# Patient Record
Sex: MALE | Race: WHITE | NOT HISPANIC OR LATINO | Employment: OTHER | URBAN - METROPOLITAN AREA
[De-identification: names, ages, dates, MRNs, and addresses within clinical notes are randomized per-mention and may not be internally consistent; named-entity substitution may affect disease eponyms.]

---

## 2017-01-04 ENCOUNTER — AMBULATORY - HEALTHEAST (OUTPATIENT)
Dept: LAB | Facility: CLINIC | Age: 52
End: 2017-01-04

## 2017-01-04 DIAGNOSIS — E11.9 TYPE 2 DIABETES MELLITUS WITHOUT COMPLICATION, WITHOUT LONG-TERM CURRENT USE OF INSULIN (H): ICD-10-CM

## 2017-01-04 LAB
CHOLEST SERPL-MCNC: 111 MG/DL
FASTING STATUS PATIENT QL REPORTED: YES
HDLC SERPL-MCNC: 32 MG/DL
LDLC SERPL CALC-MCNC: 61 MG/DL
TRIGL SERPL-MCNC: 92 MG/DL

## 2017-01-05 ENCOUNTER — COMMUNICATION - HEALTHEAST (OUTPATIENT)
Dept: FAMILY MEDICINE | Facility: CLINIC | Age: 52
End: 2017-01-05

## 2017-01-11 ENCOUNTER — OFFICE VISIT - HEALTHEAST (OUTPATIENT)
Dept: NURSING | Facility: CLINIC | Age: 52
End: 2017-01-11

## 2017-01-11 ENCOUNTER — OFFICE VISIT - HEALTHEAST (OUTPATIENT)
Dept: FAMILY MEDICINE | Facility: CLINIC | Age: 52
End: 2017-01-11

## 2017-01-11 DIAGNOSIS — R80.9 PROTEINURIA: ICD-10-CM

## 2017-01-11 DIAGNOSIS — R79.89 ELEVATED LFTS: ICD-10-CM

## 2017-01-11 DIAGNOSIS — E66.3 OVERWEIGHT: ICD-10-CM

## 2017-01-11 DIAGNOSIS — E11.9 TYPE 2 DIABETES MELLITUS WITHOUT COMPLICATION, WITHOUT LONG-TERM CURRENT USE OF INSULIN (H): ICD-10-CM

## 2017-01-11 DIAGNOSIS — E78.00 PURE HYPERCHOLESTEROLEMIA: ICD-10-CM

## 2017-01-25 ENCOUNTER — OFFICE VISIT - HEALTHEAST (OUTPATIENT)
Dept: SLEEP MEDICINE | Facility: CLINIC | Age: 52
End: 2017-01-25

## 2017-01-25 DIAGNOSIS — G47.33 OSA ON CPAP: ICD-10-CM

## 2017-01-25 ASSESSMENT — MIFFLIN-ST. JEOR: SCORE: 1992.46

## 2017-01-27 ENCOUNTER — AMBULATORY - HEALTHEAST (OUTPATIENT)
Dept: SLEEP MEDICINE | Facility: CLINIC | Age: 52
End: 2017-01-27

## 2017-02-01 ENCOUNTER — AMBULATORY - HEALTHEAST (OUTPATIENT)
Dept: FAMILY MEDICINE | Facility: CLINIC | Age: 52
End: 2017-02-01

## 2017-02-01 DIAGNOSIS — Z71.84 TRAVEL ADVICE ENCOUNTER: ICD-10-CM

## 2017-02-04 ENCOUNTER — COMMUNICATION - HEALTHEAST (OUTPATIENT)
Dept: FAMILY MEDICINE | Facility: CLINIC | Age: 52
End: 2017-02-04

## 2017-02-04 DIAGNOSIS — I10 HTN (HYPERTENSION): ICD-10-CM

## 2017-02-09 ENCOUNTER — RECORDS - HEALTHEAST (OUTPATIENT)
Dept: ADMINISTRATIVE | Facility: OTHER | Age: 52
End: 2017-02-09

## 2017-02-15 ENCOUNTER — AMBULATORY - HEALTHEAST (OUTPATIENT)
Dept: LAB | Facility: CLINIC | Age: 52
End: 2017-02-15

## 2017-02-15 DIAGNOSIS — E11.9 TYPE 2 DIABETES MELLITUS WITHOUT COMPLICATION, WITHOUT LONG-TERM CURRENT USE OF INSULIN (H): ICD-10-CM

## 2017-02-15 DIAGNOSIS — R80.9 PROTEINURIA: ICD-10-CM

## 2017-02-15 LAB — HBA1C MFR BLD: 4.9 % (ref 3.5–6)

## 2017-03-06 ENCOUNTER — COMMUNICATION - HEALTHEAST (OUTPATIENT)
Dept: SLEEP MEDICINE | Facility: CLINIC | Age: 52
End: 2017-03-06

## 2017-04-26 ENCOUNTER — OFFICE VISIT - HEALTHEAST (OUTPATIENT)
Dept: FAMILY MEDICINE | Facility: CLINIC | Age: 52
End: 2017-04-26

## 2017-04-26 DIAGNOSIS — E11.9 TYPE 2 DIABETES MELLITUS WITHOUT COMPLICATION, WITHOUT LONG-TERM CURRENT USE OF INSULIN (H): ICD-10-CM

## 2017-04-26 DIAGNOSIS — Z71.84 TRAVEL ADVICE ENCOUNTER: ICD-10-CM

## 2017-05-03 ENCOUNTER — AMBULATORY - HEALTHEAST (OUTPATIENT)
Dept: FAMILY MEDICINE | Facility: CLINIC | Age: 52
End: 2017-05-03

## 2017-05-03 ENCOUNTER — COMMUNICATION - HEALTHEAST (OUTPATIENT)
Dept: FAMILY MEDICINE | Facility: CLINIC | Age: 52
End: 2017-05-03

## 2017-05-15 ENCOUNTER — COMMUNICATION - HEALTHEAST (OUTPATIENT)
Dept: FAMILY MEDICINE | Facility: CLINIC | Age: 52
End: 2017-05-15

## 2017-05-15 DIAGNOSIS — J30.9 ALLERGIC RHINITIS: ICD-10-CM

## 2017-06-29 ENCOUNTER — AMBULATORY - HEALTHEAST (OUTPATIENT)
Dept: NURSING | Facility: CLINIC | Age: 52
End: 2017-06-29

## 2017-08-22 ENCOUNTER — AMBULATORY - HEALTHEAST (OUTPATIENT)
Dept: LAB | Facility: CLINIC | Age: 52
End: 2017-08-22

## 2017-08-22 DIAGNOSIS — Z71.84 TRAVEL ADVICE ENCOUNTER: ICD-10-CM

## 2017-08-22 DIAGNOSIS — E11.9 TYPE 2 DIABETES MELLITUS WITHOUT COMPLICATION, WITHOUT LONG-TERM CURRENT USE OF INSULIN (H): ICD-10-CM

## 2017-08-22 LAB
CHOLEST SERPL-MCNC: 114 MG/DL
FASTING STATUS PATIENT QL REPORTED: YES
HBA1C MFR BLD: 4.7 % (ref 3.5–6)
HDLC SERPL-MCNC: 35 MG/DL
LDLC SERPL CALC-MCNC: 63 MG/DL
TRIGL SERPL-MCNC: 78 MG/DL

## 2017-08-23 ENCOUNTER — COMMUNICATION - HEALTHEAST (OUTPATIENT)
Dept: FAMILY MEDICINE | Facility: CLINIC | Age: 52
End: 2017-08-23

## 2017-08-25 ENCOUNTER — OFFICE VISIT - HEALTHEAST (OUTPATIENT)
Dept: FAMILY MEDICINE | Facility: CLINIC | Age: 52
End: 2017-08-25

## 2017-08-25 DIAGNOSIS — F32.A DEPRESSION: ICD-10-CM

## 2017-08-25 DIAGNOSIS — E11.9 TYPE 2 DIABETES MELLITUS WITHOUT COMPLICATION, WITHOUT LONG-TERM CURRENT USE OF INSULIN (H): ICD-10-CM

## 2017-08-25 DIAGNOSIS — E78.00 PURE HYPERCHOLESTEROLEMIA: ICD-10-CM

## 2017-08-25 DIAGNOSIS — G47.30 SLEEP APNEA, UNSPECIFIED TYPE: ICD-10-CM

## 2017-08-25 DIAGNOSIS — M25.522 LEFT ELBOW PAIN: ICD-10-CM

## 2017-09-01 ENCOUNTER — RECORDS - HEALTHEAST (OUTPATIENT)
Dept: ADMINISTRATIVE | Facility: OTHER | Age: 52
End: 2017-09-01

## 2017-10-24 ENCOUNTER — COMMUNICATION - HEALTHEAST (OUTPATIENT)
Dept: NURSING | Facility: CLINIC | Age: 52
End: 2017-10-24

## 2017-10-24 DIAGNOSIS — E11.9 TYPE 2 DIABETES MELLITUS WITHOUT COMPLICATION, WITHOUT LONG-TERM CURRENT USE OF INSULIN (H): ICD-10-CM

## 2017-10-31 ENCOUNTER — AMBULATORY - HEALTHEAST (OUTPATIENT)
Dept: NURSING | Facility: CLINIC | Age: 52
End: 2017-10-31

## 2017-11-08 ENCOUNTER — COMMUNICATION - HEALTHEAST (OUTPATIENT)
Dept: NURSING | Facility: CLINIC | Age: 52
End: 2017-11-08

## 2017-11-08 DIAGNOSIS — E78.00 PURE HYPERCHOLESTEROLEMIA: ICD-10-CM

## 2017-12-14 ENCOUNTER — COMMUNICATION - HEALTHEAST (OUTPATIENT)
Dept: NURSING | Facility: CLINIC | Age: 52
End: 2017-12-14

## 2017-12-14 ENCOUNTER — COMMUNICATION - HEALTHEAST (OUTPATIENT)
Dept: FAMILY MEDICINE | Facility: CLINIC | Age: 52
End: 2017-12-14

## 2017-12-14 DIAGNOSIS — I10 HTN (HYPERTENSION): ICD-10-CM

## 2017-12-14 DIAGNOSIS — E11.9 TYPE 2 DIABETES MELLITUS WITHOUT COMPLICATION, WITHOUT LONG-TERM CURRENT USE OF INSULIN (H): ICD-10-CM

## 2018-01-28 ENCOUNTER — COMMUNICATION - HEALTHEAST (OUTPATIENT)
Dept: NURSING | Facility: CLINIC | Age: 53
End: 2018-01-28

## 2018-01-28 DIAGNOSIS — I10 HTN (HYPERTENSION): ICD-10-CM

## 2018-01-28 DIAGNOSIS — E11.9 TYPE 2 DIABETES MELLITUS WITHOUT COMPLICATION, WITHOUT LONG-TERM CURRENT USE OF INSULIN (H): ICD-10-CM

## 2018-02-11 ENCOUNTER — COMMUNICATION - HEALTHEAST (OUTPATIENT)
Dept: FAMILY MEDICINE | Facility: CLINIC | Age: 53
End: 2018-02-11

## 2018-02-11 DIAGNOSIS — E11.9 TYPE 2 DIABETES MELLITUS WITHOUT COMPLICATION, WITHOUT LONG-TERM CURRENT USE OF INSULIN (H): ICD-10-CM

## 2018-02-11 DIAGNOSIS — E78.00 PURE HYPERCHOLESTEROLEMIA: ICD-10-CM

## 2018-04-30 ENCOUNTER — COMMUNICATION - HEALTHEAST (OUTPATIENT)
Dept: NURSING | Facility: CLINIC | Age: 53
End: 2018-04-30

## 2018-04-30 DIAGNOSIS — E11.9 TYPE 2 DIABETES MELLITUS WITHOUT COMPLICATION, WITHOUT LONG-TERM CURRENT USE OF INSULIN (H): ICD-10-CM

## 2018-05-08 ENCOUNTER — COMMUNICATION - HEALTHEAST (OUTPATIENT)
Dept: FAMILY MEDICINE | Facility: CLINIC | Age: 53
End: 2018-05-08

## 2018-05-08 DIAGNOSIS — J30.9 ALLERGIC RHINITIS: ICD-10-CM

## 2018-05-24 ENCOUNTER — COMMUNICATION - HEALTHEAST (OUTPATIENT)
Dept: NURSING | Facility: CLINIC | Age: 53
End: 2018-05-24

## 2018-05-24 DIAGNOSIS — E11.9 TYPE 2 DIABETES MELLITUS WITHOUT COMPLICATION, WITHOUT LONG-TERM CURRENT USE OF INSULIN (H): ICD-10-CM

## 2018-05-25 ENCOUNTER — COMMUNICATION - HEALTHEAST (OUTPATIENT)
Dept: FAMILY MEDICINE | Facility: CLINIC | Age: 53
End: 2018-05-25

## 2018-05-25 DIAGNOSIS — E11.9 TYPE 2 DIABETES MELLITUS WITHOUT COMPLICATION, WITHOUT LONG-TERM CURRENT USE OF INSULIN (H): ICD-10-CM

## 2018-05-31 ENCOUNTER — OFFICE VISIT - HEALTHEAST (OUTPATIENT)
Dept: FAMILY MEDICINE | Facility: CLINIC | Age: 53
End: 2018-05-31

## 2018-05-31 DIAGNOSIS — G47.30 SLEEP APNEA, UNSPECIFIED TYPE: ICD-10-CM

## 2018-05-31 DIAGNOSIS — I10 ESSENTIAL HYPERTENSION: ICD-10-CM

## 2018-05-31 DIAGNOSIS — E78.00 PURE HYPERCHOLESTEROLEMIA: ICD-10-CM

## 2018-05-31 DIAGNOSIS — E11.9 TYPE 2 DIABETES MELLITUS WITHOUT COMPLICATION, WITHOUT LONG-TERM CURRENT USE OF INSULIN (H): ICD-10-CM

## 2018-05-31 DIAGNOSIS — E66.9 OBESITY: ICD-10-CM

## 2018-05-31 DIAGNOSIS — Z00.00 HEALTH CARE MAINTENANCE: ICD-10-CM

## 2018-05-31 RX ORDER — 1.1% SODIUM FLUORIDE 11 MG/G
GEL DENTAL
Status: SHIPPED | COMMUNITY
Start: 2018-05-29 | End: 2022-12-18

## 2018-06-01 ENCOUNTER — AMBULATORY - HEALTHEAST (OUTPATIENT)
Dept: LAB | Facility: CLINIC | Age: 53
End: 2018-06-01

## 2018-06-01 DIAGNOSIS — I10 ESSENTIAL HYPERTENSION: ICD-10-CM

## 2018-06-01 DIAGNOSIS — E11.9 TYPE 2 DIABETES MELLITUS WITHOUT COMPLICATION, WITHOUT LONG-TERM CURRENT USE OF INSULIN (H): ICD-10-CM

## 2018-06-01 DIAGNOSIS — Z00.00 HEALTH CARE MAINTENANCE: ICD-10-CM

## 2018-06-01 DIAGNOSIS — E78.00 PURE HYPERCHOLESTEROLEMIA: ICD-10-CM

## 2018-06-01 LAB
ALBUMIN SERPL-MCNC: 4.2 G/DL (ref 3.5–5)
ALP SERPL-CCNC: 90 U/L (ref 45–120)
ALT SERPL W P-5'-P-CCNC: 16 U/L (ref 0–45)
ANION GAP SERPL CALCULATED.3IONS-SCNC: 9 MMOL/L (ref 5–18)
AST SERPL W P-5'-P-CCNC: 26 U/L (ref 0–40)
BILIRUB SERPL-MCNC: 1.3 MG/DL (ref 0–1)
BUN SERPL-MCNC: 13 MG/DL (ref 8–22)
CALCIUM SERPL-MCNC: 9.3 MG/DL (ref 8.5–10.5)
CHLORIDE BLD-SCNC: 111 MMOL/L (ref 98–107)
CHOLEST SERPL-MCNC: 146 MG/DL
CO2 SERPL-SCNC: 25 MMOL/L (ref 22–31)
CREAT SERPL-MCNC: 0.87 MG/DL (ref 0.7–1.3)
CREAT UR-MCNC: 110.3 MG/DL
FASTING STATUS PATIENT QL REPORTED: YES
GFR SERPL CREATININE-BSD FRML MDRD: >60 ML/MIN/1.73M2
GLUCOSE BLD-MCNC: 115 MG/DL (ref 70–125)
HBA1C MFR BLD: 5.2 % (ref 3.5–6)
HDLC SERPL-MCNC: 36 MG/DL
LDLC SERPL CALC-MCNC: 91 MG/DL
MICROALBUMIN UR-MCNC: 11.58 MG/DL (ref 0–1.99)
MICROALBUMIN/CREAT UR: 105 MG/G
POTASSIUM BLD-SCNC: 4 MMOL/L (ref 3.5–5)
PROT SERPL-MCNC: 7.1 G/DL (ref 6–8)
PSA SERPL-MCNC: 2.3 NG/ML (ref 0–3.5)
SODIUM SERPL-SCNC: 145 MMOL/L (ref 136–145)
TRIGL SERPL-MCNC: 97 MG/DL
TSH SERPL DL<=0.005 MIU/L-ACNC: 0.63 UIU/ML (ref 0.3–5)

## 2018-06-04 ENCOUNTER — COMMUNICATION - HEALTHEAST (OUTPATIENT)
Dept: FAMILY MEDICINE | Facility: CLINIC | Age: 53
End: 2018-06-04

## 2018-06-04 LAB — 25(OH)D3 SERPL-MCNC: 29.6 NG/ML (ref 30–80)

## 2018-07-23 ENCOUNTER — AMBULATORY - HEALTHEAST (OUTPATIENT)
Dept: FAMILY MEDICINE | Facility: CLINIC | Age: 53
End: 2018-07-23

## 2018-07-23 ENCOUNTER — COMMUNICATION - HEALTHEAST (OUTPATIENT)
Dept: FAMILY MEDICINE | Facility: CLINIC | Age: 53
End: 2018-07-23

## 2018-07-23 DIAGNOSIS — I10 ESSENTIAL HYPERTENSION: ICD-10-CM

## 2018-07-27 ENCOUNTER — COMMUNICATION - HEALTHEAST (OUTPATIENT)
Dept: NURSING | Facility: CLINIC | Age: 53
End: 2018-07-27

## 2018-07-27 DIAGNOSIS — I10 HTN (HYPERTENSION): ICD-10-CM

## 2018-07-30 ENCOUNTER — COMMUNICATION - HEALTHEAST (OUTPATIENT)
Dept: FAMILY MEDICINE | Facility: CLINIC | Age: 53
End: 2018-07-30

## 2018-07-30 DIAGNOSIS — E78.00 PURE HYPERCHOLESTEROLEMIA: ICD-10-CM

## 2018-09-05 ENCOUNTER — OFFICE VISIT - HEALTHEAST (OUTPATIENT)
Dept: FAMILY MEDICINE | Facility: CLINIC | Age: 53
End: 2018-09-05

## 2018-09-05 ENCOUNTER — COMMUNICATION - HEALTHEAST (OUTPATIENT)
Dept: FAMILY MEDICINE | Facility: CLINIC | Age: 53
End: 2018-09-05

## 2018-09-05 DIAGNOSIS — E78.00 PURE HYPERCHOLESTEROLEMIA: ICD-10-CM

## 2018-09-05 DIAGNOSIS — E11.9 TYPE 2 DIABETES MELLITUS WITHOUT COMPLICATION, WITHOUT LONG-TERM CURRENT USE OF INSULIN (H): ICD-10-CM

## 2018-09-05 DIAGNOSIS — S06.9XAA TBI (TRAUMATIC BRAIN INJURY) (H): ICD-10-CM

## 2018-09-05 DIAGNOSIS — G47.30 SLEEP APNEA, UNSPECIFIED TYPE: ICD-10-CM

## 2018-09-05 DIAGNOSIS — E66.9 OBESITY: ICD-10-CM

## 2018-09-05 DIAGNOSIS — I10 ESSENTIAL HYPERTENSION: ICD-10-CM

## 2018-09-05 LAB
ALBUMIN SERPL-MCNC: 4.2 G/DL (ref 3.5–5)
ALP SERPL-CCNC: 83 U/L (ref 45–120)
ALT SERPL W P-5'-P-CCNC: 19 U/L (ref 0–45)
ANION GAP SERPL CALCULATED.3IONS-SCNC: 10 MMOL/L (ref 5–18)
AST SERPL W P-5'-P-CCNC: 22 U/L (ref 0–40)
BILIRUB SERPL-MCNC: 1.5 MG/DL (ref 0–1)
BUN SERPL-MCNC: 15 MG/DL (ref 8–22)
CALCIUM SERPL-MCNC: 9.6 MG/DL (ref 8.5–10.5)
CHLORIDE BLD-SCNC: 108 MMOL/L (ref 98–107)
CHOLEST SERPL-MCNC: 133 MG/DL
CO2 SERPL-SCNC: 28 MMOL/L (ref 22–31)
CREAT SERPL-MCNC: 0.9 MG/DL (ref 0.7–1.3)
CREAT UR-MCNC: 118.9 MG/DL
FASTING STATUS PATIENT QL REPORTED: YES
GFR SERPL CREATININE-BSD FRML MDRD: >60 ML/MIN/1.73M2
GLUCOSE BLD-MCNC: 99 MG/DL (ref 70–125)
HBA1C MFR BLD: 4.9 % (ref 3.5–6)
HDLC SERPL-MCNC: 34 MG/DL
LDLC SERPL CALC-MCNC: 75 MG/DL
MICROALBUMIN UR-MCNC: 11.41 MG/DL (ref 0–1.99)
MICROALBUMIN/CREAT UR: 96 MG/G
POTASSIUM BLD-SCNC: 4.3 MMOL/L (ref 3.5–5)
PROT SERPL-MCNC: 7 G/DL (ref 6–8)
SODIUM SERPL-SCNC: 146 MMOL/L (ref 136–145)
TRIGL SERPL-MCNC: 121 MG/DL

## 2018-09-09 ENCOUNTER — COMMUNICATION - HEALTHEAST (OUTPATIENT)
Dept: FAMILY MEDICINE | Facility: CLINIC | Age: 53
End: 2018-09-09

## 2018-09-09 DIAGNOSIS — I10 HTN (HYPERTENSION): ICD-10-CM

## 2018-10-13 ENCOUNTER — COMMUNICATION - HEALTHEAST (OUTPATIENT)
Dept: FAMILY MEDICINE | Facility: CLINIC | Age: 53
End: 2018-10-13

## 2018-12-05 ENCOUNTER — AMBULATORY - HEALTHEAST (OUTPATIENT)
Dept: FAMILY MEDICINE | Facility: CLINIC | Age: 53
End: 2018-12-05

## 2018-12-05 ENCOUNTER — COMMUNICATION - HEALTHEAST (OUTPATIENT)
Dept: LAB | Facility: CLINIC | Age: 53
End: 2018-12-05

## 2018-12-05 DIAGNOSIS — I10 ESSENTIAL HYPERTENSION: ICD-10-CM

## 2018-12-05 DIAGNOSIS — E11.9 TYPE 2 DIABETES MELLITUS WITHOUT COMPLICATION, WITHOUT LONG-TERM CURRENT USE OF INSULIN (H): ICD-10-CM

## 2018-12-05 DIAGNOSIS — E78.00 PURE HYPERCHOLESTEROLEMIA: ICD-10-CM

## 2018-12-06 ENCOUNTER — AMBULATORY - HEALTHEAST (OUTPATIENT)
Dept: LAB | Facility: CLINIC | Age: 53
End: 2018-12-06

## 2018-12-06 DIAGNOSIS — E78.00 PURE HYPERCHOLESTEROLEMIA: ICD-10-CM

## 2018-12-06 DIAGNOSIS — E11.9 TYPE 2 DIABETES MELLITUS WITHOUT COMPLICATION, WITHOUT LONG-TERM CURRENT USE OF INSULIN (H): ICD-10-CM

## 2018-12-06 DIAGNOSIS — I10 ESSENTIAL HYPERTENSION: ICD-10-CM

## 2018-12-06 LAB
ALBUMIN SERPL-MCNC: 4 G/DL (ref 3.5–5)
ALP SERPL-CCNC: 87 U/L (ref 45–120)
ALT SERPL W P-5'-P-CCNC: 16 U/L (ref 0–45)
ANION GAP SERPL CALCULATED.3IONS-SCNC: 10 MMOL/L (ref 5–18)
AST SERPL W P-5'-P-CCNC: 19 U/L (ref 0–40)
BILIRUB SERPL-MCNC: 1.3 MG/DL (ref 0–1)
BUN SERPL-MCNC: 15 MG/DL (ref 8–22)
CALCIUM SERPL-MCNC: 9.5 MG/DL (ref 8.5–10.5)
CHLORIDE BLD-SCNC: 108 MMOL/L (ref 98–107)
CHOLEST SERPL-MCNC: 133 MG/DL
CO2 SERPL-SCNC: 28 MMOL/L (ref 22–31)
CREAT SERPL-MCNC: 0.98 MG/DL (ref 0.7–1.3)
FASTING STATUS PATIENT QL REPORTED: YES
GFR SERPL CREATININE-BSD FRML MDRD: >60 ML/MIN/1.73M2
GLUCOSE BLD-MCNC: 125 MG/DL (ref 70–125)
HBA1C MFR BLD: 5 % (ref 3.5–6)
HDLC SERPL-MCNC: 37 MG/DL
LDLC SERPL CALC-MCNC: 78 MG/DL
POTASSIUM BLD-SCNC: 3.5 MMOL/L (ref 3.5–5)
PROT SERPL-MCNC: 6.6 G/DL (ref 6–8)
SODIUM SERPL-SCNC: 146 MMOL/L (ref 136–145)
TRIGL SERPL-MCNC: 92 MG/DL

## 2018-12-10 ENCOUNTER — OFFICE VISIT - HEALTHEAST (OUTPATIENT)
Dept: FAMILY MEDICINE | Facility: CLINIC | Age: 53
End: 2018-12-10

## 2018-12-10 DIAGNOSIS — E11.9 TYPE 2 DIABETES MELLITUS WITHOUT COMPLICATION, WITHOUT LONG-TERM CURRENT USE OF INSULIN (H): ICD-10-CM

## 2018-12-10 DIAGNOSIS — E66.01 MORBID OBESITY (H): ICD-10-CM

## 2018-12-10 DIAGNOSIS — I10 ESSENTIAL HYPERTENSION: ICD-10-CM

## 2018-12-10 DIAGNOSIS — E78.00 PURE HYPERCHOLESTEROLEMIA: ICD-10-CM

## 2018-12-18 ENCOUNTER — COMMUNICATION - HEALTHEAST (OUTPATIENT)
Dept: FAMILY MEDICINE | Facility: CLINIC | Age: 53
End: 2018-12-18

## 2018-12-18 DIAGNOSIS — E11.9 TYPE 2 DIABETES MELLITUS WITHOUT COMPLICATION, WITHOUT LONG-TERM CURRENT USE OF INSULIN (H): ICD-10-CM

## 2018-12-31 ENCOUNTER — AMBULATORY - HEALTHEAST (OUTPATIENT)
Dept: NURSING | Facility: CLINIC | Age: 53
End: 2018-12-31

## 2018-12-31 ENCOUNTER — AMBULATORY - HEALTHEAST (OUTPATIENT)
Dept: FAMILY MEDICINE | Facility: CLINIC | Age: 53
End: 2018-12-31

## 2018-12-31 DIAGNOSIS — I10 ESSENTIAL HYPERTENSION: ICD-10-CM

## 2019-01-25 ENCOUNTER — COMMUNICATION - HEALTHEAST (OUTPATIENT)
Dept: FAMILY MEDICINE | Facility: CLINIC | Age: 54
End: 2019-01-25

## 2019-01-25 ENCOUNTER — AMBULATORY - HEALTHEAST (OUTPATIENT)
Dept: FAMILY MEDICINE | Facility: CLINIC | Age: 54
End: 2019-01-25

## 2019-01-25 DIAGNOSIS — I10 ESSENTIAL HYPERTENSION: ICD-10-CM

## 2019-04-23 ENCOUNTER — COMMUNICATION - HEALTHEAST (OUTPATIENT)
Dept: FAMILY MEDICINE | Facility: CLINIC | Age: 54
End: 2019-04-23

## 2019-04-23 DIAGNOSIS — E78.00 PURE HYPERCHOLESTEROLEMIA: ICD-10-CM

## 2019-05-10 ENCOUNTER — AMBULATORY - HEALTHEAST (OUTPATIENT)
Dept: LAB | Facility: CLINIC | Age: 54
End: 2019-05-10

## 2019-05-10 DIAGNOSIS — E78.00 PURE HYPERCHOLESTEROLEMIA: ICD-10-CM

## 2019-05-10 DIAGNOSIS — I10 ESSENTIAL HYPERTENSION: ICD-10-CM

## 2019-05-10 DIAGNOSIS — E11.9 TYPE 2 DIABETES MELLITUS WITHOUT COMPLICATION, WITHOUT LONG-TERM CURRENT USE OF INSULIN (H): ICD-10-CM

## 2019-05-10 LAB
ALBUMIN SERPL-MCNC: 4.2 G/DL (ref 3.5–5)
ALP SERPL-CCNC: 89 U/L (ref 45–120)
ALT SERPL W P-5'-P-CCNC: 18 U/L (ref 0–45)
ANION GAP SERPL CALCULATED.3IONS-SCNC: 10 MMOL/L (ref 5–18)
AST SERPL W P-5'-P-CCNC: 20 U/L (ref 0–40)
BILIRUB SERPL-MCNC: 1.3 MG/DL (ref 0–1)
BUN SERPL-MCNC: 14 MG/DL (ref 8–22)
CALCIUM SERPL-MCNC: 9.4 MG/DL (ref 8.5–10.5)
CHLORIDE BLD-SCNC: 109 MMOL/L (ref 98–107)
CHOLEST SERPL-MCNC: 123 MG/DL
CO2 SERPL-SCNC: 25 MMOL/L (ref 22–31)
CREAT SERPL-MCNC: 0.93 MG/DL (ref 0.7–1.3)
CREAT UR-MCNC: 152.5 MG/DL
FASTING STATUS PATIENT QL REPORTED: YES
GFR SERPL CREATININE-BSD FRML MDRD: >60 ML/MIN/1.73M2
GLUCOSE BLD-MCNC: 141 MG/DL (ref 70–125)
HBA1C MFR BLD: 5.8 % (ref 3.5–6)
HDLC SERPL-MCNC: 34 MG/DL
LDLC SERPL CALC-MCNC: 68 MG/DL
MICROALBUMIN UR-MCNC: 34.32 MG/DL (ref 0–1.99)
MICROALBUMIN/CREAT UR: 225 MG/G
POTASSIUM BLD-SCNC: 4 MMOL/L (ref 3.5–5)
PROT SERPL-MCNC: 7 G/DL (ref 6–8)
SODIUM SERPL-SCNC: 144 MMOL/L (ref 136–145)
TRIGL SERPL-MCNC: 103 MG/DL
TSH SERPL DL<=0.005 MIU/L-ACNC: 0.41 UIU/ML (ref 0.3–5)

## 2019-05-13 LAB — 25(OH)D3 SERPL-MCNC: 41.7 NG/ML (ref 30–80)

## 2019-05-20 ENCOUNTER — COMMUNICATION - HEALTHEAST (OUTPATIENT)
Dept: SCHEDULING | Facility: CLINIC | Age: 54
End: 2019-05-20

## 2019-05-20 ENCOUNTER — OFFICE VISIT - HEALTHEAST (OUTPATIENT)
Dept: FAMILY MEDICINE | Facility: CLINIC | Age: 54
End: 2019-05-20

## 2019-05-20 DIAGNOSIS — R32 URINARY INCONTINENCE, UNSPECIFIED TYPE: ICD-10-CM

## 2019-05-20 DIAGNOSIS — R31.29 MICROSCOPIC HEMATURIA: ICD-10-CM

## 2019-05-20 DIAGNOSIS — E66.01 MORBID OBESITY (H): ICD-10-CM

## 2019-05-20 DIAGNOSIS — E11.9 TYPE 2 DIABETES MELLITUS WITHOUT COMPLICATION, WITHOUT LONG-TERM CURRENT USE OF INSULIN (H): ICD-10-CM

## 2019-05-20 DIAGNOSIS — E78.00 PURE HYPERCHOLESTEROLEMIA: ICD-10-CM

## 2019-05-20 DIAGNOSIS — I10 ESSENTIAL HYPERTENSION: ICD-10-CM

## 2019-05-20 LAB
ALBUMIN UR-MCNC: ABNORMAL MG/DL
APPEARANCE UR: CLEAR
BACTERIA #/AREA URNS HPF: ABNORMAL HPF
BILIRUB UR QL STRIP: NEGATIVE
COLOR UR AUTO: YELLOW
GLUCOSE UR STRIP-MCNC: ABNORMAL MG/DL
HGB UR QL STRIP: ABNORMAL
KETONES UR STRIP-MCNC: ABNORMAL MG/DL
LEUKOCYTE ESTERASE UR QL STRIP: NEGATIVE
MUCOUS THREADS #/AREA URNS LPF: ABNORMAL LPF
NITRATE UR QL: NEGATIVE
PH UR STRIP: 6 [PH] (ref 5–8)
RBC #/AREA URNS AUTO: ABNORMAL HPF
SP GR UR STRIP: 1.02 (ref 1–1.03)
SQUAMOUS #/AREA URNS AUTO: ABNORMAL LPF
UROBILINOGEN UR STRIP-ACNC: ABNORMAL
WBC #/AREA URNS AUTO: ABNORMAL HPF

## 2019-05-21 LAB — BACTERIA SPEC CULT: NO GROWTH

## 2019-05-22 ENCOUNTER — COMMUNICATION - HEALTHEAST (OUTPATIENT)
Dept: FAMILY MEDICINE | Facility: CLINIC | Age: 54
End: 2019-05-22

## 2019-05-22 ENCOUNTER — AMBULATORY - HEALTHEAST (OUTPATIENT)
Dept: FAMILY MEDICINE | Facility: CLINIC | Age: 54
End: 2019-05-22

## 2019-05-22 DIAGNOSIS — R31.29 MICROSCOPIC HEMATURIA: ICD-10-CM

## 2019-05-24 ENCOUNTER — COMMUNICATION - HEALTHEAST (OUTPATIENT)
Dept: FAMILY MEDICINE | Facility: CLINIC | Age: 54
End: 2019-05-24

## 2019-05-24 ENCOUNTER — HOSPITAL ENCOUNTER (OUTPATIENT)
Dept: CARDIOLOGY | Facility: CLINIC | Age: 54
Discharge: HOME | End: 2019-05-24
Attending: FAMILY MEDICINE

## 2019-05-24 DIAGNOSIS — I10 ESSENTIAL HYPERTENSION: ICD-10-CM

## 2019-05-24 DIAGNOSIS — E11.9 TYPE 2 DIABETES MELLITUS WITHOUT COMPLICATION, WITHOUT LONG-TERM CURRENT USE OF INSULIN (H): ICD-10-CM

## 2019-05-24 DIAGNOSIS — E66.01 MORBID OBESITY (H): ICD-10-CM

## 2019-05-24 LAB
AORTIC ROOT: 3.7 CM
AORTIC VALVE MEAN VELOCITY: 106 CM/S
ASCENDING AORTA: 4.2 CM
AV DIMENSIONLESS INDEX VTI: 0.6
AV MEAN GRADIENT: 5 MMHG
AV PEAK GRADIENT: 10.5 MMHG
AV VALVE AREA: 3.2 CM2
AV VELOCITY RATIO: 0.6
BSA FOR ECHO PROCEDURE: 2.52 M2
CV BLOOD PRESSURE: ABNORMAL MMHG
CV ECHO HEIGHT: 70 IN
CV ECHO WEIGHT: 284 LBS
DOP CALC AO PEAK VEL: 162 CM/S
DOP CALC AO VTI: 33.7 CM
DOP CALC LVOT AREA: 5.72 CM2
DOP CALC LVOT DIAMETER: 2.7 CM
DOP CALC LVOT PEAK VEL: 90.4 CM/S
DOP CALC LVOT STROKE VOLUME: 106.4 CM3
DOP CALCLVOT PEAK VEL VTI: 18.6 CM
EJECTION FRACTION: 65 % (ref 55–75)
FRACTIONAL SHORTENING: 30 % (ref 28–44)
INTERVENTRICULAR SEPTUM IN END DIASTOLE: 2 CM (ref 0.6–1)
IVS/PW RATIO: 1
LA AREA 1: 25.5 CM2
LA AREA 2: 21.9 CM2
LEFT ATRIUM LENGTH: 5.82 CM
LEFT ATRIUM SIZE: 5.1 CM
LEFT ATRIUM VOLUME INDEX: 32.4 ML/M2
LEFT ATRIUM VOLUME: 81.6 ML
LEFT VENTRICLE CARDIAC INDEX: 2.6 L/MIN/M2
LEFT VENTRICLE CARDIAC OUTPUT: 6.5 L/MIN
LEFT VENTRICLE DIASTOLIC VOLUME INDEX: 57.5 CM3/M2 (ref 34–74)
LEFT VENTRICLE DIASTOLIC VOLUME: 145 CM3 (ref 62–150)
LEFT VENTRICLE HEART RATE: 61 BPM
LEFT VENTRICLE MASS INDEX: 269.1 G/M2
LEFT VENTRICLE SYSTOLIC VOLUME INDEX: 20.2 CM3/M2 (ref 11–31)
LEFT VENTRICLE SYSTOLIC VOLUME: 51 CM3 (ref 21–61)
LEFT VENTRICULAR INTERNAL DIMENSION IN DIASTOLE: 6 CM (ref 4.2–5.8)
LEFT VENTRICULAR INTERNAL DIMENSION IN SYSTOLE: 4.2 CM (ref 2.5–4)
LEFT VENTRICULAR MASS: 678.1 G
LEFT VENTRICULAR OUTFLOW TRACT MEAN GRADIENT: 2 MMHG
LEFT VENTRICULAR OUTFLOW TRACT MEAN VELOCITY: 57.6 CM/S
LEFT VENTRICULAR OUTFLOW TRACT PEAK GRADIENT: 3 MMHG
LEFT VENTRICULAR POSTERIOR WALL IN END DIASTOLE: 2.1 CM (ref 0.6–1)
LV STROKE VOLUME INDEX: 42.2 ML/M2
MITRAL VALVE E/A RATIO: 1.6
MV AVERAGE E/E' RATIO: 10.9 CM/S
MV DECELERATION TIME: 211 MS
MV E'TISSUE VEL-LAT: 11.1 CM/S
MV E'TISSUE VEL-MED: 5.07 CM/S
MV LATERAL E/E' RATIO: 7.9
MV MEDIAL E/E' RATIO: 17.3
MV PEAK A VELOCITY: 54.1 CM/S
MV PEAK E VELOCITY: 87.9 CM/S
NUC REST DIASTOLIC VOLUME INDEX: 4544 LBS
NUC REST SYSTOLIC VOLUME INDEX: 70 IN
TRICUSPID VALVE ANULAR PLANE SYSTOLIC EXCURSION: 2.3 CM

## 2019-05-24 ASSESSMENT — MIFFLIN-ST. JEOR: SCORE: 2119.47

## 2019-05-28 ENCOUNTER — OFFICE VISIT - HEALTHEAST (OUTPATIENT)
Dept: CARDIOLOGY | Facility: CLINIC | Age: 54
End: 2019-05-28

## 2019-05-28 DIAGNOSIS — I10 UNCONTROLLED HYPERTENSION: ICD-10-CM

## 2019-05-28 ASSESSMENT — MIFFLIN-ST. JEOR: SCORE: 2155.75

## 2019-06-13 ENCOUNTER — COMMUNICATION - HEALTHEAST (OUTPATIENT)
Dept: FAMILY MEDICINE | Facility: CLINIC | Age: 54
End: 2019-06-13

## 2019-06-13 DIAGNOSIS — I10 ESSENTIAL HYPERTENSION: ICD-10-CM

## 2019-06-16 ENCOUNTER — COMMUNICATION - HEALTHEAST (OUTPATIENT)
Dept: FAMILY MEDICINE | Facility: CLINIC | Age: 54
End: 2019-06-16

## 2019-06-16 DIAGNOSIS — I10 ESSENTIAL HYPERTENSION: ICD-10-CM

## 2019-06-25 ENCOUNTER — OFFICE VISIT - HEALTHEAST (OUTPATIENT)
Dept: CARDIOLOGY | Facility: CLINIC | Age: 54
End: 2019-06-25

## 2019-06-25 DIAGNOSIS — G47.30 SLEEP APNEA, UNSPECIFIED TYPE: ICD-10-CM

## 2019-06-25 DIAGNOSIS — E66.01 MORBID OBESITY (H): ICD-10-CM

## 2019-06-25 DIAGNOSIS — I10 ESSENTIAL HYPERTENSION: ICD-10-CM

## 2019-06-25 LAB
ANION GAP SERPL CALCULATED.3IONS-SCNC: 7 MMOL/L (ref 5–18)
ATRIAL RATE - MUSE: 67 BPM
BUN SERPL-MCNC: 16 MG/DL (ref 8–22)
CALCIUM SERPL-MCNC: 9.8 MG/DL (ref 8.5–10.5)
CHLORIDE BLD-SCNC: 109 MMOL/L (ref 98–107)
CO2 SERPL-SCNC: 27 MMOL/L (ref 22–31)
CREAT SERPL-MCNC: 0.98 MG/DL (ref 0.7–1.3)
DIASTOLIC BLOOD PRESSURE - MUSE: NORMAL MMHG
GFR SERPL CREATININE-BSD FRML MDRD: >60 ML/MIN/1.73M2
GLUCOSE BLD-MCNC: 87 MG/DL (ref 70–125)
INTERPRETATION ECG - MUSE: NORMAL
P AXIS - MUSE: 115 DEGREES
POTASSIUM BLD-SCNC: 4.5 MMOL/L (ref 3.5–5)
PR INTERVAL - MUSE: 164 MS
QRS DURATION - MUSE: 104 MS
QT - MUSE: 416 MS
QTC - MUSE: 439 MS
R AXIS - MUSE: 22 DEGREES
SODIUM SERPL-SCNC: 143 MMOL/L (ref 136–145)
SYSTOLIC BLOOD PRESSURE - MUSE: NORMAL MMHG
T AXIS - MUSE: 14 DEGREES
VENTRICULAR RATE- MUSE: 67 BPM

## 2019-06-25 ASSESSMENT — MIFFLIN-ST. JEOR: SCORE: 2132.29

## 2019-06-26 ENCOUNTER — COMMUNICATION - HEALTHEAST (OUTPATIENT)
Dept: CARDIOLOGY | Facility: CLINIC | Age: 54
End: 2019-06-26

## 2019-06-26 DIAGNOSIS — I10 UNCONTROLLED HYPERTENSION: ICD-10-CM

## 2019-06-27 ENCOUNTER — AMBULATORY - HEALTHEAST (OUTPATIENT)
Dept: CARDIOLOGY | Facility: CLINIC | Age: 54
End: 2019-06-27

## 2019-06-27 DIAGNOSIS — I10 ESSENTIAL HYPERTENSION: ICD-10-CM

## 2019-07-08 ENCOUNTER — COMMUNICATION - HEALTHEAST (OUTPATIENT)
Dept: FAMILY MEDICINE | Facility: CLINIC | Age: 54
End: 2019-07-08

## 2019-07-08 ENCOUNTER — COMMUNICATION - HEALTHEAST (OUTPATIENT)
Dept: CARDIOLOGY | Facility: CLINIC | Age: 54
End: 2019-07-08

## 2019-07-17 ENCOUNTER — COMMUNICATION - HEALTHEAST (OUTPATIENT)
Dept: CARDIOLOGY | Facility: CLINIC | Age: 54
End: 2019-07-17

## 2019-07-17 DIAGNOSIS — I10 ESSENTIAL HYPERTENSION: ICD-10-CM

## 2019-07-25 ENCOUNTER — COMMUNICATION - HEALTHEAST (OUTPATIENT)
Dept: FAMILY MEDICINE | Facility: CLINIC | Age: 54
End: 2019-07-25

## 2019-07-30 ENCOUNTER — COMMUNICATION - HEALTHEAST (OUTPATIENT)
Dept: CARDIOLOGY | Facility: CLINIC | Age: 54
End: 2019-07-30

## 2019-08-07 ENCOUNTER — COMMUNICATION - HEALTHEAST (OUTPATIENT)
Dept: FAMILY MEDICINE | Facility: CLINIC | Age: 54
End: 2019-08-07

## 2019-09-11 ENCOUNTER — COMMUNICATION - HEALTHEAST (OUTPATIENT)
Dept: FAMILY MEDICINE | Facility: CLINIC | Age: 54
End: 2019-09-11

## 2019-09-11 DIAGNOSIS — I10 HTN (HYPERTENSION): ICD-10-CM

## 2019-09-15 ENCOUNTER — COMMUNICATION - HEALTHEAST (OUTPATIENT)
Dept: FAMILY MEDICINE | Facility: CLINIC | Age: 54
End: 2019-09-15

## 2019-09-15 DIAGNOSIS — E11.9 TYPE 2 DIABETES MELLITUS WITHOUT COMPLICATION, WITHOUT LONG-TERM CURRENT USE OF INSULIN (H): ICD-10-CM

## 2019-09-29 ENCOUNTER — COMMUNICATION - HEALTHEAST (OUTPATIENT)
Dept: NURSING | Facility: CLINIC | Age: 54
End: 2019-09-29

## 2019-09-29 DIAGNOSIS — E11.9 TYPE 2 DIABETES MELLITUS WITHOUT COMPLICATION, WITHOUT LONG-TERM CURRENT USE OF INSULIN (H): ICD-10-CM

## 2019-09-30 RX ORDER — LANCETS
EACH MISCELLANEOUS
Qty: 300 EACH | Refills: 4 | Status: SHIPPED | OUTPATIENT
Start: 2019-09-30

## 2019-10-07 ENCOUNTER — COMMUNICATION - HEALTHEAST (OUTPATIENT)
Dept: FAMILY MEDICINE | Facility: CLINIC | Age: 54
End: 2019-10-07

## 2019-10-07 DIAGNOSIS — E78.00 PURE HYPERCHOLESTEROLEMIA: ICD-10-CM

## 2019-11-04 ENCOUNTER — COMMUNICATION - HEALTHEAST (OUTPATIENT)
Dept: CARDIOLOGY | Facility: CLINIC | Age: 54
End: 2019-11-04

## 2019-11-04 DIAGNOSIS — I10 ESSENTIAL HYPERTENSION: ICD-10-CM

## 2019-11-21 ENCOUNTER — AMBULATORY - HEALTHEAST (OUTPATIENT)
Dept: LAB | Facility: CLINIC | Age: 54
End: 2019-11-21

## 2019-11-21 ENCOUNTER — AMBULATORY - HEALTHEAST (OUTPATIENT)
Dept: NURSING | Facility: CLINIC | Age: 54
End: 2019-11-21

## 2019-11-21 DIAGNOSIS — I10 ESSENTIAL HYPERTENSION: ICD-10-CM

## 2019-11-21 DIAGNOSIS — E78.00 PURE HYPERCHOLESTEROLEMIA: ICD-10-CM

## 2019-11-21 DIAGNOSIS — E11.9 TYPE 2 DIABETES MELLITUS WITHOUT COMPLICATION, WITHOUT LONG-TERM CURRENT USE OF INSULIN (H): ICD-10-CM

## 2019-11-21 DIAGNOSIS — Z23 NEED FOR INFLUENZA VACCINATION: ICD-10-CM

## 2019-11-21 LAB
ALBUMIN SERPL-MCNC: 4.3 G/DL (ref 3.5–5)
ALP SERPL-CCNC: 98 U/L (ref 45–120)
ALT SERPL W P-5'-P-CCNC: 19 U/L (ref 0–45)
ANION GAP SERPL CALCULATED.3IONS-SCNC: 11 MMOL/L (ref 5–18)
AST SERPL W P-5'-P-CCNC: 19 U/L (ref 0–40)
BILIRUB SERPL-MCNC: 1 MG/DL (ref 0–1)
BUN SERPL-MCNC: 17 MG/DL (ref 8–22)
CALCIUM SERPL-MCNC: 9.5 MG/DL (ref 8.5–10.5)
CHLORIDE BLD-SCNC: 109 MMOL/L (ref 98–107)
CO2 SERPL-SCNC: 24 MMOL/L (ref 22–31)
CREAT SERPL-MCNC: 1.4 MG/DL (ref 0.7–1.3)
GFR SERPL CREATININE-BSD FRML MDRD: 52 ML/MIN/1.73M2
GLUCOSE BLD-MCNC: 128 MG/DL (ref 70–125)
HBA1C MFR BLD: 5.5 % (ref 3.5–6)
POTASSIUM BLD-SCNC: 3.8 MMOL/L (ref 3.5–5)
PROT SERPL-MCNC: 7.2 G/DL (ref 6–8)
SODIUM SERPL-SCNC: 144 MMOL/L (ref 136–145)

## 2019-11-22 ENCOUNTER — COMMUNICATION - HEALTHEAST (OUTPATIENT)
Dept: FAMILY MEDICINE | Facility: CLINIC | Age: 54
End: 2019-11-22

## 2019-12-02 ENCOUNTER — OFFICE VISIT - HEALTHEAST (OUTPATIENT)
Dept: FAMILY MEDICINE | Facility: CLINIC | Age: 54
End: 2019-12-02

## 2019-12-02 DIAGNOSIS — E11.9 TYPE 2 DIABETES MELLITUS WITHOUT COMPLICATION, WITHOUT LONG-TERM CURRENT USE OF INSULIN (H): ICD-10-CM

## 2019-12-02 DIAGNOSIS — R79.89 ELEVATED SERUM CREATININE: ICD-10-CM

## 2019-12-02 DIAGNOSIS — E66.01 MORBID OBESITY (H): ICD-10-CM

## 2019-12-02 DIAGNOSIS — I10 ESSENTIAL HYPERTENSION: ICD-10-CM

## 2019-12-02 DIAGNOSIS — E78.00 PURE HYPERCHOLESTEROLEMIA: ICD-10-CM

## 2019-12-02 ASSESSMENT — PATIENT HEALTH QUESTIONNAIRE - PHQ9: SUM OF ALL RESPONSES TO PHQ QUESTIONS 1-9: 5

## 2020-01-02 ENCOUNTER — AMBULATORY - HEALTHEAST (OUTPATIENT)
Dept: LAB | Facility: CLINIC | Age: 55
End: 2020-01-02

## 2020-01-02 DIAGNOSIS — R31.29 MICROSCOPIC HEMATURIA: ICD-10-CM

## 2020-01-02 DIAGNOSIS — E78.00 PURE HYPERCHOLESTEROLEMIA: ICD-10-CM

## 2020-01-02 DIAGNOSIS — I10 ESSENTIAL HYPERTENSION: ICD-10-CM

## 2020-01-02 DIAGNOSIS — R79.89 ELEVATED SERUM CREATININE: ICD-10-CM

## 2020-01-02 LAB
ALBUMIN SERPL-MCNC: 4.3 G/DL (ref 3.5–5)
ALP SERPL-CCNC: 98 U/L (ref 45–120)
ALT SERPL W P-5'-P-CCNC: 24 U/L (ref 0–45)
ANION GAP SERPL CALCULATED.3IONS-SCNC: 8 MMOL/L (ref 5–18)
AST SERPL W P-5'-P-CCNC: 22 U/L (ref 0–40)
BILIRUB SERPL-MCNC: 1.2 MG/DL (ref 0–1)
BUN SERPL-MCNC: 18 MG/DL (ref 8–22)
CALCIUM SERPL-MCNC: 9.3 MG/DL (ref 8.5–10.5)
CHLORIDE BLD-SCNC: 108 MMOL/L (ref 98–107)
CHOLEST SERPL-MCNC: 140 MG/DL
CO2 SERPL-SCNC: 26 MMOL/L (ref 22–31)
CREAT SERPL-MCNC: 1.02 MG/DL (ref 0.7–1.3)
CREAT UR-MCNC: 133.5 MG/DL
FASTING STATUS PATIENT QL REPORTED: YES
GFR SERPL CREATININE-BSD FRML MDRD: >60 ML/MIN/1.73M2
GLUCOSE BLD-MCNC: 163 MG/DL (ref 70–125)
HDLC SERPL-MCNC: 36 MG/DL
LDLC SERPL CALC-MCNC: 78 MG/DL
MICROALBUMIN UR-MCNC: 6.73 MG/DL (ref 0–1.99)
MICROALBUMIN/CREAT UR: 50.4 MG/G
POTASSIUM BLD-SCNC: 4 MMOL/L (ref 3.5–5)
PROT SERPL-MCNC: 6.9 G/DL (ref 6–8)
SODIUM SERPL-SCNC: 142 MMOL/L (ref 136–145)
TRIGL SERPL-MCNC: 132 MG/DL

## 2020-01-03 ENCOUNTER — COMMUNICATION - HEALTHEAST (OUTPATIENT)
Dept: FAMILY MEDICINE | Facility: CLINIC | Age: 55
End: 2020-01-03

## 2020-04-27 ENCOUNTER — COMMUNICATION - HEALTHEAST (OUTPATIENT)
Dept: CARDIOLOGY | Facility: CLINIC | Age: 55
End: 2020-04-27

## 2020-04-27 DIAGNOSIS — I10 ESSENTIAL HYPERTENSION: ICD-10-CM

## 2020-05-07 ENCOUNTER — OFFICE VISIT - HEALTHEAST (OUTPATIENT)
Dept: CARDIOLOGY | Facility: CLINIC | Age: 55
End: 2020-05-07

## 2020-05-07 DIAGNOSIS — I10 ESSENTIAL HYPERTENSION: ICD-10-CM

## 2020-05-07 DIAGNOSIS — Z86.79 HISTORY OF ATRIAL FIBRILLATION: ICD-10-CM

## 2020-05-07 DIAGNOSIS — E66.01 MORBID OBESITY (H): ICD-10-CM

## 2020-05-07 DIAGNOSIS — G47.30 SLEEP APNEA, UNSPECIFIED TYPE: ICD-10-CM

## 2020-06-03 ENCOUNTER — COMMUNICATION - HEALTHEAST (OUTPATIENT)
Dept: CARDIOLOGY | Facility: CLINIC | Age: 55
End: 2020-06-03

## 2020-06-03 DIAGNOSIS — I10 UNCONTROLLED HYPERTENSION: ICD-10-CM

## 2020-07-03 ENCOUNTER — COMMUNICATION - HEALTHEAST (OUTPATIENT)
Dept: FAMILY MEDICINE | Facility: CLINIC | Age: 55
End: 2020-07-03

## 2020-07-03 DIAGNOSIS — E78.00 PURE HYPERCHOLESTEROLEMIA: ICD-10-CM

## 2020-07-14 ENCOUNTER — COMMUNICATION - HEALTHEAST (OUTPATIENT)
Dept: CARDIOLOGY | Facility: CLINIC | Age: 55
End: 2020-07-14

## 2020-07-14 DIAGNOSIS — I10 ESSENTIAL HYPERTENSION: ICD-10-CM

## 2020-07-14 RX ORDER — LISINOPRIL 40 MG/1
TABLET ORAL
Qty: 180 TABLET | Refills: 2 | Status: SHIPPED | OUTPATIENT
Start: 2020-07-14 | End: 2021-12-20 | Stop reason: DRUGHIGH

## 2020-07-18 ENCOUNTER — COMMUNICATION - HEALTHEAST (OUTPATIENT)
Dept: FAMILY MEDICINE | Facility: CLINIC | Age: 55
End: 2020-07-18

## 2020-07-18 DIAGNOSIS — E11.9 TYPE 2 DIABETES MELLITUS WITHOUT COMPLICATION, WITHOUT LONG-TERM CURRENT USE OF INSULIN (H): ICD-10-CM

## 2020-07-20 ENCOUNTER — COMMUNICATION - HEALTHEAST (OUTPATIENT)
Dept: CARDIOLOGY | Facility: CLINIC | Age: 55
End: 2020-07-20

## 2020-07-20 ENCOUNTER — AMBULATORY - HEALTHEAST (OUTPATIENT)
Dept: FAMILY MEDICINE | Facility: CLINIC | Age: 55
End: 2020-07-20

## 2020-07-20 DIAGNOSIS — I10 ESSENTIAL HYPERTENSION: ICD-10-CM

## 2020-07-31 ENCOUNTER — COMMUNICATION - HEALTHEAST (OUTPATIENT)
Dept: FAMILY MEDICINE | Facility: CLINIC | Age: 55
End: 2020-07-31

## 2020-07-31 ENCOUNTER — VIRTUAL VISIT (OUTPATIENT)
Dept: FAMILY MEDICINE | Facility: OTHER | Age: 55
End: 2020-07-31
Payer: COMMERCIAL

## 2020-07-31 PROCEDURE — 99421 OL DIG E/M SVC 5-10 MIN: CPT | Performed by: PHYSICIAN ASSISTANT

## 2020-08-01 ENCOUNTER — AMBULATORY - HEALTHEAST (OUTPATIENT)
Dept: FAMILY MEDICINE | Facility: CLINIC | Age: 55
End: 2020-08-01

## 2020-08-01 DIAGNOSIS — Z20.822 SUSPECTED COVID-19 VIRUS INFECTION: ICD-10-CM

## 2020-08-01 NOTE — PROGRESS NOTES
"Date: 2020 10:43:44  Clinician: Marilee Alston  Clinician NPI: [id number]  Patient: Govind Alexandre  Patient : 1965  Patient Address: [facility] [address] [place] [id number]  Patient Phone: [phone]  Visit Protocol: URI  Patient Summary:  Govind is a 54 year old ( : 1965 ) male who initiated a Visit for COVID-19 (Coronavirus) evaluation and screening. When asked the question \"Please sign me up to receive news, health information and promotions from [facility].\", Govind responded \"No\".    When asked when his symptoms started, Govind reported that he does not have any symptoms.   He denies having recent facial or sinus surgery in the past 60 days and taking antibiotic medication in the past month.    Pertinent COVID-19 (Coronavirus) information  In the past 14 days, Govind has not worked in a congregate living setting.   He does not work or volunteer as healthcare worker or a  and does not work or volunteer in a healthcare facility.   Govind also has not lived in a congregate living setting in the past 14 days. He does not live with a healthcare worker.   Govind has had a close contact with a laboratory-confirmed COVID-19 patient in the last 14 days. Additional information about contact with COVID-19 (Coronavirus) patient as reported by the patient (free text): My daughter who lives with us tested positive She was tested for strep negative then for covid on monday and was positive   Pertinent medical history  Govind needs a return to work/school note.   Weight: 270 lbs   Govind does not smoke or use smokeless tobacco.   Weight: 270 lbs    MEDICATIONS: spironolactone oral, atorvastatin oral, lisinopril oral, nifedipine oral, metformin oral, ALLERGIES: NKDA  Clinician Response:  Dear Govind,   Based on your exposure to COVID-19 (coronavirus), we would like to test you for this virus.  1. Please call [phone] to schedule your visit. Explain that you were referred by [facility] to have " a COVID-19 test. Be ready to share your [facility] visit ID number.  The following will serve as your written order for this COVID Test, ordered by me, for the indication of suspected COVID [Z20.828]: The test will be ordered in [facility], our electronic health record, after you are scheduled. It will show as ordered and authorized by José Magallon MD.  Order: COVID-19 (coronavirus) PCR [facility]AT[facility]  If you know you have had close contact with someone who tested positive, you should be quarantined for 14 days after this exposure. You should stay in quarantine for the14 days even if the covid test is negative, the optimal time to test after exposure is 5-7 days from the exposure  Quarantine means   What should I do?  For safety, it's very important to follow these rules. Do this for 14 days after the date you were last exposed to the virus..  Stay home and away from others. Don't go to school or anywhere else. Generally quarantine means staying home for work but there are some exceptions to this. Please contact your workplace.   No hugging, kissing or shaking hands.  Don't let anyone visit.  Cover your mouth and nose with a mask, tissue or washcloth to avoid spreading germs.  Wash your hands and face often. Use soap and water.  What are the symptoms of COVID-19?  The most common symptoms are cough, fever and trouble breathing. Less common symptoms include headache, body aches, fatigue (feeling very tired), chills, sore throat, stuffy or runny nose, diarrhea (loose poop), loss of taste or smell, belly pain, and nausea or vomiting (feeling sick to your stomach or throwing up).  After 14 days, if you have still don't have symptoms, you likely don't have this virus.  If you develop symptoms, follow these guidelines.  If you're normally healthy: Please start another [facility] visit to report your symptoms. Go to [email or web address].  If you have a serious health problem (like cancer, heart failure, an organ  transplant or kidney disease): Call your specialty clinic. Let them know that you might have COVID-19.  2. When it's time for your COVID test:  Stay at least 6 feet away from others. (If someone will drive you to your test, stay in the backseat, as far away from the  as you can.)  Cover your mouth and nose with a mask, tissue or washcloth.  Go straight to the testing site. Don't make any stops on the way there or back.  Please note  Caregivers in these groups are at risk for severe illness due to COVID-19:  o People 65 years and older  o People who live in a nursing home or long-term care facility  o People with chronic disease (lung, heart, cancer, diabetes, kidney, liver, immunologic)  o People who have a weakened immune system, including those who:  Are in cancer treatment  Take medicine that weakens the immune system, such as corticosteroids  Had a bone marrow or organ transplant  Have an immune deficiency  Have poorly controlled HIV or AIDS  Are obese (body mass index of 40 or higher)  Smoke regularly  Where can I get more information?  [facility] -- About COVID-19: [email or web address]  CDC -- What to Do If You're Sick: www.cdc.gov/coronavirus/2019-ncov/about/steps-when-sick.html  CDC -- Ending Home Isolation: www.cdc.gov/coronavirus/2019-ncov/hcp/disposition-in-home-patients.html  [facility] -- Caring for Someone: www.cdc.gov/coronavirus/2019-ncov/if-you-are-sick/care-for-someone.html  [facility] -- Interim Guidance for Hospital Discharge to Home: www.health.[facility].[place]/diseases/coronavirus/hcp/hospdischarge.pdf  [facility] clinical trials (COVID-19 research studies): clinicalaffairs.[facility]-clinical-trials  Below are the COVID-19 hotlines at the [place] Department of Health ([facility]). Interpreters are available.  For health questions: Call 346-558-0632 or 1-911.438.2492 (7 a.m. to 7 p.m.)  For questions about schools and childcare: Call 516-741-6132 or 1-711.532.2815 (7 a.m. to 7  p.m.)    Diagnosis: Contact with and (suspected) exposure to other viral communicable diseases  Diagnosis ICD: Z20.828

## 2020-08-03 ENCOUNTER — COMMUNICATION - HEALTHEAST (OUTPATIENT)
Dept: SCHEDULING | Facility: CLINIC | Age: 55
End: 2020-08-03

## 2020-08-17 ENCOUNTER — AMBULATORY - HEALTHEAST (OUTPATIENT)
Dept: LAB | Facility: CLINIC | Age: 55
End: 2020-08-17

## 2020-08-17 DIAGNOSIS — E11.9 TYPE 2 DIABETES MELLITUS WITHOUT COMPLICATION, WITHOUT LONG-TERM CURRENT USE OF INSULIN (H): ICD-10-CM

## 2020-08-17 DIAGNOSIS — E78.00 PURE HYPERCHOLESTEROLEMIA: ICD-10-CM

## 2020-08-17 DIAGNOSIS — R79.89 ELEVATED SERUM CREATININE: ICD-10-CM

## 2020-08-17 DIAGNOSIS — E66.01 MORBID OBESITY (H): ICD-10-CM

## 2020-08-17 DIAGNOSIS — I10 ESSENTIAL HYPERTENSION: ICD-10-CM

## 2020-08-17 LAB
ALBUMIN SERPL-MCNC: 4.3 G/DL (ref 3.5–5)
ALP SERPL-CCNC: 90 U/L (ref 45–120)
ALT SERPL W P-5'-P-CCNC: 21 U/L (ref 0–45)
ANION GAP SERPL CALCULATED.3IONS-SCNC: 8 MMOL/L (ref 5–18)
AST SERPL W P-5'-P-CCNC: 19 U/L (ref 0–40)
BILIRUB SERPL-MCNC: 1 MG/DL (ref 0–1)
BUN SERPL-MCNC: 14 MG/DL (ref 8–22)
CALCIUM SERPL-MCNC: 9.3 MG/DL (ref 8.5–10.5)
CHLORIDE BLD-SCNC: 107 MMOL/L (ref 98–107)
CHOLEST SERPL-MCNC: 140 MG/DL
CO2 SERPL-SCNC: 27 MMOL/L (ref 22–31)
CREAT SERPL-MCNC: 0.94 MG/DL (ref 0.7–1.3)
CREAT UR-MCNC: 190.5 MG/DL
FASTING STATUS PATIENT QL REPORTED: YES
GFR SERPL CREATININE-BSD FRML MDRD: >60 ML/MIN/1.73M2
GLUCOSE BLD-MCNC: 162 MG/DL (ref 70–125)
HBA1C MFR BLD: 5.6 %
HDLC SERPL-MCNC: 33 MG/DL
LDLC SERPL CALC-MCNC: 80 MG/DL
MICROALBUMIN UR-MCNC: 9.97 MG/DL (ref 0–1.99)
MICROALBUMIN/CREAT UR: 52.3 MG/G
POTASSIUM BLD-SCNC: 3.8 MMOL/L (ref 3.5–5)
PROT SERPL-MCNC: 7.1 G/DL (ref 6–8)
SODIUM SERPL-SCNC: 142 MMOL/L (ref 136–145)
TRIGL SERPL-MCNC: 135 MG/DL
TSH SERPL DL<=0.005 MIU/L-ACNC: 0.34 UIU/ML (ref 0.3–5)

## 2020-08-19 ENCOUNTER — OFFICE VISIT - HEALTHEAST (OUTPATIENT)
Dept: FAMILY MEDICINE | Facility: CLINIC | Age: 55
End: 2020-08-19

## 2020-08-19 DIAGNOSIS — E11.9 TYPE 2 DIABETES MELLITUS WITHOUT COMPLICATION, WITHOUT LONG-TERM CURRENT USE OF INSULIN (H): ICD-10-CM

## 2020-08-19 DIAGNOSIS — S06.9X0D TRAUMATIC BRAIN INJURY, WITHOUT LOSS OF CONSCIOUSNESS, SUBSEQUENT ENCOUNTER: ICD-10-CM

## 2020-08-19 DIAGNOSIS — E66.01 MORBID OBESITY (H): ICD-10-CM

## 2020-08-19 DIAGNOSIS — I10 ESSENTIAL HYPERTENSION: ICD-10-CM

## 2020-08-19 DIAGNOSIS — Z86.79 HISTORY OF ATRIAL FIBRILLATION: ICD-10-CM

## 2020-08-19 DIAGNOSIS — Z13.9 SCREENING FOR CONDITION: ICD-10-CM

## 2020-08-19 DIAGNOSIS — E78.00 PURE HYPERCHOLESTEROLEMIA: ICD-10-CM

## 2020-08-19 RX ORDER — ROSUVASTATIN CALCIUM 10 MG/1
10 TABLET, COATED ORAL AT BEDTIME
Qty: 30 TABLET | Refills: 11 | Status: SHIPPED | OUTPATIENT
Start: 2020-08-19 | End: 2022-07-08

## 2020-08-19 ASSESSMENT — PATIENT HEALTH QUESTIONNAIRE - PHQ9: SUM OF ALL RESPONSES TO PHQ QUESTIONS 1-9: 0

## 2020-08-19 ASSESSMENT — MIFFLIN-ST. JEOR: SCORE: 2153.94

## 2020-09-01 ENCOUNTER — COMMUNICATION - HEALTHEAST (OUTPATIENT)
Dept: FAMILY MEDICINE | Facility: CLINIC | Age: 55
End: 2020-09-01

## 2020-09-01 DIAGNOSIS — E11.9 TYPE 2 DIABETES MELLITUS WITHOUT COMPLICATION, WITHOUT LONG-TERM CURRENT USE OF INSULIN (H): ICD-10-CM

## 2020-09-03 RX ORDER — METFORMIN HCL 500 MG
TABLET, EXTENDED RELEASE 24 HR ORAL
Qty: 180 TABLET | Refills: 3 | Status: SHIPPED | OUTPATIENT
Start: 2020-09-03 | End: 2021-08-05 | Stop reason: ALTCHOICE

## 2020-11-30 ENCOUNTER — COMMUNICATION - HEALTHEAST (OUTPATIENT)
Dept: CARDIOLOGY | Facility: CLINIC | Age: 55
End: 2020-11-30

## 2020-11-30 DIAGNOSIS — I10 UNCONTROLLED HYPERTENSION: ICD-10-CM

## 2020-12-10 ENCOUNTER — OFFICE VISIT - HEALTHEAST (OUTPATIENT)
Dept: CARDIOLOGY | Facility: CLINIC | Age: 55
End: 2020-12-10

## 2020-12-10 DIAGNOSIS — E78.2 MIXED HYPERLIPIDEMIA: ICD-10-CM

## 2020-12-10 DIAGNOSIS — Z86.79 HISTORY OF ATRIAL FIBRILLATION: ICD-10-CM

## 2020-12-10 DIAGNOSIS — I10 ESSENTIAL HYPERTENSION: ICD-10-CM

## 2020-12-10 DIAGNOSIS — G47.30 SLEEP APNEA, UNSPECIFIED TYPE: ICD-10-CM

## 2020-12-10 DIAGNOSIS — E66.01 MORBID OBESITY (H): ICD-10-CM

## 2021-01-18 ENCOUNTER — AMBULATORY - HEALTHEAST (OUTPATIENT)
Dept: LAB | Facility: CLINIC | Age: 56
End: 2021-01-18

## 2021-01-18 DIAGNOSIS — E11.9 TYPE 2 DIABETES MELLITUS WITHOUT COMPLICATION, WITHOUT LONG-TERM CURRENT USE OF INSULIN (H): ICD-10-CM

## 2021-01-18 DIAGNOSIS — E78.00 PURE HYPERCHOLESTEROLEMIA: ICD-10-CM

## 2021-01-18 DIAGNOSIS — Z13.9 SCREENING FOR CONDITION: ICD-10-CM

## 2021-01-18 LAB
ALBUMIN SERPL-MCNC: 4.4 G/DL (ref 3.5–5)
ALP SERPL-CCNC: 90 U/L (ref 45–120)
ALT SERPL W P-5'-P-CCNC: 27 U/L (ref 0–45)
ANION GAP SERPL CALCULATED.3IONS-SCNC: 10 MMOL/L (ref 5–18)
AST SERPL W P-5'-P-CCNC: 25 U/L (ref 0–40)
BILIRUB SERPL-MCNC: 1.2 MG/DL (ref 0–1)
BUN SERPL-MCNC: 14 MG/DL (ref 8–22)
CALCIUM SERPL-MCNC: 9.1 MG/DL (ref 8.5–10.5)
CHLORIDE BLD-SCNC: 106 MMOL/L (ref 98–107)
CHOLEST SERPL-MCNC: 122 MG/DL
CO2 SERPL-SCNC: 25 MMOL/L (ref 22–31)
CREAT SERPL-MCNC: 0.93 MG/DL (ref 0.7–1.3)
FASTING STATUS PATIENT QL REPORTED: YES
GFR SERPL CREATININE-BSD FRML MDRD: >60 ML/MIN/1.73M2
GLUCOSE BLD-MCNC: 187 MG/DL (ref 70–125)
HBA1C MFR BLD: 6.2 %
HDLC SERPL-MCNC: 35 MG/DL
LDLC SERPL CALC-MCNC: 55 MG/DL
POTASSIUM BLD-SCNC: 4.5 MMOL/L (ref 3.5–5)
PROT SERPL-MCNC: 7 G/DL (ref 6–8)
PSA SERPL-MCNC: 2.4 NG/ML (ref 0–3.5)
SODIUM SERPL-SCNC: 141 MMOL/L (ref 136–145)
TRIGL SERPL-MCNC: 159 MG/DL

## 2021-01-19 ENCOUNTER — COMMUNICATION - HEALTHEAST (OUTPATIENT)
Dept: FAMILY MEDICINE | Facility: CLINIC | Age: 56
End: 2021-01-19

## 2021-01-22 ENCOUNTER — COMMUNICATION - HEALTHEAST (OUTPATIENT)
Dept: CARDIOLOGY | Facility: CLINIC | Age: 56
End: 2021-01-22

## 2021-01-22 DIAGNOSIS — I10 ESSENTIAL HYPERTENSION: ICD-10-CM

## 2021-01-25 ENCOUNTER — OFFICE VISIT - HEALTHEAST (OUTPATIENT)
Dept: FAMILY MEDICINE | Facility: CLINIC | Age: 56
End: 2021-01-25

## 2021-01-25 DIAGNOSIS — Z86.79 HISTORY OF ATRIAL FIBRILLATION: ICD-10-CM

## 2021-01-25 DIAGNOSIS — E78.2 MIXED HYPERLIPIDEMIA: ICD-10-CM

## 2021-01-25 DIAGNOSIS — Z79.899 MEDICATION MANAGEMENT: ICD-10-CM

## 2021-01-25 DIAGNOSIS — E11.9 TYPE 2 DIABETES MELLITUS WITHOUT COMPLICATION, WITHOUT LONG-TERM CURRENT USE OF INSULIN (H): ICD-10-CM

## 2021-01-25 DIAGNOSIS — S06.9X0D TRAUMATIC BRAIN INJURY, WITHOUT LOSS OF CONSCIOUSNESS, SUBSEQUENT ENCOUNTER: ICD-10-CM

## 2021-01-25 DIAGNOSIS — I10 ESSENTIAL HYPERTENSION: ICD-10-CM

## 2021-01-25 DIAGNOSIS — E66.01 MORBID OBESITY (H): ICD-10-CM

## 2021-01-25 DIAGNOSIS — G47.30 SLEEP APNEA, UNSPECIFIED TYPE: ICD-10-CM

## 2021-01-25 ASSESSMENT — MIFFLIN-ST. JEOR: SCORE: 2149.85

## 2021-02-12 ENCOUNTER — COMMUNICATION - HEALTHEAST (OUTPATIENT)
Dept: CARDIOLOGY | Facility: CLINIC | Age: 56
End: 2021-02-12

## 2021-02-12 DIAGNOSIS — I10 UNCONTROLLED HYPERTENSION: ICD-10-CM

## 2021-02-12 RX ORDER — SPIRONOLACTONE 25 MG/1
TABLET ORAL
Qty: 90 TABLET | Refills: 2 | Status: SHIPPED | OUTPATIENT
Start: 2021-02-12 | End: 2021-11-09

## 2021-04-22 ENCOUNTER — AMBULATORY - HEALTHEAST (OUTPATIENT)
Dept: NURSING | Facility: CLINIC | Age: 56
End: 2021-04-22

## 2021-05-13 ENCOUNTER — AMBULATORY - HEALTHEAST (OUTPATIENT)
Dept: NURSING | Facility: CLINIC | Age: 56
End: 2021-05-13

## 2021-05-22 ENCOUNTER — HEALTH MAINTENANCE LETTER (OUTPATIENT)
Age: 56
End: 2021-05-22

## 2021-05-26 ASSESSMENT — PATIENT HEALTH QUESTIONNAIRE - PHQ9: SUM OF ALL RESPONSES TO PHQ QUESTIONS 1-9: 5

## 2021-05-27 ASSESSMENT — PATIENT HEALTH QUESTIONNAIRE - PHQ9: SUM OF ALL RESPONSES TO PHQ QUESTIONS 1-9: 0

## 2021-05-28 NOTE — TELEPHONE ENCOUNTER
Refill Approved    Rx renewed per Medication Renewal Policy. Medication was last renewed on 7/30/18.    Kourtney Escobedo, [facility] Triage/Med Refill 4/24/2019     Requested Prescriptions   Pending Prescriptions Disp Refills     atorvastatin (LIPITOR) 10 MG tablet [Pharmacy Med Name: ATORVASTATIN 10MG TABLETS] 90 tablet 0     Sig: TAKE 1 TABLET BY MOUTH EVERY DAY       Statins Refill Protocol (Hmg CoA Reductase Inhibitors) Passed - 4/23/2019  3:27 AM        Passed - PCP or prescribing provider visit in past 12 months      Last office visit with prescriber/PCP: 12/10/2018 Deepa Short MD OR same dept: 12/10/2018 Deepa Short MD OR same specialty: 12/10/2018 Deepa Short MD  Last physical: Visit date not found Last MTM visit: Visit date not found   Next visit within 3 mo: Visit date not found  Next physical within 3 mo: Visit date not found  Prescriber OR PCP: Deepa Short MD  Last diagnosis associated with med order: 1. Serum Total Cholesterol Was Elevated  - atorvastatin (LIPITOR) 10 MG tablet [Pharmacy Med Name: ATORVASTATIN 10MG TABLETS]; TAKE 1 TABLET BY MOUTH EVERY DAY  Dispense: 90 tablet; Refill: 0    If protocol passes may refill for 12 months if within 3 months of last provider visit (or a total of 15 months).

## 2021-05-28 NOTE — TELEPHONE ENCOUNTER
Blood in urine., pain with urinating, lack of bladder control.  X 2 days.looks like clots as well x 2 different times.  Chills, unsure of fever. Lower back pain.  Has a history of kidney stones.    Has appointment on Wed.    Patient urged to be seen today, and an appointment was found at 10:50 this am with Dr. Short.    Cris Hernandez RN  [facility] Triage/refill nurse        Reason for Disposition    Diabetes mellitus or weak immune system (e.g., HIV positive, cancer chemotherapy, transplant patient)    [1] SEVERE pain with urination  (e.g., excruciating) AND [2] not improved after 2 hours of pain medicine (e.g., acetaminophen or ibuprofen)    Protocols used: URINATION PAIN - MALE-A-

## 2021-05-28 NOTE — PROGRESS NOTES
Chief complaint: Hematuria    HPI: The patient reports that this weekend he started having some candida blood in his urine and a couple times he actually saw clots in his urine.  He has had some urgency and frequency and a couple times he has had some incontinence.  He describes an low back ache without sharp shooting stabbing pains yesterday he had some pain with urination and that was not as bad last night or this morning but he was not having as much blood in his urine today either.  He has a remote history of kidney stones and does not really remember what that felt like.  He denies autonomic nervous system effects and denies pain of the level that is typically reported for kidney stone pain.    He was scheduled to have a follow-up appointment in 2 days for his diabetes and we ended up reviewing that today as well the thing that is concerning for me is that he is having increasing microalbuminuria.  I have not been able to get his blood pressure under control since December and we have changed, increased, medication twice in that interval.  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    In December 2015 he had a head injury and I believe at that time they discovered that his blood pressure was more out of control that he suspected an echocardiogram was completed.  In light of the fact that I am now having increasing difficulty managing his blood pressure, I want to repeat the echocardiogram and get a cardiology consult.  I do not think he is in enough pain today to warrant the elevated blood pressure.  He is significantly overweight and has gained 14 pounds since he had well-controlled blood pressure, but I do not think that that is the root cause for that.  I would be concerned about some other cause for sudden difficulty in managing his blood pressure.  I am also concerned that the microalbuminuria is because of his blood  pressure and no longer because of his diabetes because his A1c is good as is the LDL cholesterol level.    Objective:BP (!) 150/96 (Patient Site: Right Arm, Patient Position: Sitting, Cuff Size: Adult Large)   Pulse 68   Wt (!) 284 lb 14.4 oz (129.2 kg)   SpO2 96%   BMI 40.88 kg/m    Recent Results (from the past 24 hour(s))   Urinalysis Macro & Micro   Result Value Ref Range    Color, UA Yellow Colorless, Yellow, Straw, Light Yellow    Clarity, UA Clear Clear    Glucose,  mg/dL (!) Negative    Bilirubin, UA Negative Negative    Ketones, UA Trace (!) Negative    Specific Gravity, UA 1.025 1.005 - 1.030    Blood, UA Moderate (!) Negative    pH, UA 6.0 5.0 - 8.0    Protein,  mg/dL (!) Negative mg/dL    Urobilinogen, UA 2.0 E.U./dL (!) 0.2 E.U./dL, 1.0 E.U./dL    Nitrite, UA Negative Negative    Leukocytes, UA Negative Negative    Bacteria, UA None Seen None Seen hpf    RBC, UA  (!) None Seen, 0-2 hpf    WBC, UA None Seen None Seen, 0-5 hpf    Squam Epithel, UA 0-5 None Seen, 0-5 lpf    Mucus, UA Few (!) None Seen lpf     I did do a prostate exam and it does not feel to be particularly enlarged, boggy or particularly painful.    Assessment: Hematuria with urgency frequency and incontinence  Type 2 diabetes mellitus  Hypertension resistant to medication changes recently and increasing microalbuminuria  Obesity    Plan: I do not think this represents a prostatitis.  I will treat him empirically with Septra DS 1 p.o. twice daily x10 days pending urine culture  I will order an echocardiogram and asked that he be seen by a cardiologist.  I think this is unlikely to be something atypical like a pheochromocytoma but I just want to make sure I am not missing anything because now it seems to becoming more difficult to manage the blood pressure.  I also want him to come back in about 10 days to 2 weeks and do a lab visit for urinalysis to make sure that the urine clears from his urine because if it does  not, we will then pursue consult with a urologist.    He will see me again in about 4 months to follow-up on his diabetes.

## 2021-05-28 NOTE — PATIENT INSTRUCTIONS - HE
Urine culture; will do sulfa antibiotic until culture back    Push fluids in case kidney stone passed    Echocardiogram for resistant hypertension    Cardiologist consult due to hypertension and microalbuminuria

## 2021-05-29 NOTE — PROGRESS NOTES
Thank you for asking the [facility] team to see Govind Alexandre in consultation  to evaluate hypertension.      Assessment/Plan:   Hypertension - Obese with recent weight gain. No abdominal bruit on exam but his abdomen is large and dense. No symptoms that are concerning for a pheochromocytoma. His potassium in the past has been on the lower side so we can consider a hypoaldosterone state. Will start aldactone as he is not on a diuretic yet and plan to titrate that as needed/tolerated. Consider switching metoprolol to a calcium channel blocker. If unable to control blood pressure with 4 agents can consider an evaluation for a renal artery stenosis. Diet and exercise reviewed. He should avoid restaurant and processed foods given their sodium content.     Coronary risks - diabetic, hypertension, hyperlipidemia, male 52 y/o, family history. He gives no symptoms of ischemia. Diet and exercise recommended.     F/U 1 month     Current History:   Govind Alexandre is a 53 y.o. obese diabetic with uncontrolled hypertension. He has KORINA and is compliant with his CPAP, he rarely drinks alcohol and does not smoke. He is not using any stimulant medications. Govind has been on metoprolol and lisinopril for some time for hypertension. Over the past 6 months his BP has been difficult to control and his lisinopril was titrated to 40mg daily without much effect. Govind is taking both medications at night. He notes no chest pain, dyspnea, syncope or lightheadedness. He occasionally feels some brief fast heart beats in bed at night. Govind has lost and then gained back 30 lbs over the past year. His diet is poor. An echocardiogram recently showed an EF of 55% with moderate-severe concentric LVH.     Govind has a family history of both parents needing CABG in their 70s.     Past Medical History:     Past Medical History:   Diagnosis Date     Depression 12/24/2014     Essential hypertension      Multiple thyroid nodules 9/8/2016      Type 2 diabetes mellitus without complication (H) 11/7/2016       Past Surgical History:     Past Surgical History:   Procedure Laterality Date     [facility]  11/30/2015          NM REMOVAL OF SPERM DUCT(S)      Description: Surgery Of Male Genitalia Vasectomy;  Recorded: 01/13/2010;     NM REPAIR ROTATOR CUFF,ACUTE      Description: Rotator Cuff Repair Acute;  Recorded: 01/13/2010;  Comments: cortisone     WISDOM TOOTH EXTRACTION         Family History:     Family History   Problem Relation Age of Onset     Heart disease Mother      Kidney disease Mother      Thyroid cancer Father      Kidney disease Father      Heart disease Father      Hypertension Father      No Medical Problems Sister      Hypertension Brother      Depression Daughter      Autism Son      Heart disease Paternal Grandmother      Hypertension Sister        Social History:    reports that he quit smoking about 14 years ago. He has never used smokeless tobacco. He reports that he drinks alcohol. He reports that he does not use drugs.    Meds:     Current Outpatient Medications   Medication Sig     ACCU-CHEK FASTCLIX USE TO TEST BLOOD SUGARS 2 TO 3 TIMES A DAY     aspirin 81 mg chewable tablet Chew 81 mg daily.     atorvastatin (LIPITOR) 10 MG tablet TAKE 1 TABLET BY MOUTH EVERY DAY     blood glucose test (ACCU-CHEK NATHANIEL PLUS TEST STRP) strips Use 1 each As Directed 3 (three) times a day.     fexofenadine (ALLEGRA) 180 MG tablet Take 180 mg by mouth daily.     lisinopril (PRINIVIL,ZESTRIL) 40 MG tablet Take 2 tablets daily as directed     metFORMIN (GLUCOPHAGE-XR) 500 MG 24 hr tablet TAKE 2 TABLETS(1000 MG) BY MOUTH DAILY. START WITH 1 TABLET 500 MG DAILY FOR 1 WEEK, THEN. INCREASE TO 2 TABLETS DAILY     metoprolol succinate (TOPROL-XL) 50 MG 24 hr tablet Take 1 tablet (50 mg total) by mouth daily.     SF 1.1 % Gel dental gel      fluticasone (FLONASE) 50 mcg/actuation nasal spray SHAKE LIQUID AND USE 2 SPRAYS IN EACH NOSTRIL DAILY      sulfamethoxazole-trimethoprim (BACTRIM DS) 800-160 mg per tablet Take 1 tablet by mouth 2 (two) times a day for 10 days.       Allergies:   Patient has no known allergies.    Review of Systems:   Review of Systems:   General: WNL  Eyes: WNL  Ears/Nose/Throat: WNL  Lungs: WNL  Heart: WNL  Stomach: WNL  Bladder: WNL  Muscle/Joints: WNL  Skin: WNL  Nervous System: WNL  Mental Health: WNL     Blood: WNL       Objective:      Physical Exam  @LASTENCWT:3@  6' (1.829 m)  @BMI:3@  BP (!) 138/100 (Patient Site: Left Arm, Patient Position: Sitting, Cuff Size: Adult Large)   Pulse 60   Resp 16   Ht 6' (1.829 m)   Wt (!) 285 lb (129.3 kg)   BMI 38.65 kg/m      General Appearance:   Alert, cooperative and in no acute distress.   HEENT:  No scleral icterus; the mucous membranes were pink and moist.   Neck: JVP flat. No thyromegaly. No HJR   Chest: The spine was straight. The chest was symmetric.   Lungs:   Respirations unlabored; the lungs are clear to auscultation.   Cardiovascular:   S1 and S2 normal and without murmur. No clicks or rubs. No carotid bruits noted. Right DP, PT, and radial pulses 2+. Left DP, PT, and radial pulses 2+.   Abdomen:  No organomegaly, masses, bruits, or tenderness. Bowels sounds are present   Extremities: No cyanosis, clubbing, or edema.   Skin: No xanthelasma.   Neurologic: Mood and affect are appropriate.         Lab Review   Lab Results   Component Value Date     05/10/2019     (H) 12/06/2018     (H) 09/05/2018    K 4.0 05/10/2019    K 3.5 12/06/2018    K 4.3 09/05/2018     (H) 05/10/2019     (H) 12/06/2018     (H) 09/05/2018    CO2 25 05/10/2019    CO2 28 12/06/2018    CO2 28 09/05/2018    BUN 14 05/10/2019    BUN 15 12/06/2018    BUN 15 09/05/2018    CREATININE 0.93 05/10/2019    CREATININE 0.98 12/06/2018    CREATININE 0.90 09/05/2018    CALCIUM 9.4 05/10/2019    CALCIUM 9.5 12/06/2018    CALCIUM 9.6 09/05/2018     Lab Results   Component Value Date     WBC 9.8 12/10/2015    WBC 12.1 (H) 12/03/2015    WBC 14.8 (H) 12/02/2015    HGB 15.1 12/10/2015    HGB 12.7 (L) 12/04/2015    HGB 12.0 (L) 12/03/2015    HCT 43.4 12/10/2015    HCT 34.7 (L) 12/03/2015    HCT 36.0 (L) 12/02/2015    MCV 88 12/10/2015    MCV 91 12/03/2015    MCV 90 12/02/2015     12/10/2015     (L) 12/03/2015     12/02/2015     Lab Results   Component Value Date    CHOL 123 05/10/2019    CHOL 133 12/06/2018    CHOL 133 09/05/2018    TRIG 103 05/10/2019    TRIG 92 12/06/2018    TRIG 121 09/05/2018    HDL 34 (L) 05/10/2019    HDL 37 (L) 12/06/2018    HDL 34 (L) 09/05/2018     No results found for: BNP      Deborah Wang M.D.

## 2021-05-29 NOTE — TELEPHONE ENCOUNTER
Refill Approved    Rx renewed per Medication Renewal Policy. Medication was last renewed on 1/25/19.    Kourtney Escobedo, [facility] Triage/Med Refill 6/17/2019     Requested Prescriptions   Pending Prescriptions Disp Refills     lisinopril (PRINIVIL,ZESTRIL) 40 MG tablet [Pharmacy Med Name: LISINOPRIL 40MG TABLETS] 60 tablet 0     Sig: TAKE 2 TABLETS BY MOUTH DAILY AS DIRECTED       Ace Inhibitors Refill Protocol Passed - 6/16/2019 10:04 AM        Passed - PCP or prescribing provider visit in past 12 months       Last office visit with prescriber/PCP: 5/20/2019 Deepa Short MD OR same dept: 5/20/2019 Deepa Short MD OR same specialty: 5/20/2019 Deepa Short MD  Last physical: Visit date not found Last MTM visit: Visit date not found   Next visit within 3 mo: Visit date not found  Next physical within 3 mo: Visit date not found  Prescriber OR PCP: Deepa Short MD  Last diagnosis associated with med order: 1. Essential hypertension  - lisinopril (PRINIVIL,ZESTRIL) 40 MG tablet [Pharmacy Med Name: LISINOPRIL 40MG TABLETS]; TAKE 2 TABLETS BY MOUTH DAILY AS DIRECTED  Dispense: 60 tablet; Refill: 0    If protocol passes may refill for 12 months if within 3 months of last provider visit (or a total of 15 months).             Passed - Serum Potassium in past 12 months     Lab Results   Component Value Date    Potassium 4.0 05/10/2019             Passed - Blood pressure filed in past 12 months     BP Readings from Last 1 Encounters:   05/28/19 (!) 138/100             Passed - Serum Creatinine in past 12 months     Creatinine   Date Value Ref Range Status   05/10/2019 0.93 0.70 - 1.30 mg/dL Final

## 2021-05-29 NOTE — PATIENT INSTRUCTIONS - HE
- Mediterranean diet - no processed foods, avoid restaurants.     - Exercise 30 continuous minutes per day    - Start aldactone 25 mg in the morning. Keep taking metoprolol and lisinopril at night.     - Call us in 2 weeks if your BP is still above 140/100mmHg    - See me in 1 month

## 2021-05-30 VITALS — WEIGHT: 256 LBS | BODY MASS INDEX: 36.65 KG/M2 | HEIGHT: 70 IN

## 2021-05-30 VITALS — WEIGHT: 241.4 LBS | BODY MASS INDEX: 34.64 KG/M2

## 2021-05-30 VITALS — WEIGHT: 261.9 LBS | BODY MASS INDEX: 37.58 KG/M2

## 2021-05-30 NOTE — PROGRESS NOTES
"Copied for OV and AVS with EMG 6/25/19:  \"Patient Instructions     - Replace metoprolol with amlodipine 10 mg daily     - Call us in 3-4 weeks with what your blood pressures are doing     - Decrease lisinopril to 40 mg daily     - Continue to work on diet, exercise and weight loss     - F/U 2-3     Medications adjusted on AVS for proper medication list. VICKIE,RN  "

## 2021-05-30 NOTE — TELEPHONE ENCOUNTER
Spoke with patient regarding medication recommendations per Dr. Wang. Patient verbalized understanding and agreement with plan. Rx sent to patient pharmacy. No further questions or concerns at this time. -ejb    ----- Message -----  From: Deborah Wang MD  Sent: 7/16/2019   2:13 PM  To: Kehinde Guzman RN  Subject: FW: Updates about my health                      He improved with amlodipine but is not at goal yet. Since we are at max dose on amlodipine lets try nifedipine XL (procardia) 60mg daily instead of the amlodipine and see if that does get him to goal.

## 2021-05-30 NOTE — PROGRESS NOTES
Thank you for asking the [facility] team to see Govind Alexandre.      Assessment/Plan:   Severe hypertension - switch metoprolol to amlodipine. Consider switching amlodipine to nifedipine in a few weeks if there is not a sufficient BP lowering with amlodipine. He is cpap compliant. Consider w/u for CATRACHITO.    History of atrial fibrillation - unsure if this was known as it was not commented on in the hospital notes from that time (his [facility] admission). Anticoagulation would have been contraindicated at that time. Consider HUBER down the road. EKG today shows NSR with an inferior Q wave which is stable for him. I have advised him to monitor his heart rhythm daily for one minute to look for irregularility.     Risk factors - DM, HTN, obesity, KORINA. LDL 68 on low dose atorvastatin and no symptoms concerning for ischemia currently.     F/U 2-3 months     Current History:   Govind Alexandre is a 53 y.o. obese diabetic with uncontrolled hypertension. He has KORINA and is compliant with his CPAP, he rarely drinks alcohol and does not smoke. An echocardiogram recently showed an EF of 55% with moderate-severe concentric LVH. He does have a history of atrial fibrillation while hospitalized for a subarachnoid hemorrhage presumed related to a traumatic brain injury in December 2015.      Govind returns for 1 month f/u today. He has started the spironolactone but notes no improvement in his blood pressure. He continues on lisinopril 40mg two times a day and toprol XL 50mg daily. There is no chest pain, dyspnea, dizziness or syncope.     Past Medical History:     Past Medical History:   Diagnosis Date     Depression 12/24/2014     Essential hypertension      Multiple thyroid nodules 9/8/2016     Type 2 diabetes mellitus without complication (H) 11/7/2016       Past Surgical History:     Past Surgical History:   Procedure Laterality Date     PICC  11/30/2015          [place] REMOVAL OF SPERM DUCT(S)      Description: Surgery Of Male  Genitalia Vasectomy;  Recorded: 01/13/2010;     [place] REPAIR ROTATOR CUFF,ACUTE      Description: Rotator Cuff Repair Acute;  Recorded: 01/13/2010;  Comments: cortisone     WISDOM TOOTH EXTRACTION         Family History:     Family History   Problem Relation Age of Onset     Heart disease Mother      Kidney disease Mother      Thyroid cancer Father      Kidney disease Father      Heart disease Father      Hypertension Father      No Medical Problems Sister      Hypertension Brother      Depression Daughter      Autism Son      Heart disease Paternal Grandmother      Hypertension Sister        Social History:    reports that he quit smoking about 14 years ago. He has never used smokeless tobacco. He reports that he drinks alcohol. He reports that he does not use drugs.    Meds:     Current Outpatient Medications   Medication Sig     ACCU-CHEK FASTCLIX USE TO TEST BLOOD SUGARS 2 TO 3 TIMES A DAY     aspirin 81 mg chewable tablet Chew 81 mg daily.     atorvastatin (LIPITOR) 10 MG tablet TAKE 1 TABLET BY MOUTH EVERY DAY     blood glucose test (ACCU-CHEK NATHANIEL PLUS TEST STRP) strips Use 1 each As Directed 3 (three) times a day.     fexofenadine (ALLEGRA) 180 MG tablet Take 180 mg by mouth daily.     lisinopril (PRINIVIL,ZESTRIL) 40 MG tablet Take two tablets (80 mg) by mouth daily.     metFORMIN (GLUCOPHAGE-XR) 500 MG 24 hr tablet TAKE 2 TABLETS(1000 MG) BY MOUTH DAILY. START WITH 1 TABLET 500 MG DAILY FOR 1 WEEK, THEN. INCREASE TO 2 TABLETS DAILY     metoprolol succinate (TOPROL-XL) 50 MG 24 hr tablet Take 1 tablet (50 mg total) by mouth daily.     SF 1.1 % Gel dental gel      spironolactone (ALDACTONE) 25 MG tablet Take 1 tablet (25 mg total) by mouth daily.     amLODIPine (NORVASC) 10 MG tablet Take 1 tablet (10 mg total) by mouth daily.     fluticasone (FLONASE) 50 mcg/actuation nasal spray SHAKE LIQUID AND USE 2 SPRAYS IN EACH NOSTRIL DAILY     lisinopril (PRINIVIL,ZESTRIL) 40 MG tablet TAKE 2 TABLETS BY MOUTH DAILY AS  "DIRECTED       Allergies:   Patient has no known allergies.    Review of Systems:   Review of Systems:   General: WNL  Eyes: WNL  Ears/Nose/Throat: WNL  Lungs: WNL  Heart: WNL  Stomach: WNL  Bladder: WNL  Muscle/Joints: WNL  Skin: WNL  Nervous System: WNL  Mental Health: WNL     Blood: WNL       Objective:      Physical Exam  @LASTENCWT:3@  6' 0.01\" (1.829 m)  @BMI:3@  /90 (Patient Site: Left Arm, Patient Position: Sitting, Cuff Size: Adult Large)   Pulse 68   Resp 16   Ht 6' 0.01\" (1.829 m)   Wt (!) 279 lb 12.8 oz (126.9 kg)   BMI 37.94 kg/m      General Appearance:   Alert, cooperative and in no acute distress.   HEENT:  No scleral icterus; the mucous membranes were pink and moist.   Neck: JVP flat. No thyromegaly. No HJR   Chest: The spine was straight. The chest was symmetric.   Lungs:   Respirations unlabored; the lungs are clear to auscultation.   Cardiovascular:   S1 and S2 normal and without murmur. No clicks or rubs. No carotid bruits noted. Right DP, PT, and radial pulses 2+. Left DP, PT, and radial pulses 2+.   Abdomen:  No organomegaly, masses, bruits, or tenderness. Bowels sounds are present   Extremities: No cyanosis, clubbing, or edema.   Skin: No xanthelasma.   Neurologic: Mood and affect are appropriate.         Lab Review   Lab Results   Component Value Date     05/10/2019     (H) 12/06/2018     (H) 09/05/2018    K 4.0 05/10/2019    K 3.5 12/06/2018    K 4.3 09/05/2018     (H) 05/10/2019     (H) 12/06/2018     (H) 09/05/2018    CO2 25 05/10/2019    CO2 28 12/06/2018    CO2 28 09/05/2018    BUN 14 05/10/2019    BUN 15 12/06/2018    BUN 15 09/05/2018    CREATININE 0.93 05/10/2019    CREATININE 0.98 12/06/2018    CREATININE 0.90 09/05/2018    CALCIUM 9.4 05/10/2019    CALCIUM 9.5 12/06/2018    CALCIUM 9.6 09/05/2018     Lab Results   Component Value Date    WBC 9.8 12/10/2015    WBC 12.1 (H) 12/03/2015    WBC 14.8 (H) 12/02/2015    HGB 15.1 12/10/2015    " HGB 12.7 (L) 12/04/2015    HGB 12.0 (L) 12/03/2015    HCT 43.4 12/10/2015    HCT 34.7 (L) 12/03/2015    HCT 36.0 (L) 12/02/2015    MCV 88 12/10/2015    MCV 91 12/03/2015    MCV 90 12/02/2015     12/10/2015     (L) 12/03/2015     12/02/2015     Lab Results   Component Value Date    CHOL 123 05/10/2019    CHOL 133 12/06/2018    CHOL 133 09/05/2018    TRIG 103 05/10/2019    TRIG 92 12/06/2018    TRIG 121 09/05/2018    HDL 34 (L) 05/10/2019    HDL 37 (L) 12/06/2018    HDL 34 (L) 09/05/2018     No results found for: LIBIA Wang M.D.

## 2021-05-30 NOTE — PATIENT INSTRUCTIONS - HE
- Replace metoprolol with amlodipine 10 mg daily    - Call us in 3-4 weeks with what your blood pressures are doing    - Decrease lisinopril to 40 mg daily    - Continue to work on diet, exercise and weight loss    - F/U 2-3

## 2021-05-31 VITALS — BODY MASS INDEX: 35.02 KG/M2 | WEIGHT: 244.1 LBS

## 2021-06-01 VITALS — WEIGHT: 277.5 LBS | BODY MASS INDEX: 39.82 KG/M2

## 2021-06-01 VITALS — WEIGHT: 273.3 LBS | BODY MASS INDEX: 39.21 KG/M2

## 2021-06-01 NOTE — TELEPHONE ENCOUNTER
RN cannot approve Refill Request    RN can NOT refill this medication medication not on med list. Last office visit: 5/20/2019 Deepa Short MD Last Physical: Visit date not found Last MTM visit: Visit date not found Last visit same specialty: 5/20/2019 Deepa Short MD.  Next visit within 3 mo: Visit date not found  Next physical within 3 mo: Visit date not found      Mercy Marquez, [facility] Triage/Med Refill 9/12/2019    Requested Prescriptions   Pending Prescriptions Disp Refills     metoprolol succinate (TOPROL-XL) 50 MG 24 hr tablet [Pharmacy Med Name: METOPROLOL ER SUCCINATE 50MG TABS] 90 tablet 0     Sig: TAKE 1 TABLET(50 MG) BY MOUTH DAILY       Beta-Blockers Refill Protocol Passed - 9/11/2019 10:10 AM        Passed - PCP or prescribing provider visit in past 12 months or next 3 months     Last office visit with prescriber/PCP: 5/20/2019 Deepa Short MD OR same dept: 5/20/2019 Deepa Short MD OR same specialty: 5/20/2019 Deepa Short MD  Last physical: Visit date not found Last MTM visit: Visit date not found   Next visit within 3 mo: Visit date not found  Next physical within 3 mo: Visit date not found  Prescriber OR PCP: Deepa Short MD  Last diagnosis associated with med order: 1. HTN (hypertension)  - metoprolol succinate (TOPROL-XL) 50 MG 24 hr tablet [Pharmacy Med Name: METOPROLOL ER SUCCINATE 50MG TABS]; TAKE 1 TABLET(50 MG) BY MOUTH DAILY  Dispense: 90 tablet; Refill: 0    If protocol passes may refill for 12 months if within 3 months of last provider visit (or a total of 15 months).             Passed - Blood pressure filed in past 12 months     BP Readings from Last 1 Encounters:   06/25/19 148/90

## 2021-06-01 NOTE — TELEPHONE ENCOUNTER
Refill Approved    Rx renewed per Medication Renewal Policy. Medication was last renewed on 12/16/17.    Kourtney Escobedo, [facility] Triage/Med Refill 9/30/2019     Requested Prescriptions   Pending Prescriptions Disp Refills     ACCU-CHEK FASTCLIX LANCET DRUM [Pharmacy Med Name: ACCU-CHEK FASTCLIX LANCETS 102'S] 306 each 0     Sig: USE TO TEST BLOOD SUGARS 2 TO 3 TIMES A DAY       Diabetic Supplies Refill Protocol Passed - 9/30/2019 10:42 AM        Passed - Visit with PCP or prescribing provider visit in last 6 months     Last office visit with prescriber/PCP: 5/20/2019 Deepa Short MD OR same dept: Visit date not found OR same specialty: Visit date not found  Last physical: Visit date not found Last MTM visit: 1/11/2017 Flora Chavis, PharmD   Next visit within 3 mo: Visit date not found  Next physical within 3 mo: Visit date not found  Prescriber OR PCP: Deepa Short MD  Last diagnosis associated with med order: 1. Type 2 diabetes mellitus without complication, without long-term current use of insulin (H)  - ACCU-CHEK FASTCLIX LANCET DRUM [Pharmacy Med Name: ACCU-CHEK FASTCLIX LANCETS 102'S]; USE TO TEST BLOOD SUGARS 2 TO 3 TIMES A DAY  Dispense: 306 each; Refill: 0    If protocol passes may refill for 12 months if within 3 months of last provider visit (or a total of 15 months).             Passed - A1C in last 6 months     Hemoglobin A1c   Date Value Ref Range Status   05/10/2019 5.8 3.5 - 6.0 % Final

## 2021-06-01 NOTE — TELEPHONE ENCOUNTER
Refill Approved    Rx renewed per Medication Renewal Policy. Medication was last renewed on 12/19/18, last OV 5/20/19.    Lisa Esparza, [facility] Triage/Med Refill 9/15/2019     Requested Prescriptions   Pending Prescriptions Disp Refills     metFORMIN (GLUCOPHAGE-XR) 500 MG 24 hr tablet [Pharmacy Med Name: METFORMIN ER 500MG 24HR TABS] 180 tablet 0     Sig: TAKE 2 TABLETS(1000 MG) BY MOUTH DAILY. START WITH 1 TABLET 500 MG DAILY FOR 1 WEEK, THEN. INCREASE TO 2 TABLETS DAILY       Metformin Refill Protocol Passed - 9/15/2019  1:45 PM        Passed - Blood pressure in last 12 months     BP Readings from Last 1 Encounters:   06/25/19 148/90             Passed - LFT or AST or ALT in last 12 months     Albumin   Date Value Ref Range Status   05/10/2019 4.2 3.5 - 5.0 g/dL Final     Bilirubin, Total   Date Value Ref Range Status   05/10/2019 1.3 (H) 0.0 - 1.0 mg/dL Final     Bilirubin, Direct   Date Value Ref Range Status   12/02/2015 0.5 <=0.5 mg/dL Final     Alkaline Phosphatase   Date Value Ref Range Status   05/10/2019 89 45 - 120 U/L Final     AST   Date Value Ref Range Status   05/10/2019 20 0 - 40 U/L Final     ALT   Date Value Ref Range Status   05/10/2019 18 0 - 45 U/L Final     Protein, Total   Date Value Ref Range Status   05/10/2019 7.0 6.0 - 8.0 g/dL Final                Passed - GFR or Serum Creatinine in last 6 months     GFR MDRD Non Af Amer   Date Value Ref Range Status   06/25/2019 >60 >60 mL/min/1.73m2 Final     GFR MDRD Af Amer   Date Value Ref Range Status   06/25/2019 >60 >60 mL/min/1.73m2 Final             Passed - Visit with PCP or prescribing provider visit in last 6 months or next 3 months     Last office visit with prescriber/PCP: 5/20/2019 OR same dept: 5/20/2019 Deepa Short MD OR same specialty: 5/20/2019 Deepa Short MD Last physical: Visit date not found Last MTM visit: Visit date not found         Next appt within 3 mo: Visit date not found  Next physical within 3 mo:  Visit date not found  Prescriber OR PCP: Deepa Short MD  Last diagnosis associated with med order: 1. Type 2 diabetes mellitus without complication, without long-term current use of insulin (H)  - metFORMIN (GLUCOPHAGE-XR) 500 MG 24 hr tablet [Pharmacy Med Name: METFORMIN ER 500MG 24HR TABS]; TAKE 2 TABLETS(1000 MG) BY MOUTH DAILY. START WITH 1 TABLET 500 MG DAILY FOR 1 WEEK, THEN. INCREASE TO 2 TABLETS DAILY  Dispense: 180 tablet; Refill: 0     If protocol passes may refill for 12 months if within 3 months of last provider visit (or a total of 15 months).           Passed - A1C in last 6 months     Hemoglobin A1c   Date Value Ref Range Status   05/10/2019 5.8 3.5 - 6.0 % Final               Passed - Microalbumin in last year      Microalbumin, Random Urine   Date Value Ref Range Status   05/10/2019 34.32 (H) 0.00 - 1.99 mg/dL Final

## 2021-06-02 VITALS — WEIGHT: 278.8 LBS | BODY MASS INDEX: 40 KG/M2

## 2021-06-02 NOTE — TELEPHONE ENCOUNTER
Refill Approved    Rx renewed per Medication Renewal Policy. Medication was last renewed on 4/24/19.    Kourtney Escobedo, [facility] Triage/Med Refill 10/8/2019     Requested Prescriptions   Pending Prescriptions Disp Refills     atorvastatin (LIPITOR) 10 MG tablet [Pharmacy Med Name: ATORVASTATIN 10MG TABLETS] 90 tablet 0     Sig: TAKE 1 TABLET BY MOUTH EVERY DAY       Statins Refill Protocol (Hmg CoA Reductase Inhibitors) Passed - 10/7/2019  5:08 AM        Passed - PCP or prescribing provider visit in past 12 months      Last office visit with prescriber/PCP: 5/20/2019 Deepa Short MD OR same dept: 5/20/2019 Deepa Short MD OR same specialty: 5/20/2019 Deepa Short MD  Last physical: Visit date not found Last MTM visit: Visit date not found   Next visit within 3 mo: Visit date not found  Next physical within 3 mo: Visit date not found  Prescriber OR PCP: Deepa Short MD  Last diagnosis associated with med order: 1. Serum Total Cholesterol Was Elevated  - atorvastatin (LIPITOR) 10 MG tablet [Pharmacy Med Name: ATORVASTATIN 10MG TABLETS]; TAKE 1 TABLET BY MOUTH EVERY DAY  Dispense: 90 tablet; Refill: 0    If protocol passes may refill for 12 months if within 3 months of last provider visit (or a total of 15 months).

## 2021-06-03 VITALS — BODY MASS INDEX: 40.66 KG/M2 | HEIGHT: 70 IN | WEIGHT: 284 LBS

## 2021-06-03 VITALS — BODY MASS INDEX: 38.6 KG/M2 | WEIGHT: 285 LBS | HEIGHT: 72 IN

## 2021-06-03 VITALS — BODY MASS INDEX: 37.9 KG/M2 | WEIGHT: 279.8 LBS | HEIGHT: 72 IN

## 2021-06-03 VITALS — WEIGHT: 284.9 LBS | BODY MASS INDEX: 40.88 KG/M2

## 2021-06-03 NOTE — PROGRESS NOTES
Chief complaint: Check diabetes    HPI: Patient is here after having had some labs completed at the end of November.  His A1c was great at a 5.5 level and he is only taking the metformin once a day.  His blood pressure has been hovering in the 130s to 140s over 70s to 80s.  He been seen by cardiologist who told him they were to have to change medication if he were consistently greater than 140 systolic and he has not hit that point.  They stop the amlodipine and start a different calcium channel blocker that seems to be working a little bit better for him.    Objective:/80   Pulse 73   Wt (!) 284 lb 6.4 oz (129 kg)   SpO2 98%   BMI 38.56 kg/m    He brought in his list of blood pressure readings for the last 4 months and he is mostly at goal when he takes his medication.    He is significantly overweight for height and has not lost weight as he knows he needs to do  His A1c is normal at 5.5 and that was from the end of November.  His company has a metabolic panel was reviewed and is glucose is 128 but it was not a fasting level so I am okay with that.  The curious finding was a creatinine that was elevated at 1.42 so want to check that again in about a month to make sure that we are not having some problems with impaired renal function but some new.    Assessment: Type 2 diabetes mellitus A1c doing well  Elevated serum creatinine level-new finding we will follow-up  Hypertension reasonably well controlled  Obesity  Hyperlipidemia    Plan: He will come back in about a month just for a lab visit to check the creatinine.  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

## 2021-06-04 VITALS
OXYGEN SATURATION: 95 % | WEIGHT: 284.6 LBS | BODY MASS INDEX: 38.55 KG/M2 | HEIGHT: 72 IN | HEART RATE: 72 BPM | SYSTOLIC BLOOD PRESSURE: 139 MMHG | DIASTOLIC BLOOD PRESSURE: 84 MMHG

## 2021-06-04 VITALS
WEIGHT: 284.4 LBS | DIASTOLIC BLOOD PRESSURE: 80 MMHG | HEART RATE: 73 BPM | SYSTOLIC BLOOD PRESSURE: 144 MMHG | OXYGEN SATURATION: 98 % | BODY MASS INDEX: 38.56 KG/M2

## 2021-06-04 VITALS
HEART RATE: 78 BPM | WEIGHT: 275 LBS | DIASTOLIC BLOOD PRESSURE: 86 MMHG | SYSTOLIC BLOOD PRESSURE: 142 MMHG | BODY MASS INDEX: 37.29 KG/M2

## 2021-06-05 VITALS
HEART RATE: 62 BPM | BODY MASS INDEX: 38.42 KG/M2 | DIASTOLIC BLOOD PRESSURE: 66 MMHG | WEIGHT: 283.7 LBS | SYSTOLIC BLOOD PRESSURE: 132 MMHG | HEIGHT: 72 IN

## 2021-06-08 NOTE — PROGRESS NOTES
Dear Dr. Deepa Short MD  [address] [place] [address],    Thank you for the opportunity to participate in the care of Govind Alexandre.     He is a 51 y.o. y/o male patient who comes to the sleep medicine clinic for follow up.    He was diagnosed with severe KORINA (JJJ=562.1)  and has been using CPAP of 10 cwp.  He had frequent central events during the titration portion of the test but we started with CPAP because ASV did not provide complete resolution of events either.  His symptoms are improved since he started using CPAP. He is not waking up in the middle of the night. He denies any PAP intolerance. He is using the device every night and tolerates the pressure well.  He is no longer snoring with the device.     Residual AHI: 11.6  Leak: 0  Compliance: 100%    Mask Tolerance: excellent  Skin irritation: no      Past Medical History   Diagnosis Date     Depression 12/24/2014     Multiple thyroid nodules 9/8/2016     Type 2 diabetes mellitus without complication 11/7/2016       Past Surgical History   Procedure Laterality Date     Pr repair rotator cuff,acute       Description: Rotator Cuff Repair Acute;  Recorded: 01/13/2010;  Comments: cortisone     Pr removal of sperm duct(s)       Description: Surgery Of Male Genitalia Vasectomy;  Recorded: 01/13/2010;     [place] tooth extraction       Pic  11/30/2015             Social History     Social History     Marital status:      Spouse name: N/A     Number of children: N/A     Years of education: N/A     Occupational History     Not on file.     Social History Main Topics     Smoking status: Former Smoker     Quit date: 12/24/2004     Smokeless tobacco: Never Used     Alcohol use Yes      Comment: 1 every 2 weeks     Drug use: No     Sexual activity: Yes     Partners: Female     Birth control/ protection: None     Other Topics Concern     Not on file     Social History Narrative       Review of Systems:  General: No weight gain, no weight  "loss  Eyes: No vision changes  ENT: No hearing changes  Cardio: No chest pain, no nocturnal dyspnea  Respiratory: No shortness of breath, no cough  Gastrointestinal: No diarrhea, no constipation  Genitourinary: No excessive urination  Tegumentary: No rashes  Neurological: No seizures, no loss of consciousness  Endo: No heat or cold intolerance.    Current Outpatient Prescriptions   Medication Sig Dispense Refill     aspirin 81 mg chewable tablet Chew 81 mg daily.       atorvastatin (LIPITOR) 10 MG tablet Take 1 tablet (10 mg total) by mouth daily. 90 tablet 3     blood glucose test (ACCU-CHEK NATHANIEL PLUS TEST STRP) strips Use 1 each As Directed 3 (three) times a day. 300 each 3     fexofenadine (ALLEGRA) 180 MG tablet Take 180 mg by mouth daily.       fluticasone (FLONASE) 50 mcg/actuation nasal spray 2 sprays into each nostril daily. 16 g 3     generic lancets (ACCU-CHEK FASTCLIX) Use to test blood sugars 2-3 times daily. 306 each 3     lisinopril (PRINIVIL,ZESTRIL) 10 MG tablet Take 1 tablet (10 mg total) by mouth daily. 30 tablet 11     metFORMIN (GLUCOPHAGE XR) 500 MG 24 hr tablet Take 2 tablets (1,000 mg total) by mouth daily. Start with 1 tablet (500 mg) daily for 1 week, then increase to 2 tablets daily. 180 tablet 3     metoprolol succinate (TOPROL XL) 50 MG 24 hr tablet Take 1 tablet (50 mg total) by mouth daily. 90 tablet 3     No current facility-administered medications for this visit.        No Known Allergies    Physical Exam:  Visit Vitals     /86     Ht 5' 10\" (1.778 m)     Wt (!) 256 lb (116.1 kg)     BMI 36.73 kg/m2     BMI:Body mass index is 36.73 kg/(m^2).   GEN: NAD, obese  Neurological: Alert, oriented to time, place, and person.  Psych: normal mood, normal affect     Labs/Studies:    I reviewed the efficacy and compliance report from his device. Data summarized on the HPI and the CPAP compliance flow sheet.     Lab Results   Component Value Date    WBC 9.8 12/10/2015    HGB 15.1 " 12/10/2015    HCT 43.4 12/10/2015    MCV 88 12/10/2015     12/10/2015         Chemistry        Component Value Date/Time     01/04/2017 0740    K 3.5 01/04/2017 0740     (H) 01/04/2017 0740    CO2 28 01/04/2017 0740    BUN 16 01/04/2017 0740    CREATININE 0.80 01/04/2017 0740    GLU 96 01/04/2017 0740        Component Value Date/Time    CALCIUM 9.2 01/04/2017 0740    ALKPHOS 96 01/04/2017 0740    AST 33 01/04/2017 0740    ALT 27 01/04/2017 0740    BILITOT 1.3 (H) 01/04/2017 0740              Assessment and Plan:  In summary Govind Alexandre is a 51 y.o. year old male who is here for follow up.    1. Obstructive Sleep Apnea  The AHI is improved compared to his baseline study but he continues to have residual events. Residual AHI with CPAP of 10 was 23 and this is improved today. Here is evidence that suggests that complex sleep apnea may resolve over time.  One of the concerns right now is that he may have significant hypoxia from these residual events.  We will order an overnight oximetry an decide if ASV is required or not.      Patient verbalized understanding of these issues, agrees with the plan and all questions were answered today. Patient was given an opportuntity to voice any other symptoms or concerns not listed above. Patient did not have any other symptoms or concerns.      Patient told to return in 6 months. Patient instructed to stop at  to schedule appointment before leaving today.    MD MARIYA Lowe Board Certified in Internal Medicine and Sleep Medicine  [facility].

## 2021-06-08 NOTE — PROGRESS NOTES
Order for Durable Medical Equipment was processed and equipment ordered.   DME provider: [facility]  Date Faxed: 1/27/2017  Ordering Provider: DR. DEXTER  Equipment ordered: OVERNIGHT OXIMETRY

## 2021-06-08 NOTE — PATIENT INSTRUCTIONS - HE
- walk at least 30 minutes a day, shoot for 60    - Low sodium, mediterranean diet    - See me in 6 months

## 2021-06-08 NOTE — PROGRESS NOTES
Chief complaint: Follow-up diabetes    HPI: The patient was seen with his wife and with our pharmacist today.  He had had some labs completed within the last week or so and was found to have some notable proteinuria.  Plan he's been taking his blood sugars a couple times a day and he is getting to goal.  He is lost 3 pounds.  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  He knows that he will be able to do as much exercise as he should but they are planning ahead to make sure that the snacks that he had in the meals that he has are diet and diabetes friendly.    He is to change the type of meter he uses to one does less expensive.    His blood pressure isn't quite as well-controlled as we would like just on the metoprolol 50 mg a day, and with his pulse rate in the 60s I don't want to lower his heart rate any further so will add lisinopril to lower the blood pressure and to protect the kidneys with a microalbuminuria.    We discussed whether or not he would need multivitamins and likely only during the tax season when he is not eating adequately.    He mentioned as an aside that they're going to [place] in May and wanted to know if he needed any shots.  I will print out the [employer] website for traveling to [place] and told him if there is something on there that he needs to have he will need to discuss that at another visit.  I do want to see him back after April 17 20 taxes are due before he goes to [place] switch him to a diabetes check.  I do want to do some labs in about 4-6 weeks because her putting him on lisinopril and wanted check renal function and by then we can also check an A1c to see how much better he is doing with his blood sugars.    Objective:  Visit Vitals     /86 (Patient Site: Right Arm, Patient Position: Sitting, Cuff Size: Adult Large)     Pulse 64     Wt  (!) 261 lb 14.4 oz (118.8 kg)     BMI 37.58 kg/m2     We reviewed his labs in detail.  His liver function is stable on 10 mg of atorvastatin and his lipids have come down dramatically    No further physical exam was completed.    Assessment: Type 2 diabetes mellitus  Microalbuminuria  Hypertension not adequately controlled  Overweight  Hyperlipidemia    Plan: Most of his numbers are improving, but with a microalbuminuria we will add lisinopril.  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  He will come back and see me the end of April before he goes to [place].    I gave him information on traveling to [place] from the [employer] website softer something else he needs before the trip, we can address that the next time we see him.    Total time spent was 20-25 minutes and all that time was counseling and care coordination and discussion of trying to come up with the care plan and follow-up it is appropriate for his schedule.

## 2021-06-08 NOTE — PROGRESS NOTES
MTM Encounter    ASSESSMENT AND PLAN    1. Type 2 diabetes mellitus   Blood sugars are well controlled and meeting fasting and post-prandial goals. Congratulated him on the positive dietary changes he is making and encouraged to continue, even through his busy work season. Will plan on checking A1c in about 6 weeks, along with BMP since starting lisinopril.   Plan   1. Check A1c in about 6 weeks.     2. Elevated blood pressure  BP is above goal of <140/90 mm Hg. Because of this and microalbuminuria, recommended starting an ACE inhibitor. We will start lisinopril 10 mg daily. Medication education and counseling was provided, including indication, expected effect, dosing instructions, and possible side effects. The patient's questions were answered.   Plan   1. Start lisinopril 10 mg daily.         Return in about 3 months (around 4/11/2017).      SUBJECTIVE AND OBJECTIVE  Govind Alexanrde is a 51 y.o. male here for a medication therapy management (MTM) appointment.     1. Type 2 diabetes mellitus   Govind was recently diagnosed with type 2 diabetes. He is currently taking metformin extended release 1000 milligrams daily. He and his wife have been working on making positive diet changes. He still isn't really exercising though. He is also meeting with diabetes education. He is checking his blood sugars twice daily as recommended. Blood sugars in the morning and evening are in the  range.   Lab Results   Component Value Date    HGBA1C 7.4 (H) 11/04/2016       2. Elevated blood pressure  Govind is currently taking metoprolol extended release 50 mg daily. His blood pressure recently has been elevated. Today it is 142/86.     BP Readings from Last 3 Encounters:   01/11/17 142/86   12/27/16 (!) 183/91   12/21/16 118/80                 Govind's medication list was reviewed with them, discussing reason for use, directions for use, and potential side effects of each medication as needed. Indication, safety, efficacy, and  convenience was assessed for all medications addressed above.  No environmental factors were noted currently affecting patient.  This care plan was communicated via EMR with his primary care provider, Deepa Short MD, who is the authorizing prescriber for this visit.  Direct supervision was available by either the patient's PCP or other available provider.    Time Spent: 30 minutes  Time and complexity billing metrics are included in the doc-flowsheet linked to this visit.       Flora Chavis, PharmD, BCACP    Current Outpatient Prescriptions   Medication Sig Dispense Refill     aspirin 81 mg chewable tablet Chew 81 mg daily.       atorvastatin (LIPITOR) 10 MG tablet Take 1 tablet (10 mg total) by mouth daily. 90 tablet 3     blood glucose test (ACCU-CHEK NATHANIEL PLUS TEST STRP) strips Use 1 each As Directed 3 (three) times a day. 300 each 3     fexofenadine (ALLEGRA) 180 MG tablet Take 180 mg by mouth daily.       fluticasone (FLONASE) 50 mcg/actuation nasal spray 2 sprays into each nostril daily. 16 g 3     generic lancets (ACCU-CHEK FASTCLIX) Use to test blood sugars 2-3 times daily. 306 each 3     lisinopril (PRINIVIL,ZESTRIL) 10 MG tablet Take 1 tablet (10 mg total) by mouth daily. 30 tablet 11     metFORMIN (GLUCOPHAGE XR) 500 MG 24 hr tablet Take 2 tablets (1,000 mg total) by mouth daily. Start with 1 tablet (500 mg) daily for 1 week, then increase to 2 tablets daily. 180 tablet 3     metoprolol succinate (TOPROL XL) 50 MG 24 hr tablet Take 1 tablet (50 mg total) by mouth daily. 90 tablet 3     No current facility-administered medications for this visit.

## 2021-06-09 NOTE — TELEPHONE ENCOUNTER
Appointment scheduled for 8/7/2020 at 3:30pm. Patient would like labs done before appointment, lab only appointment scheduled for 7/30/2020 at 8:15am. Are you okay placing future lab orders?

## 2021-06-09 NOTE — TELEPHONE ENCOUNTER
RN cannot approve Refill Request    RN can NOT refill this medication PCP messaged that patient is overdue for Labs and Office Visit and Protocol failed and NO refill given. Last office visit: 12/2/2019 Deepa Short MD Last Physical: Visit date not found Last MTM visit: Visit date not found Last visit same specialty: 12/2/2019 Deepa Short MD.  Next visit within 3 mo: Visit date not found  Next physical within 3 mo: Visit date not found      Kathleen Mota, [facility] Triage/Med Refill 7/18/2020    Requested Prescriptions   Pending Prescriptions Disp Refills     metFORMIN (GLUCOPHAGE-XR) 500 MG 24 hr tablet [Pharmacy Med Name: METFORMIN ER 500MG 24HR TABS] 180 tablet 2     Sig: TAKE 2 TABLETS(1000 MG) BY MOUTH DAILY. START WITH 1 TABLET 500 MG DAILY FOR 1 WEEK, THEN. INCREASE TO 2 TABLETS DAILY       Metformin Refill Protocol Failed - 7/18/2020  8:33 AM        Failed - Visit with PCP or prescribing provider visit in last 6 months or next 3 months     Last office visit with prescriber/PCP: Visit date not found OR same dept: 12/2/2019 Deepa Short MD OR same specialty: 12/2/2019 Deepa Short MD Last physical: Visit date not found Last MTM visit: Visit date not found         Next appt within 3 mo: Visit date not found  Next physical within 3 mo: Visit date not found  Prescriber OR PCP: Deepa Short MD  Last diagnosis associated with med order: 1. Type 2 diabetes mellitus without complication, without long-term current use of insulin (H)  - metFORMIN (GLUCOPHAGE-XR) 500 MG 24 hr tablet [Pharmacy Med Name: METFORMIN ER 500MG 24HR TABS]; TAKE 2 TABLETS(1000 MG) BY MOUTH DAILY. START WITH 1 TABLET 500 MG DAILY FOR 1 WEEK, THEN. INCREASE TO 2 TABLETS DAILY  Dispense: 180 tablet; Refill: 2     If protocol passes may refill for 12 months if within 3 months of last provider visit (or a total of 15 months).           Failed - A1C in last 6 months     Hemoglobin A1c   Date Value Ref Range Status    11/21/2019 5.5 3.5 - 6.0 % Final               Passed - Blood pressure in last 12 months     BP Readings from Last 1 Encounters:   05/07/20 142/86             Passed - LFT or AST or ALT in last 12 months     Albumin   Date Value Ref Range Status   01/02/2020 4.3 3.5 - 5.0 g/dL Final     Bilirubin, Total   Date Value Ref Range Status   01/02/2020 1.2 (H) 0.0 - 1.0 mg/dL Final     Bilirubin, Direct   Date Value Ref Range Status   12/02/2015 0.5 <=0.5 mg/dL Final     Alkaline Phosphatase   Date Value Ref Range Status   01/02/2020 98 45 - 120 U/L Final     AST   Date Value Ref Range Status   01/02/2020 22 0 - 40 U/L Final     ALT   Date Value Ref Range Status   01/02/2020 24 0 - 45 U/L Final     Protein, Total   Date Value Ref Range Status   01/02/2020 6.9 6.0 - 8.0 g/dL Final                Passed - GFR or Serum Creatinine in last 6 months     GFR MDRD Non Af Amer   Date Value Ref Range Status   01/02/2020 >60 >60 mL/min/1.73m2 Final     GFR MDRD Af Amer   Date Value Ref Range Status   01/02/2020 >60 >60 mL/min/1.73m2 Final             Passed - Microalbumin in last year      Microalbumin, Random Urine   Date Value Ref Range Status   01/02/2020 6.73 (H) 0.00 - 1.99 mg/dL Final

## 2021-06-10 NOTE — PROGRESS NOTES
Complaint: Travel consult to [facility]  Diabetes recheck    HPI: Patient was recently diagnosed with diabetes so he has been taking his Glucophage and checking his blood sugars.  We also put him on Lipitor for the cholesterol and metoprolol and lisinopril for his blood pressure.  He has lost a significant amount of weight and now he is already less than the weight goal he had set for himself.  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    We reviewed his last labs and he would be due for any blood work today he will come back in about 3 or 4 months to recheck fasting labs.  He will start an exercise program and hopefully if he continues to lose weight we can decrease some of his other medication.    We reviewed the CDC guidelines in detail and he already has a prescription for typhoid vaccine at home and will get him started on his hepatitis B vaccine series which he needs for his diabetes anyway.    He was seen by an ophthalmologist in October 2016 shortly after we diagnosed him with diabetes and has had his prescription change.    Objective:/84 (Patient Site: Right Arm, Patient Position: Sitting, Cuff Size: Adult Large)  Pulse 76  Wt (!) 241 lb 6.4 oz (109.5 kg)  BMI 34.64 kg/m2  He is in no acute distress and he does look to have lost some weight he is wearing glasses.    We reviewed the CDC website and his labs    Assessment: Type 2 diabetes mellitus doing pretty well he will check his machines control solution to make sure that is giving him accurate readings.  I will start him on his hepatitis B vaccine series today no come back in 2 months and in 6 months and have him follow-up with his diabetes check within that 4-6 month timeframe

## 2021-06-10 NOTE — PROGRESS NOTES
Chief complaint: Diabetes follow-up    HPI: The patient had his labs completed a couple of days ago so we could review them today.    He has a daughter who is living at home with him who tested positive for COVID even though the only symptom she had was a sore throat and she had initially come in to be tested for strep.  No one else in the home tested positive.    Objective:/84 (Patient Site: Right Arm, Patient Position: Sitting, Cuff Size: Adult Regular)   Pulse 72   Ht 6' (1.829 m)   Wt (!) 284 lb 9.6 oz (129.1 kg)   SpO2 95%   BMI 38.60 kg/m    He brought in a list of all of his blood pressures that he has had over the last several months and his average is running in the 130s systolic over 70s to 80s diastolic.  He just had a telephone visit with a cardiologist last month and no changes were made in his regimen.  He is not currently having atrial fibrillation    His cardiac exam is unremarkable his lungs are clear    His foot exam is entirely normal    His A1c is less than 6 and the rest of his labs were remarkable for persistently elevated microalbumin in the urine and his fasting blood sugar was 160s.  His A1c however was at an appropriate level but he is also being medicated.    His weight has been stable but he certainly needs to lose a significant amount of weight and he might be able to get off some of his medication    Assessment: Type 2 diabetes mellitus; A1c at goal  Hyperlipidemia; at goal but with low HDL, we decided to change from atorvastatin to rosuvastatin because that has less of an effect on the HDL.  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

## 2021-06-11 NOTE — TELEPHONE ENCOUNTER
Refill Approved    Rx renewed per Medication Renewal Policy. Medication was last renewed on 7/20/20.    Kourtney Escobedo, [facility] Triage/Med Refill 9/3/2020     Requested Prescriptions   Pending Prescriptions Disp Refills     metFORMIN (GLUCOPHAGE-XR) 500 MG 24 hr tablet [Pharmacy Med Name: METFORMIN ER 500MG 24HR TABS] 60 tablet 0     Sig: TAKE 2 TABLETS(1000 MG) BY MOUTH DAILY. START WITH 1 TABLET 500 MG DAILY FOR 1 WEEK, THEN. INCREASE TO 2 TABLETS DAILY       Metformin Refill Protocol Passed - 9/1/2020  6:01 AM        Passed - Blood pressure in last 12 months     BP Readings from Last 1 Encounters:   08/19/20 139/84             Passed - LFT or AST or ALT in last 12 months     Albumin   Date Value Ref Range Status   08/17/2020 4.3 3.5 - 5.0 g/dL Final     Bilirubin, Total   Date Value Ref Range Status   08/17/2020 1.0 0.0 - 1.0 mg/dL Final     Bilirubin, Direct   Date Value Ref Range Status   12/02/2015 0.5 <=0.5 mg/dL Final     Alkaline Phosphatase   Date Value Ref Range Status   08/17/2020 90 45 - 120 U/L Final     AST   Date Value Ref Range Status   08/17/2020 19 0 - 40 U/L Final     ALT   Date Value Ref Range Status   08/17/2020 21 0 - 45 U/L Final     Protein, Total   Date Value Ref Range Status   08/17/2020 7.1 6.0 - 8.0 g/dL Final                Passed - GFR or Serum Creatinine in last 6 months     GFR MDRD Non Af Amer   Date Value Ref Range Status   08/17/2020 >60 >60 mL/min/1.73m2 Final     GFR MDRD Af Amer   Date Value Ref Range Status   08/17/2020 >60 >60 mL/min/1.73m2 Final             Passed - Visit with PCP or prescribing provider visit in last 6 months or next 3 months     Last office visit with prescriber/PCP: 8/19/2020 OR same dept: 8/19/2020 Deepa Short MD OR same specialty: 8/19/2020 Deepa Short MD Last physical: Visit date not found Last MTM visit: Visit date not found         Next appt within 3 mo: Visit date not found  Next physical within 3 mo: Visit date not  found  Prescriber OR PCP: Deepa Short MD  Last diagnosis associated with med order: 1. Type 2 diabetes mellitus without complication, without long-term current use of insulin (H)  - metFORMIN (GLUCOPHAGE-XR) 500 MG 24 hr tablet [Pharmacy Med Name: METFORMIN ER 500MG 24HR TABS]; TAKE 2 TABLETS(1000 MG) BY MOUTH DAILY. START WITH 1 TABLET 500 MG DAILY FOR 1 WEEK, THEN. INCREASE TO 2 TABLETS DAILY  Dispense: 60 tablet; Refill: 0     If protocol passes may refill for 12 months if within 3 months of last provider visit (or a total of 15 months).           Passed - A1C in last 6 months     Hemoglobin A1c   Date Value Ref Range Status   08/17/2020 5.6 <=5.6 % Final     Comment:     Normal <5.7% Prediabete 5.7-6.4% Diabletes 6.5% or higher - adopted from ADA consensus guidelines               Passed - Microalbumin in last year      Microalbumin, Random Urine   Date Value Ref Range Status   08/17/2020 9.97 (H) 0.00 - 1.99 mg/dL Final

## 2021-06-12 NOTE — PROGRESS NOTES
Chief complaint: Follow-up diabetes testing  Left elbow pain for over 8 months  Sleep disturbance with mood disturbance    HPI: Patient is here with his wife to review his diabetes labs.  He has been doing well but with a new meter, he thinks his blood sugars have been running a little bit higher.  I have asked him if he used the control solution to calibrate his machine and he did not really know what I was talking about so he will ask his pharmacist.    He has lost 20 pounds since last December according to our records and would love to get off some of his medication but I am not sure that there is anything I am to be able to decrease yet.  He needs to continue work on weight loss and exercise because his blood pressure is still elevated despite being on multiple medications.    He is complaining of some left elbow pain that certainly been going on for over 6 months and he does not know if he injured it when he fell on the ice in his driveway a year and a half ago.  He feels a bony prominence that is very tender on the lateral aspect of his elbow and feels when he is flexing and extending it that something is clunking in his joint and it might be a tendon.  He has not done anything with this besides rest it but he just came back from a trip to [place] so he has been doing a lot of lifting of luggage etc.    Objective:/88 (Patient Site: Right Arm, Patient Position: Sitting, Cuff Size: Adult Large)  Pulse 68  Wt (!) 244 lb 1.6 oz (110.7 kg)  BMI 35.02 kg/m2  He is in no distress and he is wearing glasses.  We reviewed his labs in detail with the trend of his results.  His A1c is down to 4.7.  His LDL is at goal.  He is taking an aspirin he is not smoking but his blood pressure is not quite where we want it to be yet.  We had intended to check his blood pressure before he left and did not get a chance to do that.    His left elbow has a bony protuberance and it feels like an osteophyte over the lateral aspect  of the olecranon.  When he flexes and extends it feels as though a tendon is sliding over this area.  Is not warm red or indurated.  I opted not to do an x-ray because I am going to send him to the orthopedic surgeon and they will x-ray it.    Assessment: Type 2 diabetes mellitus  Hypertension not adequately controlled  Overweight  Hyperlipidemia doing well  Left elbow pain    Plan: We will continue his diabetes medications for now and see him back in about 5 months before [facility] season starts [facility].  I am not going change medications right now.  I have encouraged him to continue decreasing portion sizes and to increase his exercise for continued weight loss.  I will send him to orthopedic surgery for the imaging and management of his elbow pain.    Addendum he mentions as an aside after we completed our visit that he is having some sleep disturbance and is kind of anxious.  His PHQ 9 score is 8 and his JORGE 7 score is 6.  There are a lot of things going on the family and with a business but they are having a big meeting with the lower in about 3 weeks and think things will get better at that point.  We really did not have time to address this today so he will monitor how that is going and come back if he wants to be seen by a therapist because right now I would not start medications and we talked about sleep hygiene and some other things that he could do.    Over 25 minutes was spent today and over half of that was counseling care coordination and discussion

## 2021-06-13 NOTE — PATIENT INSTRUCTIONS - HE
- Pick one diet change (big glass of water before meals, cut out baked goods, cut out soda, cut portion size in half, etc) and stick to it    - Walk every day    - Call if you have any trouble or questions    - See me in 1 year

## 2021-06-14 ENCOUNTER — AMBULATORY - HEALTHEAST (OUTPATIENT)
Dept: LAB | Facility: CLINIC | Age: 56
End: 2021-06-14

## 2021-06-14 DIAGNOSIS — I10 ESSENTIAL HYPERTENSION: ICD-10-CM

## 2021-06-14 DIAGNOSIS — E11.9 TYPE 2 DIABETES MELLITUS WITHOUT COMPLICATION, WITHOUT LONG-TERM CURRENT USE OF INSULIN (H): ICD-10-CM

## 2021-06-14 DIAGNOSIS — E78.2 MIXED HYPERLIPIDEMIA: ICD-10-CM

## 2021-06-14 LAB
ALBUMIN SERPL-MCNC: 4.4 G/DL (ref 3.5–5)
ALP SERPL-CCNC: 86 U/L (ref 45–120)
ALT SERPL W P-5'-P-CCNC: 20 U/L (ref 0–45)
ANION GAP SERPL CALCULATED.3IONS-SCNC: 12 MMOL/L (ref 5–18)
AST SERPL W P-5'-P-CCNC: 19 U/L (ref 0–40)
BILIRUB SERPL-MCNC: 1.1 MG/DL (ref 0–1)
BUN SERPL-MCNC: 18 MG/DL (ref 8–22)
CALCIUM SERPL-MCNC: 8.7 MG/DL (ref 8.5–10.5)
CHLORIDE BLD-SCNC: 110 MMOL/L (ref 98–107)
CHOLEST SERPL-MCNC: 108 MG/DL
CO2 SERPL-SCNC: 22 MMOL/L (ref 22–31)
CREAT SERPL-MCNC: 0.97 MG/DL (ref 0.7–1.3)
CREAT UR-MCNC: 181.7 MG/DL
FASTING STATUS PATIENT QL REPORTED: YES
GFR SERPL CREATININE-BSD FRML MDRD: >60 ML/MIN/1.73M2
GLUCOSE BLD-MCNC: 148 MG/DL (ref 70–125)
HBA1C MFR BLD: 5.8 %
HDLC SERPL-MCNC: 31 MG/DL
LDLC SERPL CALC-MCNC: 57 MG/DL
MICROALBUMIN UR-MCNC: 3.36 MG/DL (ref 0–1.99)
MICROALBUMIN/CREAT UR: 18.5 MG/G
POTASSIUM BLD-SCNC: 4.7 MMOL/L (ref 3.5–5)
PROT SERPL-MCNC: 7 G/DL (ref 6–8)
SODIUM SERPL-SCNC: 144 MMOL/L (ref 136–145)
TRIGL SERPL-MCNC: 98 MG/DL
TSH SERPL DL<=0.005 MIU/L-ACNC: 0.38 UIU/ML (ref 0.3–5)
VIT B12 SERPL-MCNC: 839 PG/ML (ref 213–816)

## 2021-06-14 NOTE — PROGRESS NOTES
ASSESSMENT:  1. Essential hypertension     - Comprehensive Metabolic Panel; Future    2. Type 2 diabetes mellitus without complication, without long-term current use of insulin (H)     - Glycosylated Hemoglobin A1c; Future  - Comprehensive Metabolic Panel; Future  - Vitamin B12; Future  - Thyroid Stimulating Hormone (TSH); Future  - Vitamin D, Total (25-Hydroxy); Future  - Microalbumin, Random Urine; Future    3. Sleep apnea, unspecified type  Wears CPAP    4. Mixed hyperlipidemia     - Lipid Profile; Future  - Comprehensive Metabolic Panel; Future    5. Traumatic brain injury, without loss of consciousness, subsequent encounter  Just emotional lability as a residual    6. Obesity (BMI 35.0-39.9) with comorbidity (H)       7. History of atrial fibrillation  With TBI    8. Medication management              PLAN:   follow up PE in 6 months    There are no Patient Instructions on file for this visit.    Orders Placed This Encounter   Procedures     Glycosylated Hemoglobin A1c     Standing Status:   Future     Standing Expiration Date:   1/25/2022     Lipid Profile     Standing Status:   Future     Standing Expiration Date:   1/25/2022     Order Specific Question:   Fasting is required?     Answer:   Yes     Comprehensive Metabolic Panel     Standing Status:   Future     Standing Expiration Date:   1/25/2022     Vitamin B12     Standing Status:   Future     Standing Expiration Date:   1/25/2022     Thyroid Stimulating Hormone (TSH)     Standing Status:   Future     Standing Expiration Date:   1/25/2022     Vitamin D, Total (25-Hydroxy)     Standing Status:   Future     Standing Expiration Date:   1/25/2022     Microalbumin, Random Urine     Standing Status:   Future     Standing Expiration Date:   1/25/2022     Medications Discontinued During This Encounter   Medication Reason     fluticasone (FLONASE) 50 mcg/actuation nasal spray Therapy completed          CHIEF COMPLAINT:  Chief Complaint   Patient presents with      Diabetes     follow up       HISTORY OF PRESENT ILLNESS:  Govind is a 55 y.o. male who is here for diabetes follow-up.  He had his labs completed 1 week ago and we reviewed those together today.    He has not been managing his diabetes very well and took some liberties with his diet and exercise over the last several weeks.  His weight is stable but his A1c jumped from 5.6 in August to 6.2 .  His triglycerides increased from 135 in August to 159 in January.  His total cholesterol LDL and HDL are otherwise pretty stable.  His fasting blood sugar on  was 187 and his bilirubin was slightly elevated 1.2 but that is an old finding.  The rest of his comprehensive metabolic panel is unremarkable.  I had also done a PSA that was normal and stable at 2.4.    He missed his eye exam in November so he will get it at the completion of tax season.  His foot exam today is due.    He had lots of questions about when he will be able to get the Covid vaccine.  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    REVIEW OF SYSTEMS:   He tells me that maybe 2 nights he woke up from a dream with his heart beating faster than is typical.  He took a deep breath and then held his breath and what ever that rhythm was completely stopped.  He had no chest pain or shortness of breath with this.  Was not particularly stressed so unclear what the etiology of that might be.  If it starts to happen more regularly, we could always do a Holter monitor.  All other systems are negative.      TOBACCO USE:  Social History     Tobacco Use   Smoking Status Former Smoker     Quit date: 2004     Years since quittin.0   Smokeless Tobacco Never Used        VITALS:  Vitals:    01/25/21 0759   BP: 132/66   Patient Site: Right Arm   Patient Position: Sitting   Cuff Size: Adult Large   Pulse: 62   Weight: (!) 283 lb 11.2 oz (128.7 kg)   Height: 6' (1.829 m)     Wt Readings from Last 3 Encounters:   01/25/21 (!) 283 lb 11.2 oz (128.7 kg)   08/19/20 (!) 284 lb 9.6 oz (129.1 kg)   05/07/20 (!) 275 lb (124.7 kg)       PHYSICAL EXAM:  He is significantly overweight for height.  He is wearing glasses and he has a double facemask.  His foot exam was normal.  Reviewed his vitals that have been fairly stable although blood pressure is slightly higher than we would like it to be.    DATA REVIEWED:  Additional History from Old Records Summarized (2):    Decision to Obtain Records (1):    Radiology Tests Summarized or Ordered (1):    Labs Reviewed or Ordered (1): 1  Medicine Test Summarized or Ordered (1):    Independent Review of EKG or X-RAY(2 each):      The visit lasted a total of 25 minutes face to face with the patient. Over 50% of the time was spent counseling and educating the patient about managing his diabetes and cholesterol in the Covid pandemic while trying to do his job as a ..    MEDICATIONS:  Current Outpatient Medications   Medication Sig Dispense Refill     ACCU-CHEK FASTCLIX LANCET DRUM USE TO TEST BLOOD SUGARS 2 TO 3 TIMES A  each 4     aspirin 81 mg chewable tablet Chew 81 mg daily.       blood glucose test (ACCU-CHEK NATHANIEL PLUS TEST STRP) strips Use 1 each As Directed 3 (three) times a day. 300 each 3     fexofenadine (ALLEGRA) 180 MG tablet Take 180 mg by mouth daily.       lisinopriL (PRINIVIL,ZESTRIL) 40 MG tablet TAKE 1 TABLET BY MOUTH DAILY 180 tablet 2     metFORMIN (GLUCOPHAGE-XR) 500 MG 24 hr tablet TAKE 2 TABLETS(1000 MG) BY MOUTH DAILY. START WITH 1 TABLET 500 MG DAILY FOR 1 WEEK, THEN. INCREASE TO 2 TABLETS DAILY 180 tablet 3     rosuvastatin (CRESTOR) 10 MG tablet Take 1 tablet (10 mg total) by mouth at bedtime. 30 tablet  11     SF 1.1 % Gel dental gel        spironolactone (ALDACTONE) 25 MG tablet TAKE 1 TABLET(25 MG) BY MOUTH DAILY 90 tablet 0     NIFEdipine (PROCARDIA XL) 60 MG 24 hr tablet TAKE 1 TABLET(60 MG) BY MOUTH DAILY 90 tablet 2     No current facility-administered medications for this visit.

## 2021-06-15 LAB — 25(OH)D3 SERPL-MCNC: 51.9 NG/ML (ref 30–80)

## 2021-06-16 PROBLEM — E78.2 MIXED HYPERLIPIDEMIA: Status: ACTIVE | Noted: 2020-12-10

## 2021-06-16 PROBLEM — Z86.79 HISTORY OF ATRIAL FIBRILLATION: Status: ACTIVE | Noted: 2020-05-07

## 2021-06-16 PROBLEM — E66.01 MORBID OBESITY (H): Status: ACTIVE | Noted: 2018-12-10

## 2021-06-18 NOTE — PROGRESS NOTES
Chief complaint: Follow-up diabetes, hypertension, hyperlipidemia and obesity    HPI: The patient has not been seen since August.  He is a  and his season is from January to April and he works very long days 6 or 7 days a week.  He had been doing okay with his blood sugar control during that time and then has been not watching his diet and gaining weight.  There are some psychosocial stressors in that his brother-in-law has been living with them for quite a while while he is transitioning out of her marriage and trying to go through some recovery from alcoholism.  He has been through treatment.  He will be moving out of the house in a week because he bought his own house.    His daughter is moving to [place] to live with her boyfriend is being transferred from the  to the [place] to work for an adhesive company.    He knows that he has not been managing his blood sugars as he should and wants to get back on track.    Objective:BP (!) 142/98 (Patient Site: Right Arm, Patient Position: Sitting, Cuff Size: Adult Large)  Pulse 72  Wt (!) 277 lb 8 oz (125.9 kg)  BMI 39.82 kg/m2  He is significantly overweight and wearing glasses.  His heart and lung exam is unremarkable.  When I reviewed his labs from August, he was doing quite well and his A1c was less than 5.  Since he has fallen so far from ideal    Assessment: Hypertension not adequately managed  Hyperlipidemia  Type 2 diabetes mellitus without complication yet  Obesity    Plan: We will have him increase his lisinopril to 2 tablets a day.  He will come back tomorrow morning and have his fasting blood work completed.  We can make adjustments from there but looking to see him in about 3 months to hold him accountable for the lifestyle changes that he knows he is capable of making.  He did a beautiful job when he was first diagnosed bringing his A1c down into normal range quite quickly.  He is motivated to try to get back on track with that.   We will try to reset his every six-month visit to be in the summer and in the winter before tax season starts.  Right now we will see him every 3 months with a goal to get him back on an every six-month schedule he is agreeable to that.    More than 25 minutes was spent today reviewing his history, his medications, his labs and coming up with a plan for what we need to do next and how we need to follow his progress.  Over 50% of that time was in counseling care coordination and discussion

## 2021-06-19 NOTE — LETTER
Letter by Deepa Short MD at      Author: Deepa Short MD Service: -- Author Type: --    Filed:  Encounter Date: 7/25/2019 Status: (Other)         Govind Alexandre  [address]  [facility] [address]             July 25, 2019         Dear Mr. Alexandre,    Our records indicate that you are due for your annual diabetic eye exam.  If you have already completed this exam within this year please help us get a copy of the exam so we can update your records.  If you have not completed this yet this year, please call your eye clinic and schedule an appointment.    Please call with questions or contact us using [employer].    Sincerely,        Electronically signed by Deepa Short MD

## 2021-06-19 NOTE — LETTER
Letter by Deepa Short MD at      Author: Deepa Short MD Service: -- Author Type: --    Filed:  Encounter Date: 8/7/2019 Status: (Other)         Govind Alexandre  [address]  [facility] [address]             August 7, 2019         Dear Mr. Alexandre,    Our records indicate that you are due for your annual diabetic eye exam.  If you have already completed this exam within this year please help us get a copy of the exam so we can update your records. They can be faxed to [phone]. If you have not completed this yet this year, please call your eye clinic and schedule an appointment.    Please call with questions or contact us using [employer].    Sincerely,        Electronically signed by Deepa Short MD

## 2021-06-20 NOTE — PROGRESS NOTES
Chief complaint: Diabetes check    HPI: Patient is here for his diabetes check.  He has not lost the weight that he had gained over this year.  They have had some dietary indiscretions in the last week because his daughter became engaged and they were entertaining his future in-laws.  He does not need any refills on medication and he knows he needs to make an appointment to see the ophthalmologist.  No other particular concerns today.  When I asked him about any sequelae from his traumatic head injury, he reported that sometimes he will have a little bit of memory difficulty but not enough that he is suffering from that.    Objective:/82 (Patient Site: Right Arm, Patient Position: Sitting, Cuff Size: Adult Large)  Pulse 72  Wt (!) 273 lb 4.8 oz (124 kg)  BMI 39.21 kg/m2  He is wearing glasses he still significantly overweight.    He is fasting today so we will order the blood work and he will be notified of the results.  We will see him back in about 4 months or so.  We will do his tetanus update today and his flu shot today    Assessment: Diabetes follow-up  Hypertension doing well  Obesity  Hyperlipidemia  Seasonal allergies    Plan: We will do his diabetes labs and he will be notified of the results.  We likely are not can make any changes to medication but I have encouraged him to get his eyes evaluated by an ophthalmologist.

## 2021-06-21 ENCOUNTER — OFFICE VISIT - HEALTHEAST (OUTPATIENT)
Dept: FAMILY MEDICINE | Facility: CLINIC | Age: 56
End: 2021-06-21

## 2021-06-21 DIAGNOSIS — I10 ESSENTIAL HYPERTENSION: ICD-10-CM

## 2021-06-21 DIAGNOSIS — E78.2 MIXED HYPERLIPIDEMIA: ICD-10-CM

## 2021-06-21 DIAGNOSIS — E78.00 PURE HYPERCHOLESTEROLEMIA: ICD-10-CM

## 2021-06-21 DIAGNOSIS — S06.9X9S TRAUMATIC BRAIN INJURY WITH LOSS OF CONSCIOUSNESS, SEQUELA (H): ICD-10-CM

## 2021-06-21 DIAGNOSIS — E66.01 MORBID OBESITY (H): ICD-10-CM

## 2021-06-21 DIAGNOSIS — G56.02 CARPAL TUNNEL SYNDROME OF LEFT WRIST: ICD-10-CM

## 2021-06-21 DIAGNOSIS — E04.2 MULTIPLE THYROID NODULES: ICD-10-CM

## 2021-06-21 DIAGNOSIS — E11.9 TYPE 2 DIABETES MELLITUS WITHOUT COMPLICATION, WITHOUT LONG-TERM CURRENT USE OF INSULIN (H): ICD-10-CM

## 2021-06-21 DIAGNOSIS — I10 UNCONTROLLED HYPERTENSION: ICD-10-CM

## 2021-06-21 DIAGNOSIS — Z00.00 ROUTINE GENERAL MEDICAL EXAMINATION AT A HEALTH CARE FACILITY: ICD-10-CM

## 2021-06-21 DIAGNOSIS — G47.30 SLEEP APNEA, UNSPECIFIED TYPE: ICD-10-CM

## 2021-06-21 RX ORDER — METFORMIN HCL 500 MG
1000 TABLET, EXTENDED RELEASE 24 HR ORAL
Qty: 180 TABLET | Refills: 3 | Status: SHIPPED | OUTPATIENT
Start: 2021-06-21 | End: 2021-12-20

## 2021-06-21 RX ORDER — LISINOPRIL 40 MG/1
40 TABLET ORAL DAILY
Qty: 180 TABLET | Refills: 3 | Status: SHIPPED | OUTPATIENT
Start: 2021-06-21 | End: 2022-07-21

## 2021-06-21 RX ORDER — ROSUVASTATIN CALCIUM 10 MG/1
10 TABLET, COATED ORAL AT BEDTIME
Qty: 90 TABLET | Refills: 3 | Status: SHIPPED | OUTPATIENT
Start: 2021-06-21 | End: 2021-12-20

## 2021-06-21 ASSESSMENT — MIFFLIN-ST. JEOR: SCORE: 2093.16

## 2021-06-22 NOTE — PROGRESS NOTES
He was told to increase the blood pressure medication to 40 mg daily and recheck blood pressure in one month

## 2021-06-22 NOTE — PROGRESS NOTES
CC: Diabetes check    HPI: The patient is here for his diabetes check.  He had labs completed prior to the visit so we could discuss them today.  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  His daughter is also getting  in June which is somewhat stressful.    He has not been able to exercise very well so he is gained back 37 pounds in April 2017 so well his A1c is good and his labs are pretty acceptable, his blood pressure has increased again    Objective:/90 (Patient Site: Right Arm, Patient Position: Sitting, Cuff Size: Adult Large)   Pulse 64   Wt (!) 278 lb 12.8 oz (126.5 kg)   BMI 40.00 kg/m    We discussed his weight, we discussed his labs in detail.  We rechecked his blood pressure and it was still elevated so we decided to increase his Prinivil and have him come back for a nurse visit at the end of the month to make sure that it is controlling his blood pressure better.  He wants to come back in May to do his next set of labs and diabetes check so that it is after tax season and before his daughter's wedding.    Assessment: Type 2 diabetes mellitus well controlled  Overweight discussed weight loss  Hypertension-not adequately controlled will increase the lisinopril  Hyperlipidemia  Early glaucoma    Plan: Double the Prinivil and come back at the end of the month to have a nurse visit to recheck blood pressure.  We will see him again in May to discuss his diabetes and blood pressure and sooner if there are questions problems or concerns.    Addendum: He is having trouble sleeping so we talked about doing some journaling and using over-the-counter medication like Unisom  instead of generic Sominex.  Tylenol PM is also an option.  I did not really want to do a prescription sleep aid because it might lead to weight gain or constipation.    He also wanted refill on Cialis and was told that he can't get refill while his blood pressure is elevated. If, after increasing the lisinopril, his blood pressure is better, I would consider sending the refill

## 2021-06-22 NOTE — PROGRESS NOTES
I met with Govind Alexandre at the request of Dr. Short to recheck his blood pressure.  Blood pressure medications on the MAR were reviewed with patient.    Patient has taken all medications as per usual regimen: Yes  Patient reports tolerating them without any issues or concerns: Yes    Vitals:    12/31/18 1144 12/31/18 1148   BP: 162/86 150/88   Patient Site: Right Arm Right Arm   Patient Position: Sitting Sitting   Cuff Size: Adult Large Adult Large   Pulse: 60        After 5 minutes, the patient's blood pressure remained greater than or equal to 140/90.    Is the patient currently having any chest pain?  No  Does the patient currently have a headache?   No  Does the patient currently have any vision changes? No  Does the patient currently have any nausea? No  Does the patient currently have any abdominal pain? No    After 5 minutes BP was 150/88.  Pt had a recent med change of Lisinopril 10mg to Lisinopril 20mg.

## 2021-06-23 NOTE — TELEPHONE ENCOUNTER
Medication Question or Clarification  Who is calling: Pharmacy: [facility] [place]  What medication are you calling about?: Lisinopril 40mg tablets  What dose do you take?: see below  How often are you taking the medication?: see below  Who prescribed the medication?: Deepa Short MD  What is your question/concern?: Fax received from pharmacy requesting clarification on directions for use. Current RX has 2 different sets of instructions  Pharmacy: [facility]  Okay to leave a detailed message?: No-speak to pharmacy staff  Site CMT - Please call the pharmacy to obtain any additional needed information.    Order Providers     Prescribing Provider Encounter Provider   Deepa Short MD Sheehy, Kerry L., MD   Medication Detail      Disp Refills Start End    lisinopril (PRINIVIL,ZESTRIL) 40 MG tablet 30 tablet 4 12/31/2018     Sig - Route: Take 1 tablet (40 mg total) by mouth daily. Take 2 tablets daily as directed - Oral    Sent to pharmacy as: lisinopril (PRINIVIL,ZESTRIL) 40 MG tablet

## 2021-06-26 NOTE — PROGRESS NOTES
"ASSESSMENT:  1. Routine general medical examination at a health care facility       2. Essential hypertension  Well controlled  - lisinopriL (PRINIVIL,ZESTRIL) 40 MG tablet; Take 1 tablet (40 mg total) by mouth daily.  Dispense: 180 tablet; Refill: 3  - NIFEdipine (PROCARDIA XL) 60 MG 24 hr tablet; TAKE 1 TABLET(60 MG) BY MOUTH DAILY  Dispense: 90 tablet; Refill: 3    3. Multiple thyroid nodules  Thyroid has been normal    4. Type 2 diabetes mellitus without complication, without long-term current use of insulin (H)  Doing quite well  - metFORMIN (GLUCOPHAGE-XR) 500 MG 24 hr tablet; Take 2 tablets (1,000 mg total) by mouth daily with breakfast.  Dispense: 180 tablet; Refill: 3    5. Sleep apnea, unspecified type  Uses CPAP regularly    6. Mixed hyperlipidemia  He is at goal for his lipids on medication    7. Obesity (BMI 35.0-39.9) with comorbidity (H)  He will be trying to lose weight with increased exercise    8. Serum Total Cholesterol Was Elevated     - rosuvastatin (CRESTOR) 10 MG tablet; Take 1 tablet (10 mg total) by mouth at bedtime.  Dispense: 90 tablet; Refill: 3      9. Traumatic brain injury with loss of consciousness, sequela (H)  Still with some smell and taste distortion    11. Carpal tunnel syndrome of left wrist  Has had EMG in the past and not considered \"bad enough\", so he wears splints at night. Not getting worse by might do something if EMG looks worse  - EMG- Left Arm; Future           PLAN:  There are no Patient Instructions on file for this visit.   Follow up 6 months    Orders Placed This Encounter   Procedures     EMG- Left Arm     Left Arm     Standing Status:   Future     Standing Expiration Date:   6/21/2022     Scheduling Instructions:      With Dr. Worthington     Medications Discontinued During This Encounter   Medication Reason     lisinopriL (PRINIVIL,ZESTRIL) 40 MG tablet Reorder     rosuvastatin (CRESTOR) 10 MG tablet Reorder     metFORMIN (GLUCOPHAGE-XR) 500 MG 24 hr tablet Reorder "     NIFEdipine (PROCARDIA XL) 60 MG 24 hr tablet Reorder       Return in about 6 months (around 12/21/2021) for diabetes.    Health Maintenance Due   Topic Date Due     DEPRESSION ACTION PLAN  Never done     PREVENTIVE CARE VISIT  Never done     ZOSTER VACCINES (2 of 3) 01/16/2017     DIABETIC EYE EXAM  11/10/2019     HYPERTENSION FOLLOW-UP  06/02/2020         CHIEF COMPLAINT:  Chief Complaint   Patient presents with     Annual Exam         HISTORY OF PRESENT ILLNESS:  Govind Alexandre is a 55 y.o. male presenting to the clinic today for a physical exam.     He had his fasting labs completed a week ago.  We went through the tests from last week.  He was congratulated that his microalbumin is less than it was last year.  His A1c is down to 5.8 from 6.2.  His lipids from 614 showed sugar triglycerides of 98, cholesterol 108, LDL 57 and HDL 31.  His comprehensive metabolic panel shows a slightly elevated chloride, fasting blood sugar of 148, total bilirubin of 1.1 and that has been elevated in the past with normal renal function and liver function.  His B12 is only slightly elevated at 839 and the only be supplement he takes is in a multivitamin.  His TSH is normal and his vitamin D is 51.9 so that is also normal.    He and his family are going to [place] to have belated party for his daughters wedding reception for the family of her  who live in [place].  He will be tested before he goes and still have to quarantine for 2 weeks once he gets there so they are staying approximately 6 weeks and then returning to [place].  He and his whole household have all been vaccinated against COVID-19.    He is an  and we talked a little bit about some safety measures that he will continue to use in the office and going forward and for next flu season.    He has bilateral carpal tunnel symptoms and wears night splints and it helps, but he still has some dysesthesias more in his left hand than his right hand.  He  initially was resistant to doing anything about that at this time, but he agreed that doing an EMG would make sense to make sure that the nerve compression is not getting worse which might precipitate a visit with the surgeon to at least consider a cortisone injection.    We talked again about the need for him to lose some weight and that perhaps he could get off some of his medications and he would be interested in that.    Since his labs are so good, we will just plan to see him again in 6 months unless something happens sooner    Healthy Habits  Are you taking a daily aspirin? Yes  Do you typically exercising at least 40 min, 3-4 times per week?  Yes  Do you usually eat at least 4 servings of fruit and vegetables a day, include whole grains and fiber and avoid regularly eating high fat foods? Yes  Have you had an eye exam in the past two years? Yes  Do you see a dentist twice per year? Yes  Do you have any concerns regarding sleep? No  Do you drink caffeine? YES    Safety Screen  If you own firearms, are they secured in a locked gun cabinet or with trigger locks? NO    REVIEW OF SYSTEMS:   All other systems are negative.    PFSH:     Social History     Tobacco Use   Smoking Status Former Smoker     Quit date: 2004     Years since quittin.5   Smokeless Tobacco Never Used     Family History   Problem Relation Age of Onset     Heart disease Mother      Kidney disease Mother      Thyroid cancer Father      Kidney disease Father      Heart disease Father      Hypertension Father      No Medical Problems Sister      Hypertension Brother      Depression Daughter      Autism Son      Heart disease Paternal Grandmother      Hypertension Sister      Social History     Socioeconomic History     Marital status:      Spouse name: Not on file     Number of children: Not on file     Years of education: Not on file     Highest education level: Not on file   Occupational History     Not on file   Social Needs      Financial resource strain: Not on file     Food insecurity     Worry: Not on file     Inability: Not on file     Transportation needs     Medical: Not on file     Non-medical: Not on file   Tobacco Use     Smoking status: Former Smoker     Quit date: 2004     Years since quittin.5     Smokeless tobacco: Never Used   Substance and Sexual Activity     Alcohol use: Yes     Comment: 1 every 2 weeks     Drug use: No     Sexual activity: Yes     Partners: Female     Birth control/protection: None   Lifestyle     Physical activity     Days per week: Not on file     Minutes per session: Not on file     Stress: Not on file   Relationships     Social connections     Talks on phone: Not on file     Gets together: Not on file     Attends Voodoo service: Not on file     Active member of club or organization: Not on file     Attends meetings of clubs or organizations: Not on file     Relationship status: Not on file     Intimate partner violence     Fear of current or ex partner: Not on file     Emotionally abused: Not on file     Physically abused: Not on file     Forced sexual activity: Not on file   Other Topics Concern     Not on file   Social History Narrative     Not on file     Past Surgical History:   Procedure Laterality Date     PICC  2015          [place] REMOVAL OF SPERM DUCT(S)      Description: Surgery Of Male Genitalia Vasectomy;  Recorded: 2010;     [place] REPAIR ROTATOR CUFF,ACUTE      Description: Rotator Cuff Repair Acute;  Recorded: 2010;  Comments: cortisone     WISDOM TOOTH EXTRACTION       No Known Allergies  Active Ambulatory Problems     Diagnosis Date Noted     Male Erectile Disorder      Chronic fatigue      Depression 2014     Essential hypertension 2014     TBI (traumatic brain injury) (H) 2015     Multiple thyroid nodules 2016     Type 2 diabetes mellitus without complication (H) 2016     Sleep apnea 2016     Obesity (BMI 35.0-39.9) with  "comorbidity (H) 12/10/2018     History of atrial fibrillation 05/07/2020     Mixed hyperlipidemia 12/10/2020     Resolved Ambulatory Problems     Diagnosis Date Noted     Serum Total Cholesterol Was Elevated      Blood Pressure Isolated Elevated      Serum Total Cholesterol Was Elevated 12/24/2014     SAH (subarachnoid hemorrhage) (H) 11/30/2015     Elevated LFTs 11/30/2015     Obesity 12/02/2016     No Additional Past Medical History       VITALS:  Vitals:    06/21/21 0758   BP: 130/68   Patient Site: Right Arm   Patient Position: Sitting   Cuff Size: Adult Large   Pulse: 74   Weight: (!) 278 lb 3.2 oz (126.2 kg)   Height: 5' 10\" (1.778 m)     BP Readings from Last 3 Encounters:   06/21/21 130/68   01/25/21 132/66   08/19/20 139/84     Wt Readings from Last 3 Encounters:   06/21/21 (!) 278 lb 3.2 oz (126.2 kg)   01/25/21 (!) 283 lb 11.2 oz (128.7 kg)   08/19/20 (!) 284 lb 9.6 oz (129.1 kg)     Body mass index is 39.92 kg/m .    PHYSICAL EXAM:  General Appearance: Alert, cooperative, no distress, appears stated age. Overweight for height  Head: Normocephalic, without obvious abnormality, atraumatic  Eyes: PERRL, conjunctiva/corneas clear, EOM's intact, glasses  Ears: Normal TM's and external ear canals, both ears  Nose: Nares normal, septum midline,mucosa normal, no drainage  Throat: masked, has seen Dentist  Neck: Supple, symmetrical, trachea midline, no adenopathy;  thyroid: not enlarged, symmetric, no tenderness/mass/nodules;    Back: Symmetric, no curvature, ROM normal, no CVA tenderness  Lungs: Clear to auscultation bilaterally, respirations unlabored  Heart: Regular rate and rhythm, S1 and S2 normal, no murmur, rub, or gallop, Abdomen: Soft, non-tender, bowel sounds active all four quadrants,  no masses, no organomegaly  Extremities: Extremities normal, atraumatic, no cyanosis or edema  Skin: Skin color, texture, turgor normal, no rashes or lesions  Lymph nodes: Cervical, supraclavicular, and axillary nodes " normal  Neurologic: Normal     QUALITY MEASURES:  The following high BMI interventions were performed this visit: encouragement to exercise, weight monitoring and prescribed dietary intake         MEDICATIONS:  Current Outpatient Medications   Medication Sig Dispense Refill     ACCU-CHEK FASTCLIX LANCET DRUM USE TO TEST BLOOD SUGARS 2 TO 3 TIMES A  each 4     aspirin 81 mg chewable tablet Chew 81 mg daily.       blood glucose test (ACCU-CHEK NATHANIEL PLUS TEST STRP) strips Use 1 each As Directed 3 (three) times a day. 300 each 3     fexofenadine (ALLEGRA) 180 MG tablet Take 180 mg by mouth daily.       lisinopriL (PRINIVIL,ZESTRIL) 40 MG tablet Take 1 tablet (40 mg total) by mouth daily. 180 tablet 3     metFORMIN (GLUCOPHAGE-XR) 500 MG 24 hr tablet Take 2 tablets (1,000 mg total) by mouth daily with breakfast. 180 tablet 3     NIFEdipine (PROCARDIA XL) 60 MG 24 hr tablet TAKE 1 TABLET(60 MG) BY MOUTH DAILY 90 tablet 3     rosuvastatin (CRESTOR) 10 MG tablet Take 1 tablet (10 mg total) by mouth at bedtime. 90 tablet 3     SF 1.1 % Gel dental gel        spironolactone (ALDACTONE) 25 MG tablet TAKE 1 TABLET(25 MG) BY MOUTH DAILY 90 tablet 2     No current facility-administered medications for this visit.

## 2021-06-29 NOTE — PROGRESS NOTES
"Progress Notes by Deborah Wang MD at 5/7/2020  2:10 PM     Author: Deborah Wang MD Service: -- Author Type: Physician    Filed: 5/7/2020  2:25 PM Encounter Date: 5/7/2020 Status: Signed    : Deborah Wang MD (Physician)           The patient has been notified of following:     \"This video visit will be conducted via a call between you and your physician/provider. We have found that certain health care needs can be provided without the need for an in-person physical exam.  This service lets us provide the care you need with a video conversation.  If a prescription is necessary we can send it directly to your pharmacy.  If lab work is needed we can place an order for that and you can then stop by our lab to have the test done at a later time.      Patient has given verbal consent to a Video visit? Yes    HEART CARE VIDEO ENCOUNTER        The patient has chosen to have the visit conducted as a video visit, to reduce risk of exposure given the current status of Coronavirus in our community. This video visit is being conducted via a call between the patient and physician/provider. Health care needs are being provided without a physical exam.     Assessment/Recommendations   Assessment/Plan:  Hypertension with LVH - blood pressure mostly controlled with aldactone, lisinopril and nifedipine. We discussed daily exercise and weight loss vs titrating the nifedipine, and he favors the diet and exercise route.     Hyperlipidemia - LDL 78 in January on atorvastatin 10mg    Lone afib - consider HUBER down the road    Follow Up Plan:  6 months  I have reviewed the note as documented.  This accurately captures the substance of my conversation with the patient.    Total time of video between patient and provider was 5 minutes   Start Time: 14:`4   Stop Time:14:19    Originating Location (pt. Location): Home    Distant Location (provider location):  [facility]      Mode of Communication:  Video Conference " via [email or web address]       History of Present Illness/Subjective    Govind Alexandre is a 54 y.o. male who is being evaluated via a billable video visit and has consented to a video visit. Govind Alexandre is an obese diabetic with uncontrolled hypertension. He has KORINA and is compliant with his CPAP, he rarely drinks alcohol and does not smoke. An echocardiogram recently showed an EF of 55% with moderate-severe concentric LVH. He does have a history of atrial fibrillation while hospitalized for a subarachnoid hemorrhage presumed related to a traumatic brain injury in December 2015. Govind has been on aldactone 25, lisinopril 40 and nifedipine 60mg for the past several months.      Govind notes his bps are typically 120-130s/80s. He feels well and walks regularly with his family. There is no chest pain, dyspnea, dizziness, palpitations or edema. He is trying to avoid salt and eat right but has not been able to lose weight as of yet.    I have reviewed and updated the patient's Past Medical History, Social History, Family History and Medication List.     Physical Examination performed via live video encounter Review of Systems   General Appearance:   no distress, obese body habitus, upright.   ENT/Mouth: membranes moist, no nasal discharge or bleeding gums.  Normal head shape, no evidence of injury or laceration.     EYES:  no scleral icterus, normal conjunctivae   Neck: no evidence of thyromegaly.  Supple   Chest/Lungs:   No audible wheezing equal chest wall expansion. Non labored breathing.  No cough.   Cardiovascular:   No evidence of elevated jugular venous pressure.  No evidence of pitting edema bilaterally    Abdomen:  no evidence of abdominal distention. No observe juandice.     Extremities: no cyanosis or clubbing noted.    Skin: no xanthelasma, normal skin color. No evidence of facial lacerations.      Neurologic: Normal arm motion bilateral, no tremors.  No evidence of focal defect.       Psychiatric: alert and oriented  x3, calm                                               Medical History  Surgical History Family History Social History   Past Medical History:   Diagnosis Date   ? Depression 12/24/2014   ? Essential hypertension    ? Multiple thyroid nodules 9/8/2016   ? Type 2 diabetes mellitus without complication (H) 11/7/2016    Past Surgical History:   Procedure Laterality Date   ? PICC  11/30/2015        ? [place] REMOVAL OF SPERM DUCT(S)      Description: Surgery Of Male Genitalia Vasectomy;  Recorded: 01/13/2010;   ? [place] REPAIR ROTATOR CUFF,ACUTE      Description: Rotator Cuff Repair Acute;  Recorded: 01/13/2010;  Comments: cortisone   ? WISDOM TOOTH EXTRACTION      Family History   Problem Relation Age of Onset   ? Heart disease Mother    ? Kidney disease Mother    ? Thyroid cancer Father    ? Kidney disease Father    ? Heart disease Father    ? Hypertension Father    ? No Medical Problems Sister    ? Hypertension Brother    ? Depression Daughter    ? Autism Son    ? Heart disease Paternal Grandmother    ? Hypertension Sister       Social History     Socioeconomic History   ? Marital status:      Spouse name: Not on file   ? Number of children: Not on file   ? Years of education: Not on file   ? Highest education level: Not on file   Occupational History   ? Not on file   Social Needs   ? Financial resource strain: Not on file   ? Food insecurity     Worry: Not on file     Inability: Not on file   ? Transportation needs     Medical: Not on file     Non-medical: Not on file   Tobacco Use   ? Smoking status: Former Smoker     Last attempt to quit: 12/24/2004     Years since quitting: 15.3   ? Smokeless tobacco: Never Used   Substance and Sexual Activity   ? Alcohol use: Yes     Comment: 1 every 2 weeks   ? Drug use: No   ? Sexual activity: Yes     Partners: Female     Birth control/protection: None   Lifestyle   ? Physical activity     Days per week: Not on file     Minutes per session: Not on file   ? Stress: Not on file    Relationships   ? Social connections     Talks on phone: Not on file     Gets together: Not on file     Attends Hindu service: Not on file     Active member of club or organization: Not on file     Attends meetings of clubs or organizations: Not on file     Relationship status: Not on file   ? Intimate partner violence     Fear of current or ex partner: Not on file     Emotionally abused: Not on file     Physically abused: Not on file     Forced sexual activity: Not on file   Other Topics Concern   ? Not on file   Social History Narrative   ? Not on file          Medications  Allergies   Current Outpatient Medications   Medication Sig Dispense Refill   ? ACCU-CHEK FASTCLIX LANCET DRUM USE TO TEST BLOOD SUGARS 2 TO 3 TIMES A  each 4   ? aspirin 81 mg chewable tablet Chew 81 mg daily.     ? atorvastatin (LIPITOR) 10 MG tablet TAKE 1 TABLET BY MOUTH EVERY DAY 90 tablet 2   ? blood glucose test (ACCU-CHEK NATHANIEL PLUS TEST STRP) strips Use 1 each As Directed 3 (three) times a day. 300 each 3   ? fexofenadine (ALLEGRA) 180 MG tablet Take 180 mg by mouth daily.     ? lisinopril (PRINIVIL,ZESTRIL) 40 MG tablet Take 1 tablet (40 mg total) by mouth daily. 180 tablet 3   ? metFORMIN (GLUCOPHAGE-XR) 500 MG 24 hr tablet TAKE 2 TABLETS(1000 MG) BY MOUTH DAILY. START WITH 1 TABLET 500 MG DAILY FOR 1 WEEK, THEN. INCREASE TO 2 TABLETS DAILY 180 tablet 2   ? NIFEdipine (PROCARDIA XL) 60 MG 24 hr tablet TAKE 1 TABLET(60 MG) BY MOUTH DAILY 90 tablet 0   ? SF 1.1 % Gel dental gel      ? spironolactone (ALDACTONE) 25 MG tablet TAKE 1 TABLET(25 MG) BY MOUTH DAILY 90 tablet 3   ? fluticasone (FLONASE) 50 mcg/actuation nasal spray SHAKE LIQUID AND USE 2 SPRAYS IN EACH NOSTRIL DAILY 16 g 6     No current facility-administered medications for this visit.     No Known Allergies      Lab Results    Chemistry/lipid CBC Cardiac Enzymes/BNP/TSH/INR   Lab Results   Component Value Date    CHOL 140 01/02/2020    HDL 36 (L) 01/02/2020     LDLCALC 78 01/02/2020    TRIG 132 01/02/2020    CREATININE 1.02 01/02/2020    BUN 18 01/02/2020    K 4.0 01/02/2020     01/02/2020     (H) 01/02/2020    CO2 26 01/02/2020    Lab Results   Component Value Date    WBC 9.8 12/10/2015    HGB 15.1 12/10/2015    HCT 43.4 12/10/2015    MCV 88 12/10/2015     12/10/2015    Lab Results   Component Value Date    TSH 0.41 05/10/2019    INR 1.04 11/30/2015        Deborah Wang

## 2021-06-30 NOTE — PROGRESS NOTES
"Progress Notes by Deborah Wang MD at 12/10/2020  7:50 AM     Author: Deborah Wang MD Service: -- Author Type: Physician    Filed: 12/10/2020  8:15 AM Encounter Date: 12/10/2020 Status: Signed    : Deborah Wang MD (Physician)           The patient has been notified of following:     \"This video visit will be conducted via a call between you and your physician/provider. We have found that certain health care needs can be provided without the need for an in-person physical exam.  This service lets us provide the care you need with a video conversation.  If a prescription is necessary we can send it directly to your pharmacy.  If lab work is needed we can place an order for that and you can then stop by our lab to have the test done at a later time.      Patient has given verbal consent to a Video visit? Yes    HEART CARE VIDEO ENCOUNTER        The patient has chosen to have the visit conducted as a video visit, to reduce risk of exposure given the current status of Coronavirus in our community. This video visit is being conducted via a call between the patient and physician/provider. Health care needs are being provided without a physical exam.     Assessment/Recommendations   Assessment/Plan:  Hypertension with LVH - asymptomatic and BP controlled on multiple agents. Diet and exercise reviewed and recommendations given.    Hyperlipidemia - getting checked next month with PCP. On statin.     Lone afib - occasional rare palpitations, he is going to get an apple watch    KORINA - compliant with CPAP    Obesity - diet and exercise reviewed    Follow Up Plan:  12 months  I have reviewed the note as documented.  This accurately captures the substance of my conversation with the patient.    Total time of video between patient and provider was 9 minutes   Start Time: 8:01   Stop Time: 8:10    Originating Location (pt. Location): Home    Distant Location (provider location):  [facility] " [place]     Mode of Communication:  Video Conference via [email or web address]       History of Present Illness/Subjective    Govind Alexandre is a 55 y.o. male who is being evaluated via a billable video visit and has consented to a video visit. Govind Alexandre is an obese diabetic with uncontrolled hypertension. He has KORINA and is compliant with his CPAP, he rarely drinks alcohol and does not smoke. An echocardiogram showed an EF of 55% with moderate-severe concentric LVH. He does have a history of atrial fibrillation while hospitalized for a subarachnoid hemorrhage presumed related to a traumatic brain injury in December 2015. Govind has been on aldactone 25, lisinopril 40 and nifedipine 60mg for the past several months.      Govind has been feeling well. He walks three times per week without dyspnea, dizziness or chest pain. He has rare brief palpitations, primarily at night, that are otherwise asymptomatic. He feels like he is doing well and his BP is always below 140/90 at home.    I have reviewed and updated the patient's Past Medical History, Social History, Family History and Medication List.     Physical Examination performed via live video encounter Review of Systems     Vitals - Patient Reported  Systolic (Patient Reported): 124  Diastolic (Patient Reported): 75  Weight (Patient Reported): 277 lb (125.6 kg)  Pulse (Patient Reported): 77     Vitals - Patient Reported 12/10/2020   Weight (Patient Reported) 277 lb   Systolic (Patient Reported) 124   Diastolic (Patient Reported) 75   Pulse (Patient Reported) 77          General Appearance:   no distress, normal body habitus, upright.   ENT/Mouth: membranes moist, no nasal discharge or bleeding gums.  Normal head shape, no evidence of injury or laceration.     EYES:  no scleral icterus, normal conjunctivae   Neck: no evidence of thyromegaly.  Supple   Chest/Lungs:   No audible wheezing equal chest wall expansion. Non labored breathing.  No cough.   Cardiovascular:   No  evidence of elevated jugular venous pressure.  No evidence of pitting edema bilaterally    Abdomen:  no evidence of abdominal distention. No observe juandice.     Extremities: no cyanosis or clubbing noted.    Skin: no xanthelasma, normal skin color. No evidence of facial lacerations.      Neurologic: Normal arm motion bilateral, no tremors.  No evidence of focal defect.       Psychiatric: alert and oriented x3, calm                                               Medical History  Surgical History Family History Social History   Past Medical History:   Diagnosis Date   ? Depression 12/24/2014   ? Essential hypertension    ? Multiple thyroid nodules 9/8/2016   ? Type 2 diabetes mellitus without complication (H) 11/7/2016    Past Surgical History:   Procedure Laterality Date   ? PICC  11/30/2015        ? NC REMOVAL OF SPERM DUCT(S)      Description: Surgery Of Male Genitalia Vasectomy;  Recorded: 01/13/2010;   ? NC REPAIR ROTATOR CUFF,ACUTE      Description: Rotator Cuff Repair Acute;  Recorded: 01/13/2010;  Comments: cortisone   ? WISDOM TOOTH EXTRACTION      Family History   Problem Relation Age of Onset   ? Heart disease Mother    ? Kidney disease Mother    ? Thyroid cancer Father    ? Kidney disease Father    ? Heart disease Father    ? Hypertension Father    ? No Medical Problems Sister    ? Hypertension Brother    ? Depression Daughter    ? Autism Son    ? Heart disease Paternal Grandmother    ? Hypertension Sister       Social History     Socioeconomic History   ? Marital status:      Spouse name: Not on file   ? Number of children: Not on file   ? Years of education: Not on file   ? Highest education level: Not on file   Occupational History   ? Not on file   Social Needs   ? Financial resource strain: Not on file   ? Food insecurity     Worry: Not on file     Inability: Not on file   ? Transportation needs     Medical: Not on file     Non-medical: Not on file   Tobacco Use   ? Smoking status: Former  Smoker     Quit date: 12/24/2004     Years since quitting: 15.9   ? Smokeless tobacco: Never Used   Substance and Sexual Activity   ? Alcohol use: Yes     Comment: 1 every 2 weeks   ? Drug use: No   ? Sexual activity: Yes     Partners: Female     Birth control/protection: None   Lifestyle   ? Physical activity     Days per week: Not on file     Minutes per session: Not on file   ? Stress: Not on file   Relationships   ? Social connections     Talks on phone: Not on file     Gets together: Not on file     Attends Muslim service: Not on file     Active member of club or organization: Not on file     Attends meetings of clubs or organizations: Not on file     Relationship status: Not on file   ? Intimate partner violence     Fear of current or ex partner: Not on file     Emotionally abused: Not on file     Physically abused: Not on file     Forced sexual activity: Not on file   Other Topics Concern   ? Not on file   Social History Narrative   ? Not on file          Medications  Allergies   Current Outpatient Medications   Medication Sig Dispense Refill   ? ACCU-CHEK FASTCLIX LANCET DRUM USE TO TEST BLOOD SUGARS 2 TO 3 TIMES A  each 4   ? aspirin 81 mg chewable tablet Chew 81 mg daily.     ? blood glucose test (ACCU-CHEK NATHANIEL PLUS TEST STRP) strips Use 1 each As Directed 3 (three) times a day. 300 each 3   ? fexofenadine (ALLEGRA) 180 MG tablet Take 180 mg by mouth daily.     ? lisinopriL (PRINIVIL,ZESTRIL) 40 MG tablet TAKE 1 TABLET BY MOUTH DAILY 180 tablet 2   ? metFORMIN (GLUCOPHAGE-XR) 500 MG 24 hr tablet TAKE 2 TABLETS(1000 MG) BY MOUTH DAILY. START WITH 1 TABLET 500 MG DAILY FOR 1 WEEK, THEN. INCREASE TO 2 TABLETS DAILY 180 tablet 3   ? NIFEdipine (PROCARDIA XL) 60 MG 24 hr tablet TAKE 1 TABLET(60 MG) BY MOUTH DAILY 90 tablet 1   ? rosuvastatin (CRESTOR) 10 MG tablet Take 1 tablet (10 mg total) by mouth at bedtime. 30 tablet 11   ? spironolactone (ALDACTONE) 25 MG tablet TAKE 1 TABLET(25 MG) BY MOUTH  DAILY 90 tablet 0   ? fluticasone (FLONASE) 50 mcg/actuation nasal spray SHAKE LIQUID AND USE 2 SPRAYS IN EACH NOSTRIL DAILY 16 g 6   ? SF 1.1 % Gel dental gel        No current facility-administered medications for this visit.     No Known Allergies      Lab Results    Chemistry/lipid CBC Cardiac Enzymes/BNP/TSH/INR   Lab Results   Component Value Date    CHOL 140 08/17/2020    HDL 33 (L) 08/17/2020    LDLCALC 80 08/17/2020    TRIG 135 08/17/2020    CREATININE 0.94 08/17/2020    BUN 14 08/17/2020    K 3.8 08/17/2020     08/17/2020     08/17/2020    CO2 27 08/17/2020    Lab Results   Component Value Date    WBC 9.8 12/10/2015    HGB 15.1 12/10/2015    HCT 43.4 12/10/2015    MCV 88 12/10/2015     12/10/2015    Lab Results   Component Value Date    TSH 0.34 08/17/2020    INR 1.04 11/30/2015        Deborah Wang

## 2021-07-06 VITALS
HEIGHT: 70 IN | HEART RATE: 74 BPM | SYSTOLIC BLOOD PRESSURE: 130 MMHG | DIASTOLIC BLOOD PRESSURE: 68 MMHG | BODY MASS INDEX: 39.83 KG/M2 | WEIGHT: 278.2 LBS

## 2021-08-03 ENCOUNTER — OFFICE VISIT (OUTPATIENT)
Dept: PHYSICAL MEDICINE AND REHAB | Facility: CLINIC | Age: 56
End: 2021-08-03
Payer: COMMERCIAL

## 2021-08-03 DIAGNOSIS — R20.2 PARESTHESIA OF HAND, BILATERAL: Primary | ICD-10-CM

## 2021-08-03 PROCEDURE — 95911 NRV CNDJ TEST 9-10 STUDIES: CPT | Performed by: PHYSICAL MEDICINE & REHABILITATION

## 2021-08-03 PROCEDURE — 95886 MUSC TEST DONE W/N TEST COMP: CPT | Mod: RT | Performed by: PHYSICAL MEDICINE & REHABILITATION

## 2021-08-03 NOTE — PROGRESS NOTES
Patient presents at the request of Dr. Deepa Short for an upper extremity EMG.  He has left greater than right hand numbness and tingling throughout digits 1-3 of both hands.  Present for some time worsening for the past 6 months.  He is right-handed.  On exam, he has normal sensation, strength and reflexes in the upper extremities.  Bilateral upper extremity testing was done today given the severity of symptoms in both hands.    EMG/NCS  results: Please see scanned full report.    Comment NCS: Abnormal study:    1.  Absent left median SNAP and bilateral median transcarpal studies.  2.  Prolonged bilateral median CMAP latency, decreased amplitudes and conduction velocities are slowed.  3.  Normal bilateral ulnar studies.    Comment EMG: Abnormal study.  1.  Increased insertional activity with reduced recruitment left APB muscle.  Remainder right upper extremity needle EMG normal.  2.  Equivocal increased insertional activity right APB muscle.  Remainder right upper extremity needle EMG normal    Interpretation: Abnormal study: There is electrodiagnostic evidence of:    1.  Left median neuropathy at the wrist consistent with entrapment in the carpal tunnel, severe in severity.    2.  Right median neuropathy at the wrist consistent with entrapment in the carpal tunnel, moderate-severe in severity.      3. There is no electrodiagnostic evidence of cervical radiculopathy, brachial plexopathy, or ulnar neuropathy in the bilateral upper extremities.    The testing was completed in its entirety by the physician.      It was our pleasure caring for your patient today, if there any questions or concerns please do not hesitate to contact us.

## 2021-08-03 NOTE — LETTER
8/3/2021         RE: Govind Alexandre  [id number] [facility] [id number]        Dear Colleague,    Thank you for referring your patient, Govind Alexandre, to the [facility]. Please see a copy of my visit note below.    Patient presents at the request of Dr. Deepa Short for an upper extremity EMG.  He has left greater than right hand numbness and tingling throughout digits 1-3 of both hands.  Present for some time worsening for the past 6 months.  He is right-handed.  On exam, he has normal sensation, strength and reflexes in the upper extremities.  Bilateral upper extremity testing was done today given the severity of symptoms in both hands.    EMG/NCS  results: Please see scanned full report.    Comment NCS: Abnormal study:    1.  Absent left median SNAP and bilateral median transcarpal studies.  2.  Prolonged bilateral median CMAP latency, decreased amplitudes and conduction velocities are slowed.  3.  Normal bilateral ulnar studies.    Comment EMG: Abnormal study.  1.  Increased insertional activity with reduced recruitment left APB muscle.  Remainder right upper extremity needle EMG normal.  2.  Equivocal increased insertional activity right APB muscle.  Remainder right upper extremity needle EMG normal    Interpretation: Abnormal study: There is electrodiagnostic evidence of:    1.  Left median neuropathy at the wrist consistent with entrapment in the carpal tunnel, severe in severity.    2.  Right median neuropathy at the wrist consistent with entrapment in the carpal tunnel, moderate-severe in severity.      3. There is no electrodiagnostic evidence of cervical radiculopathy, brachial plexopathy, or ulnar neuropathy in the bilateral upper extremities.    The testing was completed in its entirety by the physician.      It was our pleasure caring for your patient today, if there any questions or concerns please do not hesitate to contact us.        Again, thank you for  allowing me to participate in the care of your patient.        Sincerely,        Leon Worthington, DO

## 2021-08-04 NOTE — PROGRESS NOTES
Hello, could you call and tell him our testing confirms significant carpal tunnel nerve impingement. Does he need a referral to the hand surgeon?

## 2021-08-05 DIAGNOSIS — G56.03 BILATERAL CARPAL TUNNEL SYNDROME: Primary | ICD-10-CM

## 2021-08-07 ENCOUNTER — TRANSFERRED RECORDS (OUTPATIENT)
Dept: HEALTH INFORMATION MANAGEMENT | Facility: CLINIC | Age: 56
End: 2021-08-07

## 2021-08-07 LAB — RETINOPATHY: NEGATIVE

## 2021-08-11 ENCOUNTER — TRANSFERRED RECORDS (OUTPATIENT)
Dept: HEALTH INFORMATION MANAGEMENT | Facility: CLINIC | Age: 56
End: 2021-08-11

## 2021-08-17 ENCOUNTER — TRANSFERRED RECORDS (OUTPATIENT)
Dept: HEALTH INFORMATION MANAGEMENT | Facility: CLINIC | Age: 56
End: 2021-08-17

## 2021-08-25 ENCOUNTER — TRANSFERRED RECORDS (OUTPATIENT)
Dept: HEALTH INFORMATION MANAGEMENT | Facility: CLINIC | Age: 56
End: 2021-08-25

## 2021-08-31 ENCOUNTER — TRANSFERRED RECORDS (OUTPATIENT)
Dept: HEALTH INFORMATION MANAGEMENT | Facility: CLINIC | Age: 56
End: 2021-08-31

## 2021-09-08 ENCOUNTER — TRANSFERRED RECORDS (OUTPATIENT)
Dept: HEALTH INFORMATION MANAGEMENT | Facility: CLINIC | Age: 56
End: 2021-09-08

## 2021-09-11 ENCOUNTER — HEALTH MAINTENANCE LETTER (OUTPATIENT)
Age: 56
End: 2021-09-11

## 2021-11-06 ENCOUNTER — HEALTH MAINTENANCE LETTER (OUTPATIENT)
Age: 56
End: 2021-11-06

## 2021-11-09 DIAGNOSIS — I10 UNCONTROLLED HYPERTENSION: ICD-10-CM

## 2021-11-09 RX ORDER — SPIRONOLACTONE 25 MG/1
TABLET ORAL
Qty: 90 TABLET | Refills: 0 | Status: SHIPPED | OUTPATIENT
Start: 2021-11-09 | End: 2022-02-07

## 2021-12-01 DIAGNOSIS — E78.2 MIXED HYPERLIPIDEMIA: ICD-10-CM

## 2021-12-01 DIAGNOSIS — E11.9 TYPE 2 DIABETES MELLITUS WITHOUT COMPLICATION, WITHOUT LONG-TERM CURRENT USE OF INSULIN (H): Primary | ICD-10-CM

## 2021-12-01 DIAGNOSIS — I10 ESSENTIAL HYPERTENSION: ICD-10-CM

## 2021-12-03 ENCOUNTER — LAB (OUTPATIENT)
Dept: LAB | Facility: CLINIC | Age: 56
End: 2021-12-03
Payer: COMMERCIAL

## 2021-12-03 DIAGNOSIS — E78.2 MIXED HYPERLIPIDEMIA: ICD-10-CM

## 2021-12-03 DIAGNOSIS — I10 ESSENTIAL HYPERTENSION: ICD-10-CM

## 2021-12-03 DIAGNOSIS — E11.9 TYPE 2 DIABETES MELLITUS WITHOUT COMPLICATION, WITHOUT LONG-TERM CURRENT USE OF INSULIN (H): ICD-10-CM

## 2021-12-03 LAB
ALBUMIN SERPL-MCNC: 4 G/DL (ref 3.5–5)
ALP SERPL-CCNC: 85 U/L (ref 45–120)
ALT SERPL W P-5'-P-CCNC: 22 U/L (ref 0–45)
ANION GAP SERPL CALCULATED.3IONS-SCNC: 12 MMOL/L (ref 5–18)
AST SERPL W P-5'-P-CCNC: 19 U/L (ref 0–40)
BILIRUB SERPL-MCNC: 1.3 MG/DL (ref 0–1)
BUN SERPL-MCNC: 15 MG/DL (ref 8–22)
CALCIUM SERPL-MCNC: 9.4 MG/DL (ref 8.5–10.5)
CHLORIDE BLD-SCNC: 108 MMOL/L (ref 98–107)
CHOLEST SERPL-MCNC: 120 MG/DL
CO2 SERPL-SCNC: 23 MMOL/L (ref 22–31)
CREAT SERPL-MCNC: 1.03 MG/DL (ref 0.7–1.3)
FASTING STATUS PATIENT QL REPORTED: YES
GFR SERPL CREATININE-BSD FRML MDRD: 80 ML/MIN/1.73M2
GLUCOSE BLD-MCNC: 215 MG/DL (ref 70–125)
HBA1C MFR BLD: 6.9 % (ref 0–5.6)
HDLC SERPL-MCNC: 35 MG/DL
LDLC SERPL CALC-MCNC: 54 MG/DL
POTASSIUM BLD-SCNC: 4.4 MMOL/L (ref 3.5–5)
PROT SERPL-MCNC: 6.7 G/DL (ref 6–8)
SODIUM SERPL-SCNC: 143 MMOL/L (ref 136–145)
TRIGL SERPL-MCNC: 153 MG/DL

## 2021-12-03 PROCEDURE — 36415 COLL VENOUS BLD VENIPUNCTURE: CPT

## 2021-12-03 PROCEDURE — 80053 COMPREHEN METABOLIC PANEL: CPT

## 2021-12-03 PROCEDURE — 80061 LIPID PANEL: CPT

## 2021-12-03 PROCEDURE — 83036 HEMOGLOBIN GLYCOSYLATED A1C: CPT

## 2021-12-20 ENCOUNTER — OFFICE VISIT (OUTPATIENT)
Dept: FAMILY MEDICINE | Facility: CLINIC | Age: 56
End: 2021-12-20
Payer: COMMERCIAL

## 2021-12-20 VITALS
WEIGHT: 289.7 LBS | DIASTOLIC BLOOD PRESSURE: 62 MMHG | SYSTOLIC BLOOD PRESSURE: 124 MMHG | HEART RATE: 72 BPM | BODY MASS INDEX: 41.57 KG/M2

## 2021-12-20 DIAGNOSIS — E11.9 TYPE 2 DIABETES MELLITUS WITHOUT COMPLICATION, WITHOUT LONG-TERM CURRENT USE OF INSULIN (H): ICD-10-CM

## 2021-12-20 DIAGNOSIS — Z23 HIGH PRIORITY FOR 2019-NCOV VACCINE: Primary | ICD-10-CM

## 2021-12-20 PROCEDURE — 99214 OFFICE O/P EST MOD 30 MIN: CPT | Performed by: FAMILY MEDICINE

## 2021-12-20 PROCEDURE — 91300 COVID-19,PF,PFIZER (12+ YRS): CPT | Performed by: FAMILY MEDICINE

## 2021-12-20 PROCEDURE — 0004A COVID-19,PF,PFIZER (12+ YRS): CPT | Performed by: FAMILY MEDICINE

## 2021-12-20 RX ORDER — METFORMIN HCL 500 MG
1000 TABLET, EXTENDED RELEASE 24 HR ORAL 2 TIMES DAILY WITH MEALS
Qty: 360 TABLET | Refills: 3 | Status: SHIPPED | OUTPATIENT
Start: 2021-12-20 | End: 2022-11-29

## 2021-12-20 NOTE — PROGRESS NOTES
ASSESSMENT:  1. Type 2 diabetes mellitus without complication, without long-term current use of insulin (H)  Not well controlled  - metFORMIN (GLUCOPHAGE-XR) 500 MG 24 hr tablet; Take 2 tablets (1,000 mg) by mouth 2 times daily (with meals)  Dispense: 360 tablet; Refill: 3  - Adult Endocrinology Referral; Future    2. High priority for 2019-nCoV vaccine     - COVID-19PF,PFIZER (12+ Yrs PURPLE LABEL)           PLAN:  We will have him increase his Metformin to 2 tablets twice a day and try to find a way to get an exercise.  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    Orders Placed This Encounter   Procedures     COVID-19,PF,PFIZER (12+ Yrs PURPLE LABEL)     Adult Endocrinology Referral     Medications Discontinued During This Encounter   Medication Reason     lisinopriL (PRINIVIL,ZESTRIL) 40 MG tablet Dose adjustment     lisinopril (ZESTRIL) 40 MG tablet Dose adjustment     metFORMIN (GLUCOPHAGE-XR) 500 MG 24 hr tablet Reorder     NIFEdipine (PROCARDIA XL) 60 MG 24 hr tablet Therapy completed     NIFEdipine (PROCARDIA XL) 60 MG 24 hr tablet Therapy completed     rosuvastatin (CRESTOR) 10 MG tablet Therapy completed       No follow-ups on file. 4 months  CHIEF COMPLAINT:  chief complaint    HISTORY OF PRESENT ILLNESS:  Govind is a 56 year old male who messaged me with increasing difficulties with frequency of urination and some blurred vision and all signs that his blood sugars probably were not adequately controlled so we ordered his diabetes labs and as we expected he is not as  well-controlled as he should be so we will adjust his medications accordingly.    REVIEW OF SYSTEMS:       All other systems are negative.      TOBACCO USE:  History   Smoking Status     Former Smoker     Quit date: 12/24/2004   Smokeless Tobacco     Never Used       VITALS:  Vitals:    12/20/21 0824   BP: 124/62   BP Location: Right arm   Patient Position: Sitting   Cuff Size: Adult Large   Pulse: 72   Weight: 131.4 kg (289 lb 11.2 oz)     Wt Readings from Last 3 Encounters:   12/20/21 131.4 kg (289 lb 11.2 oz)   06/21/21 126.2 kg (278 lb 3.2 oz)   01/25/21 128.7 kg (283 lb 11.2 oz)       PHYSICAL EXAM:  This was limited to reviewing all of his labs in detail that had been drawn December 3.  Weight is also up almost 10 pounds from the last time we checked it so he knows he needs some lifestyle management     MEDICATIONS:  Current Outpatient Medications   Medication Sig Dispense Refill     ACCU-CHEK FASTCLIX LANCET DRUM [ACCU-CHEK FASTCLIX LANCET DRUM] USE TO TEST BLOOD SUGARS 2 TO 3 TIMES A  each 4     aspirin 81 mg chewable tablet [ASPIRIN 81 MG CHEWABLE TABLET] Chew 81 mg daily.       blood glucose test (ACCU-CHEK NATHANIEL PLUS TEST STRP) strips [BLOOD GLUCOSE TEST (ACCU-CHEK NATHANIEL PLUS TEST STRP) STRIPS] Use 1 each As Directed 3 (three) times a day. 300 each 3     fexofenadine (ALLEGRA) 180 MG tablet [FEXOFENADINE (ALLEGRA) 180 MG TABLET] Take 180 mg by mouth daily.       lisinopriL (PRINIVIL,ZESTRIL) 40 MG tablet [LISINOPRIL (PRINIVIL,ZESTRIL) 40 MG TABLET] Take 1 tablet (40 mg total) by mouth daily. 180 tablet 3     metFORMIN (GLUCOPHAGE-XR) 500 MG 24 hr tablet Take 2 tablets (1,000 mg) by mouth 2 times daily (with meals) 360 tablet 3     NIFEdipine ER OSMOTIC (PROCARDIA XL) 60 MG 24 hr tablet TAKE 1 TABLET(60 MG) BY MOUTH DAILY 90 tablet 1     rosuvastatin (CRESTOR) 10 MG tablet [ROSUVASTATIN (CRESTOR) 10 MG TABLET] Take 1 tablet (10 mg total) by mouth at bedtime. 30 tablet 11     SF 1.1 % Gel dental gel  [SF 1.1 % GEL DENTAL GEL]        spironolactone (ALDACTONE) 25 MG tablet TAKE 1 TABLET(25 MG) BY MOUTH DAILY 90 tablet 0

## 2021-12-20 NOTE — TELEPHONE ENCOUNTER
RECORDS RECEIVED FROM: internal    DATE RECEIVED: 12.22.21    NOTES (FOR ALL VISITS) STATUS DETAILS   OFFICE NOTES from referring provider internal    Dr Short   OFFICE NOTES from other specialist na    ED NOTES na    OPERATIVE REPORT  (thyroid, pituitary, adrenal, parathyroid) na    MEDICATION LIST internal     IMAGING      DEXASCAN na    MRI (BRAIN) na    XR (Chest) na    CT (HEAD/NECK/CHEST/ABDOMEN) na    NUCLEAR  na    ULTRASOUND (HEAD/NECK) na    LABS     DIABETES: HBGA1C, CREATININE, FASTING LIPIDS, MICROALBUMIN URINE, POTASSIUM, TSH, T4    THYROID: TSH, T4, CBC, THYRODLONULIN, TOTAL T3, FREE T4, CALCITONIN, CEA internal  Lipid= 12.3.21  HBGA1C- 12.3.21   TSH/T4- 6.14.21  Vitamin D- 6.14.21

## 2021-12-21 ASSESSMENT — ENCOUNTER SYMPTOMS
DEPRESSION: 1
TROUBLE SWALLOWING: 0
DYSURIA: 0
INCREASED ENERGY: 0
DECREASED CONCENTRATION: 0
FATIGUE: 1
SINUS PAIN: 0
HOARSE VOICE: 0
SINUS CONGESTION: 0
CHILLS: 0
ALTERED TEMPERATURE REGULATION: 0
POLYPHAGIA: 0
SORE THROAT: 0
WEIGHT LOSS: 0
SMELL DISTURBANCE: 0
DIFFICULTY URINATING: 1
FLANK PAIN: 0
WEIGHT GAIN: 1
FEVER: 0
PANIC: 0
HALLUCINATIONS: 0
INSOMNIA: 0
NERVOUS/ANXIOUS: 1
POLYDIPSIA: 1
TASTE DISTURBANCE: 0
NECK MASS: 0
DECREASED APPETITE: 0
NIGHT SWEATS: 0
HEMATURIA: 0

## 2021-12-22 ENCOUNTER — PRE VISIT (OUTPATIENT)
Dept: ENDOCRINOLOGY | Facility: CLINIC | Age: 56
End: 2021-12-22

## 2021-12-22 ENCOUNTER — VIRTUAL VISIT (OUTPATIENT)
Dept: ENDOCRINOLOGY | Facility: CLINIC | Age: 56
End: 2021-12-22
Payer: COMMERCIAL

## 2021-12-22 DIAGNOSIS — E66.813 CLASS 3 SEVERE OBESITY DUE TO EXCESS CALORIES WITH SERIOUS COMORBIDITY AND BODY MASS INDEX (BMI) OF 40.0 TO 44.9 IN ADULT (H): ICD-10-CM

## 2021-12-22 DIAGNOSIS — E04.1 THYROID NODULE: ICD-10-CM

## 2021-12-22 DIAGNOSIS — E11.9 TYPE 2 DIABETES MELLITUS WITHOUT COMPLICATION, WITHOUT LONG-TERM CURRENT USE OF INSULIN (H): Primary | ICD-10-CM

## 2021-12-22 DIAGNOSIS — E66.01 CLASS 3 SEVERE OBESITY DUE TO EXCESS CALORIES WITH SERIOUS COMORBIDITY AND BODY MASS INDEX (BMI) OF 40.0 TO 44.9 IN ADULT (H): ICD-10-CM

## 2021-12-22 DIAGNOSIS — G47.33 OSA (OBSTRUCTIVE SLEEP APNEA): ICD-10-CM

## 2021-12-22 PROCEDURE — 99204 OFFICE O/P NEW MOD 45 MIN: CPT | Mod: 95 | Performed by: INTERNAL MEDICINE

## 2021-12-22 NOTE — PROGRESS NOTES
Endocrinology Clinic New Consult        Govind Alexandre MRN:[id number] YOB: 1965  Primary care provider: Deepa Short     Reason for Endocrine consult: T2 DM without long term insulin use    HPI:  Govind Alexandre is a 56 year old male with PMHx of HTN, T2DM, HLD,KORINA on CPAP, Obesity, thyroid nodules is referred to our clinic for management of his T2DM    He was diagnosed with DM 2 in 2016 and has only been on metformin, currently 1000 mg XR BID, he went up on the dose 2-3 days ago, before that he was on metformin 500 mg XR BID    He was recently complaining of excessive urination, blurring of vision and his labs were rechecked and he had HbA1c: 6.9. Weight relatively stable in last 2 years 10 lb weight gain, current weight: 289 lbs.     He is interested in weight loss management    Diet:  Eats 3 meals a day, trying not to snack, cut out soda, doing water and unsweetened ice tea, used to drinks 4-5 sodas a day, now does 1 can  a week. Has pasta and pizza often    Exercise:He is trying to exercise more in the last 6 months, but he hasn't seen much improvement in diet.  Was swimming in the pool, uses treadmill on and off and walking.     Hypoglycemia: none    Checks BG using glucometer, twice a day after getting up and bedtime    BG: In Nov BG : 225,207,392,304, 317, 325, 279  He mentions he always had BG in 100-150 range in the last 6 years but now he is seeing higher number and he is concerned about it and wants to know medication options    Diabetes complications include:  Retinopathy: last eye exam 3-4 months ago, neg for PDR  Nephropathy: none   Neuropathy: has mild tingling in middle toes, no numbness, hand he had tingling and numbness but that was from carpal tunnel that resolved 95% after surgery  LDL: At goal on statin crestor 10 mg QD  ASA: yes  Smoking: None  BP: 124/62 mm Hg , well controlled on lisinopril 40 mg QD    He mentions he doesn't remember how this came about, whether on  physical exam or if he had noticed some swelling, he said that they got thyroid US as his father had thyroid cancer . He also mentions he underwent biopsy of the thyroid at [facility] which was benign    Denies any difficulty swallowing, neck swelling,change in voice, weight changes, sleep disturbances, fatigue, sleepiness, palpitations,. Diarrhea, constipation, tremors. 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 mother, father and brother : CAD    SH: Non smoker, alcohol consumption - social 1-2 drinks a month , illicit drug use.  He is an  , runs a tax company.  Lives with wife and son who is autistic. Has a daughter       ROS:  All 12 systems were reviewed and negative except as mentioned in HPI    Past Medical/Surgical History:  Past Medical History:   Diagnosis Date     Depression 12/24/2014     Essential hypertension      Multiple thyroid nodules 9/8/2016     Type 2 diabetes mellitus without complication (H) 11/7/2016     Past Surgical History:   Procedure Laterality Date     CARPAL TUNNEL RELEASE RT/LT Right      HC REPAIR ROTATOR CUFF,ACUTE      Description: Rotator Cuff Repair Acute;  Recorded: 01/13/2010;  Comments: cortisone     [facility]  11/30/2015          REMOVAL OF SPERM DUCT(S)      Description: Surgery Of Male Genitalia Vasectomy;  Recorded: 01/13/2010;     WISDOM TOOTH EXTRACTION         Allergies:  No Known Allergies    PTA Meds:  Prior to Admission medications    Medication Sig Last Dose Taking? Auth Provider   ACCU-CHEK FASTCLIX LANCET DRUM [ACCU-CHEK FASTCLIX LANCET DRUM] USE TO TEST BLOOD SUGARS 2 TO 3 TIMES A DAY   Deepa Short MD   aspirin 81 mg chewable tablet [ASPIRIN 81 MG CHEWABLE TABLET] Chew 81 mg daily.   Provider, Historical   blood glucose test (ACCU-CHEK NATHANIEL PLUS TEST STRP) strips [BLOOD GLUCOSE TEST (ACCU-CHEK NATHANIEL PLUS TEST STRP) STRIPS] Use 1 each As Directed 3 (three) times  a day.   Deepa Short MD   fexofenadine (ALLEGRA) 180 MG tablet [FEXOFENADINE (ALLEGRA) 180 MG TABLET] Take 180 mg by mouth daily.   Provider, Historical   lisinopriL (PRINIVIL,ZESTRIL) 40 MG tablet [LISINOPRIL (PRINIVIL,ZESTRIL) 40 MG TABLET] Take 1 tablet (40 mg total) by mouth daily.   Deepa Short MD   metFORMIN (GLUCOPHAGE-XR) 500 MG 24 hr tablet Take 2 tablets (1,000 mg) by mouth 2 times daily (with meals)   Deepa Short MD   NIFEdipine ER OSMOTIC (PROCARDIA XL) 60 MG 24 hr tablet TAKE 1 TABLET(60 MG) BY MOUTH DAILY   Deborah Wang MD   rosuvastatin (CRESTOR) 10 MG tablet [ROSUVASTATIN (CRESTOR) 10 MG TABLET] Take 1 tablet (10 mg total) by mouth at bedtime.   Deepa Short MD   SF 1.1 % Gel dental gel [SF 1.1 % GEL DENTAL GEL]    Provider, Historical   spironolactone (ALDACTONE) 25 MG tablet TAKE 1 TABLET(25 MG) BY MOUTH DAILY   Deborah Wang MD        Current heard:   Current Outpatient Medications   Medication     ACCU-CHEK FASTCLIX LANCET DRUM     aspirin 81 mg chewable tablet     blood glucose test (ACCU-CHEK NATHANIEL PLUS TEST STRP) strips     fexofenadine (ALLEGRA) 180 MG tablet     lisinopriL (PRINIVIL,ZESTRIL) 40 MG tablet     metFORMIN (GLUCOPHAGE-XR) 500 MG 24 hr tablet     NIFEdipine ER OSMOTIC (PROCARDIA XL) 60 MG 24 hr tablet     rosuvastatin (CRESTOR) 10 MG tablet     SF 1.1 % Gel dental gel     spironolactone (ALDACTONE) 25 MG tablet     No current facility-administered medications for this visit.         Social History:  Social History     Tobacco Use     Smoking status: Former Smoker     Quit date: 2004     Years since quittin.0     Smokeless tobacco: Never Used   Substance Use Topics     Alcohol use: Yes     Comment: Alcoholic Drinks/day: 1 every 2 weeks         Physical examination:  General appearance: seated comfortably in the chair during assessment. Not in any acute distress  HEENT: EOMI, anicteric, thick neck  Lungs: Unlabored respiration, speaking full  sentences  Abdomen: soobese  Neurological: conscious and oriented. Speech: normal. Moving all four extremities equally  Psychiatric: normal mood and affect. Normal judgment    Endocrine Labs:        Narrative & Impression   US THYROID  9/9/2016 7:05 AM     INDICATION: Nodules noted on recent neck CT  TECHNIQUE: Routine.  COMPARISON: Neck CT 09/07/2016.     FINDINGS:  Both the linear and curved transducers were used to evaluate the thyroid as the left lobe is low lying.     Right lobe measures 4.7 x 2.7 x 3 cm and the left 4.8 x 2.7 x 2.1 cm. Both lobes are quite heterogeneous with multiple nodules. There are at least 2 that are measured on the right, largest measuring approximately 2 x 2.7 cm which is predominantly   hyperechoic but heterogeneous. Coarse calcifications noted within it. In the thickened isthmus, there is an ovoid 3.3 x 3 x 2 cm hypoechoic nodule. The left lobe is quite heterogeneous and areas measured seem arbitrary.     IMPRESSION:   CONCLUSION:  1.  Multinodular goiter with an isoechoic nodule along the left-sided isthmus as noted above. This as well as the larger heterogeneous nodule in the right lobe should undergo FNA if not already performed.     Case Report   Medical Cytology                                  Case: [id number]                                    Authorizing Provider:  Deepa Short MD        Collected:           09/20/2016 1002               Ordering Location:     [facility]      Received:            09/20/2016 1016                                      Ultrasound                                                                    Pathologist:           Brendan Mejia MD                                                        Specimens:   A) - Thyroid, Right                                                                                  B) - Isthmus                                                                               General Path Interpretation   Negative  for malignant cells   Electronically signed by Brendan Mejia MD on 9/22/2016 at 12:01 PM       Lab AP Micro/Diagnosis     A) ULTRASOUND-GUIDED FINE NEEDLE ASPIRATION OF RIGHT THYROID GLAND NODULE #1:          -  CYTOLOGIC FINDINGS MOST CONSISTENT WITH COLLOID NODULE          -  NUMEROUS HEMOSIDERIN MACROPHAGES, CONSISTENT WITH ONE OR MORE PRIOR                EPISODES OF BLEEDING          -  NEGATIVE FOR ATYPICAL OR MALIGNANT CELLS          -  NO EVIDENCE OF PAPILLARY EPITHELIAL GROWTH     B) ULTRASOUND-GUIDED FINE NEEDLE ASPIRATION OF RIGHT THYROID GLAND NODULE #2:          -  CYTOLOGIC FINDINGS MOST CONSISTENT WITH COLLOID NODULE          -  NEGATIVE FOR ATYPICAL OR MALIGNANT CELLS          -  NO EVIDENCE OF PAPILLARY EPITHELIAL GROWTH   Electronically signed by Brendan Mejia MD on 9/22/2016 at 12:01 PM    Comment     MICROSCOPIC:   A) Material examined consists of cell block sections, one monolayer preparation, and six smears preparations. Cell block sections contains scattered epithelial cell, scattered lymphocytes, and occasional macrophages within a background of degenerated blood. The monolayer and smear preparations demonstrate moderate numbers of hemosiderin-filled macrophages, with scattered clusters of epithelial cells. The epithelial cells are admixed with stringy colloid, and there are additional fragments of firm colloid in most of the smear preparations. In the background are moderate numbers of lymphocytes, and rare neutrophils. The epithelial groups demonstrate no atypical nuclear features, and there are no microfollicular or papillary architectural patterns.     B) Material examined consists of one monolayer preparation, and six smears preparations. These demonstrate loose or cohesive groups of epithelial cells essentially identical in appearance to those described in specimen A above. As above, there are no atypical nuclear features, and no microfollicular or papillary architectural  features. In the case of this nodule, the smear preparations demonstrate no hemosiderin macrophages, and only small numbers of lymphocytes.    Clinical Information  multiple thyroid nodules    Specimen Description     Ultrasound guided FNA performed by Dr. Nickolas Negron.     A)   3 Air dried slides   3 Fixed slides   1 Sure[employer] slide   1 Cell block   are prepared.     B)   3 Air dried slides   3 Fixed slides   1 SurePath slide   are prepared.     INITIAL CYTOLOGIC EVALUATION :   A)  Site #1, Episode #1, 3 Passes :  See below     B)  Site #2, Episode #2, 3 Passes :  A&B - [place] epithelium, colloid in A, hemosiderin, macrophages in A. - JPL     CPT:   66215 ×2, 02000 ×2, 36201   ICD-10:   E04.2     CC:   Nickolas Negron MD    Comment:    All histology slide preparation, stains and image analysis done               at [facility] are performed at [facility], [address] [place] [address], [facility], with final interpretation and               frozen section analysis at the indicated laboratory.      Resulting [facility] LABORATORY              Specimen Collected: 09/20/16 10:02 AM Last Resulted: 09/22/16 12:01 PM        Order Details      View Encounter      Lab and Collection Details      Routing      Result History             Result Care Coordination        Patient Communication     Add Comments   Add Notifications  Back to Top                Assessment and Plan:   Govind Alexandre is a 56 year old male with PMHx of HTN, T2DM, HLD,KORINA on CPAP, Obesity, thyroid nodules is referred to our clinic for management of his T2DM    # T2DM , HbA1c:6.9, moderately controlled, C/b obesity, HTN, HLD  - Given sudden worsening of BG raises suspicion for ROBBY  - Continue metformin 1000 mg XR BID  - Will check c- peptide and BG to check for endogenous insulin production  - We discussed GLP-1 as an option given his DM and obesity but given his father history of thyroid cancer  with hx of radiation,further clarification is required which type of cancer , would refrain from using that  - We discussed SGLT-2 as other option, jardiance 10 mg daily, side effects of dehydration, UTI.   - We asked him to send BG logs in 2 weeks  - Patient to check with his family and see if he can get more information regarding his father cancer history  - Referral to weight management made  - We discussed lifestyle changes and encouraged him to walk atleast 30 mins a day and also encouraged him to cut down carbohydrate from his meals and substitute with vegetables and protein      # Multinodular Goiter, FH of thyroid cancer  - TSH on 6/2021: 0.38  - Will repeat thyroid US and TFT  - patient to try to obtain records on his fathers cancer history    The patient was seen, examined and discussed with MD Lilian Johnson MD.  Endocrinology fellow  Answers for HPI/ROS submitted by the patient on 12/21/2021  General Symptoms: Yes  Skin Symptoms: No  HENT Symptoms: Yes  EYE SYMPTOMS: No  HEART SYMPTOMS: No  LUNG SYMPTOMS: No  INTESTINAL SYMPTOMS: No  URINARY SYMPTOMS: Yes  REPRODUCTIVE SYMPTOMS: No  SKELETAL SYMPTOMS: No  BLOOD SYMPTOMS: No  NERVOUS SYSTEM SYMPTOMS: No  MENTAL HEALTH SYMPTOMS: Yes  Ear pain: No  Ear discharge: No  Hearing loss: No  Tinnitus: Yes  Nosebleeds: No  Congestion: No  Sinus pain: No  Trouble swallowing: No   Voice hoarseness: No  Mouth sores: No  Sore throat: No  Tooth pain: No  Gum tenderness: No  Bleeding gums: No  Change in taste: No  Change in sense of smell: No  Dry mouth: No  Hearing aid used: No  Neck lump: No  Fever: No  Loss of appetite: No  Weight loss: No  Weight gain: Yes  Fatigue: Yes  Night sweats: No  Chills: No  Increased stress: Yes  Excessive hunger: No  Excessive thirst: Yes  Feeling hot or cold when others believe the temperature is normal: No  Loss of height: No  Post-operative complications: No  Surgical site pain: No  Hallucinations: No  Change  in or Loss of Energy: No  Hyperactivity: No  Confusion: No  Trouble holding urine or incontinence: No  Pain or burning: No  Trouble starting or stopping: No  Increased frequency of urination: Yes  Blood in urine: No  Decreased frequency of urination: No  Frequent nighttime urination: No  Flank pain: No  Difficulty emptying bladder: Yes  Nervous or Anxious: Yes  Depression: Yes  Trouble sleeping: No  Trouble thinking or concentrating: No  Mood changes: Yes  Panic attacks: No      Start: Video Start Time: 9: 50 AM  Video-Visit Details    Type of service:  Video Visit    Video End Time: 10: 40 Am    Originating Location (pt. Location): home    Distant Location (provider location):  [facility] [place]     Platform used for Video Visit:  Am well    Attestation    I have seen patient and discussed management plan with the patient on December 22, 2021.   I have discussed management plan with Endocrinology Fellow and agree with the assessment and plan of care as documented above.        Uncontrolled Type 2 diabetes: based on home blood glucose readings in the 200's and 300's. Adding SGLT-2 inhibitor today after discussing risk-benefit with the patient in addition to metformin 2 gm per day he is taking. Benefits from GLP-1 agonist; will further clarify hx of thyroid cancer in his father. C-peptide pending for evaluation of endogenous insulin production. Questions addressed.      Chanell Tran MD  Staff Endocrinologist

## 2021-12-22 NOTE — PROGRESS NOTES
Govind is a 56 year old who is being evaluated via a billable video visit.      How would you like to obtain your AVS? MyChart  If the video visit is dropped, the invitation should be resent by: Text to cell phone: [phone]  Will anyone else be joining your video visit? No

## 2021-12-22 NOTE — LETTER
12/22/2021       RE: Govind Alxeandre  [address] [address]  [facility] [id number]     Dear Colleague,    Thank you for referring your patient, Govind Alexandre, to the [facility] [place] [facility]. Please see a copy of my visit note below.      Endocrinology Clinic New Consult        Govind Alexandre MRN:[id number] YOB: 1965  Primary care provider: Deepa Short     Reason for Endocrine consult: T2 DM without long term insulin use    HPI:  Govind Alexandre is a 56 year old male with PMHx of HTN, T2DM, HLD,KORINA on CPAP, Obesity, thyroid nodules is referred to our clinic for management of his T2DM    He was diagnosed with DM 2 in 2016 and has only been on metformin, currently 1000 mg XR BID, he went up on the dose 2-3 days ago, before that he was on metformin 500 mg XR BID    He was recently complaining of excessive urination, blurring of vision and his labs were rechecked and he had HbA1c: 6.9. Weight relatively stable in last 2 years 10 lb weight gain, current weight: 289 lbs.     He is interested in weight loss management    Diet:  Eats 3 meals a day, trying not to snack, cut out soda, doing water and unsweetened ice tea, used to drinks 4-5 sodas a day, now does 1 can  a week. Has pasta and pizza often    Exercise:He is trying to exercise more in the last 6 months, but he hasn't seen much improvement in diet.  Was swimming in the pool, uses treadmill on and off and walking.     Hypoglycemia: none    Checks BG using glucometer, twice a day after getting up and bedtime    BG: In Nov BG : 225,207,392,304, 317, 325, 279  He mentions he always had BG in 100-150 range in the last 6 years but now he is seeing higher number and he is concerned about it and wants to know medication options    Diabetes complications include:  Retinopathy: last eye exam 3-4 months ago, neg for PDR  Nephropathy: none   Neuropathy: has mild tingling in middle  toes, no numbness, hand he had tingling and numbness but that was from carpal tunnel that resolved 95% after surgery  LDL: At goal on statin crestor 10 mg QD  ASA: yes  Smoking: None  BP: 124/62 mm Hg , well controlled on lisinopril 40 mg QD    He mentions he doesn't remember how this came about, whether on physical exam or if he had noticed some swelling, he said that they got thyroid US as his father had thyroid cancer . He also mentions he underwent biopsy of the thyroid at [facility] which was benign    Denies any difficulty swallowing, neck swelling,change in voice, weight changes, sleep disturbances, fatigue, sleepiness, palpitations,. Diarrhea, constipation, tremors. 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 mother, father and brother : CAD    SH: Non smoker, alcohol consumption - social 1-2 drinks a month , illicit drug use.  He is an  , runs a tax company.  Lives with wife and son who is autistic. Has a daughter       ROS:  All 12 systems were reviewed and negative except as mentioned in HPI    Past Medical/Surgical History:  Past Medical History:   Diagnosis Date     Depression 12/24/2014     Essential hypertension      Multiple thyroid nodules 9/8/2016     Type 2 diabetes mellitus without complication (H) 11/7/2016     Past Surgical History:   Procedure Laterality Date     CARPAL TUNNEL RELEASE RT/LT Right      HC REPAIR ROTATOR CUFF,ACUTE      Description: Rotator Cuff Repair Acute;  Recorded: 01/13/2010;  Comments: cortisone     PIC  11/30/2015          REMOVAL OF SPERM DUCT(S)      Description: Surgery Of Male Genitalia Vasectomy;  Recorded: 01/13/2010;     WISDOM TOOTH EXTRACTION         Allergies:  No Known Allergies    PTA Meds:  Prior to Admission medications    Medication Sig Last Dose Taking? Auth Provider   ACCU-CHEK FASTCLIX LANCET DRUM [ACCU-CHEK FASTCLIX LANCET DRUM] USE TO TEST BLOOD  SUGARS 2 TO 3 TIMES A DAY   Deepa Short MD   aspirin 81 mg chewable tablet [ASPIRIN 81 MG CHEWABLE TABLET] Chew 81 mg daily.   Provider, Historical   blood glucose test (ACCU-CHEK NATHANIEL PLUS TEST STRP) strips [BLOOD GLUCOSE TEST (ACCU-CHEK NATHANIEL PLUS TEST STRP) STRIPS] Use 1 each As Directed 3 (three) times a day.   Deepa Short MD   fexofenadine (ALLEGRA) 180 MG tablet [FEXOFENADINE (ALLEGRA) 180 MG TABLET] Take 180 mg by mouth daily.   Provider, Historical   lisinopriL (PRINIVIL,ZESTRIL) 40 MG tablet [LISINOPRIL (PRINIVIL,ZESTRIL) 40 MG TABLET] Take 1 tablet (40 mg total) by mouth daily.   Deepa Short MD   metFORMIN (GLUCOPHAGE-XR) 500 MG 24 hr tablet Take 2 tablets (1,000 mg) by mouth 2 times daily (with meals)   Deepa Short MD   NIFEdipine ER OSMOTIC (PROCARDIA XL) 60 MG 24 hr tablet TAKE 1 TABLET(60 MG) BY MOUTH DAILY   Deborah Wang MD   rosuvastatin (CRESTOR) 10 MG tablet [ROSUVASTATIN (CRESTOR) 10 MG TABLET] Take 1 tablet (10 mg total) by mouth at bedtime.   Deepa Short MD   SF 1.1 % Gel dental gel [SF 1.1 % GEL DENTAL GEL]    Provider, Historical   spironolactone (ALDACTONE) 25 MG tablet TAKE 1 TABLET(25 MG) BY MOUTH DAILY   Deborah Wang MD        Current heard:   Current Outpatient Medications   Medication     ACCU-CHEK FASTCLIX LANCET DRUM     aspirin 81 mg chewable tablet     blood glucose test (ACCU-CHEK NATHANIEL PLUS TEST STRP) strips     fexofenadine (ALLEGRA) 180 MG tablet     lisinopriL (PRINIVIL,ZESTRIL) 40 MG tablet     metFORMIN (GLUCOPHAGE-XR) 500 MG 24 hr tablet     NIFEdipine ER OSMOTIC (PROCARDIA XL) 60 MG 24 hr tablet     rosuvastatin (CRESTOR) 10 MG tablet     SF 1.1 % Gel dental gel     spironolactone (ALDACTONE) 25 MG tablet     No current facility-administered medications for this visit.         Social History:  Social History     Tobacco Use     Smoking status: Former Smoker     Quit date: 2004     Years since quittin.0     Smokeless tobacco: Never  Used   Substance Use Topics     Alcohol use: Yes     Comment: Alcoholic Drinks/day: 1 every 2 weeks         Physical examination:  General appearance: seated comfortably in the chair during assessment. Not in any acute distress  HEENT: EOMI, anicteric, thick neck  Lungs: Unlabored respiration, speaking full sentences  Abdomen: soobese  Neurological: conscious and oriented. Speech: normal. Moving all four extremities equally  Psychiatric: normal mood and affect. Normal judgment    Endocrine Labs:        Narrative & Impression   US THYROID  9/9/2016 7:05 AM     INDICATION: Nodules noted on recent neck CT  TECHNIQUE: Routine.  COMPARISON: Neck CT 09/07/2016.     FINDINGS:  Both the linear and curved transducers were used to evaluate the thyroid as the left lobe is low lying.     Right lobe measures 4.7 x 2.7 x 3 cm and the left 4.8 x 2.7 x 2.1 cm. Both lobes are quite heterogeneous with multiple nodules. There are at least 2 that are measured on the right, largest measuring approximately 2 x 2.7 cm which is predominantly   hyperechoic but heterogeneous. Coarse calcifications noted within it. In the thickened isthmus, there is an ovoid 3.3 x 3 x 2 cm hypoechoic nodule. The left lobe is quite heterogeneous and areas measured seem arbitrary.     IMPRESSION:   CONCLUSION:  1.  Multinodular goiter with an isoechoic nodule along the left-sided isthmus as noted above. This as well as the larger heterogeneous nodule in the right lobe should undergo FNA if not already performed.     Case Report   Medical Cytology                                  Case: [id number]                                    Authorizing Provider:  Deepa Short MD        Collected:           09/20/2016 1002               Ordering Location:     [facility]      Received:            09/20/2016 1016                                      Ultrasound                                                                    Pathologist:           Brendan LOPEZ  MD Roberto                                                        Specimens:   A) - Thyroid, Right                                                                                  B) - Isthmus                                                                               General Path Interpretation   Negative for malignant cells   Electronically signed by Brendan Mejia MD on 9/22/2016 at 12:01 PM       Lab AP Micro/Diagnosis     A) ULTRASOUND-GUIDED FINE NEEDLE ASPIRATION OF RIGHT THYROID GLAND NODULE #1:          -  CYTOLOGIC FINDINGS MOST CONSISTENT WITH COLLOID NODULE          -  NUMEROUS HEMOSIDERIN MACROPHAGES, CONSISTENT WITH ONE OR MORE PRIOR                EPISODES OF BLEEDING          -  NEGATIVE FOR ATYPICAL OR MALIGNANT CELLS          -  NO EVIDENCE OF PAPILLARY EPITHELIAL GROWTH     B) ULTRASOUND-GUIDED FINE NEEDLE ASPIRATION OF RIGHT THYROID GLAND NODULE #2:          -  CYTOLOGIC FINDINGS MOST CONSISTENT WITH COLLOID NODULE          -  NEGATIVE FOR ATYPICAL OR MALIGNANT CELLS          -  NO EVIDENCE OF PAPILLARY EPITHELIAL GROWTH   Electronically signed by Brendan Mejia MD on 9/22/2016 at 12:01 PM    Comment     MICROSCOPIC:   A) Material examined consists of cell block sections, one monolayer preparation, and six smears preparations. Cell block sections contains scattered epithelial cell, scattered lymphocytes, and occasional macrophages within a background of degenerated blood. The monolayer and smear preparations demonstrate moderate numbers of hemosiderin-filled macrophages, with scattered clusters of epithelial cells. The epithelial cells are admixed with stringy colloid, and there are additional fragments of firm colloid in most of the smear preparations. In the background are moderate numbers of lymphocytes, and rare neutrophils. The epithelial groups demonstrate no atypical nuclear features, and there are no microfollicular or papillary architectural patterns.     B) Material  examined consists of one monolayer preparation, and six smears preparations. These demonstrate loose or cohesive groups of epithelial cells essentially identical in appearance to those described in specimen A above. As above, there are no atypical nuclear features, and no microfollicular or papillary architectural features. In the case of this nodule, the smear preparations demonstrate no hemosiderin macrophages, and only small numbers of lymphocytes.    Clinical Information  multiple thyroid nodules    Specimen Description     Ultrasound guided FNA performed by Dr. Nickolas Negron.     A)   3 Air dried slides   3 Fixed slides   1 SurePath slide   1 Cell block   are prepared.     B)   3 Air dried slides   3 Fixed slides   1 SurePath slide   are prepared.     INITIAL CYTOLOGIC EVALUATION :   A)  Site #1, Episode #1, 3 Passes :  See below     B)  Site #2, Episode #2, 3 Passes :  A&B - [place] epithelium, colloid in A, hemosiderin, macrophages in A. - JPL     CPT:   08434 ×2, 39505 ×2, 38403   ICD-10:   E04.2     CC:   Nickolas Negron MD    Comment:    All histology slide preparation, stains and image analysis done               at [facility] are performed at [facility], [address] [place] [address], [facility], with final interpretation and               frozen section analysis at the indicated laboratory.      [facility] LABOR[facility]              Specimen Collected: 09/20/16 10:02 AM Last Resulted: 09/22/16 12:01 PM        Order Details      View Encounter      Lab and Collection Details      Routing      Result History             Result Care Coordination        Patient Communication     Add Comments   Add Notifications  Back to Top                Assessment and Plan:   Govind Alexandre is a 56 year old male with PMHx of HTN, T2DM, HLD,KORINA on CPAP, Obesity, thyroid nodules is referred to our clinic for management of his T2DM    # T2DM , HbA1c:6.9,  moderately controlled, C/b obesity, HTN, HLD  - Given sudden worsening of BG raises suspicion for ROBBY  - Continue metformin 1000 mg XR BID  - Will check c- peptide and BG to check for endogenous insulin production  - We discussed GLP-1 as an option given his DM and obesity but given his father history of thyroid cancer with hx of radiation,further clarification is required which type of cancer , would refrain from using that  - We discussed SGLT-2 as other option, jardiance 10 mg daily, side effects of dehydration, UTI.   - We asked him to send BG logs in 2 weeks  - Patient to check with his family and see if he can get more information regarding his father cancer history  - Referral to weight management made  - We discussed lifestyle changes and encouraged him to walk atleast 30 mins a day and also encouraged him to cut down carbohydrate from his meals and substitute with vegetables and protein      # Multinodular Goiter, FH of thyroid cancer  - TSH on 6/2021: 0.38  - Will repeat thyroid US and TFT  - patient to try to obtain records on his fathers cancer history    The patient was seen, examined and discussed with MD Lilian Johnson MD.  Endocrinology fellow  Answers for HPI/ROS submitted by the patient on 12/21/2021  General Symptoms: Yes  Skin Symptoms: No  HENT Symptoms: Yes  EYE SYMPTOMS: No  HEART SYMPTOMS: No  LUNG SYMPTOMS: No  INTESTINAL SYMPTOMS: No  URINARY SYMPTOMS: Yes  REPRODUCTIVE SYMPTOMS: No  SKELETAL SYMPTOMS: No  BLOOD SYMPTOMS: No  NERVOUS SYSTEM SYMPTOMS: No  MENTAL HEALTH SYMPTOMS: Yes  Ear pain: No  Ear discharge: No  Hearing loss: No  Tinnitus: Yes  Nosebleeds: No  Congestion: No  Sinus pain: No  Trouble swallowing: No   Voice hoarseness: No  Mouth sores: No  Sore throat: No  Tooth pain: No  Gum tenderness: No  Bleeding gums: No  Change in taste: No  Change in sense of smell: No  Dry mouth: No  Hearing aid used: No  Neck lump: No  Fever: No  Loss of appetite:  No  Weight loss: No  Weight gain: Yes  Fatigue: Yes  Night sweats: No  Chills: No  Increased stress: Yes  Excessive hunger: No  Excessive thirst: Yes  Feeling hot or cold when others believe the temperature is normal: No  Loss of height: No  Post-operative complications: No  Surgical site pain: No  Hallucinations: No  Change in or Loss of Energy: No  Hyperactivity: No  Confusion: No  Trouble holding urine or incontinence: No  Pain or burning: No  Trouble starting or stopping: No  Increased frequency of urination: Yes  Blood in urine: No  Decreased frequency of urination: No  Frequent nighttime urination: No  Flank pain: No  Difficulty emptying bladder: Yes  Nervous or Anxious: Yes  Depression: Yes  Trouble sleeping: No  Trouble thinking or concentrating: No  Mood changes: Yes  Panic attacks: No      Attestation    I have seen patient and discussed management plan with the patient on December 22, 2021.   I have discussed management plan with Endocrinology Fellow and agree with the assessment and plan of care as documented above.        Uncontrolled Type 2 diabetes: based on home blood glucose readings in the 200's and 300's. Adding SGLT-2 inhibitor today after discussing risk-benefit with the patient in addition to metformin 2 gm per day he is taking. Benefits from GLP-1 agonist; will further clarify hx of thyroid cancer in his father. C-peptide pending for evaluation of endogenous insulin production. Questions addressed.        Govind is a 56 year old who is being evaluated via a billable video visit.      How would you like to obtain your AVS? MyChart  If the video visit is dropped, the invitation should be resent by: Text to cell phone: [phone]  Will anyone else be joining your video visit? No        Again, thank you for allowing me to participate in the care of your patient.      Sincerely,    Chanell Tran MD

## 2021-12-22 NOTE — PATIENT INSTRUCTIONS
Mr. Govind Alexandre    Continue metformin at the dame dose.   Start jardiance 10 mg daily  Send BG logs in 2 weeks  Check with  family and see if you can get more information regarding thyroid cancer history  schedule thyroid Ultrasound.     Orders Placed This Encounter   Procedures     US Thyroid     C-peptide     Glucose     TSH     T4 free     Comprehensive Weight Management     Please make blood work appointment Lab scheduling to schedule at any [place] lab locations: [phone] )[address] [place] [address]) select option 1    Weight loss and exercise are recommended in the management of Type 2 diabetes.  Caloric restriction, portion control and physical activity are evidence based strategies for life style changes. Here is a starting point in your case:       Reduced calorie meal plan; set a goal.     Please keep a food journal of what you eat, calories in what you eat, and bring the journal with you to your next appointment.    Consider using applications for smart phones such as musiXmatch, Kind Intelligence, Mapidy, LoseIt, Tap&Track, and RelaxM. To keep food journal and track your exercise.     Focus on wet volumetrics, meaning, eat more foods that are high in water and fiber such as fruits and vegetables in order to get that full feeling. These are also good for your overall health as well.    Progressively increase physical activity to 45 minutes, including combination of cardio & resistance training x 5 days weekly. Start at 10-15 minutes per day and progressively work towards this goal.

## 2021-12-23 ENCOUNTER — MYC MEDICAL ADVICE (OUTPATIENT)
Dept: ENDOCRINOLOGY | Facility: CLINIC | Age: 56
End: 2021-12-23
Payer: COMMERCIAL

## 2021-12-23 DIAGNOSIS — E04.1 THYROID NODULE: ICD-10-CM

## 2021-12-23 DIAGNOSIS — E11.9 TYPE 2 DIABETES MELLITUS WITHOUT COMPLICATION, WITHOUT LONG-TERM CURRENT USE OF INSULIN (H): ICD-10-CM

## 2021-12-24 ENCOUNTER — LAB (OUTPATIENT)
Dept: LAB | Facility: CLINIC | Age: 56
End: 2021-12-24
Payer: COMMERCIAL

## 2021-12-24 DIAGNOSIS — E11.9 TYPE 2 DIABETES MELLITUS WITHOUT COMPLICATION, WITHOUT LONG-TERM CURRENT USE OF INSULIN (H): ICD-10-CM

## 2021-12-24 DIAGNOSIS — E04.1 THYROID NODULE: ICD-10-CM

## 2021-12-24 LAB
ALBUMIN SERPL-MCNC: 4.3 G/DL (ref 3.5–5)
ALP SERPL-CCNC: 89 U/L (ref 45–120)
ALT SERPL W P-5'-P-CCNC: 31 U/L (ref 0–45)
ANION GAP SERPL CALCULATED.3IONS-SCNC: 11 MMOL/L (ref 5–18)
AST SERPL W P-5'-P-CCNC: 26 U/L (ref 0–40)
BILIRUB SERPL-MCNC: 1.4 MG/DL (ref 0–1)
BUN SERPL-MCNC: 16 MG/DL (ref 8–22)
CALCIUM SERPL-MCNC: 9.6 MG/DL (ref 8.5–10.5)
CHLORIDE BLD-SCNC: 107 MMOL/L (ref 98–107)
CHOLEST SERPL-MCNC: 123 MG/DL
CO2 SERPL-SCNC: 25 MMOL/L (ref 22–31)
CREAT SERPL-MCNC: 1.04 MG/DL (ref 0.7–1.3)
FASTING STATUS PATIENT QL REPORTED: YES
FASTING STATUS PATIENT QL REPORTED: YES
GFR SERPL CREATININE-BSD FRML MDRD: 83 ML/MIN/1.73M2
GLUCOSE BLD-MCNC: 205 MG/DL (ref 70–125)
GLUCOSE BLD-MCNC: 205 MG/DL (ref 70–125)
HBA1C MFR BLD: 6.9 % (ref 0–5.6)
HDLC SERPL-MCNC: 35 MG/DL
LDLC SERPL CALC-MCNC: 56 MG/DL
POTASSIUM BLD-SCNC: 4.5 MMOL/L (ref 3.5–5)
PROT SERPL-MCNC: 6.9 G/DL (ref 6–8)
SODIUM SERPL-SCNC: 143 MMOL/L (ref 136–145)
T4 FREE SERPL-MCNC: 0.97 NG/DL (ref 0.7–1.8)
TRIGL SERPL-MCNC: 161 MG/DL
TSH SERPL DL<=0.005 MIU/L-ACNC: 0.73 UIU/ML (ref 0.3–5)

## 2021-12-24 PROCEDURE — 80061 LIPID PANEL: CPT

## 2021-12-24 PROCEDURE — 84439 ASSAY OF FREE THYROXINE: CPT

## 2021-12-24 PROCEDURE — 36415 COLL VENOUS BLD VENIPUNCTURE: CPT

## 2021-12-24 PROCEDURE — 84443 ASSAY THYROID STIM HORMONE: CPT

## 2021-12-24 PROCEDURE — 80053 COMPREHEN METABOLIC PANEL: CPT

## 2021-12-24 PROCEDURE — 83036 HEMOGLOBIN GLYCOSYLATED A1C: CPT

## 2021-12-24 PROCEDURE — 84681 ASSAY OF C-PEPTIDE: CPT

## 2021-12-27 LAB — C PEPTIDE SERPL-MCNC: 8.1 NG/ML (ref 0.9–6.9)

## 2021-12-27 NOTE — RESULT ENCOUNTER NOTE
Govind Alexandre        #  thyroid blood work results are within the normal limits.   # the c-peptide level indicate that there is insulin production and confirms the diagnosis of Type 2 diabetes.        Chanell Tran MD

## 2022-01-10 RX ORDER — SEMAGLUTIDE 1.34 MG/ML
0.25 INJECTION, SOLUTION SUBCUTANEOUS
Qty: 6 ML | Refills: 1 | Status: SHIPPED | OUTPATIENT
Start: 2022-01-10 | End: 2022-12-20

## 2022-01-10 NOTE — TELEPHONE ENCOUNTER
Spoke w/ Pt: Reviewed instructions on how to take Ozempic and follow up support if needed. Pt confirms understanding.   Simona Juares, RN on 1/10/2022 at 11:44 AM       Chanell Silverio MD  You; [facility] Endo Triage-U[facility] 22 minutes ago (11:16 AM)     KZ    Patient needs teaching on Ozempic injections.  Can you assist?.  Thank you.          Outpatient Medication Detail     Disp Refills Start End SHONNA   semaglutide (OZEMPIC, 0.25 OR 0.5 MG/DOSE,) 2 MG/1.5ML SOPN pen 6 mL 1 1/10/2022  --   Sig - Route: Inject 0.25 mg Subcutaneous every 7 days For four weeks then increase the dose to 0.5 mg every 7 days. - Subcutaneous   Class: E-Prescribe   Order: [id number]       Printout Tracking    External Result Report     Medication Administration Instructions    For four weeks then increase the dose to 0.5 mg every 7 days.     Pharmacy    [id number] - [place], [place] - [address] AT [facility] [address]

## 2022-01-10 NOTE — TELEPHONE ENCOUNTER
Uncontrolled Type 2 diabetes: Added SGLT-2 inhibitor which he is toelrating well. Benefits from GLP-1 agonist; no FH of medullary thyroid ca or pancreatitis.  C-peptide normal.   Plan:  Metformin 2 g daily.  Jardiance 25 mg daily once he is done with Jardiance 10 mg daily.  Start Ozempic 0.25 mg every 7 days for 4 weeks and then increase to 0.5 mg every 7 days.  Team, please call patient and instruct on Ozempic injection techniques.  Side effects of this medication discussed in detail and including are associated with pancreatitis.

## 2022-01-21 DIAGNOSIS — U07.1 INFECTION DUE TO 2019 NOVEL CORONAVIRUS: Primary | ICD-10-CM

## 2022-02-07 DIAGNOSIS — I10 UNCONTROLLED HYPERTENSION: ICD-10-CM

## 2022-02-07 RX ORDER — SPIRONOLACTONE 25 MG/1
TABLET ORAL
Qty: 90 TABLET | Refills: 0 | Status: SHIPPED | OUTPATIENT
Start: 2022-02-07 | End: 2022-08-08

## 2022-05-08 DIAGNOSIS — I10 ESSENTIAL HYPERTENSION: ICD-10-CM

## 2022-05-09 RX ORDER — NIFEDIPINE 60 MG/1
TABLET, EXTENDED RELEASE ORAL
Qty: 90 TABLET | Refills: 1 | OUTPATIENT
Start: 2022-05-09

## 2022-05-09 NOTE — TELEPHONE ENCOUNTER
"Documentation shows. Last OV with EMG12/2020. PCP 12/2021 shows Nifedipine \"therapy completed\". Will route to PCP to address medication, and if refill appropriate. VICKIE,Rn   "

## 2022-07-07 DIAGNOSIS — E78.00 PURE HYPERCHOLESTEROLEMIA: ICD-10-CM

## 2022-07-08 RX ORDER — ROSUVASTATIN CALCIUM 10 MG/1
TABLET, COATED ORAL
Qty: 90 TABLET | Refills: 1 | Status: SHIPPED | OUTPATIENT
Start: 2022-07-08 | End: 2022-12-02

## 2022-07-08 NOTE — TELEPHONE ENCOUNTER
"Routing refill request to provider for review/approval because:    Due to medication information not transferring due to [facility] please review the medication information prior to signing to ensure accuracy.     Disp Refills Start End SHONNA   rosuvastatin (CRESTOR) 10 MG tablet 90 tablet 3 6/21/2021  No   Sig - Route: Take 1 tablet (10 mg total) by mouth at bedtime. - Oral   Sent to pharmacy as: rosuvastatin 10 mg tablet (Crestor)   E-Prescribing Status: Receipt confirmed by pharmacy (6/21/2021  8:28 AM CDT)     Last Written Prescription Date:  06/21/2021  Last Fill Quantity: 90,  # refills: 3   Last office visit provider:  12/20/2021 with PCP.     Requested Prescriptions   Pending Prescriptions Disp Refills     rosuvastatin (CRESTOR) 10 MG tablet [Pharmacy Med Name: ROSUVASTATIN 10MG TABLETS] 90 tablet      Sig: TAKE 1 TABLET(10 MG) BY MOUTH AT BEDTIME       Statins Protocol Passed - 7/7/2022  6:09 AM        Passed - LDL on file in past 12 months     Recent Labs   Lab Test 12/24/21  0742   LDL 56             Passed - No abnormal creatine kinase in past 12 months     No lab results found.             Passed - Recent (12 mo) or future (30 days) visit within the authorizing provider's specialty     Patient has had an office visit with the authorizing provider or a provider within the authorizing providers department within the previous 12 mos or has a future within next 30 days. See \"Patient Info\" tab in inbasket, or \"Choose Columns\" in Meds & Orders section of the refill encounter.              Passed - Medication is active on med list        Passed - Patient is age 18 or older             Kathleen Mota 07/08/22 11:37 AM  "

## 2022-07-18 DIAGNOSIS — I10 ESSENTIAL HYPERTENSION: ICD-10-CM

## 2022-07-18 NOTE — TELEPHONE ENCOUNTER
"Former patient of [facility] & has not established care with another provider.  Please assign refill request to covering provider per clinic standard process.      Last Written Prescription Date:  6/21/21  Last Fill Quantity: 180,  # refills: 3   Last office visit provider:  12/20/21     Requested Prescriptions   Pending Prescriptions Disp Refills     lisinopril (ZESTRIL) 40 MG tablet [Pharmacy Med Name: LISINOPRIL 40MG TABLETS] 90 tablet      Sig: TAKE 1 TABLET BY MOUTH DAILY       ACE Inhibitors (Including Combos) Protocol Passed - 7/18/2022  6:47 AM        Passed - Blood pressure under 140/90 in past 12 months     BP Readings from Last 3 Encounters:   12/20/21 124/62   06/21/21 130/68   01/25/21 132/66                 Passed - Recent (12 mo) or future (30 days) visit within the authorizing provider's specialty     Patient has had an office visit with the authorizing provider or a provider within the authorizing providers department within the previous 12 mos or has a future within next 30 days. See \"Patient Info\" tab in inbasket, or \"Choose Columns\" in Meds & Orders section of the refill encounter.              Passed - Medication is active on med list        Passed - Patient is age 18 or older        Passed - Normal serum creatinine on file in past 12 months     Recent Labs   Lab Test 12/24/21  0742   CR 1.04       Ok to refill medication if creatinine is low          Passed - Normal serum potassium on file in past 12 months     Recent Labs   Lab Test 12/24/21  0742   POTASSIUM 4.5                  Kourtney Escobedo RN 07/18/22 10:58 AM  "

## 2022-07-19 NOTE — TELEPHONE ENCOUNTER
"Former patient of [facility] & has not established care with another provider.  Please assign refill request to covering provider per clinic standard process.    Routing refill request to provider for review/approval because:  No PCP    Last Written Prescription Date:  6/21/21  Last Fill Quantity: 180,  # refills: 3   Last office visit provider:  12/20/21     Requested Prescriptions   Pending Prescriptions Disp Refills     lisinopril (ZESTRIL) 40 MG tablet [Pharmacy Med Name: LISINOPRIL 40MG TABLETS] 90 tablet      Sig: TAKE 1 TABLET BY MOUTH DAILY       ACE Inhibitors (Including Combos) Protocol Passed - 7/18/2022 10:59 AM        Passed - Blood pressure under 140/90 in past 12 months     BP Readings from Last 3 Encounters:   12/20/21 124/62   06/21/21 130/68   01/25/21 132/66                 Passed - Recent (12 mo) or future (30 days) visit within the authorizing provider's specialty     Patient has had an office visit with the authorizing provider or a provider within the authorizing providers department within the previous 12 mos or has a future within next 30 days. See \"Patient Info\" tab in inbasket, or \"Choose Columns\" in Meds & Orders section of the refill encounter.              Passed - Medication is active on med list        Passed - Patient is age 18 or older        Passed - Normal serum creatinine on file in past 12 months     Recent Labs   Lab Test 12/24/21  0742   CR 1.04       Ok to refill medication if creatinine is low          Passed - Normal serum potassium on file in past 12 months     Recent Labs   Lab Test 12/24/21  0742   POTASSIUM 4.5                  Rik Mccurdy RN 07/19/22 8:15 AM  "

## 2022-07-21 RX ORDER — LISINOPRIL 40 MG/1
TABLET ORAL
Qty: 90 TABLET | Refills: 0 | Status: SHIPPED | OUTPATIENT
Start: 2022-07-21 | End: 2022-10-05

## 2022-07-25 DIAGNOSIS — E04.1 THYROID NODULE: ICD-10-CM

## 2022-07-25 DIAGNOSIS — E11.9 TYPE 2 DIABETES MELLITUS WITHOUT COMPLICATION, WITHOUT LONG-TERM CURRENT USE OF INSULIN (H): ICD-10-CM

## 2022-08-08 DIAGNOSIS — I10 UNCONTROLLED HYPERTENSION: ICD-10-CM

## 2022-08-08 RX ORDER — SPIRONOLACTONE 25 MG/1
25 TABLET ORAL DAILY
Qty: 90 TABLET | Refills: 0 | Status: SHIPPED | OUTPATIENT
Start: 2022-08-08 | End: 2022-11-29

## 2022-08-13 ENCOUNTER — HEALTH MAINTENANCE LETTER (OUTPATIENT)
Age: 57
End: 2022-08-13

## 2022-09-19 ENCOUNTER — TRANSFERRED RECORDS (OUTPATIENT)
Dept: HEALTH INFORMATION MANAGEMENT | Facility: CLINIC | Age: 57
End: 2022-09-19

## 2022-09-19 ENCOUNTER — IMMUNIZATION (OUTPATIENT)
Dept: NURSING | Facility: CLINIC | Age: 57
End: 2022-09-19
Payer: COMMERCIAL

## 2022-09-19 LAB — RETINOPATHY: NEGATIVE

## 2022-09-19 PROCEDURE — 90682 RIV4 VACC RECOMBINANT DNA IM: CPT

## 2022-09-19 PROCEDURE — 0134A COVID-19,PF,MODERNA BIVALENT: CPT

## 2022-09-19 PROCEDURE — 91313 COVID-19,PF,MODERNA BIVALENT: CPT

## 2022-09-19 PROCEDURE — 90471 IMMUNIZATION ADMIN: CPT

## 2022-09-26 ENCOUNTER — DOCUMENTATION ONLY (OUTPATIENT)
Dept: LAB | Facility: CLINIC | Age: 57
End: 2022-09-26

## 2022-09-26 NOTE — TELEPHONE ENCOUNTER
Last blood work labs were done in . I suggest he make an appt with me to establish care and I can order the necessary labs at that time. Appt can be in December.     Thanks    CECILY Lo CNP

## 2022-10-05 DIAGNOSIS — I10 ESSENTIAL HYPERTENSION: ICD-10-CM

## 2022-10-05 RX ORDER — LISINOPRIL 40 MG/1
40 TABLET ORAL DAILY
Qty: 90 TABLET | Refills: 0 | Status: SHIPPED | OUTPATIENT
Start: 2022-10-05 | End: 2022-11-29

## 2022-10-06 NOTE — TELEPHONE ENCOUNTER
"Last Written Prescription Date:  7/21/2022  Last Fill Quantity: 90,  # refills: 0   Last office visit provider:  12/20/2021     Requested Prescriptions   Pending Prescriptions Disp Refills     lisinopril (ZESTRIL) 40 MG tablet 90 tablet 0     Sig: Take 1 tablet (40 mg) by mouth daily       ACE Inhibitors (Including Combos) Protocol Passed - 10/5/2022  3:52 PM        Passed - Blood pressure under 140/90 in past 12 months     BP Readings from Last 3 Encounters:   12/20/21 124/62   06/21/21 130/68   01/25/21 132/66                 Passed - Recent (12 mo) or future (30 days) visit within the authorizing provider's specialty     Patient has had an office visit with the authorizing provider or a provider within the authorizing providers department within the previous 12 mos or has a future within next 30 days. See \"Patient Info\" tab in inbasket, or \"Choose Columns\" in Meds & Orders section of the refill encounter.              Passed - Medication is active on med list        Passed - Patient is age 18 or older        Passed - Normal serum creatinine on file in past 12 months     Recent Labs   Lab Test 12/24/21  0742   CR 1.04       Ok to refill medication if creatinine is low          Passed - Normal serum potassium on file in past 12 months     Recent Labs   Lab Test 12/24/21  0742   POTASSIUM 4.5                  Lachelle Salomon RN 10/05/22 10:57 PM  "

## 2022-11-03 DIAGNOSIS — I10 UNCONTROLLED HYPERTENSION: ICD-10-CM

## 2022-11-22 ASSESSMENT — ENCOUNTER SYMPTOMS
NERVOUS/ANXIOUS: 1
HEMATOCHEZIA: 0
ABDOMINAL PAIN: 0
WEAKNESS: 0
PALPITATIONS: 0
EYE PAIN: 0
SORE THROAT: 0
NAUSEA: 0
SHORTNESS OF BREATH: 0
DIARRHEA: 0
JOINT SWELLING: 0
HEMATURIA: 0
HEARTBURN: 0
DIZZINESS: 0
PARESTHESIAS: 0
FREQUENCY: 0
MYALGIAS: 0
CHILLS: 0
COUGH: 0
FEVER: 0
DYSURIA: 0
ARTHRALGIAS: 0
HEADACHES: 0
CONSTIPATION: 0

## 2022-11-29 ENCOUNTER — OFFICE VISIT (OUTPATIENT)
Dept: FAMILY MEDICINE | Facility: CLINIC | Age: 57
End: 2022-11-29
Payer: COMMERCIAL

## 2022-11-29 VITALS
WEIGHT: 270 LBS | DIASTOLIC BLOOD PRESSURE: 76 MMHG | HEIGHT: 70 IN | BODY MASS INDEX: 38.65 KG/M2 | RESPIRATION RATE: 12 BRPM | OXYGEN SATURATION: 95 % | SYSTOLIC BLOOD PRESSURE: 120 MMHG | TEMPERATURE: 98.8 F | HEART RATE: 79 BPM

## 2022-11-29 DIAGNOSIS — Z00.00 VISIT FOR PREVENTIVE HEALTH EXAMINATION: Primary | ICD-10-CM

## 2022-11-29 DIAGNOSIS — E78.00 PURE HYPERCHOLESTEROLEMIA: ICD-10-CM

## 2022-11-29 DIAGNOSIS — F52.8 OTHER SEXUAL DYSFUNCTION NOT DUE TO A SUBSTANCE OR KNOWN PHYSIOLOGICAL CONDITION: ICD-10-CM

## 2022-11-29 DIAGNOSIS — E66.813 CLASS 3 SEVERE OBESITY DUE TO EXCESS CALORIES WITH SERIOUS COMORBIDITY AND BODY MASS INDEX (BMI) OF 40.0 TO 44.9 IN ADULT (H): ICD-10-CM

## 2022-11-29 DIAGNOSIS — S06.9X9S TRAUMATIC BRAIN INJURY WITH LOSS OF CONSCIOUSNESS, SEQUELA (H): ICD-10-CM

## 2022-11-29 DIAGNOSIS — G47.30 SLEEP APNEA, UNSPECIFIED TYPE: ICD-10-CM

## 2022-11-29 DIAGNOSIS — L98.9 SKIN LESION: ICD-10-CM

## 2022-11-29 DIAGNOSIS — E11.29 TYPE 2 DIABETES MELLITUS WITH MICROALBUMINURIA, WITHOUT LONG-TERM CURRENT USE OF INSULIN (H): ICD-10-CM

## 2022-11-29 DIAGNOSIS — E66.01 MORBID OBESITY (H): ICD-10-CM

## 2022-11-29 DIAGNOSIS — R80.9 TYPE 2 DIABETES MELLITUS WITH MICROALBUMINURIA, WITHOUT LONG-TERM CURRENT USE OF INSULIN (H): ICD-10-CM

## 2022-11-29 DIAGNOSIS — E78.2 MIXED HYPERLIPIDEMIA: ICD-10-CM

## 2022-11-29 DIAGNOSIS — Z12.83 SCREENING FOR SKIN CANCER: ICD-10-CM

## 2022-11-29 DIAGNOSIS — I10 ESSENTIAL HYPERTENSION: ICD-10-CM

## 2022-11-29 DIAGNOSIS — Z86.79 HISTORY OF ATRIAL FIBRILLATION: ICD-10-CM

## 2022-11-29 DIAGNOSIS — E66.01 CLASS 3 SEVERE OBESITY DUE TO EXCESS CALORIES WITH SERIOUS COMORBIDITY AND BODY MASS INDEX (BMI) OF 40.0 TO 44.9 IN ADULT (H): ICD-10-CM

## 2022-11-29 LAB
ALBUMIN SERPL BCG-MCNC: 4.8 G/DL (ref 3.5–5.2)
ALP SERPL-CCNC: 84 U/L (ref 40–129)
ALT SERPL W P-5'-P-CCNC: 29 U/L (ref 10–50)
ANION GAP SERPL CALCULATED.3IONS-SCNC: 12 MMOL/L (ref 7–15)
AST SERPL W P-5'-P-CCNC: 32 U/L (ref 10–50)
BILIRUB SERPL-MCNC: 0.9 MG/DL
BUN SERPL-MCNC: 16.1 MG/DL (ref 8–23)
CALCIUM SERPL-MCNC: 9.9 MG/DL (ref 8.6–10)
CHLORIDE SERPL-SCNC: 108 MMOL/L (ref 98–107)
CHOLEST SERPL-MCNC: 104 MG/DL
CREAT SERPL-MCNC: 0.93 MG/DL (ref 0.67–1.17)
CREAT UR-MCNC: 83.2 MG/DL
DEPRECATED HCO3 PLAS-SCNC: 23 MMOL/L (ref 22–29)
GFR SERPL CREATININE-BSD FRML MDRD: >90 ML/MIN/1.73M2
GLUCOSE SERPL-MCNC: 108 MG/DL (ref 70–99)
HBA1C MFR BLD: 5 % (ref 0–5.6)
HDLC SERPL-MCNC: 36 MG/DL
LDLC SERPL CALC-MCNC: 41 MG/DL
MICROALBUMIN UR-MCNC: 39.1 MG/L
MICROALBUMIN/CREAT UR: 47 MG/G CR (ref 0–17)
NONHDLC SERPL-MCNC: 68 MG/DL
POTASSIUM SERPL-SCNC: 4.2 MMOL/L (ref 3.4–5.3)
PROT SERPL-MCNC: 7.5 G/DL (ref 6.4–8.3)
PSA SERPL-MCNC: 2.45 NG/ML (ref 0–3.5)
SODIUM SERPL-SCNC: 143 MMOL/L (ref 136–145)
TRIGL SERPL-MCNC: 135 MG/DL

## 2022-11-29 PROCEDURE — 80061 LIPID PANEL: CPT | Performed by: NURSE PRACTITIONER

## 2022-11-29 PROCEDURE — 99214 OFFICE O/P EST MOD 30 MIN: CPT | Mod: 25 | Performed by: NURSE PRACTITIONER

## 2022-11-29 PROCEDURE — 90750 HZV VACC RECOMBINANT IM: CPT | Performed by: NURSE PRACTITIONER

## 2022-11-29 PROCEDURE — G0103 PSA SCREENING: HCPCS | Performed by: NURSE PRACTITIONER

## 2022-11-29 PROCEDURE — 90471 IMMUNIZATION ADMIN: CPT | Performed by: NURSE PRACTITIONER

## 2022-11-29 PROCEDURE — 83036 HEMOGLOBIN GLYCOSYLATED A1C: CPT | Performed by: NURSE PRACTITIONER

## 2022-11-29 PROCEDURE — 82043 UR ALBUMIN QUANTITATIVE: CPT | Performed by: NURSE PRACTITIONER

## 2022-11-29 PROCEDURE — 90472 IMMUNIZATION ADMIN EACH ADD: CPT | Performed by: NURSE PRACTITIONER

## 2022-11-29 PROCEDURE — 80053 COMPREHEN METABOLIC PANEL: CPT | Performed by: NURSE PRACTITIONER

## 2022-11-29 PROCEDURE — 99396 PREV VISIT EST AGE 40-64: CPT | Mod: 25 | Performed by: NURSE PRACTITIONER

## 2022-11-29 PROCEDURE — 36415 COLL VENOUS BLD VENIPUNCTURE: CPT | Performed by: NURSE PRACTITIONER

## 2022-11-29 PROCEDURE — 90677 PCV20 VACCINE IM: CPT | Performed by: NURSE PRACTITIONER

## 2022-11-29 RX ORDER — TADALAFIL 10 MG/1
10 TABLET ORAL DAILY PRN
Qty: 30 TABLET | Refills: 3 | Status: SHIPPED | OUTPATIENT
Start: 2022-11-29 | End: 2023-12-22

## 2022-11-29 RX ORDER — LISINOPRIL 40 MG/1
40 TABLET ORAL DAILY
Qty: 90 TABLET | Refills: 3 | Status: ON HOLD | OUTPATIENT
Start: 2022-11-29 | End: 2023-08-04

## 2022-11-29 RX ORDER — LISINOPRIL 40 MG/1
40 TABLET ORAL DAILY
Qty: 90 TABLET | Refills: 0 | Status: SHIPPED | OUTPATIENT
Start: 2022-11-29 | End: 2022-11-29

## 2022-11-29 RX ORDER — METFORMIN HCL 500 MG
1000 TABLET, EXTENDED RELEASE 24 HR ORAL 2 TIMES DAILY WITH MEALS
Qty: 360 TABLET | Refills: 3 | Status: SHIPPED | OUTPATIENT
Start: 2022-11-29 | End: 2023-12-01

## 2022-11-29 RX ORDER — SPIRONOLACTONE 25 MG/1
25 TABLET ORAL DAILY
Qty: 90 TABLET | Refills: 3 | Status: ON HOLD | OUTPATIENT
Start: 2022-11-29 | End: 2023-08-04

## 2022-11-29 ASSESSMENT — ENCOUNTER SYMPTOMS
DIARRHEA: 0
EYE PAIN: 0
FEVER: 0
UNEXPECTED WEIGHT CHANGE: 0
WOUND: 0
PARESTHESIAS: 0
CHILLS: 0
WEAKNESS: 0
TROUBLE SWALLOWING: 0
NERVOUS/ANXIOUS: 1
HEMATOCHEZIA: 0
CONSTIPATION: 0
HEADACHES: 0
VOMITING: 0
ARTHRALGIAS: 0
JOINT SWELLING: 0
DYSURIA: 0
MYALGIAS: 0
HEARTBURN: 0
PALPITATIONS: 0
ABDOMINAL DISTENTION: 0
SHORTNESS OF BREATH: 0
CONFUSION: 0
SINUS PAIN: 0
VOICE CHANGE: 0
HEMATURIA: 0
SORE THROAT: 0
DIZZINESS: 0
FREQUENCY: 0
NAUSEA: 0
SLEEP DISTURBANCE: 0
ABDOMINAL PAIN: 0
COUGH: 0

## 2022-11-29 NOTE — PROGRESS NOTES
SUBJECTIVE:   CC: Govind is an 57 year old who presents for preventative health visit.    He started Ozempic this past September and has already lost 10 lbs. He is paying out of pocket for this med and Jardiance. Blood sugars 110-150 range with added on Ozempic. He is using his CPAP every night.     Patient has been advised of split billing requirements and indicates understanding: Yes  Healthy Habits:     Getting at least 3 servings of Calcium per day:  Yes    Bi-annual eye exam:  Yes    Dental care twice a year:  NO    Sleep apnea or symptoms of sleep apnea:  Sleep apnea    Diet:  Regular (no restrictions)    Frequency of exercise:  2-3 days/week    Duration of exercise:  15-30 minutes    Taking medications regularly:  Yes    Medication side effects:  Not applicable    PHQ-2 Total Score: 1    Additional concerns today:  Yes    Ability to successfully perform activities of daily living: Yes, no assistance needed  Home safety:  none identified   Hearing impairment: NO    Today's PHQ-2 Score:   PHQ-2 ( 1999 [employer]) 11/22/2022   Q1: Little interest or pleasure in doing things 0   Q2: Feeling down, depressed or hopeless 1   PHQ-2 Score 1   Q1: Little interest or pleasure in doing things Not at all   Q2: Feeling down, depressed or hopeless Several days   PHQ-2 Score 1       Have you ever done Advance Care Planning? (For example, a Health Directive, POLST, or a discussion with a medical provider or your loved ones about your wishes): Yes, patient states has an Advance Care Planning document and will bring a copy to the clinic.    Social History     Tobacco Use     Smoking status: Former     Types: Dip, chew, snus or snuff     Smokeless tobacco: Former     Quit date: 12/24/2004   Substance Use Topics     Alcohol use: Yes     Comment: Alcoholic Drinks/day: 1 every 2 weeks     If you drink alcohol do you typically have >3 drinks per day or >7 drinks per week? No    Alcohol Use 11/29/2022   Prescreen: >3 drinks/day or >7  drinks/week? -   Prescreen: >3 drinks/day or >7 drinks/week? No       Last PSA:   Prostate Specific Antigen Screen   Date Value Ref Range Status   01/18/2021 2.4 0.0 - 3.5 ng/mL Final       Reviewed orders with patient. Reviewed health maintenance and updated orders accordingly - Yes  Lab work is in process  Labs reviewed in EPIC    Reviewed and updated as needed this visit by clinical staff   Tobacco  Allergies  Meds              Reviewed and updated as needed this visit by Provider                 Past Medical History:   Diagnosis Date     Depression 12/24/2014     Essential hypertension      Infection due to 2019 novel coronavirus 01/11/2022     Multiple thyroid nodules 09/08/2016     Type 2 diabetes mellitus without complication (H) 11/07/2016      Past Surgical History:   Procedure Laterality Date     BIOPSY  9/18/16     CARPAL TUNNEL RELEASE RT/LT Right      COLONOSCOPY  6/15/18     HC REPAIR ROTATOR CUFF,ACUTE      Description: Rotator Cuff Repair Acute;  Recorded: 01/13/2010;  Comments: cortisone     PICC  11/30/2015          REMOVAL OF SPERM DUCT(S)      Description: Surgery Of Male Genitalia Vasectomy;  Recorded: 01/13/2010;     WISDOM TOOTH EXTRACTION         Review of Systems   Constitutional: Negative for chills, fever and unexpected weight change.   HENT: Negative for congestion, ear pain, hearing loss, mouth sores, sinus pain, sore throat, trouble swallowing and voice change.    Eyes: Negative for pain and visual disturbance.   Respiratory: Negative for cough and shortness of breath.    Cardiovascular: Negative for chest pain, palpitations and peripheral edema.   Gastrointestinal: Negative for abdominal distention, abdominal pain, constipation, diarrhea, heartburn, hematochezia, nausea and vomiting.   Genitourinary: Positive for impotence. Negative for dysuria, frequency, genital sores, hematuria, penile discharge and urgency.   Musculoskeletal: Negative for arthralgias, joint swelling and  "myalgias.   Skin: Negative for rash and wound.   Neurological: Negative for dizziness, syncope, weakness, headaches and paresthesias.   Psychiatric/Behavioral: Negative for confusion, mood changes, self-injury, sleep disturbance and suicidal ideas. The patient is nervous/anxious.        OBJECTIVE:   /76   Pulse 79   Temp 98.8  F (37.1  C) (Oral)   Resp 12   Ht 1.778 m (5' 10\")   Wt 122.5 kg (270 lb)   SpO2 95%   BMI 38.74 kg/m      Physical Exam  GENERAL: healthy, alert and no distress  EYES: Eyes grossly normal to inspection, PERRL and conjunctivae and sclerae normal  HENT: ear canals and TM's normal, nose and mouth without ulcers or lesions  NECK: no adenopathy, no asymmetry, masses, or scars and thyroid normal to palpation  RESP: lungs clear to auscultation - no rales, rhonchi or wheezes  CV: regular rate and rhythm, normal S1 S2, no S3 or S4, no murmur, click or rub, no peripheral edema and peripheral pulses strong  ABDOMEN: soft, nontender, no hepatosplenomegaly, no masses and bowel sounds normal  MS: no gross musculoskeletal defects noted, no edema  SKIN: no suspicious lesions or rashes  NEURO: Normal strength and tone, mentation intact and speech normal  PSYCH: mentation appears normal, affect normal/bright    Diagnostic Test Results:  Labs reviewed in Epic    ASSESSMENT/PLAN:       ICD-10-CM    1. Visit for preventive health examination  Z00.00       2. Type 2 diabetes mellitus with microalbuminuria, without long-term current use of insulin (H)  E11.29 Albumin Random Urine Quantitative with Creat Ratio    R80.9 HEMOGLOBIN A1C     Lipid panel reflex to direct LDL Non-fasting     Comprehensive metabolic panel (BMP + Alb, Alk Phos, ALT, AST, Total. Bili, TP)     Med Therapy Management Referral     PSA, screen     metFORMIN (GLUCOPHAGE XR) 500 MG 24 hr tablet     HEMOGLOBIN A1C     Lipid panel reflex to direct LDL Non-fasting     Comprehensive metabolic panel (BMP + Alb, Alk Phos, ALT, AST, Total. " BRETT Keith)     PSA, screen     Albumin Random Urine Quantitative with Creat Ratio      3. Essential hypertension  I10 Med Therapy Management Referral     lisinopril (ZESTRIL) 40 MG tablet     DISCONTINUED: lisinopril (ZESTRIL) 40 MG tablet      4. Class 3 severe obesity due to excess calories with serious comorbidity and body mass index (BMI) of 40.0 to 44.9 in adult (H)  E66.01     Z68.41       5. Traumatic brain injury with loss of consciousness, sequela (H)  S06.9X9S       6. Screening for skin cancer  Z12.83 Adult Dermatology Referral      7. Male Erectile Disorder  F52.8 tadalafil (CIALIS) 10 MG tablet      8. Sleep apnea, unspecified type  G47.30       9. Mixed hyperlipidemia  E78.2       10. History of atrial fibrillation  Z86.79       11. Obesity (BMI 35.0-39.9) with comorbidity (H)  E66.01       12. Skin lesion  L98.9 Adult Dermatology Referral      13. Pure hypercholesterolemia  E78.00 rosuvastatin (CRESTOR) 10 MG tablet        - VSS. May continue Lisinopril, nifedipine, and Spirolactone. BP's at home have been stable  - patient requesting a refill on Cialis. BP controlled. Refill granted.   - continue to use CPAP every night.   - your urine albumin test is elevated. This is indicating your diabetes is slightly affecting your kidneys. Please continue to see the MTM regarding increasing Ozempic and/or assessing med costs, etc.  - Your HDL on the lower side of normal, please try to increase exercise to 150 minutes per week if you are able, but I refilled your Crestor.   - started Ozempic this past September and has already lost 10 lbs. He is paying out of pocket for this med and Jardiance. I will have him follow up with MTM regarding his DM management and med cost. I question if we can increase the Ozempic dose and possibly cut down on the Jardiance. This will aide in lowering his monthly med costs. We discussed his blood sugars at home and they have been great with the added Ozempic. Please continue once a  day checking.   - TBI stable, Afib stable.     Patient has been advised of split billing requirements and indicates understanding: Yes      COUNSELING:   Reviewed preventive health counseling, as reflected in patient instructions        He reports that he has quit smoking. His smoking use included dip, chew, snus or snuff. He quit smokeless tobacco use about 17 years ago.            CECILY Lo CNP  [facility]

## 2022-12-01 ENCOUNTER — MYC MEDICAL ADVICE (OUTPATIENT)
Dept: FAMILY MEDICINE | Facility: CLINIC | Age: 57
End: 2022-12-01

## 2022-12-01 DIAGNOSIS — D22.9 CHANGE IN MOLE: Primary | ICD-10-CM

## 2022-12-01 NOTE — TELEPHONE ENCOUNTER
See MyChart from patient needing provider direction.    Please respond directly to patient if appropriate.    Maine Jo RN  [facility]

## 2022-12-02 RX ORDER — ROSUVASTATIN CALCIUM 10 MG/1
TABLET, COATED ORAL
Qty: 90 TABLET | Refills: 3 | Status: SHIPPED | OUTPATIENT
Start: 2022-12-02 | End: 2023-12-01

## 2022-12-14 ENCOUNTER — TELEPHONE (OUTPATIENT)
Dept: DERMATOLOGY | Facility: CLINIC | Age: 57
End: 2022-12-14

## 2022-12-14 NOTE — TELEPHONE ENCOUNTER
Candance M Brown is needing the prescription for Ibuprofen 600mg tablets to be sent for 90 days.    Requested Prescriptions     Pending Prescriptions Disp Refills     ibuprofen (ADVIL,MOTRIN) 600 MG tablet       Sig: Take 1 tablet (600 mg total) by mouth every 6 (six) hours as needed for pain.     Please Review    Thank You,  Buffy Novak, CMA     [facility]    Phone Message    May a detailed message be left on voicemail: yes     Reason for Call: Appointment Intake    Referring Provider Name: Lachelle Summers APRN CNP   Diagnosis and/or Symptoms: Change in mole [D22.9]    Priority 1-2 weeks. Scheduled 5/30    Action Taken: Message routed to:  Other: [facility]    Travel Screening: Not Applicable

## 2022-12-14 NOTE — TELEPHONE ENCOUNTER
This encounter is being sent to inform the clinic that this patient has a referral from Lachelle Summers APRN CNP  for the diagnoses of Change in mole [D22.9] and has requested that this patient be seen within 1-2 weeks.  Based on the availability of our provider(s), we are unable to accommodate this request.      Were all sites offered this patient?  Yes      Does scheduling algorithm request to schedule next available?  Patient has been scheduled for the first available opening with Lili Parra Scheduled for 5/30.  We have informed the patient that the clinic will review their referral and reach out if a sooner appointment is medically necessary.

## 2022-12-15 RX ORDER — SPIRONOLACTONE 25 MG/1
25 TABLET ORAL DAILY
Qty: 90 TABLET | Refills: 0 | Status: SHIPPED | OUTPATIENT
Start: 2022-12-15 | End: 2022-12-18

## 2022-12-15 NOTE — TELEPHONE ENCOUNTER
S/w pt and scheduled with Dr. Carballo on Thursday 12/22 at 10 am at  for a spot check on the mole on the inside of leg.  Nurse advised this is the only area that will be checked and will be a quick appt.  Pt states understanding.    Lynn CONNOLLY RN  [facility]  [phone]

## 2022-12-18 NOTE — PROGRESS NOTES
Medication Therapy Management (MTM) Encounter    ASSESSMENT:                            1. Type 2 diabetes mellitus   A1c very well controlled at 5%, meeting goal less than 7% per ADA guidelines.  Due to high cost with high deductible plan, reasonable to simplify medication therapy and discontinue SGLT2 inhibitor Jardiance.  Patient prefers to continue with Ozempic for the additional benefit of weight loss.  Since he is tolerating well and desires additional weight loss, plus with stopping Jardiance, will increase dose of Ozempic to 1 mg weekly.  Continue to monitor blood sugars regularly.    2. Essential hypertension  Blood pressure is well controlled and meeting goal of <130/80 mm Hg per ACC/AHA hypertension guidelines.    3. Mixed hyperlipidemia  Appropriately on a moderate intensity statin given age and diabetes diagnosis per ACC/AHA guidelines.       PLAN:                            Stop Jardiance.     Increase Ozempic to 1 mg subcutaneous weekly.     Follow-up: Return in about 4 months (around 4/20/2023).    SUBJECTIVE/OBJECTIVE:                          Govind Alexandre is a 57 year old male coming in for an initial visit. He was referred to me from Lachelle Summers CNP.    Reason for visit: diabetes medication.    Allergies/ADRs: Reviewed in chart  Past Medical History: Reviewed in chart  Tobacco: He reports that he has quit smoking. His smoking use included dip, chew, snus or snuff. He quit smokeless tobacco use about 18 years ago.  Alcohol: Reviewed in chart    Medication Adherence/Access: Has high deductible insurance plan. Would like to eventually take less medication. Patient takes medications 2 time(s) per day. The patient fills medications at [facility]: YES.    1. Type 2 diabetes mellitus   Currently taking metformin ER 1000 mg twice daily, Jardiance 25 mg every morning, and Ozempic 0.5 mg subcutaneous weekly. Patient is not experiencing side effects. Started Ozempic in September and has lost about 12  pounds. Has to pay hundreds of dollars monthly for Jardiance and Ozempic until he reaches $7,000 deductible. Wonders if he needs both medications. History of thyroid nodules and father had thyroid cancer. Worked up by endocrinology and determined ok to be on GLP-1 agonist.  Blood sugar monitorin time(s) daily. Ranges (patient reported): Fasting- 110-130, Post-Prandial- 150-200  Symptoms of low blood sugar? none  Symptoms of high blood sugar? none  Eye exam: up to date  Foot exam: up to date  Diet/Exercise: built a new home with a pool inside for exercise, but has been using it lately more for relaxation than exercise. Trying to eat healthy to lose weight. 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   Aspirin: Taking 81mg daily for primary prevention and denies side effects   Statin: Yes: rosuvastatin   ACEi/ARB: Yes: lisinopril   Urine Albumin:   Lab Results   Component Value Date    UMALCR 47.00 (H) 2022      Hemoglobin A1C   Date Value Ref Range Status   2022 5.0 0.0 - 5.6 % Final     Comment:     Normal <5.7%   Prediabetes 5.7-6.4%    Diabetes 6.5% or higher     Note: Adopted from ADA consensus guidelines.   2021 6.9 (H) 0.0 - 5.6 % Final     Comment:     Normal <5.7%   Prediabetes 5.7-6.4%    Diabetes 6.5% or higher     Note: Adopted from ADA consensus guidelines.   2021 6.9 (H) 0.0 - 5.6 % Final     Comment:     Normal <5.7%   Prediabetes 5.7-6.4%    Diabetes 6.5% or higher     Note: Adopted from ADA consensus guidelines.      Microalbumin Urine mg/dL   Date Value Ref Range Status   2021 3.36 (H) 0.00 - 1.99 mg/dL Final      Creatinine Urine mg/dL   Date Value Ref Range Status   2022 83.2 mg/dL Final     Comment:     The reference ranges have not been established in urine creatinine. The results should be integrated into the clinical context for interpretation.     LDL Cholesterol Calculated    Date Value Ref Range Status   11/29/2022 41 <=100 mg/dL Final     Creatinine   Date Value Ref Range Status   11/29/2022 0.93 0.67 - 1.17 mg/dL Final       2. Essential hypertension  Current medications include lisinopril 40 mg daily, nifedipine ER 60 mg daily, spironolactone 25 mg daily.  Patient does self-monitor blood pressure. Home BP monitoring in range of 120's systolic over 70s's diastolic.  Patient reports no current medication side effects.    BP Readings from Last 3 Encounters:   11/29/22 120/76   12/20/21 124/62   06/21/21 130/68     3. Mixed hyperlipidemia  Current therapy includes rosuvastatin 10 mg daily.  Patient reports no significant myalgias or other side effects.    The ASCVD Risk score (Pia MATOS, et al., 2019) failed to calculate for the following reasons:    The valid total cholesterol range is 130 to 320 mg/dL  Recent Labs   Lab Test 11/29/22  0800 12/24/21  0742   CHOL 104 123   HDL 36* 35*   LDL 41 56   TRIG 135 161*       Vitals:   BP Readings from Last 3 Encounters:   11/29/22 120/76   12/20/21 124/62   06/21/21 130/68      Pulse Readings from Last 3 Encounters:   11/29/22 79   12/20/21 72   06/21/21 74     Wt Readings from Last 3 Encounters:   11/29/22 270 lb (122.5 kg)   12/20/21 289 lb 11.2 oz (131.4 kg)   06/21/21 278 lb 3.2 oz (126.2 kg)     ----------------    I spent 45 minutes with this patient today. All changes were made via collaborative practice agreement with CECILY Lo CNP. A copy of the visit note was provided to the patient's provider(s).    A summary of these recommendations was sent via [employer].    Flora Chavis, PharmD, BCACP  Medication Management (MTM) Pharmacist  [facility]        Medication Therapy Recommendations  Type 2 diabetes mellitus without complication (H)    Current Medication: empagliflozin (JARDIANCE) 25 MG TABS tablet (Discontinued)   Rationale: Cannot afford medication product - Cost - Adherence    Recommendation: Discontinue Medication   Status: Accepted per CPA          Current Medication: semaglutide (OZEMPIC, 0.25 OR 0.5 MG/DOSE,) 2 MG/1.5ML SOPN pen (Discontinued)   Rationale: Dose too low - Dosage too low - Effectiveness   Recommendation: Increase Dose   Status: Accepted per CPA

## 2022-12-20 ENCOUNTER — OFFICE VISIT (OUTPATIENT)
Dept: PHARMACY | Facility: CLINIC | Age: 57
End: 2022-12-20
Payer: COMMERCIAL

## 2022-12-20 DIAGNOSIS — E11.9 TYPE 2 DIABETES MELLITUS WITHOUT COMPLICATION, WITHOUT LONG-TERM CURRENT USE OF INSULIN (H): Primary | ICD-10-CM

## 2022-12-20 DIAGNOSIS — I10 ESSENTIAL HYPERTENSION: ICD-10-CM

## 2022-12-20 DIAGNOSIS — E78.2 MIXED HYPERLIPIDEMIA: ICD-10-CM

## 2022-12-20 PROCEDURE — 99605 MTMS BY PHARM NP 15 MIN: CPT | Performed by: PHARMACIST

## 2022-12-20 PROCEDURE — 99607 MTMS BY PHARM ADDL 15 MIN: CPT | Performed by: PHARMACIST

## 2022-12-20 RX ORDER — SEMAGLUTIDE 1.34 MG/ML
1 INJECTION, SOLUTION SUBCUTANEOUS WEEKLY
Qty: 9 ML | Refills: 3 | Status: ON HOLD | OUTPATIENT
Start: 2022-12-20 | End: 2023-08-04

## 2022-12-20 NOTE — PATIENT INSTRUCTIONS
"Recommendations from today's Medication Management (MTM) visit:                                                      When finished with supply, you can stop Jardiance.     Increase Ozempic to 1 mg injected weekly.     Blood Sugar Goals  Before meals:   2 hours after meal: less than 180     See me again in April/May for lab recheck.       To schedule another MTM appointment, please call the MTM scheduling line at [phone] or toll-free at [phone].     My MTM pharmacist's contact information:      Please feel free to contact me with any questions or concerns you have via [employer] or calling the clinic.       Flora Chavis, PharmD, [facility]  Medication Management (MTM) Pharmacist  [facility]     It was great speaking with you today.  I value your experience and would be very thankful for your time in providing feedback in our clinic survey. In the next few days, you may receive an email or text message from [employer] with a link to a survey related to your  clinical pharmacist.\"     "

## 2022-12-22 ENCOUNTER — OFFICE VISIT (OUTPATIENT)
Dept: DERMATOLOGY | Facility: CLINIC | Age: 57
End: 2022-12-22
Payer: COMMERCIAL

## 2022-12-22 DIAGNOSIS — L98.9 SKIN LESION: ICD-10-CM

## 2022-12-22 DIAGNOSIS — D23.9 DERMAL NEVUS: ICD-10-CM

## 2022-12-22 DIAGNOSIS — D22.9 CHANGE IN MOLE: ICD-10-CM

## 2022-12-22 DIAGNOSIS — L82.1 SEBORRHEIC KERATOSIS: ICD-10-CM

## 2022-12-22 DIAGNOSIS — L81.4 LENTIGO: Primary | ICD-10-CM

## 2022-12-22 DIAGNOSIS — D18.01 ANGIOMA OF SKIN: ICD-10-CM

## 2022-12-22 PROCEDURE — 99203 OFFICE O/P NEW LOW 30 MIN: CPT | Performed by: DERMATOLOGY

## 2022-12-22 NOTE — LETTER
12/22/2022         RE: Govind Alexandre  [id number] [facility] [id number]        Dear Colleague,    Thank you for referring your patient, Govind Alexandre, to the [facility] [place]. Please see a copy of my visit note below.    Govind Alexandre , a 57 year old year old male patient, I was asked to see by NATO Summers for spot in groin and L hand.  Patient states this has been present for a while.  Patient reports the following symptoms:  nne .  Patient reports the following previous treatments none.  Patient reports the following modifying factors none.  Associated symptoms: none.  Patient has no other skin complaints today.  Remainder of the HPI, Meds, PMH, Allergies, FH, and SH was reviewed in chart.      Past Medical History:   Diagnosis Date     Depression 12/24/2014     Essential hypertension      Infection due to 2019 novel coronavirus 01/11/2022     Multiple thyroid nodules 09/08/2016     Type 2 diabetes mellitus without complication (H) 11/07/2016       Past Surgical History:   Procedure Laterality Date     BIOPSY  9/18/16     CARPAL TUNNEL RELEASE RT/LT Right      COLONOSCOPY  6/15/18     HC REPAIR ROTATOR CUFF,ACUTE      Description: Rotator Cuff Repair Acute;  Recorded: 01/13/2010;  Comments: cortisone     PICC  11/30/2015          REMOVAL OF SPERM DUCT(S)      Description: Surgery Of Male Genitalia Vasectomy;  Recorded: 01/13/2010;     WISDOM TOOTH EXTRACTION          Family History   Problem Relation Age of Onset     Heart Disease Mother      Kidney Disease Mother      Thyroid Cancer Father      Kidney Disease Father      Heart Disease Father      Hypertension Father      Thyroid Disease Father         cancer     No Known Problems Sister      Hypertension Brother      Depression Daughter      Anxiety Disorder Daughter      Autism Spectrum Disorder Son      Heart Disease Paternal Grandmother      Hypertension Sister        Social History     Socioeconomic History     Marital  status:      Spouse name: Not on file     Number of children: Not on file     Years of education: Not on file     Highest education level: Not on file   Occupational History     Not on file   Tobacco Use     Smoking status: Former     Types: Dip, chew, snus or snuff     Smokeless tobacco: Former     Quit date: 12/24/2004   Substance and Sexual Activity     Alcohol use: Yes     Comment: Alcoholic Drinks/day: 1 every 2 weeks     Drug use: No     Sexual activity: Yes     Partners: Female     Birth control/protection: None   Other Topics Concern     Not on file   Social History Narrative     Not on file     Social Determinants of Health     Financial Resource Strain: Not on file   Food Insecurity: Not on file   Transportation Needs: Not on file   Physical Activity: Not on file   Stress: Not on file   Social Connections: Not on file   Intimate Partner Violence: Not on file   Housing Stability: Not on file       Outpatient Encounter Medications as of 12/22/2022   Medication Sig Dispense Refill     ACCU-CHEK FASTCLIX LANCET DRUM [ACCU-CHEK FASTCLIX LANCET DRUM] USE TO TEST BLOOD SUGARS 2 TO 3 TIMES A  each 4     aspirin 81 mg chewable tablet [ASPIRIN 81 MG CHEWABLE TABLET] Chew 81 mg daily.       fexofenadine (ALLEGRA) 180 MG tablet [FEXOFENADINE (ALLEGRA) 180 MG TABLET] Take 180 mg by mouth daily.       lisinopril (ZESTRIL) 40 MG tablet Take 1 tablet (40 mg) by mouth daily 90 tablet 3     metFORMIN (GLUCOPHAGE XR) 500 MG 24 hr tablet Take 2 tablets (1,000 mg) by mouth 2 times daily (with meals) 360 tablet 3     NIFEdipine ER OSMOTIC (PROCARDIA XL) 60 MG 24 hr tablet TAKE 1 TABLET(60 MG) BY MOUTH DAILY 90 tablet 1     rosuvastatin (CRESTOR) 10 MG tablet TAKE 1 TABLET(10 MG) BY MOUTH AT BEDTIME Strength: 10 mg 90 tablet 3     Semaglutide, 1 MG/DOSE, (OZEMPIC, 1 MG/DOSE,) 4 MG/3ML SOPN Inject 1 mg Subcutaneous once a week 9 mL 3     spironolactone (ALDACTONE) 25 MG tablet Take 1 tablet (25 mg) by mouth daily 90  tablet 3     tadalafil (CIALIS) 10 MG tablet Take 1 tablet (10 mg) by mouth daily as needed (erectile dsyfunction) 10 mg as a single dose 30 minutes prior to anticipated sexual activity; do not take more than once daily.  Adjust dose based on effectiveness and tolerability; may decrease to 5 mg or increase to 20 mg per dose. 30 tablet 3     blood glucose test (ACCU-CHEK NATHANIEL PLUS TEST STRP) strips [BLOOD GLUCOSE TEST (ACCU-CHEK NATHANIEL PLUS TEST STRP) STRIPS] Use 1 each As Directed 3 (three) times a day. (Patient not taking: Reported on 11/29/2022) 300 each 3     No facility-administered encounter medications on file as of 12/22/2022.             Review Of Systems  Skin: As above  Eyes: negative  Ears/Nose/Throat: negative  Respiratory: No shortness of breath, dyspnea on exertion, cough, or hemoptysis  Cardiovascular: negative  Gastrointestinal: negative  Genitourinary: negative  Musculoskeletal: negative  Neurologic: negative  Psychiatric: negative  Hematologic/Lymphatic/Immunologic: negative  Endocrine: negative      O:   NAD, WDWN, Alert & Oriented, Mood & Affect wnl, Vitals stable   Here today alone   General appearance sanford ii   Vitals stable   Alert, oriented and in no acute distress   R groin flesh colored papule  L hand stuck on papule  Stuck on papules and brown macules on trunk and ext   Red papules on trunk  Flesh colored papules on trunk       Eyes: Conjunctivae/lids:Normal     ENT: Lips, buccal mucosa, tongue: normal    MSK:Normal    Cardiovascular: peripheral edema none    Pulm: Breathing Normal    Neuro/Psych: Orientation:Normal; Mood/Affect:Normal      A/P:  1. Seborrheic keratosis, lentigo, angioma, dermal nevus  It was a pleasure speaking to Govind Alexandre today.  Removal discussed with patient he declines  Previous clinic  notes and pertinent laboratory tests were reviewed prior to Govind Alexandre's visit.  Nature of benign skin lesions dicussed with patient.  Return to clinic prn         Again,  thank you for allowing me to participate in the care of your patient.        Sincerely,        Bryson Carballo MD

## 2022-12-22 NOTE — PROGRESS NOTES
Govind Alexandre , a 57 year old year old male patient, I was asked to see by NATO Summers for spot in groin and L hand.  Patient states this has been present for a while.  Patient reports the following symptoms:  nne .  Patient reports the following previous treatments none.  Patient reports the following modifying factors none.  Associated symptoms: none.  Patient has no other skin complaints today.  Remainder of the HPI, Meds, PMH, Allergies, FH, and SH was reviewed in chart.      Past Medical History:   Diagnosis Date     Depression 12/24/2014     Essential hypertension      Infection due to 2019 novel coronavirus 01/11/2022     Multiple thyroid nodules 09/08/2016     Type 2 diabetes mellitus without complication (H) 11/07/2016       Past Surgical History:   Procedure Laterality Date     BIOPSY  9/18/16     CARPAL TUNNEL RELEASE RT/LT Right      COLONOSCOPY  6/15/18     HC REPAIR ROTATOR CUFF,ACUTE      Description: Rotator Cuff Repair Acute;  Recorded: 01/13/2010;  Comments: cortisone     PICC  11/30/2015          REMOVAL OF SPERM DUCT(S)      Description: Surgery Of Male Genitalia Vasectomy;  Recorded: 01/13/2010;     WISDOM TOOTH EXTRACTION          Family History   Problem Relation Age of Onset     Heart Disease Mother      Kidney Disease Mother      Thyroid Cancer Father      Kidney Disease Father      Heart Disease Father      Hypertension Father      Thyroid Disease Father         cancer     No Known Problems Sister      Hypertension Brother      Depression Daughter      Anxiety Disorder Daughter      Autism Spectrum Disorder Son      Heart Disease Paternal Grandmother      Hypertension Sister        Social History     Socioeconomic History     Marital status:      Spouse name: Not on file     Number of children: Not on file     Years of education: Not on file     Highest education level: Not on file   Occupational History     Not on file   Tobacco Use     Smoking status: Former     Types: Dip, chew, snus  or snuff     Smokeless tobacco: Former     Quit date: 12/24/2004   Substance and Sexual Activity     Alcohol use: Yes     Comment: Alcoholic Drinks/day: 1 every 2 weeks     Drug use: No     Sexual activity: Yes     Partners: Female     Birth control/protection: None   Other Topics Concern     Not on file   Social History Narrative     Not on file     Social Determinants of Health     Financial Resource Strain: Not on file   Food Insecurity: Not on file   Transportation Needs: Not on file   Physical Activity: Not on file   Stress: Not on file   Social Connections: Not on file   Intimate Partner Violence: Not on file   Housing Stability: Not on file       Outpatient Encounter Medications as of 12/22/2022   Medication Sig Dispense Refill     ACCU-CHEK FASTCLIX LANCET DRUM [ACCU-CHEK FASTCLIX LANCET DRUM] USE TO TEST BLOOD SUGARS 2 TO 3 TIMES A  each 4     aspirin 81 mg chewable tablet [ASPIRIN 81 MG CHEWABLE TABLET] Chew 81 mg daily.       fexofenadine (ALLEGRA) 180 MG tablet [FEXOFENADINE (ALLEGRA) 180 MG TABLET] Take 180 mg by mouth daily.       lisinopril (ZESTRIL) 40 MG tablet Take 1 tablet (40 mg) by mouth daily 90 tablet 3     metFORMIN (GLUCOPHAGE XR) 500 MG 24 hr tablet Take 2 tablets (1,000 mg) by mouth 2 times daily (with meals) 360 tablet 3     NIFEdipine ER OSMOTIC (PROCARDIA XL) 60 MG 24 hr tablet TAKE 1 TABLET(60 MG) BY MOUTH DAILY 90 tablet 1     rosuvastatin (CRESTOR) 10 MG tablet TAKE 1 TABLET(10 MG) BY MOUTH AT BEDTIME Strength: 10 mg 90 tablet 3     Semaglutide, 1 MG/DOSE, (OZEMPIC, 1 MG/DOSE,) 4 MG/3ML SOPN Inject 1 mg Subcutaneous once a week 9 mL 3     spironolactone (ALDACTONE) 25 MG tablet Take 1 tablet (25 mg) by mouth daily 90 tablet 3     tadalafil (CIALIS) 10 MG tablet Take 1 tablet (10 mg) by mouth daily as needed (erectile dsyfunction) 10 mg as a single dose 30 minutes prior to anticipated sexual activity; do not take more than once daily.  Adjust dose based on effectiveness and  tolerability; may decrease to 5 mg or increase to 20 mg per dose. 30 tablet 3     blood glucose test (ACCU-CHEK NATHANIEL PLUS TEST STRP) strips [BLOOD GLUCOSE TEST (ACCU-CHEK NATHANIEL PLUS TEST STRP) STRIPS] Use 1 each As Directed 3 (three) times a day. (Patient not taking: Reported on 11/29/2022) 300 each 3     No facility-administered encounter medications on file as of 12/22/2022.             Review Of Systems  Skin: As above  Eyes: negative  Ears/Nose/Throat: negative  Respiratory: No shortness of breath, dyspnea on exertion, cough, or hemoptysis  Cardiovascular: negative  Gastrointestinal: negative  Genitourinary: negative  Musculoskeletal: negative  Neurologic: negative  Psychiatric: negative  Hematologic/Lymphatic/Immunologic: negative  Endocrine: negative      O:   NAD, WDWN, Alert & Oriented, Mood & Affect wnl, Vitals stable   Here today alone   General appearance sanford ii   Vitals stable   Alert, oriented and in no acute distress   R groin flesh colored papule  L hand stuck on papule  Stuck on papules and brown macules on trunk and ext   Red papules on trunk  Flesh colored papules on trunk       Eyes: Conjunctivae/lids:Normal     ENT: Lips, buccal mucosa, tongue: normal    MSK:Normal    Cardiovascular: peripheral edema none    Pulm: Breathing Normal    Neuro/Psych: Orientation:Normal; Mood/Affect:Normal      A/P:  1. Seborrheic keratosis, lentigo, angioma, dermal nevus  It was a pleasure speaking to Govind Alexandre today.  Removal discussed with patient he declines  Previous clinic  notes and pertinent laboratory tests were reviewed prior to Govind Alexandre's visit.  Nature of benign skin lesions dicussed with patient.  Return to clinic prn

## 2022-12-31 DIAGNOSIS — I10 ESSENTIAL HYPERTENSION: ICD-10-CM

## 2023-01-01 RX ORDER — NIFEDIPINE 60 MG/1
60 TABLET, EXTENDED RELEASE ORAL DAILY
Qty: 90 TABLET | Refills: 3 | Status: ON HOLD | OUTPATIENT
Start: 2023-01-01 | End: 2023-08-04

## 2023-01-01 NOTE — TELEPHONE ENCOUNTER
"Last Written Prescription Date:  6/27/22  Last Fill Quantity: 90,  # refills: 1   Last office visit provider:  11/29/22     Requested Prescriptions   Pending Prescriptions Disp Refills     NIFEdipine ER OSMOTIC (PROCARDIA XL) 60 MG 24 hr tablet [Pharmacy Med Name: NIFEDIPINE ER OSMOTIC RELEA 60 TB24] 30 tablet 0     Sig: TAKE ONE TABLET BY MOUTH ONCE DAILY       Calcium Channel Blockers Protocol  Passed - 1/1/2023  8:40 AM        Passed - Blood pressure under 140/90 in past 12 months     BP Readings from Last 3 Encounters:   11/29/22 120/76   12/20/21 124/62   06/21/21 130/68                 Passed - Recent (12 mo) or future (30 days) visit within the authorizing provider's specialty     Patient has had an office visit with the authorizing provider or a provider within the authorizing providers department within the previous 12 mos or has a future within next 30 days. See \"Patient Info\" tab in inbasket, or \"Choose Columns\" in Meds & Orders section of the refill encounter.              Passed - Medication is active on med list        Passed - Patient is age 18 or older        Passed - Normal serum creatinine on file in past 12 months     Recent Labs   Lab Test 11/29/22  0800   CR 0.93       Ok to refill medication if creatinine is low               Rik Mccurdy RN 01/01/23 8:40 AM  "

## 2023-01-20 ENCOUNTER — TELEPHONE (OUTPATIENT)
Dept: FAMILY MEDICINE | Facility: CLINIC | Age: 58
End: 2023-01-20
Payer: COMMERCIAL

## 2023-01-20 DIAGNOSIS — Z23 ENCOUNTER FOR IMMUNIZATION: Primary | ICD-10-CM

## 2023-01-20 NOTE — TELEPHONE ENCOUNTER
Patient is on the [facility] schedule 1/24/23 for a shingles vaccine. Order pended.  Latrice Quiñones CMA ............... 12:16 PM, 01/20/23

## 2023-01-24 ENCOUNTER — ALLIED HEALTH/NURSE VISIT (OUTPATIENT)
Dept: FAMILY MEDICINE | Facility: CLINIC | Age: 58
End: 2023-01-24
Payer: COMMERCIAL

## 2023-01-24 DIAGNOSIS — Z23 ENCOUNTER FOR IMMUNIZATION: ICD-10-CM

## 2023-01-24 PROCEDURE — 90471 IMMUNIZATION ADMIN: CPT

## 2023-01-24 PROCEDURE — 90750 HZV VACC RECOMBINANT IM: CPT

## 2023-01-24 PROCEDURE — 99207 PR NO CHARGE NURSE ONLY: CPT

## 2023-06-25 ENCOUNTER — HEALTH MAINTENANCE LETTER (OUTPATIENT)
Age: 58
End: 2023-06-25

## 2023-07-08 ENCOUNTER — APPOINTMENT (OUTPATIENT)
Dept: CT IMAGING | Facility: CLINIC | Age: 58
End: 2023-07-08
Attending: EMERGENCY MEDICINE
Payer: COMMERCIAL

## 2023-07-08 ENCOUNTER — HOSPITAL ENCOUNTER (EMERGENCY)
Facility: CLINIC | Age: 58
Discharge: HOME | End: 2023-07-08
Attending: EMERGENCY MEDICINE | Admitting: EMERGENCY MEDICINE
Payer: COMMERCIAL

## 2023-07-08 ENCOUNTER — NURSE TRIAGE (OUTPATIENT)
Dept: NURSING | Facility: CLINIC | Age: 58
End: 2023-07-08
Payer: COMMERCIAL

## 2023-07-08 VITALS
BODY MASS INDEX: 38.74 KG/M2 | TEMPERATURE: 97 F | SYSTOLIC BLOOD PRESSURE: 123 MMHG | RESPIRATION RATE: 20 BRPM | HEART RATE: 92 BPM | WEIGHT: 270 LBS | OXYGEN SATURATION: 95 % | DIASTOLIC BLOOD PRESSURE: 80 MMHG

## 2023-07-08 DIAGNOSIS — N20.0 KIDNEY STONE: ICD-10-CM

## 2023-07-08 DIAGNOSIS — N39.0 URINARY TRACT INFECTION WITHOUT HEMATURIA, SITE UNSPECIFIED: ICD-10-CM

## 2023-07-08 LAB
ALBUMIN SERPL-MCNC: 4.2 G/DL (ref 3.5–5)
ALBUMIN UR-MCNC: 50 MG/DL
ALP SERPL-CCNC: 93 U/L (ref 45–120)
ALT SERPL W P-5'-P-CCNC: 24 U/L (ref 0–45)
ANION GAP SERPL CALCULATED.3IONS-SCNC: 11 MMOL/L (ref 5–18)
APPEARANCE UR: CLEAR
AST SERPL W P-5'-P-CCNC: 22 U/L (ref 0–40)
BACTERIA #/AREA URNS HPF: ABNORMAL /HPF
BASOPHILS # BLD AUTO: 0 10E3/UL (ref 0–0.2)
BASOPHILS NFR BLD AUTO: 0 %
BILIRUB DIRECT SERPL-MCNC: 0.4 MG/DL
BILIRUB SERPL-MCNC: 1.3 MG/DL (ref 0–1)
BILIRUB UR QL STRIP: NEGATIVE
BUN SERPL-MCNC: 14 MG/DL (ref 8–22)
CALCIUM SERPL-MCNC: 10 MG/DL (ref 8.5–10.5)
CAOX CRY #/AREA URNS HPF: ABNORMAL /HPF
CHLORIDE BLD-SCNC: 111 MMOL/L (ref 98–107)
CO2 SERPL-SCNC: 23 MMOL/L (ref 22–31)
COLOR UR AUTO: YELLOW
CREAT SERPL-MCNC: 1.02 MG/DL (ref 0.7–1.3)
EOSINOPHIL # BLD AUTO: 0.5 10E3/UL (ref 0–0.7)
EOSINOPHIL NFR BLD AUTO: 4 %
ERYTHROCYTE [DISTWIDTH] IN BLOOD BY AUTOMATED COUNT: 15 % (ref 10–15)
GFR SERPL CREATININE-BSD FRML MDRD: 85 ML/MIN/1.73M2
GLUCOSE BLD-MCNC: 115 MG/DL (ref 70–125)
GLUCOSE UR STRIP-MCNC: NEGATIVE MG/DL
HCT VFR BLD AUTO: 38.7 % (ref 40–53)
HGB BLD-MCNC: 13.5 G/DL (ref 13.3–17.7)
HGB UR QL STRIP: ABNORMAL
IMM GRANULOCYTES # BLD: 0.1 10E3/UL
IMM GRANULOCYTES NFR BLD: 1 %
KETONES UR STRIP-MCNC: NEGATIVE MG/DL
LEUKOCYTE ESTERASE UR QL STRIP: ABNORMAL
LYMPHOCYTES # BLD AUTO: 1.2 10E3/UL (ref 0.8–5.3)
LYMPHOCYTES NFR BLD AUTO: 10 %
MCH RBC QN AUTO: 31.5 PG (ref 26.5–33)
MCHC RBC AUTO-ENTMCNC: 34.9 G/DL (ref 31.5–36.5)
MCV RBC AUTO: 90 FL (ref 78–100)
MONOCYTES # BLD AUTO: 0.7 10E3/UL (ref 0–1.3)
MONOCYTES NFR BLD AUTO: 5 %
MUCOUS THREADS #/AREA URNS LPF: PRESENT /LPF
NEUTROPHILS # BLD AUTO: 10 10E3/UL (ref 1.6–8.3)
NEUTROPHILS NFR BLD AUTO: 80 %
NITRATE UR QL: NEGATIVE
NRBC # BLD AUTO: 0 10E3/UL
NRBC BLD AUTO-RTO: 0 /100
PH UR STRIP: 5.5 [PH] (ref 5–7)
PLATELET # BLD AUTO: 166 10E3/UL (ref 150–450)
POTASSIUM BLD-SCNC: 4 MMOL/L (ref 3.5–5)
PROT SERPL-MCNC: 7.4 G/DL (ref 6–8)
RBC # BLD AUTO: 4.28 10E6/UL (ref 4.4–5.9)
RBC URINE: 59 /HPF
SODIUM SERPL-SCNC: 145 MMOL/L (ref 136–145)
SP GR UR STRIP: 1.02 (ref 1–1.03)
UROBILINOGEN UR STRIP-MCNC: <2 MG/DL
WBC # BLD AUTO: 12.4 10E3/UL (ref 4–11)
WBC URINE: 10 /HPF

## 2023-07-08 PROCEDURE — 74177 CT ABD & PELVIS W/CONTRAST: CPT

## 2023-07-08 PROCEDURE — 250N000011 HC RX IP 250 OP 636: Mod: JZ | Performed by: EMERGENCY MEDICINE

## 2023-07-08 PROCEDURE — 85025 COMPLETE CBC W/AUTO DIFF WBC: CPT | Performed by: EMERGENCY MEDICINE

## 2023-07-08 PROCEDURE — 96361 HYDRATE IV INFUSION ADD-ON: CPT

## 2023-07-08 PROCEDURE — 36415 COLL VENOUS BLD VENIPUNCTURE: CPT | Performed by: EMERGENCY MEDICINE

## 2023-07-08 PROCEDURE — 99285 EMERGENCY DEPT VISIT HI MDM: CPT | Mod: 25

## 2023-07-08 PROCEDURE — 51798 US URINE CAPACITY MEASURE: CPT

## 2023-07-08 PROCEDURE — 81001 URINALYSIS AUTO W/SCOPE: CPT | Performed by: EMERGENCY MEDICINE

## 2023-07-08 PROCEDURE — 96365 THER/PROPH/DIAG IV INF INIT: CPT | Mod: 59

## 2023-07-08 PROCEDURE — 96375 TX/PRO/DX INJ NEW DRUG ADDON: CPT

## 2023-07-08 PROCEDURE — 258N000003 HC RX IP 258 OP 636: Performed by: EMERGENCY MEDICINE

## 2023-07-08 PROCEDURE — 82248 BILIRUBIN DIRECT: CPT | Performed by: EMERGENCY MEDICINE

## 2023-07-08 RX ORDER — ONDANSETRON 2 MG/ML
4 INJECTION INTRAMUSCULAR; INTRAVENOUS ONCE
Status: COMPLETED | OUTPATIENT
Start: 2023-07-08 | End: 2023-07-08

## 2023-07-08 RX ORDER — IOPAMIDOL 755 MG/ML
100 INJECTION, SOLUTION INTRAVASCULAR ONCE
Status: COMPLETED | OUTPATIENT
Start: 2023-07-08 | End: 2023-07-08

## 2023-07-08 RX ORDER — CEFDINIR 300 MG/1
300 CAPSULE ORAL 2 TIMES DAILY
Qty: 14 CAPSULE | Refills: 0 | Status: SHIPPED | OUTPATIENT
Start: 2023-07-08 | End: 2023-07-15

## 2023-07-08 RX ORDER — CEFTRIAXONE 1 G/1
1 INJECTION, POWDER, FOR SOLUTION INTRAMUSCULAR; INTRAVENOUS ONCE
Status: COMPLETED | OUTPATIENT
Start: 2023-07-08 | End: 2023-07-08

## 2023-07-08 RX ORDER — OXYCODONE HYDROCHLORIDE 5 MG/1
5 TABLET ORAL EVERY 6 HOURS PRN
Qty: 9 TABLET | Refills: 0 | Status: SHIPPED | OUTPATIENT
Start: 2023-07-08 | End: 2023-07-11

## 2023-07-08 RX ORDER — ONDANSETRON 4 MG/1
4 TABLET, ORALLY DISINTEGRATING ORAL EVERY 8 HOURS PRN
Qty: 21 TABLET | Refills: 0 | Status: ON HOLD | OUTPATIENT
Start: 2023-07-08 | End: 2023-08-04

## 2023-07-08 RX ADMIN — CEFTRIAXONE 1 G: 1 INJECTION, POWDER, FOR SOLUTION INTRAMUSCULAR; INTRAVENOUS at 19:49

## 2023-07-08 RX ADMIN — IOPAMIDOL 100 ML: 755 INJECTION, SOLUTION INTRAVENOUS at 18:23

## 2023-07-08 RX ADMIN — SODIUM CHLORIDE 500 ML: 9 INJECTION, SOLUTION INTRAVENOUS at 19:07

## 2023-07-08 RX ADMIN — ONDANSETRON 4 MG: 2 INJECTION INTRAMUSCULAR; INTRAVENOUS at 18:29

## 2023-07-08 ASSESSMENT — ACTIVITIES OF DAILY LIVING (ADL): ADLS_ACUITY_SCORE: 35

## 2023-07-08 NOTE — ED PROVIDER NOTES
Emergency Department Encounter     Evaluation Date & Time:   7/8/2023  5:30 PM    CHIEF COMPLAINT:  Abdominal Pain, Back Pain, Urinary Retention, Vomiting, and Testicular Problem      Triage Note:    bladder scan done in triage and 88    Pt states last wed and again today having RLQ, right lower back and radiates into right testicle area, went away wed and came back today.   Pt states  Has not been harris to urinate since this morning. Pt also having diarrhea, and vomiting. Pt states felt pressure to  Urinate earlier but could not go. denies hematuria.                  ED COURSE & MEDICAL DECISION MAKING:     ED Course as of 07/08/23 2158   Sat Jul 08, 2023   1813 WBC 12.4. Rest of labs overall unremarkable.     1848 Awaiting UA. CT showing right renal pelvis stone, but no ureteral stone.     1923 UA with some bacteria and RBC. Will treat empirically, dose of IV rocephin here, home with cefdinir, KSI follow up.  Rx for oxycodone, zofran as well for symptomatic cares.    Pt remains well here, no current pain, benign abdomen, afebrile.  Pt will follow up with urology.     Pt here with RLQ pain radiating into low back with associated n/v, episodes Wednesday, resolved returned today around Noon.  Pt with benign abdomen, no cp/sob and intact pulses. Will get labs, CT abd/pelvis, treat symptomatically and reassess.      5:35 PM I met with the patient, obtained history, performed an initial exam, and discussed options and plan for diagnostics and treatment here in the ED.  7:28 PM I rechecked and updated patient. We discussed plans for discharge including supportive cares, symptomatic treatment, outpatient follow up, and reasons to return to the emergency department.      Medical Decision Making    History:    Supplemental history from: Documented in chart, if applicable    External Record(s) reviewed: Documented in chart, if applicable.    Work Up:    Chart documentation includes differential considered and any EKGs or  imaging independently interpreted by provider, where specified.    In additional to work up documented, I considered the following work up: Documented in chart, if applicable.    External consultation:    Discussion of management with another provider: Documented in chart, if applicable    Complicating factors:    Care impacted by chronic illness: Diabetes, Hyperlipidemia, Hypertension and Other: a-fib    Care affected by social determinants of health: Access to Medical Care    Disposition considerations: Discharge. I prescribed additional prescription strength medication(s) as charted. I considered admission, but ultimately discharged patient after evaluation, workup, outpatient plan..      At the conclusion of the encounter I discussed the results of all the tests and the disposition. The questions were answered. The patient or family acknowledged understanding and was agreeable with the care plan.      MEDICATIONS GIVEN IN THE EMERGENCY DEPARTMENT:  Medications   ondansetron (ZOFRAN) injection 4 mg (4 mg Intravenous $Given 7/8/23 1829)   iopamidol (ISOVUE-370) solution 100 mL (100 mLs Intravenous $Given 7/8/23 1823)   0.9% sodium chloride BOLUS (0 mLs Intravenous Stopped 7/8/23 2013)   cefTRIAXone (ROCEPHIN) 1 g vial to attach to  mL bag for ADULTS or NS 50 mL bag for PEDS (0 g Intravenous Stopped 7/8/23 2013)       NEW PRESCRIPTIONS STARTED AT TODAY'S ED VISIT:  Discharge Medication List as of 7/8/2023  8:13 PM      START taking these medications    Details   cefdinir (OMNICEF) 300 MG capsule Take 1 capsule (300 mg) by mouth 2 times daily for 7 days, Disp-14 capsule, R-0, Local Print      ondansetron (ZOFRAN ODT) 4 MG ODT tab Take 1 tablet (4 mg) by mouth every 8 hours as needed for nausea, Disp-21 tablet, R-0, Local Print      oxyCODONE (ROXICODONE) 5 MG tablet Take 1 tablet (5 mg) by mouth every 6 hours as needed for pain, Disp-9 tablet, R-0, Local Print             HPI   HPI     Govind Alexandre is a 57  "year old male with a pertinent history of DM2, a-fib, erectile dysfunction, TBI, chronic fatigue, HTN, and HLD who presents to this ED via walk-in for evaluation of abdominal pain.    Patient reports an episode of right-sided abdominal/kidney pain and testicular pain on wednesday (6/29) and took a dose of Advil that resolved his pain. He had another episode  Today and states it felt more like abdominal \"pressure\" around 12:00 pm with associated nausea and vomiting.  He reports a history of kidney stones and denies this feeling like that. State he felt better after vomiting but he continues to feel pressure. He describes the pressure as \"indigestion\". He has diarrhea as well. Patient also notes urinary retention and urgency. Patient has not taken anything today for his symptoms. Denies hematuria, chest pain, shortness of breath, fevers, and any other symptoms.    REVIEW OF SYSTEMS:  See HPI      Medical History     Past Medical History:   Diagnosis Date     Depression 12/24/2014     Essential hypertension      Infection due to 2019 novel coronavirus 01/11/2022     Multiple thyroid nodules 09/08/2016     Type 2 diabetes mellitus without complication (H) 11/07/2016       Past Surgical History:   Procedure Laterality Date     BIOPSY  9/18/16     CARPAL TUNNEL RELEASE RT/LT Right      COLONOSCOPY  6/15/18     HC REPAIR ROTATOR CUFF,ACUTE      Description: Rotator Cuff Repair Acute;  Recorded: 01/13/2010;  Comments: cortisone     PIC  11/30/2015          REMOVAL OF SPERM DUCT(S)      Description: Surgery Of Male Genitalia Vasectomy;  Recorded: 01/13/2010;     WISDOM TOOTH EXTRACTION         Family History   Problem Relation Age of Onset     Heart Disease Mother      Kidney Disease Mother      Thyroid Cancer Father      Kidney Disease Father      Heart Disease Father      Hypertension Father      Thyroid Disease Father         cancer     No Known Problems Sister      Hypertension Brother      Depression Daughter      " Anxiety Disorder Daughter      Autism Spectrum Disorder Son      Heart Disease Paternal Grandmother      Hypertension Sister        Social History     Tobacco Use     Smoking status: Former     Types: Dip, chew, snus or snuff     Smokeless tobacco: Former     Quit date: 12/24/2004   Substance Use Topics     Alcohol use: Yes     Comment: Alcoholic Drinks/day: 1 every 2 weeks     Drug use: No       cefdinir (OMNICEF) 300 MG capsule  ondansetron (ZOFRAN ODT) 4 MG ODT tab  oxyCODONE (ROXICODONE) 5 MG tablet  ACCU-CHEK FASTCLIX LANCET DRUM  aspirin 81 mg chewable tablet  blood glucose test (ACCU-CHEK NATHANIEL PLUS TEST STRP) strips  fexofenadine (ALLEGRA) 180 MG tablet  lisinopril (ZESTRIL) 40 MG tablet  metFORMIN (GLUCOPHAGE XR) 500 MG 24 hr tablet  NIFEdipine ER OSMOTIC (PROCARDIA XL) 60 MG 24 hr tablet  rosuvastatin (CRESTOR) 10 MG tablet  Semaglutide, 1 MG/DOSE, (OZEMPIC, 1 MG/DOSE,) 4 MG/3ML SOPN  spironolactone (ALDACTONE) 25 MG tablet  tadalafil (CIALIS) 10 MG tablet        Physical Exam     Vitals:  /80   Pulse 92   Temp 97  F (36.1  C) (Temporal)   Resp 20   Wt 122.5 kg (270 lb)   SpO2 95%   BMI 38.74 kg/m      PHYSICAL EXAM:   Physical Exam  Vitals and nursing note reviewed.   Constitutional:       General: He is not in acute distress.     Appearance: Normal appearance.   HENT:      Head: Normocephalic and atraumatic.      Nose: Nose normal.      Mouth/Throat:      Mouth: Mucous membranes are moist.   Eyes:      Pupils: Pupils are equal, round, and reactive to light.   Cardiovascular:      Rate and Rhythm: Normal rate and regular rhythm.      Pulses: Normal pulses.           Radial pulses are 2+ on the right side and 2+ on the left side.        Dorsalis pedis pulses are 2+ on the right side and 2+ on the left side.   Pulmonary:      Effort: Pulmonary effort is normal. No respiratory distress.      Breath sounds: Normal breath sounds.   Abdominal:      Palpations: Abdomen is soft.      Tenderness: There  is no abdominal tenderness.   Musculoskeletal:      Cervical back: Full passive range of motion without pain and neck supple.      Comments: No calf tenderness or swelling b/l   Skin:     General: Skin is warm.      Findings: No rash.   Neurological:      General: No focal deficit present.      Mental Status: He is alert. Mental status is at baseline.      Comments: Fluent speech, no acute lateralizing deficits   Psychiatric:         Mood and Affect: Mood normal.         Behavior: Behavior normal.         Results     LAB:  All pertinent labs reviewed and interpreted  Labs Ordered and Resulted from Time of ED Arrival to Time of ED Departure   BASIC METABOLIC PANEL - Abnormal       Result Value    Sodium 145      Potassium 4.0      Chloride 111 (*)     Carbon Dioxide (CO2) 23      Anion Gap 11      Urea Nitrogen 14      Creatinine 1.02      Calcium 10.0      Glucose 115      GFR Estimate 85     ROUTINE UA WITH MICROSCOPIC REFLEX TO CULTURE - Abnormal    Color Urine Yellow      Appearance Urine Clear      Glucose Urine Negative      Bilirubin Urine Negative      Ketones Urine Negative      Specific Gravity Urine 1.025      Blood Urine 0.5 mg/dL (*)     pH Urine 5.5      Protein Albumin Urine 50 (*)     Urobilinogen Urine <2.0      Nitrite Urine Negative      Leukocyte Esterase Urine 75 Sherrie/uL (*)     Bacteria Urine Few (*)     Mucus Urine Present (*)     Calcium Oxalate Crystals Urine Few (*)     RBC Urine 59 (*)     WBC Urine 10 (*)    HEPATIC FUNCTION PANEL - Abnormal    Bilirubin Total 1.3 (*)     Bilirubin Direct 0.4      Protein Total 7.4      Albumin 4.2      Alkaline Phosphatase 93      AST 22      ALT 24     CBC WITH PLATELETS AND DIFFERENTIAL - Abnormal    WBC Count 12.4 (*)     RBC Count 4.28 (*)     Hemoglobin 13.5      Hematocrit 38.7 (*)     MCV 90      MCH 31.5      MCHC 34.9      RDW 15.0      Platelet Count 166      % Neutrophils 80      % Lymphocytes 10      % Monocytes 5      % Eosinophils 4      %  Basophils 0      % Immature Granulocytes 1      NRBCs per 100 WBC 0      Absolute Neutrophils 10.0 (*)     Absolute Lymphocytes 1.2      Absolute Monocytes 0.7      Absolute Eosinophils 0.5      Absolute Basophils 0.0      Absolute Immature Granulocytes 0.1      Absolute NRBCs 0.0         RADIOLOGY:  CT Abdomen Pelvis w Contrast   Final Result   IMPRESSION:    1.  Right renal pelvic stone with mild hydronephrosis and significant inflammatory soft tissue stranding surrounding renal pelvis and proximal ureter.   2.  Each kidney contains an additional 2 stones.   3.  Fatty infiltration of liver. Mild splenomegaly.   4.  Fluid throughout the GI tract without obstruction or inflammatory wall thickening, superimposed gastroenteritis not excluded..                   ECG:  none    PROCEDURES:  Procedures:  none      FINAL IMPRESSION:    ICD-10-CM    1. Urinary tract infection without hematuria, site unspecified  N39.0 Adult Urology  Referral      2. Kidney stone  N20.0 Adult Urology  Referral          0 minutes of critical care time      I, Rufina Justin Rice, am serving as a scribe to document services personally performed by Dr. Brijesh See, based on my observations and the provider's statements to me. I, Brijesh See, DO attest that Rufina Rice is acting in a scribe capacity, has observed my performance of the services and has documented them in accordance with my direction.      Brijesh See DO  Emergency Medicine  [facility] EMERGENCY ROOM  7/8/2023  5:39 PM          Brijesh See MD  07/08/23 0556

## 2023-07-08 NOTE — ED TRIAGE NOTES
Pt states last wed and again today having RLQ, right lower back and radiates into right testicle area, went away wed and came back today.   Pt states  Has not been harris to urinate since this morning. Pt also having diarrhea, and vomiting. Pt states felt pressure to  Urinate earlier but could not go. denies hematuria.

## 2023-07-08 NOTE — TELEPHONE ENCOUNTER
The patient is complaining of vomiting, diarrhea, testicular pain, and lower back pain    Symptoms started on Wednesday 07/05/23 the subsided, and now has returned    Denies any side pain, the pain is a pressure type of pain, the abdomen is more like bloating    Rates pain 1-2/10    No pain medication    The patient reports that he has not urinated in two days    Triage guidelines recommend to go to ED now    Caller verbalized and understands directives      Reason for Disposition    [1] Unable to urinate (or only a few drops) > 4 hours AND [2] bladder feels very full (e.g., palpable bladder or strong urge to urinate)    Additional Information    Negative: Passed out (i.e., lost consciousness, collapsed and was not responding)    Negative: Shock suspected (e.g., cold/pale/clammy skin, too weak to stand, low BP, rapid pulse)    Negative: Sounds like a life-threatening emergency to the triager    Negative: Major injury to the back (e.g., MVA, fall > 10 feet or 3 meters, penetrating injury, etc.)    Negative: Followed a tailbone injury    Negative: [1] Pain in the upper back over the ribs (rib cage) AND [2] radiates (travels, goes) into chest    Negative: [1] Pain in the upper back over the ribs (rib cage) AND [2] worsened by coughing (or clearly increases with breathing)    Negative: Back pain during pregnancy    Negative: Pain mainly in flank (i.e., in the side, over the lower ribs or just below the ribs)    Negative: [1] SEVERE back pain (e.g., excruciating) AND [2] sudden onset AND [3] age > 60 years    Protocols used: BACK PAIN-A-

## 2023-07-09 NOTE — DISCHARGE INSTRUCTIONS
Take antibiotic as directed until gone.  Take Tylenol, ibuprofen for pain.  Take oxycodone for breakthrough pain. Take zofran for nausea. Follow up with urology - referral placed. Return for fevers, uncontrolled vomiting/pain.

## 2023-07-14 ENCOUNTER — VIRTUAL VISIT (OUTPATIENT)
Dept: UROLOGY | Facility: CLINIC | Age: 58
End: 2023-07-14
Payer: COMMERCIAL

## 2023-07-14 VITALS — BODY MASS INDEX: 36.57 KG/M2 | WEIGHT: 270 LBS | HEIGHT: 72 IN

## 2023-07-14 DIAGNOSIS — N39.0 URINARY TRACT INFECTION WITHOUT HEMATURIA, SITE UNSPECIFIED: ICD-10-CM

## 2023-07-14 DIAGNOSIS — N20.0 KIDNEY STONE: ICD-10-CM

## 2023-07-14 PROCEDURE — 99204 OFFICE O/P NEW MOD 45 MIN: CPT | Mod: VID | Performed by: STUDENT IN AN ORGANIZED HEALTH CARE EDUCATION/TRAINING PROGRAM

## 2023-07-14 RX ORDER — CEFAZOLIN SODIUM IN 0.9 % NACL 3 G/100 ML
3 INTRAVENOUS SOLUTION, PIGGYBACK (ML) INTRAVENOUS SEE ADMIN INSTRUCTIONS
Status: CANCELLED | OUTPATIENT
Start: 2023-07-14

## 2023-07-14 RX ORDER — CEFAZOLIN SODIUM IN 0.9 % NACL 3 G/100 ML
3 INTRAVENOUS SOLUTION, PIGGYBACK (ML) INTRAVENOUS
Status: CANCELLED | OUTPATIENT
Start: 2023-07-14

## 2023-07-14 ASSESSMENT — PAIN SCALES - GENERAL: PAINLEVEL: NO PAIN (0)

## 2023-07-14 NOTE — PROGRESS NOTES
** Patient will meet you in My Chart    Govind is a 57 year old who is being evaluated via a billable video visit.      How would you like to obtain your AVS? MyChart  If the video visit is dropped, the invitation should be resent by: Text to cell phone: [phone]  Will anyone else be joining your video visit? No        [facility]  Main Office: [address]  [address]  [place] [address]       CHIEF COMPLAINT:  Right renal colic    HISTORY:   58 yo M with past history of stones now with right renal colic, found to have right renal pelvis stone and additional bilateral nonobstructive nephrolithiasis    Passed a stone about 10 years ago, has not had any surgery. + FH stones in father and sisters.    Presented to ER 7/8/2023 with right abdominal pain, aching, soreness, also vomiting and diarrhea. UA with 59 rbc/hpf, 10 wbc/hpf. Was given empiric cefdinir. Has had diarrhea since then. Today he is finally feeling better.    No pain currently. No n/v. No f/c.    Patient was set on leaving tomorrow on an [employer]    PAST MEDICAL HISTORY:   Past Medical History:   Diagnosis Date     Depression 12/24/2014     Essential hypertension      Infection due to 2019 novel coronavirus 01/11/2022     Multiple thyroid nodules 09/08/2016     Type 2 diabetes mellitus without complication (H) 11/07/2016       PAST SURGICAL HISTORY:   Past Surgical History:   Procedure Laterality Date     BIOPSY  9/18/16     CARPAL TUNNEL RELEASE RT/LT Right      COLONOSCOPY  6/15/18     HC REPAIR ROTATOR CUFF,ACUTE      Description: Rotator Cuff Repair Acute;  Recorded: 01/13/2010;  Comments: cortisone     PICC  11/30/2015          REMOVAL OF SPERM DUCT(S)      Description: Surgery Of Male Genitalia Vasectomy;  Recorded: 01/13/2010;     WISDOM TOOTH EXTRACTION         FAMILY HISTORY:   Family History   Problem Relation Age of Onset     Heart Disease Mother      Kidney Disease Mother      Thyroid Cancer Father      Kidney Disease  Father      Heart Disease Father      Hypertension Father      Thyroid Disease Father         cancer     No Known Problems Sister      Hypertension Brother      Depression Daughter      Anxiety Disorder Daughter      Autism Spectrum Disorder Son      Heart Disease Paternal Grandmother      Hypertension Sister        SOCIAL HISTORY:   Social History     Tobacco Use     Smoking status: Former     Types: Dip, chew, snus or snuff     Smokeless tobacco: Former     Quit date: 12/24/2004   Substance Use Topics     Alcohol use: Yes     Comment: Alcoholic Drinks/day: 1 every 2 weeks        No Known Allergies      Current Outpatient Medications:      ACCU-CHEK FASTCLIX LANCET DRUM, [ACCU-CHEK FASTCLIX LANCET DRUM] USE TO TEST BLOOD SUGARS 2 TO 3 TIMES A DAY, Disp: 300 each, Rfl: 4     aspirin 81 mg chewable tablet, [ASPIRIN 81 MG CHEWABLE TABLET] Chew 81 mg daily., Disp: , Rfl:      blood glucose test (ACCU-CHEK NATHANIEL PLUS TEST STRP) strips, [BLOOD GLUCOSE TEST (ACCU-CHEK NATHANIEL PLUS TEST STRP) STRIPS] Use 1 each As Directed 3 (three) times a day., Disp: 300 each, Rfl: 3     cefdinir (OMNICEF) 300 MG capsule, Take 1 capsule (300 mg) by mouth 2 times daily for 7 days, Disp: 14 capsule, Rfl: 0     fexofenadine (ALLEGRA) 180 MG tablet, [FEXOFENADINE (ALLEGRA) 180 MG TABLET] Take 180 mg by mouth daily., Disp: , Rfl:      lisinopril (ZESTRIL) 40 MG tablet, Take 1 tablet (40 mg) by mouth daily, Disp: 90 tablet, Rfl: 3     metFORMIN (GLUCOPHAGE XR) 500 MG 24 hr tablet, Take 2 tablets (1,000 mg) by mouth 2 times daily (with meals), Disp: 360 tablet, Rfl: 3     NIFEdipine ER OSMOTIC (PROCARDIA XL) 60 MG 24 hr tablet, Take 1 tablet (60 mg) by mouth daily, Disp: 90 tablet, Rfl: 3     ondansetron (ZOFRAN ODT) 4 MG ODT tab, Take 1 tablet (4 mg) by mouth every 8 hours as needed for nausea, Disp: 21 tablet, Rfl: 0     rosuvastatin (CRESTOR) 10 MG tablet, TAKE 1 TABLET(10 MG) BY MOUTH AT BEDTIME Strength: 10 mg, Disp: 90 tablet, Rfl: 3      Semaglutide, 1 MG/DOSE, (OZEMPIC, 1 MG/DOSE,) 4 MG/3ML SOPN, Inject 1 mg Subcutaneous once a week, Disp: 9 mL, Rfl: 3     spironolactone (ALDACTONE) 25 MG tablet, Take 1 tablet (25 mg) by mouth daily, Disp: 90 tablet, Rfl: 3     tadalafil (CIALIS) 10 MG tablet, Take 1 tablet (10 mg) by mouth daily as needed (erectile dsyfunction) 10 mg as a single dose 30 minutes prior to anticipated sexual activity; do not take more than once daily.  Adjust dose based on effectiveness and tolerability; may decrease to 5 mg or increase to 20 mg per dose., Disp: 30 tablet, Rfl: 3    Review Of Systems:  Skin: No rash, pruritis, or skin pigmentation  Eyes: No changes in vision  Ears/Nose/Throat: No changes in hearing, no nosebleeds  Respiratory: No shortness of breath, dyspnea on exertion, cough, or hemoptysis  Cardiovascular: No chest pain or palpitations  Gastrointestinal: No diarrhea or constipation. No abdominal pain. No hematochezia  Genitourinary: see HPI  Musculoskeletal: No pain or swelling of joints, normal range of motion  Neurologic: No weakness or tremors  Psychiatric: No recent changes in memory or mood  Hematologic/Lymphatic/Immunologic: No easy bruising or enlarged lymph nodes  Endocrine: No weight gain or loss      PHYSICAL EXAM:    Ht 1.829 m (6')   Wt 122.5 kg (270 lb)   BMI 36.62 kg/m    General appearance: In NAD, conversant  HEENT: Normocephalic and atraumatic, anicteric sclera  Cardiovascular: Not examined  Respiratory: normal, non-labored breathing  Musculoskeletal: Not Examined  Peripheral Vascular/extremity: No peripheral edema  Skin: Normal temperature, turgor, and texture. No rash  Psychiatric: Appropriate affect, alert and oriented to person, place, and time    Cystoscopy: Not done        UA RESULTS:  Recent Labs   Lab Test 07/08/23  1905 05/20/19  1107   COLOR Yellow Yellow   APPEARANCE Clear Clear   URINEGLC Negative 100 mg/dL*   URINEBILI Negative Negative   URINEKETONE Negative Trace*   SG 1.025 1.025    UBLD 0.5 mg/dL* Moderate*   URINEPH 5.5 6.0   PROTEIN 50* 100 mg/dL*   UROBILINOGEN  --  2.0 E.U./dL*   NITRITE Negative Negative   LEUKEST 75 Sherrie/uL* Negative   RBCU 59* *   WBCU 10* None Seen       Bladder Scan:     Other Labs:      Imaging Studies:     CT a/p 7/8/2023        Reviewed and interpreted personally. 11 mm right renal pelvis stone, about 600 HU, 140 mm stsd, with additional smaller stones on the right and a 5 mm left nonobstructive stone    CLINICAL IMPRESSION:   56 yo M with past history of stones now with right renal colic, found to have right renal pelvis stone and additional bilateral nonobstructive nephrolithiasis    11 mm right renal pelvis stone will not pass on its own on. Discussed options for management of the right side including ureteroscopy with laser lithotripsy and stone basketing vs. ESWL vs. PCNL. Best option is ureteroscopy given the stone burden, multiple stones, high STSD. Also has nonobstructive left renal stone. First discussed observation of the left renal stone but after discussion patient also wants preemptive treatment of the left side    Risks of uretreoscopy including need for secondary procedure, infection, ureteral injury, incomplete stone clearance, stent pain and irritation were discussed    Patient had initially planned on going to cruise to [place] prior to treatment but I strongly recommended not proceeding with a trip where he would be unable to get prompt medical care in the event that the stone became more symptomatic. Patient needs urgent medical attention if uncontrolled pain, uncontrolled nausea/vomiting, or especially if fever or chills develop    PLAN:   Bilateral ureteroscopy with laser lithotripsy/stone basketing/bilateral retrograde pyelogram/bilateral ureteral stent placement    Recommend against going on cruise to [place]    Mark Orta MD   [facility]  [phone] clinic phone      Video-Visit Details    Type of service:  Video Visit      Originating Location (pt. Location): Home  Distant Location (provider location):  On-site  Platform used for Video Visit: Yesica     Over 45 minutes spent on this encounter including time spent talking to the patient initially on video visit and on follow up phone call, reviewing records, documentation

## 2023-07-14 NOTE — LETTER
7/14/2023       RE: Govind Alexandre  [address] [facility] [address]     Dear Colleague,    Thank you for referring your patient, Govind Alexandre, to the [facility] [place] [facility]. Please see a copy of my visit note below.    ** Patient will meet you in My Chart    Govind is a 57 year old who is being evaluated via a billable video visit.      How would you like to obtain your AVS? MyChart  If the video visit is dropped, the invitation should be resent by: Text to cell phone: [phone]  Will anyone else be joining your video visit? No        [facility]  Main Office: [address]  [address]  [place] [address]       CHIEF COMPLAINT:  Right renal colic    HISTORY:   58 yo M with past history of stones now with right renal colic, found to have right renal pelvis stone and additional bilateral nonobstructive nephrolithiasis    Passed a stone about 10 years ago, has not had any surgery. + FH stones in father and sisters.    Presented to ER 7/8/2023 with right abdominal pain, aching, soreness, also vomiting and diarrhea. UA with 59 rbc/hpf, 10 wbc/hpf. Was given empiric cefdinir. Has had diarrhea since then. Today he is finally feeling better.    No pain currently. No n/v. No f/c.    Patient was set on leaving tomorrow on an [employer] cruise    PAST MEDICAL HISTORY:   Past Medical History:   Diagnosis Date    Depression 12/24/2014    Essential hypertension     Infection due to 2019 novel coronavirus 01/11/2022    Multiple thyroid nodules 09/08/2016    Type 2 diabetes mellitus without complication (H) 11/07/2016       PAST SURGICAL HISTORY:   Past Surgical History:   Procedure Laterality Date    BIOPSY  9/18/16    CARPAL TUNNEL RELEASE RT/LT Right     COLONOSCOPY  6/15/18    HC REPAIR ROTATOR CUFF,ACUTE      Description: Rotator Cuff Repair Acute;  Recorded: 01/13/2010;  Comments: cortisone    PICC  11/30/2015         REMOVAL  OF SPERM DUCT(S)      Description: Surgery Of Male Genitalia Vasectomy;  Recorded: 01/13/2010;    WISDOM TOOTH EXTRACTION         FAMILY HISTORY:   Family History   Problem Relation Age of Onset    Heart Disease Mother     Kidney Disease Mother     Thyroid Cancer Father     Kidney Disease Father     Heart Disease Father     Hypertension Father     Thyroid Disease Father         cancer    No Known Problems Sister     Hypertension Brother     Depression Daughter     Anxiety Disorder Daughter     Autism Spectrum Disorder Son     Heart Disease Paternal Grandmother     Hypertension Sister        SOCIAL HISTORY:   Social History     Tobacco Use    Smoking status: Former     Types: Dip, chew, snus or snuff    Smokeless tobacco: Former     Quit date: 12/24/2004   Substance Use Topics    Alcohol use: Yes     Comment: Alcoholic Drinks/day: 1 every 2 weeks        No Known Allergies      Current Outpatient Medications:     ACCU-CHEK FASTCLIX LANCET DRUM, [ACCU-CHEK FASTCLIX LANCET DRUM] USE TO TEST BLOOD SUGARS 2 TO 3 TIMES A DAY, Disp: 300 each, Rfl: 4    aspirin 81 mg chewable tablet, [ASPIRIN 81 MG CHEWABLE TABLET] Chew 81 mg daily., Disp: , Rfl:     blood glucose test (ACCU-CHEK NATHANIEL PLUS TEST STRP) strips, [BLOOD GLUCOSE TEST (ACCU-CHEK NATHANIEL PLUS TEST STRP) STRIPS] Use 1 each As Directed 3 (three) times a day., Disp: 300 each, Rfl: 3    cefdinir (OMNICEF) 300 MG capsule, Take 1 capsule (300 mg) by mouth 2 times daily for 7 days, Disp: 14 capsule, Rfl: 0    fexofenadine (ALLEGRA) 180 MG tablet, [FEXOFENADINE (ALLEGRA) 180 MG TABLET] Take 180 mg by mouth daily., Disp: , Rfl:     lisinopril (ZESTRIL) 40 MG tablet, Take 1 tablet (40 mg) by mouth daily, Disp: 90 tablet, Rfl: 3    metFORMIN (GLUCOPHAGE XR) 500 MG 24 hr tablet, Take 2 tablets (1,000 mg) by mouth 2 times daily (with meals), Disp: 360 tablet, Rfl: 3    NIFEdipine ER OSMOTIC (PROCARDIA XL) 60 MG 24 hr tablet, Take 1 tablet (60 mg) by mouth daily, Disp: 90 tablet,  Rfl: 3    ondansetron (ZOFRAN ODT) 4 MG ODT tab, Take 1 tablet (4 mg) by mouth every 8 hours as needed for nausea, Disp: 21 tablet, Rfl: 0    rosuvastatin (CRESTOR) 10 MG tablet, TAKE 1 TABLET(10 MG) BY MOUTH AT BEDTIME Strength: 10 mg, Disp: 90 tablet, Rfl: 3    Semaglutide, 1 MG/DOSE, (OZEMPIC, 1 MG/DOSE,) 4 MG/3ML SOPN, Inject 1 mg Subcutaneous once a week, Disp: 9 mL, Rfl: 3    spironolactone (ALDACTONE) 25 MG tablet, Take 1 tablet (25 mg) by mouth daily, Disp: 90 tablet, Rfl: 3    tadalafil (CIALIS) 10 MG tablet, Take 1 tablet (10 mg) by mouth daily as needed (erectile dsyfunction) 10 mg as a single dose 30 minutes prior to anticipated sexual activity; do not take more than once daily.  Adjust dose based on effectiveness and tolerability; may decrease to 5 mg or increase to 20 mg per dose., Disp: 30 tablet, Rfl: 3    Review Of Systems:  Skin: No rash, pruritis, or skin pigmentation  Eyes: No changes in vision  Ears/Nose/Throat: No changes in hearing, no nosebleeds  Respiratory: No shortness of breath, dyspnea on exertion, cough, or hemoptysis  Cardiovascular: No chest pain or palpitations  Gastrointestinal: No diarrhea or constipation. No abdominal pain. No hematochezia  Genitourinary: see HPI  Musculoskeletal: No pain or swelling of joints, normal range of motion  Neurologic: No weakness or tremors  Psychiatric: No recent changes in memory or mood  Hematologic/Lymphatic/Immunologic: No easy bruising or enlarged lymph nodes  Endocrine: No weight gain or loss      PHYSICAL EXAM:    Ht 1.829 m (6')   Wt 122.5 kg (270 lb)   BMI 36.62 kg/m    General appearance: In NAD, conversant  HEENT: Normocephalic and atraumatic, anicteric sclera  Cardiovascular: Not examined  Respiratory: normal, non-labored breathing  Musculoskeletal: Not Examined  Peripheral Vascular/extremity: No peripheral edema  Skin: Normal temperature, turgor, and texture. No rash  Psychiatric: Appropriate affect, alert and oriented to person, place,  and time    Cystoscopy: Not done        UA RESULTS:  Recent Labs   Lab Test 07/08/23  1905 05/20/19  1107   COLOR Yellow Yellow   APPEARANCE Clear Clear   URINEGLC Negative 100 mg/dL*   URINEBILI Negative Negative   URINEKETONE Negative Trace*   SG 1.025 1.025   UBLD 0.5 mg/dL* Moderate*   URINEPH 5.5 6.0   PROTEIN 50* 100 mg/dL*   UROBILINOGEN  --  2.0 E.U./dL*   NITRITE Negative Negative   LEUKEST 75 Sherrie/uL* Negative   RBCU 59* *   WBCU 10* None Seen       Bladder Scan:     Other Labs:      Imaging Studies:     CT a/p 7/8/2023        Reviewed and interpreted personally. 11 mm right renal pelvis stone, about 600 HU, 140 mm stsd, with additional smaller stones on the right and a 5 mm left nonobstructive stone    CLINICAL IMPRESSION:   56 yo M with past history of stones now with right renal colic, found to have right renal pelvis stone and additional bilateral nonobstructive nephrolithiasis    11 mm right renal pelvis stone will not pass on its own on. Discussed options for management of the right side including ureteroscopy with laser lithotripsy and stone basketing vs. ESWL vs. PCNL. Best option is ureteroscopy given the stone burden, multiple stones, high STSD. Also has nonobstructive left renal stone. First discussed observation of the left renal stone but after discussion patient also wants preemptive treatment of the left side    Risks of uretreoscopy including need for secondary procedure, infection, ureteral injury, incomplete stone clearance, stent pain and irritation were discussed    Patient had initially planned on going to cruise to [place] prior to treatment but I strongly recommended not proceeding with a trip where he would be unable to get prompt medical care in the event that the stone became more symptomatic. Patient needs urgent medical attention if uncontrolled pain, uncontrolled nausea/vomiting, or especially if fever or chills develop    PLAN:   Bilateral ureteroscopy with laser  lithotripsy/stone basketing/bilateral retrograde pyelogram/bilateral ureteral stent placement    Recommend against going on cruise to [place]    Mark Orta MD   [facility]  [phone] clinic phone      Video-Visit Details    Type of service:  Video Visit     Originating Location (pt. Location): Home  Distant Location (provider location):  On-site  Platform used for Video Visit: [facility]     Over 45 minutes spent on this encounter including time spent talking to the patient initially on video visit and on follow up phone call, reviewing records, documentation

## 2023-07-18 ENCOUNTER — TELEPHONE (OUTPATIENT)
Dept: UROLOGY | Facility: CLINIC | Age: 58
End: 2023-07-18
Payer: COMMERCIAL

## 2023-07-18 NOTE — TELEPHONE ENCOUNTER
Scheduled surgery for pt 7/26 at [facility], went through instructions over the phone, surgery packet mailed.

## 2023-07-25 ENCOUNTER — ANESTHESIA EVENT (OUTPATIENT)
Dept: SURGERY | Facility: CLINIC | Age: 58
End: 2023-07-25
Payer: COMMERCIAL

## 2023-07-26 ENCOUNTER — ANESTHESIA (OUTPATIENT)
Dept: SURGERY | Facility: CLINIC | Age: 58
End: 2023-07-26
Payer: COMMERCIAL

## 2023-07-26 ENCOUNTER — HOSPITAL ENCOUNTER (OUTPATIENT)
Facility: CLINIC | Age: 58
Discharge: HOME | End: 2023-07-26
Attending: STUDENT IN AN ORGANIZED HEALTH CARE EDUCATION/TRAINING PROGRAM | Admitting: STUDENT IN AN ORGANIZED HEALTH CARE EDUCATION/TRAINING PROGRAM
Payer: COMMERCIAL

## 2023-07-26 ENCOUNTER — APPOINTMENT (OUTPATIENT)
Dept: GENERAL RADIOLOGY | Facility: CLINIC | Age: 58
End: 2023-07-26
Attending: STUDENT IN AN ORGANIZED HEALTH CARE EDUCATION/TRAINING PROGRAM
Payer: COMMERCIAL

## 2023-07-26 VITALS
RESPIRATION RATE: 16 BRPM | WEIGHT: 276.1 LBS | BODY MASS INDEX: 38.65 KG/M2 | OXYGEN SATURATION: 95 % | HEIGHT: 71 IN | SYSTOLIC BLOOD PRESSURE: 110 MMHG | DIASTOLIC BLOOD PRESSURE: 70 MMHG | TEMPERATURE: 97.2 F | HEART RATE: 80 BPM

## 2023-07-26 DIAGNOSIS — N20.0 BILATERAL NEPHROLITHIASIS: Primary | ICD-10-CM

## 2023-07-26 LAB
GLUCOSE BLDC GLUCOMTR-MCNC: 103 MG/DL (ref 70–99)
GLUCOSE BLDC GLUCOMTR-MCNC: 114 MG/DL (ref 70–99)

## 2023-07-26 PROCEDURE — 74420 UROGRAPHY RTRGR +-KUB: CPT | Mod: 26 | Performed by: STUDENT IN AN ORGANIZED HEALTH CARE EDUCATION/TRAINING PROGRAM

## 2023-07-26 PROCEDURE — 360N000076 HC SURGERY LEVEL 3, PER MIN: Performed by: STUDENT IN AN ORGANIZED HEALTH CARE EDUCATION/TRAINING PROGRAM

## 2023-07-26 PROCEDURE — 370N000017 HC ANESTHESIA TECHNICAL FEE, PER MIN: Performed by: STUDENT IN AN ORGANIZED HEALTH CARE EDUCATION/TRAINING PROGRAM

## 2023-07-26 PROCEDURE — 250N000011 HC RX IP 250 OP 636: Performed by: STUDENT IN AN ORGANIZED HEALTH CARE EDUCATION/TRAINING PROGRAM

## 2023-07-26 PROCEDURE — 272N000001 HC OR GENERAL SUPPLY STERILE: Performed by: STUDENT IN AN ORGANIZED HEALTH CARE EDUCATION/TRAINING PROGRAM

## 2023-07-26 PROCEDURE — 258N000003 HC RX IP 258 OP 636: Performed by: ANESTHESIOLOGY

## 2023-07-26 PROCEDURE — 710N000009 HC RECOVERY PHASE 1, LEVEL 1, PER MIN: Performed by: STUDENT IN AN ORGANIZED HEALTH CARE EDUCATION/TRAINING PROGRAM

## 2023-07-26 PROCEDURE — 250N000025 HC SEVOFLURANE, PER MIN: Performed by: STUDENT IN AN ORGANIZED HEALTH CARE EDUCATION/TRAINING PROGRAM

## 2023-07-26 PROCEDURE — 250N000009 HC RX 250: Performed by: NURSE ANESTHETIST, CERTIFIED REGISTERED

## 2023-07-26 PROCEDURE — 52356 CYSTO/URETERO W/LITHOTRIPSY: CPT | Mod: 50 | Performed by: STUDENT IN AN ORGANIZED HEALTH CARE EDUCATION/TRAINING PROGRAM

## 2023-07-26 PROCEDURE — C1769 GUIDE WIRE: HCPCS | Performed by: STUDENT IN AN ORGANIZED HEALTH CARE EDUCATION/TRAINING PROGRAM

## 2023-07-26 PROCEDURE — 999N000179 XR SURGERY CARM FLUORO LESS THAN 5 MIN W STILLS

## 2023-07-26 PROCEDURE — 255N000002 HC RX 255 OP 636: Mod: JZ | Performed by: STUDENT IN AN ORGANIZED HEALTH CARE EDUCATION/TRAINING PROGRAM

## 2023-07-26 PROCEDURE — 999N000141 HC STATISTIC PRE-PROCEDURE NURSING ASSESSMENT: Performed by: STUDENT IN AN ORGANIZED HEALTH CARE EDUCATION/TRAINING PROGRAM

## 2023-07-26 PROCEDURE — 250N000009 HC RX 250: Performed by: ANESTHESIOLOGY

## 2023-07-26 PROCEDURE — 82962 GLUCOSE BLOOD TEST: CPT

## 2023-07-26 PROCEDURE — 250N000011 HC RX IP 250 OP 636: Mod: JZ | Performed by: ANESTHESIOLOGY

## 2023-07-26 PROCEDURE — 710N000012 HC RECOVERY PHASE 2, PER MINUTE: Performed by: STUDENT IN AN ORGANIZED HEALTH CARE EDUCATION/TRAINING PROGRAM

## 2023-07-26 PROCEDURE — C1758 CATHETER, URETERAL: HCPCS | Performed by: STUDENT IN AN ORGANIZED HEALTH CARE EDUCATION/TRAINING PROGRAM

## 2023-07-26 PROCEDURE — 258N000001 HC RX 258: Performed by: STUDENT IN AN ORGANIZED HEALTH CARE EDUCATION/TRAINING PROGRAM

## 2023-07-26 PROCEDURE — C1894 INTRO/SHEATH, NON-LASER: HCPCS | Performed by: STUDENT IN AN ORGANIZED HEALTH CARE EDUCATION/TRAINING PROGRAM

## 2023-07-26 PROCEDURE — C2617 STENT, NON-COR, TEM W/O DEL: HCPCS | Performed by: STUDENT IN AN ORGANIZED HEALTH CARE EDUCATION/TRAINING PROGRAM

## 2023-07-26 PROCEDURE — 258N000003 HC RX IP 258 OP 636: Performed by: NURSE ANESTHETIST, CERTIFIED REGISTERED

## 2023-07-26 PROCEDURE — 250N000011 HC RX IP 250 OP 636: Performed by: NURSE ANESTHETIST, CERTIFIED REGISTERED

## 2023-07-26 PROCEDURE — 82365 CALCULUS SPECTROSCOPY: CPT | Performed by: STUDENT IN AN ORGANIZED HEALTH CARE EDUCATION/TRAINING PROGRAM

## 2023-07-26 DEVICE — URETERAL STENT
Type: IMPLANTABLE DEVICE | Site: URETER | Status: FUNCTIONAL
Brand: POLARIS™ ULTRA

## 2023-07-26 RX ORDER — LABETALOL HYDROCHLORIDE 5 MG/ML
10 INJECTION, SOLUTION INTRAVENOUS
Status: DISCONTINUED | OUTPATIENT
Start: 2023-07-26 | End: 2023-07-26 | Stop reason: HOSPADM

## 2023-07-26 RX ORDER — PROPOFOL 10 MG/ML
INJECTION, EMULSION INTRAVENOUS CONTINUOUS PRN
Status: DISCONTINUED | OUTPATIENT
Start: 2023-07-26 | End: 2023-07-26

## 2023-07-26 RX ORDER — CEFAZOLIN SODIUM/WATER 3 G/30 ML
3 SYRINGE (ML) INTRAVENOUS SEE ADMIN INSTRUCTIONS
Status: DISCONTINUED | OUTPATIENT
Start: 2023-07-26 | End: 2023-07-26 | Stop reason: HOSPADM

## 2023-07-26 RX ORDER — EPHEDRINE SULFATE 50 MG/ML
INJECTION, SOLUTION INTRAMUSCULAR; INTRAVENOUS; SUBCUTANEOUS PRN
Status: DISCONTINUED | OUTPATIENT
Start: 2023-07-26 | End: 2023-07-26

## 2023-07-26 RX ORDER — FENTANYL CITRATE 50 UG/ML
50 INJECTION, SOLUTION INTRAMUSCULAR; INTRAVENOUS EVERY 5 MIN PRN
Status: DISCONTINUED | OUTPATIENT
Start: 2023-07-26 | End: 2023-07-26 | Stop reason: HOSPADM

## 2023-07-26 RX ORDER — ACETAMINOPHEN 500 MG
1000 TABLET ORAL EVERY 6 HOURS PRN
Qty: 100 TABLET | Refills: 0 | Status: SHIPPED | OUTPATIENT
Start: 2023-07-26 | End: 2023-12-22

## 2023-07-26 RX ORDER — ONDANSETRON 2 MG/ML
4 INJECTION INTRAMUSCULAR; INTRAVENOUS EVERY 30 MIN PRN
Status: DISCONTINUED | OUTPATIENT
Start: 2023-07-26 | End: 2023-07-26 | Stop reason: HOSPADM

## 2023-07-26 RX ORDER — TAMSULOSIN HYDROCHLORIDE 0.4 MG/1
0.4 CAPSULE ORAL DAILY
Qty: 7 CAPSULE | Refills: 0 | Status: SHIPPED | OUTPATIENT
Start: 2023-07-26 | End: 2023-08-22

## 2023-07-26 RX ORDER — LIDOCAINE HYDROCHLORIDE 20 MG/ML
INJECTION, SOLUTION INFILTRATION; PERINEURAL PRN
Status: DISCONTINUED | OUTPATIENT
Start: 2023-07-26 | End: 2023-07-26

## 2023-07-26 RX ORDER — ONDANSETRON 2 MG/ML
INJECTION INTRAMUSCULAR; INTRAVENOUS PRN
Status: DISCONTINUED | OUTPATIENT
Start: 2023-07-26 | End: 2023-07-26

## 2023-07-26 RX ORDER — ONDANSETRON 4 MG/1
4 TABLET, ORALLY DISINTEGRATING ORAL EVERY 30 MIN PRN
Status: DISCONTINUED | OUTPATIENT
Start: 2023-07-26 | End: 2023-07-26 | Stop reason: HOSPADM

## 2023-07-26 RX ORDER — FENTANYL CITRATE 50 UG/ML
25 INJECTION, SOLUTION INTRAMUSCULAR; INTRAVENOUS EVERY 5 MIN PRN
Status: DISCONTINUED | OUTPATIENT
Start: 2023-07-26 | End: 2023-07-26 | Stop reason: HOSPADM

## 2023-07-26 RX ORDER — HYDROMORPHONE HCL IN WATER/PF 6 MG/30 ML
0.4 PATIENT CONTROLLED ANALGESIA SYRINGE INTRAVENOUS EVERY 5 MIN PRN
Status: DISCONTINUED | OUTPATIENT
Start: 2023-07-26 | End: 2023-07-26 | Stop reason: HOSPADM

## 2023-07-26 RX ORDER — HYDROMORPHONE HCL IN WATER/PF 6 MG/30 ML
0.2 PATIENT CONTROLLED ANALGESIA SYRINGE INTRAVENOUS EVERY 5 MIN PRN
Status: DISCONTINUED | OUTPATIENT
Start: 2023-07-26 | End: 2023-07-26 | Stop reason: HOSPADM

## 2023-07-26 RX ORDER — DEXMEDETOMIDINE HYDROCHLORIDE 4 UG/ML
INJECTION, SOLUTION INTRAVENOUS PRN
Status: DISCONTINUED | OUTPATIENT
Start: 2023-07-26 | End: 2023-07-26

## 2023-07-26 RX ORDER — SODIUM CHLORIDE, SODIUM LACTATE, POTASSIUM CHLORIDE, CALCIUM CHLORIDE 600; 310; 30; 20 MG/100ML; MG/100ML; MG/100ML; MG/100ML
INJECTION, SOLUTION INTRAVENOUS CONTINUOUS PRN
Status: DISCONTINUED | OUTPATIENT
Start: 2023-07-26 | End: 2023-07-26

## 2023-07-26 RX ORDER — PROPOFOL 10 MG/ML
INJECTION, EMULSION INTRAVENOUS PRN
Status: DISCONTINUED | OUTPATIENT
Start: 2023-07-26 | End: 2023-07-26

## 2023-07-26 RX ORDER — SODIUM CHLORIDE, SODIUM LACTATE, POTASSIUM CHLORIDE, CALCIUM CHLORIDE 600; 310; 30; 20 MG/100ML; MG/100ML; MG/100ML; MG/100ML
INJECTION, SOLUTION INTRAVENOUS CONTINUOUS
Status: DISCONTINUED | OUTPATIENT
Start: 2023-07-26 | End: 2023-07-26 | Stop reason: HOSPADM

## 2023-07-26 RX ORDER — IBUPROFEN 800 MG/1
800 TABLET, FILM COATED ORAL EVERY 6 HOURS PRN
Qty: 50 TABLET | Refills: 0 | Status: ON HOLD | OUTPATIENT
Start: 2023-07-26 | End: 2023-08-04

## 2023-07-26 RX ORDER — CIPROFLOXACIN 500 MG/1
500 TABLET, FILM COATED ORAL ONCE
Qty: 1 TABLET | Refills: 0 | Status: SHIPPED | OUTPATIENT
Start: 2023-07-26 | End: 2023-07-26

## 2023-07-26 RX ORDER — CEFAZOLIN SODIUM/WATER 3 G/30 ML
3 SYRINGE (ML) INTRAVENOUS
Status: COMPLETED | OUTPATIENT
Start: 2023-07-26 | End: 2023-07-26

## 2023-07-26 RX ORDER — OXYCODONE HYDROCHLORIDE 5 MG/1
5 TABLET ORAL EVERY 6 HOURS PRN
Qty: 6 TABLET | Refills: 0 | Status: ON HOLD | OUTPATIENT
Start: 2023-07-26 | End: 2023-07-30

## 2023-07-26 RX ORDER — DOCUSATE SODIUM 100 MG/1
100 CAPSULE, LIQUID FILLED ORAL 2 TIMES DAILY
Qty: 30 CAPSULE | Refills: 0 | Status: SHIPPED | OUTPATIENT
Start: 2023-07-26 | End: 2023-09-06

## 2023-07-26 RX ORDER — DEXAMETHASONE SODIUM PHOSPHATE 4 MG/ML
INJECTION, SOLUTION INTRA-ARTICULAR; INTRALESIONAL; INTRAMUSCULAR; INTRAVENOUS; SOFT TISSUE PRN
Status: DISCONTINUED | OUTPATIENT
Start: 2023-07-26 | End: 2023-07-26

## 2023-07-26 RX ORDER — FENTANYL CITRATE 50 UG/ML
INJECTION, SOLUTION INTRAMUSCULAR; INTRAVENOUS PRN
Status: DISCONTINUED | OUTPATIENT
Start: 2023-07-26 | End: 2023-07-26

## 2023-07-26 RX ORDER — IOPAMIDOL 612 MG/ML
INJECTION, SOLUTION INTRAVASCULAR PRN
Status: DISCONTINUED | OUTPATIENT
Start: 2023-07-26 | End: 2023-07-26 | Stop reason: HOSPADM

## 2023-07-26 RX ADMIN — Medication 5 MG: at 11:57

## 2023-07-26 RX ADMIN — Medication 5 MG: at 12:05

## 2023-07-26 RX ADMIN — PHENYLEPHRINE HYDROCHLORIDE 100 MCG: 10 INJECTION INTRAVENOUS at 12:06

## 2023-07-26 RX ADMIN — PROPOFOL 170 MG: 10 INJECTION, EMULSION INTRAVENOUS at 11:19

## 2023-07-26 RX ADMIN — DEXMEDETOMIDINE HYDROCHLORIDE 8 MCG: 200 INJECTION INTRAVENOUS at 11:27

## 2023-07-26 RX ADMIN — ONDANSETRON 4 MG: 2 INJECTION INTRAMUSCULAR; INTRAVENOUS at 12:35

## 2023-07-26 RX ADMIN — PHENYLEPHRINE HYDROCHLORIDE 150 MCG: 10 INJECTION INTRAVENOUS at 11:40

## 2023-07-26 RX ADMIN — PHENYLEPHRINE HYDROCHLORIDE 100 MCG: 10 INJECTION INTRAVENOUS at 11:57

## 2023-07-26 RX ADMIN — FENTANYL CITRATE 50 MCG: 50 INJECTION, SOLUTION INTRAMUSCULAR; INTRAVENOUS at 11:26

## 2023-07-26 RX ADMIN — PROPOFOL 60 MG: 10 INJECTION, EMULSION INTRAVENOUS at 12:15

## 2023-07-26 RX ADMIN — MIDAZOLAM 2 MG: 1 INJECTION INTRAMUSCULAR; INTRAVENOUS at 11:15

## 2023-07-26 RX ADMIN — Medication 5 MG: at 11:50

## 2023-07-26 RX ADMIN — DEXAMETHASONE SODIUM PHOSPHATE 4 MG: 4 INJECTION, SOLUTION INTRA-ARTICULAR; INTRALESIONAL; INTRAMUSCULAR; INTRAVENOUS; SOFT TISSUE at 11:23

## 2023-07-26 RX ADMIN — HYDROMORPHONE HYDROCHLORIDE 0.5 MG: 1 INJECTION, SOLUTION INTRAMUSCULAR; INTRAVENOUS; SUBCUTANEOUS at 11:36

## 2023-07-26 RX ADMIN — DEXMEDETOMIDINE HYDROCHLORIDE 4 MCG: 200 INJECTION INTRAVENOUS at 12:28

## 2023-07-26 RX ADMIN — PHENYLEPHRINE HYDROCHLORIDE 100 MCG: 10 INJECTION INTRAVENOUS at 11:32

## 2023-07-26 RX ADMIN — FENTANYL CITRATE 50 MCG: 50 INJECTION, SOLUTION INTRAMUSCULAR; INTRAVENOUS at 11:19

## 2023-07-26 RX ADMIN — Medication 5 MG: at 12:27

## 2023-07-26 RX ADMIN — Medication 3 G: at 11:15

## 2023-07-26 RX ADMIN — PHENYLEPHRINE HYDROCHLORIDE 150 MCG: 10 INJECTION INTRAVENOUS at 11:50

## 2023-07-26 RX ADMIN — LIDOCAINE HYDROCHLORIDE 80 MG: 20 INJECTION, SOLUTION INFILTRATION; PERINEURAL at 11:19

## 2023-07-26 RX ADMIN — SODIUM CHLORIDE, POTASSIUM CHLORIDE, SODIUM LACTATE AND CALCIUM CHLORIDE: 600; 310; 30; 20 INJECTION, SOLUTION INTRAVENOUS at 11:13

## 2023-07-26 RX ADMIN — Medication 5 MG: at 12:06

## 2023-07-26 RX ADMIN — PROPOFOL 30 MG: 10 INJECTION, EMULSION INTRAVENOUS at 11:20

## 2023-07-26 RX ADMIN — PHENYLEPHRINE HYDROCHLORIDE 100 MCG: 10 INJECTION INTRAVENOUS at 11:36

## 2023-07-26 RX ADMIN — PHENYLEPHRINE HYDROCHLORIDE 150 MCG: 10 INJECTION INTRAVENOUS at 11:44

## 2023-07-26 RX ADMIN — DEXMEDETOMIDINE HYDROCHLORIDE 8 MCG: 200 INJECTION INTRAVENOUS at 11:41

## 2023-07-26 RX ADMIN — SODIUM CHLORIDE, POTASSIUM CHLORIDE, SODIUM LACTATE AND CALCIUM CHLORIDE: 600; 310; 30; 20 INJECTION, SOLUTION INTRAVENOUS at 12:42

## 2023-07-26 RX ADMIN — PROPOFOL 30 MCG/KG/MIN: 10 INJECTION, EMULSION INTRAVENOUS at 11:27

## 2023-07-26 ASSESSMENT — ACTIVITIES OF DAILY LIVING (ADL)
ADLS_ACUITY_SCORE: 35

## 2023-07-26 ASSESSMENT — LIFESTYLE VARIABLES: TOBACCO_USE: 0

## 2023-07-26 ASSESSMENT — ENCOUNTER SYMPTOMS
SEIZURES: 0
DYSRHYTHMIAS: 0

## 2023-07-26 ASSESSMENT — COPD QUESTIONNAIRES: COPD: 0

## 2023-07-26 NOTE — BRIEF OP NOTE
[facility]  Brief Operative Note    Pre-operative diagnosis: Kidney stone [N20.0]   Post-operative diagnosis Same as preop   Procedure: Procedure(s):  Cystoscopy  bilateral ureteroscopy with holmium laser lithotripsy  bilateral ureteroscopy with stone basketing  bilateral retrograde pyelogram  bilateral ureteral stent placement  Fluoroscopic interpretation <1 hour physician time   Surgeon: Mark Orta MD   Assistants(s): none   Anesthesia: General    Estimated blood loss: Minimal    Total IV fluids: (See anesthesia record)   Blood transfusion: No transfusion was given during surgery   Total urine output: Not measured   Drains: Bilateral ureteral stents   Specimens: ID Type Source Tests Collected by Time Destination   A : Bilateral Kidney Stones Calculus/Stone Other STONE ANALYSIS Mark Orta MD 7/26/2023 12:36 PM         Implants: Implant Name Type Inv. Item Serial No.  Lot No. LRB No. Used Action   STENT URETERAL POLARIS ULTRA [id number] - [id number] Stent STENT URETERAL POLARIS ULTRA [id number]  [employer] [id number] Right 1 Implanted   STENT URETERAL POLARIS ULTRA [id number] - [id number] Stent STENT URETERAL POLARIS ULTRA [id number]  [employer] [id number] Left 1 Implanted          Findings:   Trilobar obstructive hypertrophy of the prostate with prominent median lobe making it difficult to access the ureteral orifices.  11 mm right renal pelvis stone with additional small embedded stone visible in upper pole, laser dusted/basketed, 98% stone free  5 mm left interpolar stone, laser dusted/basketed, 95% stone free     Complications: None.   Condition: Stable

## 2023-07-26 NOTE — DISCHARGE INSTRUCTIONS
**Because you had anesthesia today and your history of sleep apnea, it is extremely important that you use your CPAP machine for the next 24 hours while napping or sleeping.** hand written onto original         POSTOPERATIVE INSTRUCTIONS    Diagnosis-------------------------------   Bilateral kidney stones    Procedure-------------------------------  Procedure(s) (LRB):  cystoscopy, bilateral ureteroscopy with holmium laser lithotripsy, bilateral ureteroscopy with stone basketing, bilateral retrograde pyelogram, bilateral ureteral stent placement (Bilateral)      Findings--------------------------------  Trilobar obstructive hypertrophy of the prostate with prominent median lobe making it difficult to access the ureteral orifices.  11 mm right renal pelvis stone with additional small embedded stone visible in upper pole, laser dusted/basketed, 98% stone free  5 mm left interpolar stone, laser dusted/basketed, 95% stone free    Home-going instructions-----------------         Activity Limitation:     - No driving or operating heavy machinery while on narcotic pain medication.     FOLLOW THESE INSTRUCTIONS AS INDICATED BELOW:  - Observe operative area for signs of excessive bleeding.  - You may shower.  - Increase fluid intake to promote clear urine.  - Resume usual diet as tolerated    What to expect while recovering-----------  - You may experience some intermittent bleeding that makes your urine pink or cherry colored. This is normal.  - However, if you are unable to urinate, passing large amount of clots, have candida blood in your urine, or have a temperature >101 degrees, call the urology nurse on call, or present to your nearest emergency department.  - You are encouraged to walk daily, and have no activity restrictions.   - A URETERAL STENT has been placed that allows urine to flow unobstructed from your kidney into your bladder.  The stent has a curl in the kidney and a curl in the bladder.  The curl in the  bladder can cause some urgency and frequency of urination as well as some mild blood in the urine.  The curl in the kidney can cause some mild flank discomfort.  This may be more noticeable when you urinate.  A URETERAL STENT is meant to be left in temporarily.  It must be removed or changed no later than 3 months after its insertion.  If it's not removed it can result in stone overgrowth on the stent that can cause pain, infection, and can be very difficult to remove.        Discharge Medications/instructions:   - Flomax (tamsulosin) to be taken daily until stent is removed  - Antibiotics (ciprofloxacin) are to be taken with you to your scheduled return visit for stent removal  - Take Tylenol 1000mg every 6 hours for pain  - Take Ibuprofen 600mg every 6 hours as needed for additional pain control (alternate with the Tylenol so you take one or the other every 3 hours)  - Take Oxycodone 5mg every 6 hours only for break through pain  - Take Colace while taking Oxycodone to prevent constipation      Questions/concerns------------------------  [facility]: [phone]    Future appointments  Follow up 8/2/2023 for cystoscopy and stent removal in the office      Mark Orta MD   Same Day Surgery Discharge Instructions for  Sedation and General Anesthesia     It's not unusual to feel dizzy, light-headed or faint for up to 24 hours after surgery or while taking pain medication.  If you have these symptoms: sit for a few minutes before standing and have someone assist you when you get up to walk or use the bathroom.    You should rest and relax for the next 24 hours. We recommend you make arrangements to have an adult stay with you for at least 24 hours after your discharge.  Avoid hazardous and strenuous activity.    DO NOT DRIVE any vehicle or operate mechanical equipment for 24 hours following the end of your surgery.  Even though you may feel normal, your reactions may be affected by  the medication you have received.    Do not drink alcoholic beverages for 24 hours following surgery.     Slowly progress to your regular diet as you feel able. It's not unusual to feel nauseated and/or vomit after receiving anesthesia.  If you develop these symptoms, drink clear liquids (apple juice, ginger ale, broth, 7-up, etc. ) until you feel better.  If your nausea and vomiting persists for 24 hours, please notify your surgeon.      All narcotic pain medications, along with inactivity and anesthesia, can cause constipation. Drinking plenty of liquids and increasing fiber intake will help.    For any questions of a medical nature, call your surgeon.    Do not make important decisions for 24 hours.    If you had general anesthesia, you may have a sore throat for a couple of days related to the breathing tube used during surgery.  You may use Cepacol lozenges to help with this discomfort.  If it worsens or if you develop a fever, contact your surgeon.     If you feel your pain is not well managed with the pain medications prescribed by your surgeon, please contact your surgeon's office to let them know so they can address your concerns.     **If you have questions or concerns about your procedure,   call Dr. Orta [phone]**

## 2023-07-26 NOTE — OR NURSING
Up to BR voids light pink X2- AOX3-VSS-O2 sats >92% RA- Good PO intake, Denies c/o- Pt and responsible adult verbalize understanding of discharge instructions, denies questions. Up in W/C - transported to door for discharge to home.

## 2023-07-26 NOTE — ANESTHESIA PROCEDURE NOTES
Airway       Patient location during procedure: OR  Staff -        CRNA: Marilin Martinez APRN CRNA       Performed By: CRNA  Consent for Airway        Urgency: elective  Indications and Patient Condition       Indications for airway management: brandi-procedural       Induction type:intravenous       Mask difficulty assessment: 0 - not attempted    Final Airway Details       Final airway type: supraglottic airway    Supraglottic Airway Details        Type: LMA       Brand: I-Gel       LMA size: 5    Post intubation assessment        Placement verified by: capnometry, equal breath sounds and chest rise        Number of attempts at approach: 2       Number of other approaches attempted: 0       Secured with: pink tape       Ease of procedure: easy       Dentition: Unchanged

## 2023-07-26 NOTE — ANESTHESIA CARE TRANSFER NOTE
Patient: Govind Alexandre    Procedure: Procedure(s):  cystoscopy, bilateral ureteroscopy with holmium laser lithotripsy, bilateral ureteroscopy with stone basketing, bilateral retrograde pyelogram, bilateral ureteral stent placement       Diagnosis: Kidney stone [N20.0]  Diagnosis Additional Information: No value filed.    Anesthesia Type:   General     Note:    Oropharynx: oropharynx clear of all foreign objects and spontaneously breathing  Level of Consciousness: drowsy  Oxygen Supplementation: face mask  Level of Supplemental Oxygen (L/min / FiO2): 6  Independent Airway: airway patency satisfactory and stable  Dentition: dentition unchanged  Vital Signs Stable: post-procedure vital signs reviewed and stable  Report to RN Given: handoff report given  Patient transferred to: PACU    Handoff Report: Identifed the Patient, Identified the Reponsible Provider, Reviewed the pertinent medical history, Discussed the surgical course, Reviewed Intra-OP anesthesia mangement and issues during anesthesia, Set expectations for post-procedure period and Allowed opportunity for questions and acknowledgement of understanding      Vitals:  Vitals Value Taken Time   /66    Temp 36.0C    Pulse 88    Resp     SpO2 93%        Electronically Signed By: CECILY Bragg CRNA  July 26, 2023  1:03 PM

## 2023-07-26 NOTE — ANESTHESIA POSTPROCEDURE EVALUATION
Patient: Govind Alexandre    Procedure: Procedure(s):  cystoscopy, bilateral ureteroscopy with holmium laser lithotripsy, bilateral ureteroscopy with stone basketing, bilateral retrograde pyelogram, bilateral ureteral stent placement       Anesthesia Type:  General    Note:  Disposition: Outpatient   Postop Pain Control: Uneventful            Sign Out: Well controlled pain   PONV: No   Neuro/Psych: Uneventful            Sign Out: Acceptable/Baseline neuro status   Airway/Respiratory: Uneventful            Sign Out: Acceptable/Baseline resp. status   CV/Hemodynamics: Uneventful            Sign Out: Acceptable CV status; No obvious hypovolemia; No obvious fluid overload   Other NRE: NONE   DID A NON-ROUTINE EVENT OCCUR? No           Last vitals:  Vitals Value Taken Time   /68 07/26/23 1400   Temp 36.3  C (97.4  F) 07/26/23 1330   Pulse 77 07/26/23 1401   Resp 14 07/26/23 1401   SpO2 94 % 07/26/23 1401   Vitals shown include unvalidated device data.    Electronically Signed By: Mark Sheppard DO  July 26, 2023  2:02 PM

## 2023-07-26 NOTE — OP NOTE
[facility]  Operative Note    Pre-operative diagnosis: Kidney stone [N20.0]   Post-operative diagnosis Same as preop   Procedure: Procedure(s):  Cystoscopy  bilateral ureteroscopy with holmium laser lithotripsy  bilateral ureteroscopy with stone basketing  bilateral retrograde pyelogram  bilateral ureteral stent placement  Fluoroscopic interpretation <1 hour physician time   Surgeon: Mark Orta MD   Assistants(s): none   Anesthesia: General    Estimated blood loss: Minimal    Total IV fluids: (See anesthesia record)   Blood transfusion: No transfusion was given during surgery   Total urine output: Not measured   Drains: Bilateral ureteral stents   Specimens: ID Type Source Tests Collected by Time Destination   A : Bilateral Kidney Stones Calculus/Stone Other STONE ANALYSIS Mark Orta MD 7/26/2023 12:36 PM         Implants: Implant Name Type Inv. Item Serial No.  Lot No. LRB No. Used Action   STENT URETERAL POLARIS ULTRA [id number] - [id number] Stent STENT URETERAL POLARIS ULTRA [id number]  [employer] [id number] Right 1 Implanted   STENT URETERAL POLARIS ULTRA [id number] - [id number] Stent STENT URETERAL POLARIS ULTRA [id number]  [employer] [id number] Left 1 Implanted          Findings:   Trilobar obstructive hypertrophy of the prostate with prominent median lobe making it difficult to access the ureteral orifices.  11 mm right renal pelvis stone with additional small embedded stone visible in upper pole, laser dusted/basketed, 98% stone free  5 mm left interpolar stone, laser dusted/basketed, 95% stone free     Complications: None.   Condition: Stable               Description of procedure:  57-year-old man with remote history of spontaneously passed stones, now with symptomatic 11 mm right renal pelvis stone first diagnosed 7/8/2023.  He had been set to go on an [place] cruise but unfortunately had to cancel  this.  He presents today for ureteroscopic management.  He also has asymptomatic stone burden on the left side. Risks including need for secondary procedure, infection, ureteral injury, incomplete stone clearance, stent pain and irritation were discussed.  Informed consent was obtained.    The patient was brought to operating room #19.  Ancef antibiotics were given prior to the procedure.  General anesthesia was induced, he was placed in dorsolithotomy position, prepped and draped in standard sterile fashion.  Timeout was performed.    A 22 [place] Storz cystoscope was assembled and passed through the urethra into the bladder.  The prostate had significant trilobar obstructive hypertrophy with a median lobe which made accessing the ureteral orifices difficult.  The right ureteral orifice was identified close to the bladder neck.  This was cannulated with a 5 [place] tiger tail catheter.  A  film showed a shadowing radiodensity over the renal pelvis consistent with stone.  A gentle retrograde pyelogram showed minimal right hydronephrosis.  A 0.035 inch stiff shaft Glidewire was passed up to the renal pelvis under fluoroscopic guidance and the tiger tail catheter was removed.  The scope was removed and the wire was clipped to the drapes as a safety wire.  A Storz long semirigid ureteroscope was assembled and passed through the urethra, into the bladder and up the right ureteral orifice.  I was able to pass the scope all the way up to the proximal ureter and the ureter appeared to be widely patent.  A second wire was then passed through the scope up into the renal pelvis and the scope was backloaded off the wire.  Over the working wire a [employer] Navigator 11/13 Mosotho by 46 cm ureteral access sheath was passed up to the proximal ureter under fluoroscopic guidance and the obturator and wire were removed.  A Storz digital flexible ureteroscope was then passed through the access sheath up to the renal pelvis.   Complete pyeloscopy revealed a single large renal pelvis stone as well as some embedded stones up in the upper pole.  The [employer] holmium laser was then used to dust all visible stone burden at 0.3J/80Hz.  Some fragments were removed using the [place] Halo basket.  Some of the embedded stones in the upper pole were also lasered.  Estimate 98% stone free with remainder of the stone burden consisting of dust which should pass spontaneously.  Repeat retrograde pyelogram was performed to provide a landing zone for the stent.  Pullout ureteroscopy revealed the ureter to be in good condition with no tears or lacerations.  A stent was then placed in the usual fashion under cystoscopic and fluoroscopic guidance with good curls noted proximally and distally.  Attention was turned to the left side which proceeded in a similar fashion to the right side.  A left retrograde pyelogram showed mild left hydronephrosis.  A wire was then passed up to the renal pelvis and the tiger tail and scope were removed.  Unfortunately I could not pass the semirigid scope up the ureter due to the angulation from the prostatic hyperplasia.  I used the inner obturator from the tiger tail catheter to dilate the ureter all the way up to the proximal ureter over the wire.  I then placed a second wire and then attempted to pass the sheath over the working wire however I could not get the sheath to pass the distal ureter.  I then decided to pass the flexible ureteroscope directly over the working wire all the way up to the renal pelvis.  The working wire was removed.  Complete pyeloscopy revealed a single 5 mm stone in an interpolar calyx.  The stone was laser dusted and a large fragment was removed with a basket.  The ureteroscope was removed.  A repeat retrograde pyelogram was performed to provide a landing zone for the stent.  A stent was then placed in the usual fashion under cystoscopic and fluoroscopic guidance with good curls noted  proximally and distally.  Bladder was drained and scope was removed.  Patient was cleaned up, awoken from anesthesia and brought to PACU in stable condition.  He will discharge home and return for cystoscopy and stent removal in the office in about a week    Mark Orta MD   [facility]  [phone] clinic phone

## 2023-07-26 NOTE — ANESTHESIA PREPROCEDURE EVALUATION
Anesthesia Pre-Procedure Evaluation    Patient: Govind Alexandre   MRN: [id number] : 1965        Procedure : Procedure(s):  cystoscopy, bilateral ureteroscopy with holmium laser lithotripsy, bilateral ureteroscopy with stone basketing, bilateral retrograde pyelogram, bilateral ureteral stent placement          Past Medical History:   Diagnosis Date    Depression 2014    Essential hypertension     Infection due to 2019 novel coronavirus 2022    Multiple thyroid nodules 2016    Type 2 diabetes mellitus without complication (H) 2016      Past Surgical History:   Procedure Laterality Date    BIOPSY  16    CARPAL TUNNEL RELEASE RT/LT Right     COLONOSCOPY  6/15/18    HC REPAIR ROTATOR CUFF,ACUTE      Description: Rotator Cuff Repair Acute;  Recorded: 2010;  Comments: cortisone    PIC  2015         REMOVAL OF SPERM DUCT(S)      Description: Surgery Of Male Genitalia Vasectomy;  Recorded: 2010;    WISDOM TOOTH EXTRACTION        No Known Allergies   Social History     Tobacco Use    Smoking status: Former     Types: Dip, chew, snus or snuff    Smokeless tobacco: Former     Quit date: 2004   Substance Use Topics    Alcohol use: Yes     Comment: Alcoholic Drinks/day: 1 every 2 weeks      Wt Readings from Last 1 Encounters:   23 122.5 kg (270 lb)        Anesthesia Evaluation   Pt has had prior anesthetic.     No history of anesthetic complications       ROS/MED HX  ENT/Pulmonary:     (+) sleep apnea, moderate, uses CPAP,   KORINA risk factors,  hypertension, obese,                            (-) tobacco use, asthma, COPD and recent URI   Neurologic:    (-) no seizures, no CVA and migraines   Cardiovascular:     (+)  hypertension- -   -  - -                                   (-) CHF, arrhythmias, irregular heartbeat/palpitations and dyslipidemia   METS/Exercise Tolerance: >4 METS    Hematologic:    (-) history of blood clots   Musculoskeletal:    (-) arthritis    GI/Hepatic:     (+) GERD, Asymptomatic on medication,               (-) liver disease   Renal/Genitourinary:     (+) renal disease, type: CRI,            Endo:     (+)  type II DM,   Not using insulin,          Obesity,    (-) thyroid disease   Psychiatric/Substance Use:     (+) psychiatric history depression       Infectious Disease:       Malignancy:       Other:            Physical Exam    Airway        Mallampati: III   TM distance: > 3 FB   Neck ROM: full   Mouth opening: > 3 cm    Respiratory Devices and Support         Dental  no notable dental history     (+) Minor Abnormalities - some fillings, tiny chips      Cardiovascular   cardiovascular exam normal          Pulmonary   pulmonary exam normal        breath sounds clear to auscultation           OUTSIDE LABS:  CBC:   Lab Results   Component Value Date    WBC 12.4 (H) 07/08/2023    HGB 13.5 07/08/2023    HCT 38.7 (L) 07/08/2023     07/08/2023     BMP:   Lab Results   Component Value Date     07/08/2023     11/29/2022    POTASSIUM 4.0 07/08/2023    POTASSIUM 4.2 11/29/2022    CHLORIDE 111 (H) 07/08/2023    CHLORIDE 108 (H) 11/29/2022    CO2 23 07/08/2023    CO2 23 11/29/2022    BUN 14 07/08/2023    BUN 16.1 11/29/2022    CR 1.02 07/08/2023    CR 0.93 11/29/2022     07/08/2023     (H) 11/29/2022     COAGS: No results found for: PTT, INR, FIBR  POC: No results found for: BGM, HCG, HCGS  HEPATIC:   Lab Results   Component Value Date    ALBUMIN 4.2 07/08/2023    PROTTOTAL 7.4 07/08/2023    ALT 24 07/08/2023    AST 22 07/08/2023    ALKPHOS 93 07/08/2023    BILITOTAL 1.3 (H) 07/08/2023     OTHER:   Lab Results   Component Value Date    A1C 5.0 11/29/2022    LEV 10.0 07/08/2023    TSH 0.73 12/24/2021    T4 0.97 12/24/2021       Anesthesia Plan    ASA Status:  3    NPO Status:  NPO Appropriate    Anesthesia Type: General.     - Airway: LMA   Induction: Intravenous, Propofol.   Maintenance: Balanced.        Consents    Anesthesia  Plan(s) and associated risks, benefits, and realistic alternatives discussed. Questions answered and patient/representative(s) expressed understanding.     - Discussed:     - Discussed with:  Patient      - Extended Intubation/Ventilatory Support Discussed: No.      - Patient is DNR/DNI Status: No     Use of blood products discussed: No .     Postoperative Care    Pain management: IV analgesics, Oral pain medications, Multi-modal analgesia.   PONV prophylaxis: Ondansetron (or other 5HT-3), Dexamethasone or Solumedrol, Background Propofol Infusion     Comments:                Mark Sheppard DO

## 2023-07-29 ENCOUNTER — APPOINTMENT (OUTPATIENT)
Dept: CT IMAGING | Facility: CLINIC | Age: 58
DRG: 872 | End: 2023-07-29
Attending: EMERGENCY MEDICINE
Payer: COMMERCIAL

## 2023-07-29 ENCOUNTER — HOSPITAL ENCOUNTER (INPATIENT)
Facility: CLINIC | Age: 58
LOS: 4 days | Discharge: HOME | DRG: 872 | End: 2023-08-04
Attending: EMERGENCY MEDICINE | Admitting: INTERNAL MEDICINE
Payer: COMMERCIAL

## 2023-07-29 ENCOUNTER — APPOINTMENT (OUTPATIENT)
Dept: RADIOLOGY | Facility: CLINIC | Age: 58
DRG: 872 | End: 2023-07-29
Attending: EMERGENCY MEDICINE
Payer: COMMERCIAL

## 2023-07-29 DIAGNOSIS — I10 ESSENTIAL HYPERTENSION: ICD-10-CM

## 2023-07-29 DIAGNOSIS — R78.81 ENTEROCOCCAL BACTEREMIA: Chronic | ICD-10-CM

## 2023-07-29 DIAGNOSIS — I48.91 ATRIAL FIBRILLATION WITH RVR (H): ICD-10-CM

## 2023-07-29 DIAGNOSIS — Z79.01 CHRONIC ANTICOAGULATION: Chronic | ICD-10-CM

## 2023-07-29 DIAGNOSIS — N12 PYELONEPHRITIS: ICD-10-CM

## 2023-07-29 DIAGNOSIS — R78.81 BACTEREMIA: Primary | ICD-10-CM

## 2023-07-29 DIAGNOSIS — B95.2 ENTEROCOCCAL BACTEREMIA: Chronic | ICD-10-CM

## 2023-07-29 DIAGNOSIS — N39.0 COMPLICATED UTI (URINARY TRACT INFECTION): ICD-10-CM

## 2023-07-29 DIAGNOSIS — L29.9 ITCHING: ICD-10-CM

## 2023-07-29 DIAGNOSIS — E11.9 TYPE 2 DIABETES MELLITUS WITHOUT COMPLICATION, WITHOUT LONG-TERM CURRENT USE OF INSULIN (H): ICD-10-CM

## 2023-07-29 LAB
ALBUMIN UR-MCNC: 200 MG/DL
ANION GAP SERPL CALCULATED.3IONS-SCNC: 10 MMOL/L (ref 5–18)
APPEARANCE STONE: NORMAL
APPEARANCE UR: ABNORMAL
BACTERIA #/AREA URNS HPF: ABNORMAL /HPF
BILIRUB UR QL STRIP: ABNORMAL
BUN SERPL-MCNC: 14 MG/DL (ref 8–22)
CALCIUM SERPL-MCNC: 9.6 MG/DL (ref 8.5–10.5)
CHLORIDE BLD-SCNC: 106 MMOL/L (ref 98–107)
CO2 SERPL-SCNC: 23 MMOL/L (ref 22–31)
COLOR UR AUTO: ABNORMAL
COMPN STONE: NORMAL
CREAT SERPL-MCNC: 1.05 MG/DL (ref 0.7–1.3)
ERYTHROCYTE [DISTWIDTH] IN BLOOD BY AUTOMATED COUNT: 14.4 % (ref 10–15)
FLUAV RNA SPEC QL NAA+PROBE: NEGATIVE
FLUBV RNA RESP QL NAA+PROBE: NEGATIVE
GFR SERPL CREATININE-BSD FRML MDRD: 82 ML/MIN/1.73M2
GLUCOSE BLD-MCNC: 147 MG/DL (ref 70–125)
GLUCOSE UR STRIP-MCNC: NEGATIVE MG/DL
HCT VFR BLD AUTO: 32.5 % (ref 40–53)
HGB BLD-MCNC: 11.1 G/DL (ref 13.3–17.7)
HGB UR QL STRIP: ABNORMAL
HOLD SPECIMEN: NORMAL
KETONES UR STRIP-MCNC: 10 MG/DL
LACTATE SERPL-SCNC: 1.1 MMOL/L (ref 0.7–2)
LEUKOCYTE ESTERASE UR QL STRIP: ABNORMAL
MCH RBC QN AUTO: 31.4 PG (ref 26.5–33)
MCHC RBC AUTO-ENTMCNC: 34.2 G/DL (ref 31.5–36.5)
MCV RBC AUTO: 92 FL (ref 78–100)
MUCOUS THREADS #/AREA URNS LPF: PRESENT /LPF
NITRATE UR QL: NEGATIVE
PH UR STRIP: 6 [PH] (ref 5–7)
PLATELET # BLD AUTO: 106 10E3/UL (ref 150–450)
POTASSIUM BLD-SCNC: 3.4 MMOL/L (ref 3.5–5)
RBC # BLD AUTO: 3.54 10E6/UL (ref 4.4–5.9)
RBC URINE: >182 /HPF
RSV RNA SPEC NAA+PROBE: NEGATIVE
SARS-COV-2 RNA RESP QL NAA+PROBE: NEGATIVE
SODIUM SERPL-SCNC: 139 MMOL/L (ref 136–145)
SP GR UR STRIP: 1.02 (ref 1–1.03)
SPECIMEN WT: 151 MG
TROPONIN I SERPL-MCNC: 0.01 NG/ML (ref 0–0.29)
UROBILINOGEN UR STRIP-MCNC: >12 MG/DL
WBC # BLD AUTO: 6.3 10E3/UL (ref 4–11)
WBC URINE: >182 /HPF

## 2023-07-29 PROCEDURE — 250N000011 HC RX IP 250 OP 636: Mod: JZ | Performed by: EMERGENCY MEDICINE

## 2023-07-29 PROCEDURE — 99285 EMERGENCY DEPT VISIT HI MDM: CPT | Mod: 25

## 2023-07-29 PROCEDURE — 81001 URINALYSIS AUTO W/SCOPE: CPT | Performed by: EMERGENCY MEDICINE

## 2023-07-29 PROCEDURE — G0378 HOSPITAL OBSERVATION PER HR: HCPCS

## 2023-07-29 PROCEDURE — 99223 1ST HOSP IP/OBS HIGH 75: CPT | Performed by: INTERNAL MEDICINE

## 2023-07-29 PROCEDURE — 83735 ASSAY OF MAGNESIUM: CPT | Performed by: INTERNAL MEDICINE

## 2023-07-29 PROCEDURE — 87088 URINE BACTERIA CULTURE: CPT | Performed by: EMERGENCY MEDICINE

## 2023-07-29 PROCEDURE — 71045 X-RAY EXAM CHEST 1 VIEW: CPT

## 2023-07-29 PROCEDURE — 258N000003 HC RX IP 258 OP 636: Performed by: EMERGENCY MEDICINE

## 2023-07-29 PROCEDURE — 80048 BASIC METABOLIC PNL TOTAL CA: CPT | Performed by: EMERGENCY MEDICINE

## 2023-07-29 PROCEDURE — 84145 PROCALCITONIN (PCT): CPT | Performed by: INTERNAL MEDICINE

## 2023-07-29 PROCEDURE — 96361 HYDRATE IV INFUSION ADD-ON: CPT

## 2023-07-29 PROCEDURE — 93005 ELECTROCARDIOGRAM TRACING: CPT | Performed by: EMERGENCY MEDICINE

## 2023-07-29 PROCEDURE — 83010 ASSAY OF HAPTOGLOBIN QUANT: CPT | Performed by: HOSPITALIST

## 2023-07-29 PROCEDURE — 74177 CT ABD & PELVIS W/CONTRAST: CPT

## 2023-07-29 PROCEDURE — 84484 ASSAY OF TROPONIN QUANT: CPT | Performed by: EMERGENCY MEDICINE

## 2023-07-29 PROCEDURE — 85027 COMPLETE CBC AUTOMATED: CPT | Performed by: EMERGENCY MEDICINE

## 2023-07-29 PROCEDURE — 87637 SARSCOV2&INF A&B&RSV AMP PRB: CPT | Performed by: EMERGENCY MEDICINE

## 2023-07-29 PROCEDURE — 250N000013 HC RX MED GY IP 250 OP 250 PS 637: Performed by: EMERGENCY MEDICINE

## 2023-07-29 PROCEDURE — 83605 ASSAY OF LACTIC ACID: CPT | Performed by: EMERGENCY MEDICINE

## 2023-07-29 PROCEDURE — 36415 COLL VENOUS BLD VENIPUNCTURE: CPT | Performed by: EMERGENCY MEDICINE

## 2023-07-29 PROCEDURE — 96365 THER/PROPH/DIAG IV INF INIT: CPT | Mod: 59

## 2023-07-29 PROCEDURE — 87149 DNA/RNA DIRECT PROBE: CPT | Performed by: EMERGENCY MEDICINE

## 2023-07-29 PROCEDURE — 87077 CULTURE AEROBIC IDENTIFY: CPT | Performed by: EMERGENCY MEDICINE

## 2023-07-29 PROCEDURE — 96375 TX/PRO/DX INJ NEW DRUG ADDON: CPT

## 2023-07-29 RX ORDER — DILTIAZEM HYDROCHLORIDE 5 MG/ML
25 INJECTION INTRAVENOUS ONCE
Status: COMPLETED | OUTPATIENT
Start: 2023-07-29 | End: 2023-07-29

## 2023-07-29 RX ORDER — DILTIAZEM HYDROCHLORIDE 30 MG/1
60 TABLET, FILM COATED ORAL ONCE
Status: COMPLETED | OUTPATIENT
Start: 2023-07-29 | End: 2023-07-29

## 2023-07-29 RX ORDER — ACETAMINOPHEN 325 MG/1
650 TABLET ORAL ONCE
Status: COMPLETED | OUTPATIENT
Start: 2023-07-29 | End: 2023-07-29

## 2023-07-29 RX ORDER — ONDANSETRON 2 MG/ML
4 INJECTION INTRAMUSCULAR; INTRAVENOUS ONCE
Status: COMPLETED | OUTPATIENT
Start: 2023-07-29 | End: 2023-07-29

## 2023-07-29 RX ORDER — IOPAMIDOL 755 MG/ML
100 INJECTION, SOLUTION INTRAVASCULAR ONCE
Status: COMPLETED | OUTPATIENT
Start: 2023-07-29 | End: 2023-07-29

## 2023-07-29 RX ORDER — CEFTRIAXONE 1 G/1
1 INJECTION, POWDER, FOR SOLUTION INTRAMUSCULAR; INTRAVENOUS ONCE
Status: COMPLETED | OUTPATIENT
Start: 2023-07-29 | End: 2023-07-29

## 2023-07-29 RX ADMIN — SODIUM CHLORIDE 1000 ML: 9 INJECTION, SOLUTION INTRAVENOUS at 22:34

## 2023-07-29 RX ADMIN — DILTIAZEM HYDROCHLORIDE 25 MG: 5 INJECTION INTRAVENOUS at 22:43

## 2023-07-29 RX ADMIN — DILTIAZEM HYDROCHLORIDE 60 MG: 30 TABLET, FILM COATED ORAL at 23:09

## 2023-07-29 RX ADMIN — ONDANSETRON 4 MG: 2 INJECTION INTRAMUSCULAR; INTRAVENOUS at 22:23

## 2023-07-29 RX ADMIN — SODIUM CHLORIDE 1000 ML: 9 INJECTION, SOLUTION INTRAVENOUS at 21:27

## 2023-07-29 RX ADMIN — CEFTRIAXONE 1 G: 1 INJECTION, POWDER, FOR SOLUTION INTRAMUSCULAR; INTRAVENOUS at 21:33

## 2023-07-29 RX ADMIN — IOPAMIDOL 100 ML: 755 INJECTION, SOLUTION INTRAVENOUS at 21:56

## 2023-07-29 RX ADMIN — ACETAMINOPHEN 650 MG: 325 TABLET ORAL at 21:32

## 2023-07-29 RX ADMIN — SODIUM CHLORIDE 1000 ML: 9 INJECTION, SOLUTION INTRAVENOUS at 23:08

## 2023-07-29 ASSESSMENT — ACTIVITIES OF DAILY LIVING (ADL)
ADLS_ACUITY_SCORE: 35
ADLS_ACUITY_SCORE: 35

## 2023-07-30 ENCOUNTER — APPOINTMENT (OUTPATIENT)
Dept: ULTRASOUND IMAGING | Facility: CLINIC | Age: 58
DRG: 872 | End: 2023-07-30
Attending: HOSPITALIST
Payer: COMMERCIAL

## 2023-07-30 ENCOUNTER — APPOINTMENT (OUTPATIENT)
Dept: CARDIOLOGY | Facility: CLINIC | Age: 58
DRG: 872 | End: 2023-07-30
Attending: INTERNAL MEDICINE
Payer: COMMERCIAL

## 2023-07-30 LAB
ALBUMIN SERPL-MCNC: 2.6 G/DL (ref 3.5–5)
ALP SERPL-CCNC: 130 U/L (ref 45–120)
ALT SERPL W P-5'-P-CCNC: 97 U/L (ref 0–45)
ANION GAP SERPL CALCULATED.3IONS-SCNC: 10 MMOL/L (ref 5–18)
AST SERPL W P-5'-P-CCNC: 72 U/L (ref 0–40)
ATRIAL RATE - MUSE: 127 BPM
ATRIAL RATE - MUSE: 141 BPM
BASOPHILS # BLD AUTO: 0 10E3/UL (ref 0–0.2)
BASOPHILS # BLD AUTO: 0 10E3/UL (ref 0–0.2)
BASOPHILS NFR BLD AUTO: 0 %
BASOPHILS NFR BLD AUTO: 0 %
BILIRUB SERPL-MCNC: 4.5 MG/DL (ref 0–1)
BNP SERPL-MCNC: 307 PG/ML (ref 0–51)
BUN SERPL-MCNC: 16 MG/DL (ref 8–22)
CALCIUM SERPL-MCNC: 8.5 MG/DL (ref 8.5–10.5)
CHLORIDE BLD-SCNC: 110 MMOL/L (ref 98–107)
CO2 SERPL-SCNC: 20 MMOL/L (ref 22–31)
CREAT SERPL-MCNC: 1.07 MG/DL (ref 0.7–1.3)
DIASTOLIC BLOOD PRESSURE - MUSE: 55 MMHG
DIASTOLIC BLOOD PRESSURE - MUSE: 94 MMHG
ENTEROCOCCUS FAECALIS: DETECTED
ENTEROCOCCUS FAECIUM: NOT DETECTED
EOSINOPHIL # BLD AUTO: 0.1 10E3/UL (ref 0–0.7)
EOSINOPHIL # BLD AUTO: 0.1 10E3/UL (ref 0–0.7)
EOSINOPHIL NFR BLD AUTO: 1 %
EOSINOPHIL NFR BLD AUTO: 2 %
ERYTHROCYTE [DISTWIDTH] IN BLOOD BY AUTOMATED COUNT: 14.5 % (ref 10–15)
ERYTHROCYTE [DISTWIDTH] IN BLOOD BY AUTOMATED COUNT: 14.6 % (ref 10–15)
GFR SERPL CREATININE-BSD FRML MDRD: 80 ML/MIN/1.73M2
GLUCOSE BLD-MCNC: 144 MG/DL (ref 70–125)
GLUCOSE BLDC GLUCOMTR-MCNC: 106 MG/DL (ref 70–99)
GLUCOSE BLDC GLUCOMTR-MCNC: 120 MG/DL (ref 70–99)
GLUCOSE BLDC GLUCOMTR-MCNC: 140 MG/DL (ref 70–99)
GLUCOSE BLDC GLUCOMTR-MCNC: 152 MG/DL (ref 70–99)
HCT VFR BLD AUTO: 28.5 % (ref 40–53)
HCT VFR BLD AUTO: 28.8 % (ref 40–53)
HGB BLD-MCNC: 9.6 G/DL (ref 13.3–17.7)
HGB BLD-MCNC: 9.8 G/DL (ref 13.3–17.7)
IMM GRANULOCYTES # BLD: 0 10E3/UL
IMM GRANULOCYTES # BLD: 0 10E3/UL
IMM GRANULOCYTES NFR BLD: 1 %
IMM GRANULOCYTES NFR BLD: 1 %
INTERPRETATION ECG - MUSE: NORMAL
INTERPRETATION ECG - MUSE: NORMAL
LDH SERPL L TO P-CCNC: 226 U/L (ref 125–220)
LISTERIA SPECIES (DETECTED/NOT DETECTED): NOT DETECTED
LVEF ECHO: NORMAL
LYMPHOCYTES # BLD AUTO: 0.2 10E3/UL (ref 0.8–5.3)
LYMPHOCYTES # BLD AUTO: 0.3 10E3/UL (ref 0.8–5.3)
LYMPHOCYTES NFR BLD AUTO: 3 %
LYMPHOCYTES NFR BLD AUTO: 5 %
MAGNESIUM SERPL-MCNC: 1.7 MG/DL (ref 1.8–2.6)
MAGNESIUM SERPL-MCNC: 2 MG/DL (ref 1.8–2.6)
MCH RBC QN AUTO: 31.3 PG (ref 26.5–33)
MCH RBC QN AUTO: 31.4 PG (ref 26.5–33)
MCHC RBC AUTO-ENTMCNC: 33.7 G/DL (ref 31.5–36.5)
MCHC RBC AUTO-ENTMCNC: 34 G/DL (ref 31.5–36.5)
MCV RBC AUTO: 92 FL (ref 78–100)
MCV RBC AUTO: 93 FL (ref 78–100)
MONOCYTES # BLD AUTO: 0.5 10E3/UL (ref 0–1.3)
MONOCYTES # BLD AUTO: 0.5 10E3/UL (ref 0–1.3)
MONOCYTES NFR BLD AUTO: 8 %
MONOCYTES NFR BLD AUTO: 8 %
NEUTROPHILS # BLD AUTO: 4.7 10E3/UL (ref 1.6–8.3)
NEUTROPHILS # BLD AUTO: 5.5 10E3/UL (ref 1.6–8.3)
NEUTROPHILS NFR BLD AUTO: 84 %
NEUTROPHILS NFR BLD AUTO: 87 %
NRBC # BLD AUTO: 0 10E3/UL
NRBC # BLD AUTO: 0 10E3/UL
NRBC BLD AUTO-RTO: 0 /100
NRBC BLD AUTO-RTO: 0 /100
P AXIS - MUSE: NORMAL DEGREES
P AXIS - MUSE: NORMAL DEGREES
PLATELET # BLD AUTO: 106 10E3/UL (ref 150–450)
PLATELET # BLD AUTO: 94 10E3/UL (ref 150–450)
POTASSIUM BLD-SCNC: 3.3 MMOL/L (ref 3.5–5)
POTASSIUM BLD-SCNC: 3.5 MMOL/L (ref 3.5–5)
PR INTERVAL - MUSE: NORMAL MS
PR INTERVAL - MUSE: NORMAL MS
PROCALCITONIN SERPL-MCNC: 0.73 NG/ML (ref 0–0.49)
PROT SERPL-MCNC: 5.7 G/DL (ref 6–8)
QRS DURATION - MUSE: 102 MS
QRS DURATION - MUSE: 104 MS
QT - MUSE: 302 MS
QT - MUSE: 342 MS
QTC - MUSE: 475 MS
QTC - MUSE: 480 MS
R AXIS - MUSE: 82 DEGREES
R AXIS - MUSE: 84 DEGREES
RBC # BLD AUTO: 3.07 10E6/UL (ref 4.4–5.9)
RBC # BLD AUTO: 3.12 10E6/UL (ref 4.4–5.9)
RETICS # AUTO: 0.08 10E6/UL (ref 0.01–0.11)
RETICS/RBC NFR AUTO: 2.5 % (ref 0.8–2.7)
SODIUM SERPL-SCNC: 140 MMOL/L (ref 136–145)
STAPHYLOCOCCUS AUREUS: NOT DETECTED
STAPHYLOCOCCUS EPIDERMIDIS: NOT DETECTED
STAPHYLOCOCCUS LUGDUNENSIS: NOT DETECTED
STAPHYLOCOCCUS SPECIES: NOT DETECTED
STREPTOCOCCUS AGALACTIAE: NOT DETECTED
STREPTOCOCCUS ANGINOSUS GROUP: NOT DETECTED
STREPTOCOCCUS PNEUMONIAE: NOT DETECTED
STREPTOCOCCUS PYOGENES: NOT DETECTED
STREPTOCOCCUS SPECIES: NOT DETECTED
SYSTOLIC BLOOD PRESSURE - MUSE: 147 MMHG
SYSTOLIC BLOOD PRESSURE - MUSE: 98 MMHG
T AXIS - MUSE: -44 DEGREES
T AXIS - MUSE: -5 DEGREES
TROPONIN I SERPL-MCNC: 0.08 NG/ML (ref 0–0.29)
VENTRICULAR RATE- MUSE: 116 BPM
VENTRICULAR RATE- MUSE: 152 BPM
WBC # BLD AUTO: 5.6 10E3/UL (ref 4–11)
WBC # BLD AUTO: 6.3 10E3/UL (ref 4–11)

## 2023-07-30 PROCEDURE — 85025 COMPLETE CBC W/AUTO DIFF WBC: CPT | Performed by: INTERNAL MEDICINE

## 2023-07-30 PROCEDURE — 96375 TX/PRO/DX INJ NEW DRUG ADDON: CPT

## 2023-07-30 PROCEDURE — 250N000013 HC RX MED GY IP 250 OP 250 PS 637: Performed by: HOSPITALIST

## 2023-07-30 PROCEDURE — 85025 COMPLETE CBC W/AUTO DIFF WBC: CPT | Performed by: HOSPITALIST

## 2023-07-30 PROCEDURE — 82962 GLUCOSE BLOOD TEST: CPT

## 2023-07-30 PROCEDURE — 36415 COLL VENOUS BLD VENIPUNCTURE: CPT | Performed by: HOSPITALIST

## 2023-07-30 PROCEDURE — 85045 AUTOMATED RETICULOCYTE COUNT: CPT | Performed by: HOSPITALIST

## 2023-07-30 PROCEDURE — 80053 COMPREHEN METABOLIC PANEL: CPT | Performed by: INTERNAL MEDICINE

## 2023-07-30 PROCEDURE — 99222 1ST HOSP IP/OBS MODERATE 55: CPT | Performed by: INTERNAL MEDICINE

## 2023-07-30 PROCEDURE — 258N000003 HC RX IP 258 OP 636: Performed by: INTERNAL MEDICINE

## 2023-07-30 PROCEDURE — 250N000011 HC RX IP 250 OP 636: Mod: JZ | Performed by: INTERNAL MEDICINE

## 2023-07-30 PROCEDURE — 84484 ASSAY OF TROPONIN QUANT: CPT | Performed by: INTERNAL MEDICINE

## 2023-07-30 PROCEDURE — 96367 TX/PROPH/DG ADDL SEQ IV INF: CPT

## 2023-07-30 PROCEDURE — 85060 BLOOD SMEAR INTERPRETATION: CPT | Performed by: PATHOLOGY

## 2023-07-30 PROCEDURE — 96376 TX/PRO/DX INJ SAME DRUG ADON: CPT

## 2023-07-30 PROCEDURE — 93306 TTE W/DOPPLER COMPLETE: CPT | Mod: 26 | Performed by: INTERNAL MEDICINE

## 2023-07-30 PROCEDURE — 83880 ASSAY OF NATRIURETIC PEPTIDE: CPT | Performed by: INTERNAL MEDICINE

## 2023-07-30 PROCEDURE — G0378 HOSPITAL OBSERVATION PER HR: HCPCS

## 2023-07-30 PROCEDURE — 36415 COLL VENOUS BLD VENIPUNCTURE: CPT | Performed by: INTERNAL MEDICINE

## 2023-07-30 PROCEDURE — 84132 ASSAY OF SERUM POTASSIUM: CPT | Performed by: INTERNAL MEDICINE

## 2023-07-30 PROCEDURE — 83735 ASSAY OF MAGNESIUM: CPT | Performed by: INTERNAL MEDICINE

## 2023-07-30 PROCEDURE — 93306 TTE W/DOPPLER COMPLETE: CPT

## 2023-07-30 PROCEDURE — 83615 LACTATE (LD) (LDH) ENZYME: CPT | Performed by: HOSPITALIST

## 2023-07-30 PROCEDURE — 250N000013 HC RX MED GY IP 250 OP 250 PS 637: Performed by: INTERNAL MEDICINE

## 2023-07-30 PROCEDURE — 76705 ECHO EXAM OF ABDOMEN: CPT

## 2023-07-30 PROCEDURE — 99232 SBSQ HOSP IP/OBS MODERATE 35: CPT | Performed by: HOSPITALIST

## 2023-07-30 RX ORDER — LISINOPRIL 40 MG/1
40 TABLET ORAL DAILY
Status: DISCONTINUED | OUTPATIENT
Start: 2023-07-30 | End: 2023-07-31

## 2023-07-30 RX ORDER — ACETAMINOPHEN 325 MG/1
650 TABLET ORAL EVERY 4 HOURS PRN
Status: DISCONTINUED | OUTPATIENT
Start: 2023-07-30 | End: 2023-08-04 | Stop reason: HOSPADM

## 2023-07-30 RX ORDER — MAGNESIUM SULFATE HEPTAHYDRATE 40 MG/ML
2 INJECTION, SOLUTION INTRAVENOUS ONCE
Status: COMPLETED | OUTPATIENT
Start: 2023-07-30 | End: 2023-07-30

## 2023-07-30 RX ORDER — POTASSIUM CHLORIDE 1500 MG/1
20 TABLET, EXTENDED RELEASE ORAL ONCE
Status: COMPLETED | OUTPATIENT
Start: 2023-07-30 | End: 2023-07-30

## 2023-07-30 RX ORDER — POTASSIUM CHLORIDE 1500 MG/1
40 TABLET, EXTENDED RELEASE ORAL ONCE
Status: COMPLETED | OUTPATIENT
Start: 2023-07-30 | End: 2023-07-30

## 2023-07-30 RX ORDER — PIPERACILLIN SODIUM, TAZOBACTAM SODIUM 3; .375 G/15ML; G/15ML
3.38 INJECTION, POWDER, LYOPHILIZED, FOR SOLUTION INTRAVENOUS EVERY 8 HOURS
Status: DISCONTINUED | OUTPATIENT
Start: 2023-07-30 | End: 2023-07-30

## 2023-07-30 RX ORDER — NICOTINE POLACRILEX 4 MG
15-30 LOZENGE BUCCAL
Status: DISCONTINUED | OUTPATIENT
Start: 2023-07-30 | End: 2023-08-04 | Stop reason: HOSPADM

## 2023-07-30 RX ORDER — SODIUM CHLORIDE 9 MG/ML
INJECTION, SOLUTION INTRAVENOUS CONTINUOUS
Status: DISCONTINUED | OUTPATIENT
Start: 2023-07-30 | End: 2023-07-30

## 2023-07-30 RX ORDER — DEXTROSE MONOHYDRATE 25 G/50ML
25-50 INJECTION, SOLUTION INTRAVENOUS
Status: DISCONTINUED | OUTPATIENT
Start: 2023-07-30 | End: 2023-08-04 | Stop reason: HOSPADM

## 2023-07-30 RX ORDER — TAMSULOSIN HYDROCHLORIDE 0.4 MG/1
0.4 CAPSULE ORAL DAILY
Status: DISCONTINUED | OUTPATIENT
Start: 2023-07-30 | End: 2023-08-04 | Stop reason: HOSPADM

## 2023-07-30 RX ORDER — ONDANSETRON 4 MG/1
4 TABLET, ORALLY DISINTEGRATING ORAL EVERY 6 HOURS PRN
Status: DISCONTINUED | OUTPATIENT
Start: 2023-07-30 | End: 2023-08-04 | Stop reason: HOSPADM

## 2023-07-30 RX ORDER — PIPERACILLIN SODIUM, TAZOBACTAM SODIUM 3; .375 G/15ML; G/15ML
3.38 INJECTION, POWDER, LYOPHILIZED, FOR SOLUTION INTRAVENOUS ONCE
Status: COMPLETED | OUTPATIENT
Start: 2023-07-30 | End: 2023-07-30

## 2023-07-30 RX ORDER — PIPERACILLIN SODIUM, TAZOBACTAM SODIUM 3; .375 G/15ML; G/15ML
3.38 INJECTION, POWDER, LYOPHILIZED, FOR SOLUTION INTRAVENOUS EVERY 8 HOURS
Status: DISCONTINUED | OUTPATIENT
Start: 2023-07-30 | End: 2023-07-31

## 2023-07-30 RX ORDER — DILTIAZEM HYDROCHLORIDE 30 MG/1
60 TABLET, FILM COATED ORAL EVERY 6 HOURS SCHEDULED
Status: DISCONTINUED | OUTPATIENT
Start: 2023-07-30 | End: 2023-07-31

## 2023-07-30 RX ORDER — ACETAMINOPHEN 650 MG/1
650 SUPPOSITORY RECTAL EVERY 6 HOURS PRN
Status: DISCONTINUED | OUTPATIENT
Start: 2023-07-30 | End: 2023-08-04 | Stop reason: HOSPADM

## 2023-07-30 RX ORDER — ROSUVASTATIN CALCIUM 10 MG/1
10 TABLET, COATED ORAL DAILY
Status: DISCONTINUED | OUTPATIENT
Start: 2023-07-30 | End: 2023-08-04 | Stop reason: HOSPADM

## 2023-07-30 RX ORDER — NIFEDIPINE 30 MG/1
60 TABLET, EXTENDED RELEASE ORAL DAILY
Status: DISCONTINUED | OUTPATIENT
Start: 2023-07-30 | End: 2023-07-31

## 2023-07-30 RX ORDER — SPIRONOLACTONE 25 MG/1
25 TABLET ORAL DAILY
Status: DISCONTINUED | OUTPATIENT
Start: 2023-07-30 | End: 2023-07-31

## 2023-07-30 RX ORDER — ONDANSETRON 2 MG/ML
4 INJECTION INTRAMUSCULAR; INTRAVENOUS EVERY 6 HOURS PRN
Status: DISCONTINUED | OUTPATIENT
Start: 2023-07-30 | End: 2023-08-04 | Stop reason: HOSPADM

## 2023-07-30 RX ADMIN — DILTIAZEM HYDROCHLORIDE 60 MG: 30 TABLET, FILM COATED ORAL at 13:08

## 2023-07-30 RX ADMIN — SODIUM CHLORIDE: 9 INJECTION, SOLUTION INTRAVENOUS at 03:29

## 2023-07-30 RX ADMIN — VANCOMYCIN HYDROCHLORIDE 1250 MG: 5 INJECTION, POWDER, LYOPHILIZED, FOR SOLUTION INTRAVENOUS at 16:41

## 2023-07-30 RX ADMIN — POTASSIUM CHLORIDE 40 MEQ: 1500 TABLET, EXTENDED RELEASE ORAL at 03:27

## 2023-07-30 RX ADMIN — DILTIAZEM HYDROCHLORIDE 60 MG: 30 TABLET, FILM COATED ORAL at 04:08

## 2023-07-30 RX ADMIN — ACETAMINOPHEN 650 MG: 325 TABLET ORAL at 03:31

## 2023-07-30 RX ADMIN — POTASSIUM CHLORIDE 20 MEQ: 1500 TABLET, EXTENDED RELEASE ORAL at 13:51

## 2023-07-30 RX ADMIN — POTASSIUM CHLORIDE 40 MEQ: 1500 TABLET, EXTENDED RELEASE ORAL at 09:32

## 2023-07-30 RX ADMIN — MAGNESIUM SULFATE HEPTAHYDRATE 2 G: 40 INJECTION, SOLUTION INTRAVENOUS at 03:27

## 2023-07-30 RX ADMIN — PIPERACILLIN AND TAZOBACTAM 3.38 G: 3; .375 INJECTION, POWDER, LYOPHILIZED, FOR SOLUTION INTRAVENOUS at 16:41

## 2023-07-30 RX ADMIN — TAMSULOSIN HYDROCHLORIDE 0.4 MG: 0.4 CAPSULE ORAL at 13:51

## 2023-07-30 RX ADMIN — DILTIAZEM HYDROCHLORIDE 60 MG: 30 TABLET, FILM COATED ORAL at 18:58

## 2023-07-30 RX ADMIN — PIPERACILLIN AND TAZOBACTAM 3.38 G: 3; .375 INJECTION, POWDER, LYOPHILIZED, FOR SOLUTION INTRAVENOUS at 01:38

## 2023-07-30 RX ADMIN — PIPERACILLIN AND TAZOBACTAM 3.38 G: 3; .375 INJECTION, POWDER, LYOPHILIZED, FOR SOLUTION INTRAVENOUS at 09:32

## 2023-07-30 RX ADMIN — MAGNESIUM SULFATE HEPTAHYDRATE 2 G: 40 INJECTION, SOLUTION INTRAVENOUS at 09:32

## 2023-07-30 RX ADMIN — VANCOMYCIN HYDROCHLORIDE 2000 MG: 5 INJECTION, POWDER, LYOPHILIZED, FOR SOLUTION INTRAVENOUS at 04:30

## 2023-07-30 RX ADMIN — ONDANSETRON 4 MG: 2 INJECTION INTRAMUSCULAR; INTRAVENOUS at 13:13

## 2023-07-30 RX ADMIN — ACETAMINOPHEN 650 MG: 325 TABLET ORAL at 13:54

## 2023-07-30 ASSESSMENT — ACTIVITIES OF DAILY LIVING (ADL)
CHANGE_IN_FUNCTIONAL_STATUS_SINCE_ONSET_OF_CURRENT_ILLNESS/INJURY: NO
ADLS_ACUITY_SCORE: 22
FALL_HISTORY_WITHIN_LAST_SIX_MONTHS: NO
DRESSING/BATHING_DIFFICULTY: NO
WALKING_OR_CLIMBING_STAIRS_DIFFICULTY: NO
CONCENTRATING,_REMEMBERING_OR_MAKING_DECISIONS_DIFFICULTY: NO
ADLS_ACUITY_SCORE: 22
DOING_ERRANDS_INDEPENDENTLY_DIFFICULTY: NO
ADLS_ACUITY_SCORE: 22
TOILETING: 0-->INDEPENDENT
TOILETING: 0-->INDEPENDENT
TOILETING_ISSUES: NO
ADLS_ACUITY_SCORE: 22
DIFFICULTY_EATING/SWALLOWING: NO
ADLS_ACUITY_SCORE: 35
ADLS_ACUITY_SCORE: 22
VISION_MANAGEMENT: GLASSES
WEAR_GLASSES_OR_BLIND: YES
ADLS_ACUITY_SCORE: 35
ADLS_ACUITY_SCORE: 22
HEARING_DIFFICULTY_OR_DEAF: NO
DIFFICULTY_COMMUNICATING: NO

## 2023-07-30 NOTE — ED TRIAGE NOTES
Patient reports bilateral stone removal on 7/26/23 with stent placement. Procedure completed at [facility]. Patient reports feeling feel on 7/27/23- body aches, chills, generalized abdominal discomfort      Triage Assessment       Row Name 07/29/23 2002       Triage Assessment (Adult)    Airway WDL WDL       Respiratory WDL    Respiratory WDL WDL       Skin Circulation/Temperature WDL    Skin Circulation/Temperature WDL WDL       Cardiac WDL    Cardiac WDL WDL       Peripheral/Neurovascular WDL    Peripheral Neurovascular WDL WDL       Cognitive/Neuro/Behavioral WDL    Cognitive/Neuro/Behavioral WDL WDL

## 2023-07-30 NOTE — PLAN OF CARE
"Vitals stable- pt remains in afib throughout shift, rate controlled around 80-95bpm. Afebrile throughout shift. Patient intermittently becoming sweaty and feeling \"chills\". Zofran given x1 for nausea, PRN tylenol for dysuria. Receiving oral diltiazem Q6h. Saline locked. On vanco and zosyn.     Patient is having some urinary incontinence. Frequency of urination and only voiding 100-200 ml at a time. Urine is very dark curt in color, he states all of these symptoms are new since stent placement.     Pt scheduled for abdominal US at 2130. Has been NPO since 1300 today for this.     At 1850 received a call from lab that patient has positive blood cultures, growing gram + cocci in pairs and chains. MD paged.   "

## 2023-07-30 NOTE — ED PROVIDER NOTES
EMERGENCY DEPARTMENT ENCOUNTER      NAME: Govind Alexandre  AGE: 57 year old male  YOB: 1965  MRN: [id number]  EVALUATION DATE & TIME: 7/29/2023  8:07 PM    PCP: Lachelle Summers    ED PROVIDER: Remi Lamas M.D.      Chief Complaint   Patient presents with    Fever         FINAL IMPRESSION:  Complicated UTI  Atrial fibrillation with RVR      ED COURSE & MEDICAL DECISION MAKING:    Pertinent Labs & Imaging studies reviewed. (See chart for details)  57 year old male presents to the Emergency Department for evaluation of fevers and chills.  Per review of records patient underwent bilateral ureteroscopy with stone removal on 7/26/2023.  Patient also had bilateral ureteral stents placed.  On Thursday he had low-grade temperature elevation approximately 100 but otherwise felt well.  Today started feeling more fevers temperature went up to 102 prompting evaluation tonight.  He denies any chest pain, cough, shortness of breath.  Does not report feeling weak.  On arrival patient temperature is slightly more than 102.  Patient with rapid heart rate on the monitor suggestive of sinus tachycardia.  Initial blood pressure approximately 120 systolic.  Patient reports continued blood in his urine but no obvious discomfort urination.  However presentation consistent with complications to recent interventions and likely complex urinary tract infection, SIRS, sepsis.  Tylenol given for fevers.  Blood cultures ordered.  Baseline blood work being obtained.  CT imaging being obtained to assess for complications of his interventions.  Intravenous fluids initiated.  Exam is unremarkable other than mild left CVA tenderness.  Abdomen is soft and otherwise nontender.  Does not suggest perforation.  Patient appears non toxic with stable vitals signs.     8:45 PM I met with the patient for the initial interview and physical examination. Discussed plan for treatment and workup in the ED.    9:30 PM.  EKG reveals atrial  fibrillation with RVR.  Patient with prior history of atrial fibrillation.  States his previous episode was in 2015.   10 PM.  Urine analysis with evidence of infection.  Patient had received intravenous Rocephin on arrival over concerns of infectious process.  White cell count normal.  Hematocrit slightly reduced at 32.5.  Laboratory evaluation minimal hypokalemia.  Glucose normal.  No evidence of acidosis.  Lactic acid also normal..  Patient's blood pressure is soft and slightly.  Additional liter bolus of normal saline being given.    10:40 PM.  Intravenous diltiazem and oral diltiazem ordered for rate control with patient's paroxysmal atrial fibrillation.  10:50 PM.  Blood pressure is normal.  Heart rate has declined approximately 105 after diltiazem.  CT imaging with evidence of his recent stent placement but no obvious significant Complications.  Minimal inflammatory changes in right kidney but none in the left for patient has  tenderness.  Plan will be for hospitalization with continued antibiosis and rate control.    11:16 PM.  Patient discussed with urology at [facility].  Recommends hospitalization with antibiotics.  With stents in good position no additional measures needed at this time.  Patient now reporting some slight chest pressure.  Repeat EKG is unremarkable.  Troponin added.  We will proceed with plans for hospitalization.  Call placed to the admitting physician.  11:24 PM.  Discussed with the admitting physician.  Agree with plan.    At the conclusion of the encounter I discussed the results of all of the tests and the disposition. The questions were answered and return precautions provided. The patient or family acknowledged understanding and was agreeable with the care plan.       PPE: Provider wore gloves.     Medical Decision Making    History:  Supplemental history from: Documented in chart, if applicable  External Record(s) reviewed: Documented in chart, if applicable.    Work  Up:  Chart documentation includes differential considered and any EKGs or imaging independently interpreted by provider, where specified.  In additional to work up documented, I considered the following work up: Documented in chart, if applicable.    External consultation:  Discussion of management with another provider: Documented in chart, if applicable    Complicating factors:  Care impacted by chronic illness: Diabetes, Heart Disease, Hyperlipidemia, and Hypertension  Care affected by social determinants of health: N/A    Disposition considerations: Admit.        MEDICATIONS GIVEN IN THE EMERGENCY:  Medications   cefTRIAXone (ROCEPHIN) 1 g vial to attach to  mL bag for ADULTS or NS 50 mL bag for PEDS (has no administration in time range)   acetaminophen (TYLENOL) tablet 650 mg (has no administration in time range)   0.9% sodium chloride BOLUS (has no administration in time range)   ondansetron (ZOFRAN) injection 4 mg (has no administration in time range)       NEW PRESCRIPTIONS STARTED AT TODAY'S ER VISIT  New Prescriptions    No medications on file          =================================================================    HPI    Patient information was obtained from: patient    Use of Intrepreter: N/A      Govind Alexandre is a 57 year old male with a pertient medical history of atrial fibrillation, hypertension, hyperlipidemia, type II diabetes who presents to the ED for evaluation of fever.    The patient presented that on Thursday he had a fever which has persisted. He reported that tonight it was 101. He also states that he has nausea and blood in his urine. He has taken tylenol for his pain. He reports he had a kidney stone removed on 7/26.    He reports he had COVID-19 about 2 years. He is COVID-19 vaccinated. He denies diarrhea, vomiting, cough, and abdominal pain. No other acute symptoms presented.       REVIEW OF SYSTEMS   Constitutional:  Denies chills Positive for fever  Respiratory:  Denies  productive cough or increased work of breathing  Cardiovascular:  Denies chest pain, palpitations  GI:  Denies abdominal pain, vomiting, or change in bowel. Positive for nausea and blood in urine  Musculoskeletal:  Denies any new muscle/joint swelling  Skin:  Denies rash   Neurologic:  Denies focal weakness  All systems negative except as marked.     PAST MEDICAL HISTORY:  Past Medical History:   Diagnosis Date    Depression 12/24/2014    Essential hypertension     Infection due to 2019 novel coronavirus 01/11/2022    Multiple thyroid nodules 09/08/2016    Type 2 diabetes mellitus without complication (H) 11/07/2016       PAST SURGICAL HISTORY:  Past Surgical History:   Procedure Laterality Date    BIOPSY  9/18/16    CARPAL TUNNEL RELEASE RT/LT Right     COLONOSCOPY  6/15/18    COMBINED CYSTOSCOPY, RETROGRADES, URETEROSCOPY, LASER HOLMIUM LITHOTRIPSY URETER(S), INSERT STENT Bilateral 7/26/2023    Procedure: cystoscopy, bilateral ureteroscopy with holmium laser lithotripsy, bilateral ureteroscopy with stone basketing, bilateral retrograde pyelogram, bilateral ureteral stent placement;  Surgeon: Mark Orta MD;  Location:  OR     REPAIR ROTATOR CUFF,ACUTE      Description: Rotator Cuff Repair Acute;  Recorded: 01/13/2010;  Comments: cortisone    PICC  11/30/2015         REMOVAL OF SPERM DUCT(S)      Description: Surgery Of Male Genitalia Vasectomy;  Recorded: 01/13/2010;    WISDOM TOOTH EXTRACTION           CURRENT MEDICATIONS:      Current Facility-Administered Medications:     0.9% sodium chloride BOLUS, 1,000 mL, Intravenous, Once, Remi Lamas MD    acetaminophen (TYLENOL) tablet 650 mg, 650 mg, Oral, Once, Remi Lamas MD    cefTRIAXone (ROCEPHIN) 1 g vial to attach to  mL bag for ADULTS or NS 50 mL bag for PEDS, 1 g, Intravenous, Once, Remi Lamas MD    ondansetron (ZOFRAN) injection 4 mg, 4 mg, Intravenous, Once, Remi Lamas MD    Current Outpatient Medications:     ACCU-CHEK  FASTCLIX LANCET DRUM, [ACCU-CHEK FASTCLIX LANCET DRUM] USE TO TEST BLOOD SUGARS 2 TO 3 TIMES A DAY, Disp: 300 each, Rfl: 4    acetaminophen (TYLENOL) 500 MG tablet, Take 2 tablets (1,000 mg) by mouth every 6 hours as needed for mild pain, Disp: 100 tablet, Rfl: 0    aspirin 81 mg chewable tablet, [ASPIRIN 81 MG CHEWABLE TABLET] Chew 81 mg daily., Disp: , Rfl:     blood glucose test (ACCU-CHEK NATHANIEL PLUS TEST STRP) strips, [BLOOD GLUCOSE TEST (ACCU-CHEK NATHANIEL PLUS TEST STRP) STRIPS] Use 1 each As Directed 3 (three) times a day., Disp: 300 each, Rfl: 3    docusate sodium (COLACE) 100 MG capsule, Take 1 capsule (100 mg) by mouth 2 times daily, Disp: 30 capsule, Rfl: 0    fexofenadine (ALLEGRA) 180 MG tablet, [FEXOFENADINE (ALLEGRA) 180 MG TABLET] Take 180 mg by mouth daily., Disp: , Rfl:     ibuprofen (ADVIL/MOTRIN) 800 MG tablet, Take 1 tablet (800 mg) by mouth every 6 hours as needed, Disp: 50 tablet, Rfl: 0    lisinopril (ZESTRIL) 40 MG tablet, Take 1 tablet (40 mg) by mouth daily, Disp: 90 tablet, Rfl: 3    metFORMIN (GLUCOPHAGE XR) 500 MG 24 hr tablet, Take 2 tablets (1,000 mg) by mouth 2 times daily (with meals), Disp: 360 tablet, Rfl: 3    NIFEdipine ER OSMOTIC (PROCARDIA XL) 60 MG 24 hr tablet, Take 1 tablet (60 mg) by mouth daily, Disp: 90 tablet, Rfl: 3    ondansetron (ZOFRAN ODT) 4 MG ODT tab, Take 1 tablet (4 mg) by mouth every 8 hours as needed for nausea, Disp: 21 tablet, Rfl: 0    oxyCODONE (ROXICODONE) 5 MG tablet, Take 1 tablet (5 mg) by mouth every 6 hours as needed for severe pain, Disp: 6 tablet, Rfl: 0    rosuvastatin (CRESTOR) 10 MG tablet, TAKE 1 TABLET(10 MG) BY MOUTH AT BEDTIME Strength: 10 mg, Disp: 90 tablet, Rfl: 3    Semaglutide, 1 MG/DOSE, (OZEMPIC, 1 MG/DOSE,) 4 MG/3ML SOPN, Inject 1 mg Subcutaneous once a week, Disp: 9 mL, Rfl: 3    spironolactone (ALDACTONE) 25 MG tablet, Take 1 tablet (25 mg) by mouth daily, Disp: 90 tablet, Rfl: 3    tadalafil (CIALIS) 10 MG tablet, Take 1 tablet (10  mg) by mouth daily as needed (erectile dsyfunction) 10 mg as a single dose 30 minutes prior to anticipated sexual activity; do not take more than once daily.  Adjust dose based on effectiveness and tolerability; may decrease to 5 mg or increase to 20 mg per dose., Disp: 30 tablet, Rfl: 3    tamsulosin (FLOMAX) 0.4 MG capsule, Take 1 capsule (0.4 mg) by mouth daily While the stent is in place, for stent pain and irritation, Disp: 7 capsule, Rfl: 0    ALLERGIES:  No Known Allergies    FAMILY HISTORY:  Family History   Problem Relation Age of Onset    Heart Disease Mother     Kidney Disease Mother     Thyroid Cancer Father     Kidney Disease Father     Heart Disease Father     Hypertension Father     Thyroid Disease Father         cancer    No Known Problems Sister     Hypertension Brother     Depression Daughter     Anxiety Disorder Daughter     Autism Spectrum Disorder Son     Heart Disease Paternal Grandmother     Hypertension Sister        SOCIAL HISTORY:   Social History     Socioeconomic History    Marital status:    Tobacco Use    Smoking status: Former     Types: Dip, chew, snus or snuff    Smokeless tobacco: Former     Types: Chew     Quit date: 12/24/2004   Vaping Use    Vaping Use: Never used   Substance and Sexual Activity    Alcohol use: Yes     Comment: Alcoholic Drinks/day: 1 every 2 weeks    Drug use: No    Sexual activity: Yes     Partners: Female     Birth control/protection: None       VITALS:  Patient Vitals for the past 24 hrs:   BP Temp Temp src Pulse Resp SpO2 Height Weight   07/29/23 2004 (!) 149/71 (!) 102.8  F (39.3  C) Oral 118 16 93 % 1.829 m (6') 124.7 kg (275 lb)        PHYSICAL EXAM    Constitutional:  Awake, alert, Mild distress  HENT:  Normocephalic, Atraumatic. Bilateral external ears normal. Oropharynx moist. Nose normal. Neck- Normal range of motion with no guarding, No midline cervical tenderness, Supple, No stridor.   Eyes:  PERRL, EOMI with no signs of entrapment,  Conjunctiva normal, No discharge.   Respiratory:  Normal breath sounds, No respiratory distress, No wheezing.    Cardiovascular:  Tachycardic, Normal rhythm, No appreciable rubs or gallops.   GI:  Soft, No tenderness, No distension, No palpable masses  Musculoskeletal:  Intact distal pulses, No edema. Good range of motion in all major joints. No major deformities noted. Left CVA tenderness  Integument:  Warm, Dry, No erythema, No rash.   Neurologic:  Alert & oriented, Normal motor function, Normal sensory function, No focal deficits noted.   Psychiatric:  Affect normal, Judgment normal, Mood normal.     LAB:  All pertinent labs reviewed and interpreted.  Results for orders placed or performed during the hospital encounter of 07/29/23   CT Abdomen Pelvis w Contrast     Status: None    Narrative    EXAM: CT ABDOMEN PELVIS W CONTRAST  LOCATION: [facility]  DATE: 7/29/2023    INDICATION: Fevers post bilateral stent placement  COMPARISON: CT from 07/08/2023  TECHNIQUE: CT scan of the abdomen and pelvis was performed following injection of IV contrast. Multiplanar reformats were obtained. Dose reduction techniques were used.  CONTRAST: 100ml Isovue 370    FINDINGS:   LOWER CHEST: Normal.    HEPATOBILIARY: Diffuse hepatic steatosis. Normal gallbladder.    PANCREAS: Normal.    SPLEEN: The spleen measures 18 cm craniocaudal, previously 15 cm.    ADRENAL GLANDS: Normal.    KIDNEYS/BLADDER: Bilateral nephroureteral stents in place, proximal pigtails in the right upper pole calyx and left renal pelvis. There is some inflammatory change about the right renal pelvis. There is no residual hydronephrosis. Stents terminate in the   bladder. No perinephric collection. Hyperdense foci in the lower poles of the renal collecting system are new and may reflect early calyceal tip contrast excretion.    BOWEL: No bowel obstruction or free air. No focal bowel wall thickening. Diffuse colonic diverticulosis. Normal  appendix.    LYMPH NODES: Some mild edema in the central mesentery adjacent to subcentimeter lymph nodes.    VASCULATURE: Unremarkable.    PELVIC ORGANS: Prostate is mildly enlarged.    MUSCULOSKELETAL: L4-L5 degenerative disc height loss. Arthritic joint space loss of the hips.      Impression    IMPRESSION:   1.  Interval placement of bilateral nephroureteral stents. No residual hydronephrosis or drainable perinephric collections. There is some persistent inflammation about the right renal pelvis.    2.  Increased splenomegaly.    3.  Mild haziness/edema adjacent to some nonenlarged mesenteric lymph nodes, likely reactive.   XR Chest Port 1 View     Status: None    Narrative    EXAM: XR CHEST PORT 1 VIEW  LOCATION: [facility]  DATE: 7/29/2023    INDICATION: Fevers  COMPARISON: 11/30/2015      Impression    IMPRESSION: Heart size within normal limits. No evidence of pneumonia. No pneumothorax or pleural effusion.   [place] Draw     Status: None    Narrative    The following orders were created for panel order [place] Draw.  Procedure                               Abnormality         Status                     ---------                               -----------         ------                     Extra Blood Culture Bottle[[id number]]                       Final result               Extra Blue Top Tube[[id number]]                              Final result               Extra Red Top Tube[[id number]]                               Final result               Extra Green Top (Lithium...[[id number]]                      Final result               Extra Purple Top Tube[[id number]]                            Final result               Extra Green Top (Lithium...[[id number]]                      Final result                 Please view results for these tests on the individual orders.   Extra Blood Culture Bottle     Status: None   Result Value Ref Range    Hold Specimen JIC    Extra Blue Top Tube      Status: None   Result Value Ref Range    Hold Specimen JIC    Extra Red Top Tube     Status: None   Result Value Ref Range    Hold Specimen JIC    Extra Green Top (Lithium Heparin) Tube     Status: None   Result Value Ref Range    Hold Specimen JIC    Extra Purple Top Tube     Status: None   Result Value Ref Range    Hold Specimen JIC    Extra Green Top (Lithium Heparin) ON ICE     Status: None   Result Value Ref Range    Hold Specimen JIC    Basic metabolic panel     Status: Abnormal   Result Value Ref Range    Sodium 139 136 - 145 mmol/L    Potassium 3.4 (L) 3.5 - 5.0 mmol/L    Chloride 106 98 - 107 mmol/L    Carbon Dioxide (CO2) 23 22 - 31 mmol/L    Anion Gap 10 5 - 18 mmol/L    Urea Nitrogen 14 8 - 22 mg/dL    Creatinine 1.05 0.70 - 1.30 mg/dL    Calcium 9.6 8.5 - 10.5 mg/dL    Glucose 147 (H) 70 - 125 mg/dL    GFR Estimate 82 >60 mL/min/1.73m2   Lactic acid whole blood     Status: Normal   Result Value Ref Range    Lactic Acid 1.1 0.7 - 2.0 mmol/L   CBC (+ platelets, no diff)     Status: Abnormal   Result Value Ref Range    WBC Count 6.3 4.0 - 11.0 10e3/uL    RBC Count 3.54 (L) 4.40 - 5.90 10e6/uL    Hemoglobin 11.1 (L) 13.3 - 17.7 g/dL    Hematocrit 32.5 (L) 40.0 - 53.0 %    MCV 92 78 - 100 fL    MCH 31.4 26.5 - 33.0 pg    MCHC 34.2 31.5 - 36.5 g/dL    RDW 14.4 10.0 - 15.0 %    Platelet Count 106 (L) 150 - 450 10e3/uL   UA with Microscopic reflex to Culture     Status: Abnormal    Specimen: Urine, Clean Catch   Result Value Ref Range    Color Urine Dark Yellow (A) Colorless, Straw, Light Yellow, Yellow    Appearance Urine Turbid (A) Clear    Glucose Urine Negative Negative mg/dL    Bilirubin Urine 2.0 mg/dL (A) Negative    Ketones Urine 10 (A) Negative mg/dL    Specific Gravity Urine 1.021 1.001 - 1.030    Blood Urine >1.0 mg/dL (A) Negative    pH Urine 6.0 5.0 - 7.0    Protein Albumin Urine 200 (A) Negative mg/dL    Urobilinogen Urine >12.0 (A) <2.0 mg/dL    Nitrite Urine Negative Negative    Leukocyte  Esterase Urine 500 Sherrie/uL (A) Negative    Bacteria Urine Few (A) None Seen /HPF    Mucus Urine Present (A) None Seen /LPF    RBC Urine >182 (H) <=2 /HPF    WBC Urine >182 (H) <=5 /HPF    Narrative    Urine Culture ordered based on laboratory criteria   Symptomatic Influenza A/B, RSV, & SARS-CoV2 PCR (COVID-19) Nasopharyngeal     Status: Normal    Specimen: Nasopharyngeal; Swab   Result Value Ref Range    Influenza A PCR Negative Negative    Influenza B PCR Negative Negative    RSV PCR Negative Negative    SARS CoV2 PCR Negative Negative    Narrative    Testing was performed using the Xpert Xpress CoV2/Flu/RSV Assay on the Cepheid GeneXpert Instrument. This test should be ordered for the detection of SARS-CoV-2, influenza, and RSV viruses in individuals who meet clinical and/or epidemiological criteria. Test performance is unknown in asymptomatic patients. This test is for in vitro diagnostic use under the FDA EUA for laboratories certified under CLIA to perform high or moderate complexity testing. This test has not been FDA cleared or approved. A negative result does not rule out the presence of PCR inhibitors in the specimen or target RNA in concentration below the limit of detection for the assay. If only one viral target is positive but coinfection with multiple targets is suspected, the sample should be re-tested with another FDA cleared, approved, or authorized test, if coinfection would change clinical management. This test was validated by the [email or web address]. These laboratories are certified under the Clinical Laboratory Improvement Amendments of 1988 (CLIA-88) as qualified to perform high complexity laboratory testing.   [place] Draw *Canceled*     Status: None ()    Narrative    The following orders were created for panel order [place] Draw.  Procedure                               Abnormality         Status                     ---------                               -----------          ------                       Please view results for these tests on the individual orders.   [place] Draw *Canceled*     Status: None ()    Narrative    The following orders were created for panel order [place] Draw.  Procedure                               Abnormality         Status                     ---------                               -----------         ------                     Extra Blood Culture Bottle[[id number]]                                                    Please view results for these tests on the individual orders.        RADIOLOGY:  Reviewed all pertinent imaging. Please see official radiology report.      EKG:    Atrial fibrillation with RVR.  Rate of 152.  Normal QRS.  Slight ST segment depressions in inferior leads which are new compared to tracing July 25, 2019.  Atrial fibrillation is also replaced normal sinus rhythm    EKG #2.  Atrial fibrillation RVR.  Rate of 116.  Normal QRS.  Normal ST segments.  Previous ST segment depressions resolved.  I have independently reviewed and interpreted the EKG(s) documented above.    PROCEDURES:          I, Deborah Jaimes, am serving as a scribe to document services personally performed by Remi Lamas MD, based on my observation and the provider's statements to me. I, Remi Lamas MD attest that Deborah Jaimes is acting in a scribe capacity, has observed my performance of the services and has documented them in accordance with my direction.    Remi Lamas M.D.  Emergency Medicine  [facility] EMERGENCY ROOM     Remi Lamas MD  07/29/23 [id number]

## 2023-07-30 NOTE — H&P
[facility] MEDICINE ADMISSION HISTORY AND PHYSICAL       Assessment & Plan      1. Fever, shaking chills, low platelets, sepsis concerns from UTI/acute pyelonephritis    Had recent Cystoscopy, bilateral ureteroscopy with holmium laser lithotripsy  bilateral ureteroscopy with stone basketing, blateral ureteral stent placement    - IVF, zosyn, pain meds, anti emetics  - Urology ref  - CBC/CMP in AM  - Blood cultures    2. Afib - ?new onset  VS post-surgical Afib vs ischemic Afib.     Back when he had TBI  several years ago in 2015- he was told had Afib.     He mentioned he had chest pressure while in ED. Resolved now. No history of CAD.     - tele and rate controller - was given 60 mgs of cardizem in ED - HR of 105 now and still afib  - Will consider cardiology - may need cardioversion  - ECHO in AM  - trop and electrolytes with rep as needed     3. Essential hypertension and HL   - PTA meds    4. DM2   - PTA meds    5. Sleep apnea, obesity - CPAP    6. History of Traumatic brain injury with loss of consciousness, sequela (H)    7. Mild elev of AST/ALT - CT showed - Diffuse hepatic steatosis. Normal gallbladder.   - repeat LTFTs if continue to rise consider GI eval    326A -- Fever back at 101. Procal of 0.73. Will add  vancomycin given urologic procedure done recently. HR still poorly controled -- Cardizem 60 mgs q6 - with holding parameters      VTE prophylaxis --  SCDs  Diet --  DM2  Code Status -- Full,   Barriers to discharge -- admitting clinical condition  Discharge Disposition and goals --  Unable to determine at this point, pending clinical progress and response to treatment. Patient may need transfer to SNF or ACR if unsafe to go home and needed treatment inappropriate at home setting OR may need home health care evaluation if care can be delivered at home settings. Consider referral to care manager/    PPE - I was wearing PPE when I met the patient including but not  limited to - N95 mask, Gloves, and/or Safety glasses.  Admission Status -- Observation     Care plan was created based on available information and patient's condition at the time of encounter. This was discussed with the patient and/or family members using layman's terms, including counseling/education and they have agreed to proceed. I recommend to revise care plan and to review history if there is change in condition and/or new clinical information that is not available during my encounter. At the end of night shift, this case will be presented to the [facility] Hospitalist.       80 minutes spent by me on the date of service doing chart review, history, exam, diagnostic test results interpretation, documentation & further activities per the note.        Eleazar Mcdaniels MD, MPH, FACP, [facility]  Internal Medicine - Hospitalist        Chief Complaint Fever      HISTORY     - I met the patient in ED - 11. He is here with fever, shaking chills, dysuria, gross hematuria. He felt miserable. He also felt sweaty. He was on antibiotics for UTI last week.     - Last July 26, 2023 - Had Cystoscopy, lithotripsy, bilateral ureteroscopy with stone basketing, and bilateral ureteral stent placement    - ED course - CT today -- 1.  Interval placement of bilateral nephroureteral stents. No residual hydronephrosis or drainable perinephric collections. There is some persistent inflammation about the right renal pelvis.     - WBC is normal. Urology rec to start antibiotics. Noted also afib and was givenc cardizem 60 mgs po.       - ROS --- No headache. No dizziness. No weakness. No palpitations. No abdominal pain. No nausea or vomiting. No bleeding symptoms. No weight loss. Rest of 12 point ROS was reviewed and negative.       Past Medical History     Past Medical History:   Diagnosis Date    Depression 12/24/2014    Essential hypertension     Infection due to 2019 novel coronavirus 01/11/2022    Multiple thyroid nodules 09/08/2016     Type 2 diabetes mellitus without complication (H) 11/07/2016         Surgical History     Past Surgical History:   Procedure Laterality Date    BIOPSY  9/18/16    CARPAL TUNNEL RELEASE RT/LT Right     COLONOSCOPY  6/15/18    COMBINED CYSTOSCOPY, RETROGRADES, URETEROSCOPY, LASER HOLMIUM LITHOTRIPSY URETER(S), INSERT STENT Bilateral 7/26/2023    Procedure: cystoscopy, bilateral ureteroscopy with holmium laser lithotripsy, bilateral ureteroscopy with stone basketing, bilateral retrograde pyelogram, bilateral ureteral stent placement;  Surgeon: Mark Orta MD;  Location: [facility] OR    HC REPAIR ROTATOR CUFF,ACUTE      Description: Rotator Cuff Repair Acute;  Recorded: 01/13/2010;  Comments: cortisone    PICC  11/30/2015         REMOVAL OF SPERM DUCT(S)      Description: Surgery Of Male Genitalia Vasectomy;  Recorded: 01/13/2010;    WISDOM TOOTH EXTRACTION          Family History      Family History   Problem Relation Age of Onset    Heart Disease Mother     Kidney Disease Mother     Thyroid Cancer Father     Kidney Disease Father     Heart Disease Father     Hypertension Father     Thyroid Disease Father         cancer    No Known Problems Sister     Hypertension Brother     Depression Daughter     Anxiety Disorder Daughter     Autism Spectrum Disorder Son     Heart Disease Paternal Grandmother     Hypertension Sister          Social History      .  Social History     Socioeconomic History    Marital status:      Spouse name: Not on file    Number of children: Not on file    Years of education: Not on file    Highest education level: Not on file   Occupational History    Not on file   Tobacco Use    Smoking status: Former     Types: Dip, chew, snus or snuff    Smokeless tobacco: Former     Types: Chew     Quit date: 12/24/2004   Vaping Use    Vaping Use: Never used   Substance and Sexual Activity    Alcohol use: Yes     Comment: Alcoholic Drinks/day: 1 every 2 weeks    Drug use: No    Sexual activity: Yes      Partners: Female     Birth control/protection: None   Other Topics Concern    Not on file   Social History Narrative    Not on file     Social Determinants of Health     Financial Resource Strain: Not on file   Food Insecurity: Not on file   Transportation Needs: Not on file   Physical Activity: Not on file   Stress: Not on file   Social Connections: Not on file   Intimate Partner Violence: Not on file   Housing Stability: Not on file          Allergies      No Known Allergies      Prior to Admission Medications      No current facility-administered medications on file prior to encounter.  ACCU-CHEK FASTCLIX LANCET DRUM, [ACCU-CHEK FASTCLIX LANCET DRUM] USE TO TEST BLOOD SUGARS 2 TO 3 TIMES A DAY  acetaminophen (TYLENOL) 500 MG tablet, Take 2 tablets (1,000 mg) by mouth every 6 hours as needed for mild pain  aspirin 81 mg chewable tablet, [ASPIRIN 81 MG CHEWABLE TABLET] Chew 81 mg daily.  blood glucose test (ACCU-CHEK NATHANIEL PLUS TEST STRP) strips, [BLOOD GLUCOSE TEST (ACCU-CHEK NATHANIEL PLUS TEST STRP) STRIPS] Use 1 each As Directed 3 (three) times a day.  docusate sodium (COLACE) 100 MG capsule, Take 1 capsule (100 mg) by mouth 2 times daily  fexofenadine (ALLEGRA) 180 MG tablet, [FEXOFENADINE (ALLEGRA) 180 MG TABLET] Take 180 mg by mouth daily.  ibuprofen (ADVIL/MOTRIN) 800 MG tablet, Take 1 tablet (800 mg) by mouth every 6 hours as needed  lisinopril (ZESTRIL) 40 MG tablet, Take 1 tablet (40 mg) by mouth daily  metFORMIN (GLUCOPHAGE XR) 500 MG 24 hr tablet, Take 2 tablets (1,000 mg) by mouth 2 times daily (with meals)  NIFEdipine ER OSMOTIC (PROCARDIA XL) 60 MG 24 hr tablet, Take 1 tablet (60 mg) by mouth daily  ondansetron (ZOFRAN ODT) 4 MG ODT tab, Take 1 tablet (4 mg) by mouth every 8 hours as needed for nausea  oxyCODONE (ROXICODONE) 5 MG tablet, Take 1 tablet (5 mg) by mouth every 6 hours as needed for severe pain  rosuvastatin (CRESTOR) 10 MG tablet, TAKE 1 TABLET(10 MG) BY MOUTH AT BEDTIME Strength: 10  mg  Semaglutide, 1 MG/DOSE, (OZEMPIC, 1 MG/DOSE,) 4 MG/3ML SOPN, Inject 1 mg Subcutaneous once a week  spironolactone (ALDACTONE) 25 MG tablet, Take 1 tablet (25 mg) by mouth daily  tadalafil (CIALIS) 10 MG tablet, Take 1 tablet (10 mg) by mouth daily as needed (erectile dsyfunction) 10 mg as a single dose 30 minutes prior to anticipated sexual activity; do not take more than once daily.  Adjust dose based on effectiveness and tolerability; may decrease to 5 mg or increase to 20 mg per dose.  tamsulosin (FLOMAX) 0.4 MG capsule, Take 1 capsule (0.4 mg) by mouth daily While the stent is in place, for stent pain and irritation            Review of Systems     A 12 point comprehensive review of systems was negative except as noted above in HPI.    PHYSICAL EXAMINATION       Vitals      Vitals: BP 97/54   Pulse 100   Temp 100.4  F (38  C) (Oral)   Resp 26   Ht 1.829 m (6')   Wt 124.7 kg (275 lb)   SpO2 93%   BMI 37.30 kg/m    BMI= Body mass index is 37.3 kg/m .      Examination     General Appearance:  Alert, cooperative, no distress  Head:    Normocephalic, without obvious abnormality, atraumatic  EENT:  PERRL, conjunctiva/corneas clear, EOM's intact.   Neck:   Supple, symmetrical, trachea midline, no adenopathy; no NVE  Back:  Symmetric, no curvature, no CVA tenderness  Chest/Lungs: Clear to auscultation bilaterally, respirations unlabored, No tenderness or deformity. No abdominal breathing or use of accessory muscles.   Heart:    Regular rate and rhythm, S1 and S2 normal, no murmur, rub   or gallop  Abdomen: Soft, non-tender, bowel sounds active all four quadrants, not peritoneal on palpation. Not distended  Extremities:  Extremities normal, atraumatic, no swelling   Skin:  Skin color, texture, turgor normal, no rashes or lesion  Neurologic:  Awake and alert, No lateralizing or localizing signs     Pertinent Lab     Results for orders placed or performed during the hospital encounter of 07/29/23   CT Abdomen  Pelvis w Contrast    Impression    IMPRESSION:   1.  Interval placement of bilateral nephroureteral stents. No residual hydronephrosis or drainable perinephric collections. There is some persistent inflammation about the right renal pelvis.    2.  Increased splenomegaly.    3.  Mild haziness/edema adjacent to some nonenlarged mesenteric lymph nodes, likely reactive.   XR Chest Port 1 View    Impression    IMPRESSION: Heart size within normal limits. No evidence of pneumonia. No pneumothorax or pleural effusion.   Extra Blood Culture Bottle   Result Value Ref Range    Hold Specimen JIC    Extra Blue Top Tube   Result Value Ref Range    Hold Specimen JIC    Extra Red Top Tube   Result Value Ref Range    Hold Specimen JIC    Extra Green Top (Lithium Heparin) Tube   Result Value Ref Range    Hold Specimen JIC    Extra Purple Top Tube   Result Value Ref Range    Hold Specimen JIC    Extra Green Top (Lithium Heparin) ON ICE   Result Value Ref Range    Hold Specimen JIC    Basic metabolic panel   Result Value Ref Range    Sodium 139 136 - 145 mmol/L    Potassium 3.4 (L) 3.5 - 5.0 mmol/L    Chloride 106 98 - 107 mmol/L    Carbon Dioxide (CO2) 23 22 - 31 mmol/L    Anion Gap 10 5 - 18 mmol/L    Urea Nitrogen 14 8 - 22 mg/dL    Creatinine 1.05 0.70 - 1.30 mg/dL    Calcium 9.6 8.5 - 10.5 mg/dL    Glucose 147 (H) 70 - 125 mg/dL    GFR Estimate 82 >60 mL/min/1.73m2   Lactic acid whole blood   Result Value Ref Range    Lactic Acid 1.1 0.7 - 2.0 mmol/L   CBC (+ platelets, no diff)   Result Value Ref Range    WBC Count 6.3 4.0 - 11.0 10e3/uL    RBC Count 3.54 (L) 4.40 - 5.90 10e6/uL    Hemoglobin 11.1 (L) 13.3 - 17.7 g/dL    Hematocrit 32.5 (L) 40.0 - 53.0 %    MCV 92 78 - 100 fL    MCH 31.4 26.5 - 33.0 pg    MCHC 34.2 31.5 - 36.5 g/dL    RDW 14.4 10.0 - 15.0 %    Platelet Count 106 (L) 150 - 450 10e3/uL   UA with Microscopic reflex to Culture    Specimen: Urine, Clean Catch   Result Value Ref Range    Color Urine Dark Yellow (A)  Colorless, Straw, Light Yellow, Yellow    Appearance Urine Turbid (A) Clear    Glucose Urine Negative Negative mg/dL    Bilirubin Urine 2.0 mg/dL (A) Negative    Ketones Urine 10 (A) Negative mg/dL    Specific Gravity Urine 1.021 1.001 - 1.030    Blood Urine >1.0 mg/dL (A) Negative    pH Urine 6.0 5.0 - 7.0    Protein Albumin Urine 200 (A) Negative mg/dL    Urobilinogen Urine >12.0 (A) <2.0 mg/dL    Nitrite Urine Negative Negative    Leukocyte Esterase Urine 500 Sherrie/uL (A) Negative    Bacteria Urine Few (A) None Seen /HPF    Mucus Urine Present (A) None Seen /LPF    RBC Urine >182 (H) <=2 /HPF    WBC Urine >182 (H) <=5 /HPF   Symptomatic Influenza A/B, RSV, & SARS-CoV2 PCR (COVID-19) Nasopharyngeal    Specimen: Nasopharyngeal; Swab   Result Value Ref Range    Influenza A PCR Negative Negative    Influenza B PCR Negative Negative    RSV PCR Negative Negative    SARS CoV2 PCR Negative Negative           Pertinent Radiology

## 2023-07-30 NOTE — PHARMACY-ADMISSION MEDICATION HISTORY
Pharmacist Admission Medication History    Admission medication history is complete. The information provided in this note is only as accurate as the sources available at the time of the update.    Medication reconciliation/reorder completed by provider prior to medication history? No    Information Source(s): Patient and CareEverywhere/SureScripts via in-person    Pertinent Information: Patient reports not been taking home meds for a week.     Changes made to PTA medication list:  Added: None  Deleted: oxycodone  Changed: None    Medication Affordability:       Allergies reviewed with patient and updates made in EHR: yes    Medication History Completed By: Payton Rhodes RPH 7/30/2023 8:19 AM    Prior to Admission medications    Medication Sig Last Dose Taking? Auth Provider Long Term End Date   acetaminophen (TYLENOL) 500 MG tablet Take 2 tablets (1,000 mg) by mouth every 6 hours as needed for mild pain 7/29/2023 Yes Mark Orta MD     aspirin 81 mg chewable tablet [ASPIRIN 81 MG CHEWABLE TABLET] Chew 81 mg daily. Past Week Yes Provider, Historical     fexofenadine (ALLEGRA) 180 MG tablet [FEXOFENADINE (ALLEGRA) 180 MG TABLET] Take 180 mg by mouth daily. Past Week Yes Provider, Historical     lisinopril (ZESTRIL) 40 MG tablet Take 1 tablet (40 mg) by mouth daily Past Week Yes Lachelle Summers APRN CNP Yes    metFORMIN (GLUCOPHAGE XR) 500 MG 24 hr tablet Take 2 tablets (1,000 mg) by mouth 2 times daily (with meals) Past Week Yes Lachelle Summers APRN CNP Yes    NIFEdipine ER OSMOTIC (PROCARDIA XL) 60 MG 24 hr tablet Take 1 tablet (60 mg) by mouth daily Past Week Yes Lachelle Summers APRN CNP Yes    ondansetron (ZOFRAN ODT) 4 MG ODT tab Take 1 tablet (4 mg) by mouth every 8 hours as needed for nausea 7/29/2023 Yes Brijesh See MD     rosuvastatin (CRESTOR) 10 MG tablet TAKE 1 TABLET(10 MG) BY MOUTH AT BEDTIME Strength: 10 mg Past Week Yes Lachelle Summers APRN CNP Yes    Semaglutide, 1 MG/DOSE,  (OZEMPIC, 1 MG/DOSE,) 4 MG/3ML SOPN Inject 1 mg Subcutaneous once a week  Patient taking differently: Inject 1 mg Subcutaneous once a week Every Friday or Saturday 7/21/2023 Yes Lachelle Summers APRN CNP     spironolactone (ALDACTONE) 25 MG tablet Take 1 tablet (25 mg) by mouth daily Past Week at AM Yes Lachelle Summers APRN CNP Yes    tadalafil (CIALIS) 10 MG tablet Take 1 tablet (10 mg) by mouth daily as needed (erectile dsyfunction) 10 mg as a single dose 30 minutes prior to anticipated sexual activity; do not take more than once daily.  Adjust dose based on effectiveness and tolerability; may decrease to 5 mg or increase to 20 mg per dose. Past Month Yes Lachelle Summers APRN CNP Yes    tamsulosin (FLOMAX) 0.4 MG capsule Take 1 capsule (0.4 mg) by mouth daily While the stent is in place, for stent pain and irritation 7/29/2023 at AM Yes Mark Orta MD     docusate sodium (COLACE) 100 MG capsule Take 1 capsule (100 mg) by mouth 2 times daily Has not started  Mark Orta MD     ibuprofen (ADVIL/MOTRIN) 800 MG tablet Take 1 tablet (800 mg) by mouth every 6 hours as needed Has not started  Mark Orta MD

## 2023-07-30 NOTE — PLAN OF CARE
Goal Outcome Evaluation:         Problem: Plan of Care - These are the overarching goals to be used throughout the patient stay.    Goal: Optimal Comfort and Wellbeing  Outcome: Progressing     Problem: UTI (Urinary Tract Infection)  Goal: Improved Infection Symptoms  Outcome: Progressing         Patient arrived to unit around 0320. Ambulated with standby assist to bed. Orientated to room. Denies pain. Febrile upon arrival but temp has decreased following tylenol. Patient is experiencing incontinence and has brief and requested urinal.Voiding dark curt urine. New order for vancomycin due to continued fever, currently infusing. IV access in both arms, IVF currently running. Mg++ and K+ replaced per protocols re-check in AM. Pt. remains in Afib with rate in low 100's with diltiazem po.

## 2023-07-30 NOTE — PHARMACY-VANCOMYCIN DOSING SERVICE
"Pharmacy Vancomycin Initial Note  Date of Service 2023  Patient's  1965  57 year old, male    Indication: Sepsis and Urinary Tract Infection    Current estimated CrCl = Estimated Creatinine Clearance: 103.9 mL/min (based on SCr of 1.05 mg/dL).    Creatinine for last 3 days  2023:  8:14 PM Creatinine 1.05 mg/dL    Recent Vancomycin Level(s) for last 3 days  No results found for requested labs within last 3 days.      Vancomycin IV Administrations (past 72 hours)        No vancomycin orders with administrations in past 72 hours.                    Nephrotoxins and other renal medications (From now, onward)      Start     Dose/Rate Route Frequency Ordered Stop    23 1630  vancomycin (VANCOCIN) 1,250 mg in 0.9% NaCl 250 mL intermittent infusion         1,250 mg  over 90 Minutes Intravenous EVERY 12 HOURS 23 0421      23 0700  piperacillin-tazobactam (ZOSYN) 3.375 g vial to attach to  mL bag        Note to Pharmacy: For [facility], [facility] and [facility]: For Zosyn-naive patients, use the \"Zosyn initial dose + extended infusion\" order panel.    3.375 g  over 240 Minutes Intravenous EVERY 8 HOURS 23 0034      23 0430  vancomycin (VANCOCIN) 2,000 mg in 0.9% NaCl 500 mL intermittent infusion         2,000 mg  over 2 Hours Intravenous ONCE 23 0337              Contrast Orders - past 72 hours (72h ago, onward)      Start     Dose/Rate Route Frequency Stop    23 2130  iopamidol (ISOVUE-370) solution 100 mL         100 mL Intravenous ONCE 23 2156            [employer] Prediction of Planned Initial Vancomycin Regimen  Loading dose: 2000 mg at 04:30 2023.  Regimen: 1250 mg IV every 12 hours.  Start time: 04:22 on 2023  Exposure target: AUC24 (range)400-600 mg/L.hr   AUC24,ss: 499 mg/L.hr  Probability of AUC24 > 400: 73 %  Ctrough,ss: 16.5 mg/L  Probability of Ctrough,ss > 20: 33 %  Probability of nephrotoxicity (Lodise MARYANN ): 12 %          Plan:  Start " vancomycin 2000mg x 1, then 1250mg IV q12h  Vancomycin monitoring method: AUC  Vancomycin therapeutic monitoring goal: 400-600 mg*h/L  Pharmacy will check vancomycin levels as appropriate in 1-3 Days.    Serum creatinine levels will be ordered daily for the first week of therapy and at least twice weekly for subsequent weeks.      Armand Álvarez RPH

## 2023-07-30 NOTE — ED NOTES
Bed: [facility]11  Expected date: 7/29/23  Expected time: 8:23 PM  Means of arrival:   Comments:  G to room 11

## 2023-07-30 NOTE — ED NOTES
Pt report given to hospital RN at this time.  Pt to transport to the hospital when staff available.  Amelia Welsh RN on 7/30/2023 at 2:28 AM

## 2023-07-30 NOTE — PROGRESS NOTES
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 Didn't feel good after for 3 days.  07/29/23 - Admitted after Temp 101 at home. Presented to the ED. CXR was negative. UA suggested infection. CT abd showed enlarging splenomegaly, right renal pelvis stranding, recent (above) ureteral stenting, and steatosis. He was diagnosed with sepsis. He received ceftriaxone, then was given Vanc/Zosyn. He had afib with RVR and given IV dilt, then PO dilt  07/30/23 - Echo showed 55% EF, RV dilated with normal fxn. Mild MR, biatrial enlargement, and mild TR. Urology was consulted. Abd doppler ordered to assess liver/spleen. Hemolytic anemia labs ordered. Smear ordered.    Assessment & Plan   Right-sided pyelonephritis  Continue Zosyn/vancomycin. UrCx pending. Trend fever curve prn tynenol.  Consult Urology    Atrial fibrillation with RVR  Rate-control per cards. Appreciate their assistance.  CHADS2-vasc 2    Hypokalemia  Stop NS, restart lisinopril, spironolactone    Normocytic anemia  Thrombocytopenia  Denies melena, presumed hematuria (not chronic). Check LDH, haptoglobin  Hold aspirin    Essential hypertension  Resume lisinopril, spironolactone    Transaminitis  Bilirubinemia  Hepatic steatosis  Hold statin, check LDH, trend CMP, RUQ U/S  Check portal/hepatic/splenic thombosis with abd doppler (I called tech to discuss)     KORINA  I encouraged his wife to go grab his home CPAP to help us control his rate    Splenomegaly  Check peripheral smear, LDH, U/S as above. No immature cells on diff. Mild mesenteric LAD on CT, may just be reactive. CXR was negative on admit    BPH  Home flomax       Discharge: Likely 2 days pending Cx  DVT Prophylaxis:  Low Risk/Ambulatory with no VTE  "prophylaxis indicated  Code Status: Full Code  Diet: Moderate Consistent Carb (60 g CHO per Meal) Diet  Cardiac monitor: ACTIVE order. Indication: Tachyarrhythmias, acute (48 hours)  Body mass index is 37.68 kg/m .    Interval History  \"I'm feeling wiped out\". Having burping. Today he was able to take some PO. Unaware of splenomegaly in the past. Denies SOB or CP now. Says abd doesn't hurt. No nausea    Mildly uncomfortable, oriented x4, able to tell jokes  Glasses  Irregular, no murmur  Lungs clear b/l  Abd soft, non-acute  Obese      Last 24H PRN:     acetaminophen (TYLENOL) tablet 650 mg, 650 mg at 07/30/23 0331 **OR** acetaminophen (TYLENOL) Suppository 650 mg      6.3  \   9.8 (L)   / 94 (L)     140 110 (H) 16 /  140 (H)   3.3 (L) 20 (L) 1.07 \     ALT: 97 (H) AST: 72 (H) AP: 130 (H) TBILI: 4.5 (H)   ALB: 2.6 (L) TOT PROTEIN: 5.7 (L) LIPASE: N/A     Trop: N/A BNP: 307 (H)     Procal: 0.73 (H) CRP: N/A Lactic Acid: 1.1         Recent Results (from the past 24 hour(s))   CT Abdomen Pelvis w Contrast    Narrative    EXAM: CT ABDOMEN PELVIS W CONTRAST  LOCATION: [facility]  DATE: 7/29/2023    INDICATION: Fevers post bilateral stent placement  COMPARISON: CT from 07/08/2023  TECHNIQUE: CT scan of the abdomen and pelvis was performed following injection of IV contrast. Multiplanar reformats were obtained. Dose reduction techniques were used.  CONTRAST: 100ml Isovue 370    FINDINGS:   LOWER CHEST: Normal.    HEPATOBILIARY: Diffuse hepatic steatosis. Normal gallbladder.    PANCREAS: Normal.    SPLEEN: The spleen measures 18 cm craniocaudal, previously 15 cm.    ADRENAL GLANDS: Normal.    KIDNEYS/BLADDER: Bilateral nephroureteral stents in place, proximal pigtails in the right upper pole calyx and left renal pelvis. There is some inflammatory change about the right renal pelvis. There is no residual hydronephrosis. Stents terminate in the   bladder. No perinephric collection. Hyperdense foci in " the lower poles of the renal collecting system are new and may reflect early calyceal tip contrast excretion.    BOWEL: No bowel obstruction or free air. No focal bowel wall thickening. Diffuse colonic diverticulosis. Normal appendix.    LYMPH NODES: Some mild edema in the central mesentery adjacent to subcentimeter lymph nodes.    VASCULATURE: Unremarkable.    PELVIC ORGANS: Prostate is mildly enlarged.    MUSCULOSKELETAL: L4-L5 degenerative disc height loss. Arthritic joint space loss of the hips.      Impression    IMPRESSION:   1.  Interval placement of bilateral nephroureteral stents. No residual hydronephrosis or drainable perinephric collections. There is some persistent inflammation about the right renal pelvis.    2.  Increased splenomegaly.    3.  Mild haziness/edema adjacent to some nonenlarged mesenteric lymph nodes, likely reactive.   XR Chest Port 1 View    Narrative    EXAM: XR CHEST PORT 1 VIEW  LOCATION: [facility]  DATE: 2023    INDICATION: Fevers  COMPARISON: 2015      Impression    IMPRESSION: Heart size within normal limits. No evidence of pneumonia. No pneumothorax or pleural effusion.   Echocardiogram Complete   Result Value    LVEF  55-60%    Narrative    [id number]  [id number]  [id number]  [id number] [address]     Name: ANITHA MARSHALL  MRN: [id number]  : 1965  Study Date: 2023 08:45 AM  Age: 57 yrs  Gender: Male  Patient Location: [facility]  Reason For Study: Chest Pain  Ordering Physician: GREG GRIMM  Performed By: AT     BSA: 2.4 m2  Height: 72 in  Weight: 277 lb  HR: 93  ______________________________________________________________________________  Procedure  Complete Echo Adult.  ______________________________________________________________________________  Interpretation Summary     Normal left ventricular size. Moderate concentric left ventricular hypertrophy  with  sigmoid shaped septum. Visually estimated ejection fraction of 55 to 60%  without observed wall motion abnormality.  Right ventricle is mildly dilated. Right ventricular systolic function appears  grossly normal.  Mild biatrial enlargement.  Mild mitral regurgitation.  Mild tricuspid regurgitation. Unable to estimate right ventricular systolic  function secondary to incomplete tricuspid regurgitation velocity envelope.  Mild enlargement of the proximal ascending aorta.  Underlying rhythm is atrial fibrillation.  ______________________________________________________________________________  Left Ventricle  The left ventricle is normal in size. There is moderate concentric left  ventricular hypertrophy. A sigmoid septum is present. Diastolic Doppler  findings (E/E' ratio and/or other parameters) suggest left ventricular filling  pressures are normal. The visual ejection fraction is 55-60%. No regional wall  motion abnormalities noted.     Right Ventricle  The right ventricle is mildly dilated. The right ventricular systolic function  is normal.     Atria  The left atrium is mildly dilated. The right atrium is mildly dilated.     Mitral Valve  Mitral valve leaflets appear normal. There is mild (1+) mitral regurgitation.     Tricuspid Valve  The tricuspid valve is not well visualized. There is mild (1+) tricuspid  regurgitation.     Aortic Valve  The aortic valve is trileaflet with aortic valve sclerosis. No hemodynamically  significant valvular aortic stenosis.     Pulmonic Valve  There is trace pulmonic valvular regurgitation.     Vessels  The aorta root is normal. The ascending aorta is Mildly dilated. IVC diameter  and respiratory changes fall into an intermediate range suggesting an RA  pressure of 8 mmHg.     Pericardium  There is no pericardial effusion.     Rhythm  The rhythm was atrial fibrillation.  ______________________________________________________________________________  MMode/2D Measurements &  Calculations  IVSd: 1.7 cm  LVIDd: 5.1 cm  LVIDs: 3.4 cm  LVPWd: 1.4 cm  FS: 32.9 %  LV mass(C)d: 332.7 grams  LV mass(C)dI: 136.0 grams/m2  Ao root diam: 3.3 cm  asc Aorta Diam: 4.2 cm  LVOT diam: 2.5 cm  LVOT area: 4.9 cm2  LA Volume Indexed (AL/bp): 34.1 ml/m2  RV Base: 4.2 cm     RWT: 0.54  TAPSE: 2.3 cm     Time Measurements  MM HR: 90.0 BPM     Doppler Measurements & Calculations  MV E max brayden: 136.0 cm/sec  MV max P.8 mmHg  MV mean PG: 3.0 mmHg  MV V2 VTI: 28.3 cm  MVA(VTI): 3.2 cm2  MV dec time: 0.24 sec  Ao V2 max: 154.0 cm/sec  Ao max P.0 mmHg  Ao V2 mean: 107.0 cm/sec  Ao mean P.0 mmHg  Ao V2 VTI: 26.5 cm  STEPHANIE(I,D): 3.4 cm2  STEPHANIE(V,D): 3.5 cm2  LV V1 max P.8 mmHg  LV V1 max: 109.0 cm/sec  LV V1 VTI: 18.2 cm  SV(LVOT): 89.3 ml  SI(LVOT): 36.5 ml/m2  PA acc time: 0.09 sec  AV Brayden Ratio (DI): 0.71  STEPHANIE Index (cm2/m2): 1.4     E/E': 10.7  E/E' avg: 10.0  Lateral E/e': 9.3  Medial E/e': 10.7  Peak E' Brayden: 12.7 cm/sec  RV S Brayden: 12.5 cm/sec     ______________________________________________________________________________  Report approved by: Ar Louise 2023 10:32 AM           Wt Readings from Last 5 Encounters:   23 126.1 kg (277 lb 14.4 oz)   23 125.2 kg (276 lb 1.6 oz)   23 122.5 kg (270 lb)   23 122.5 kg (270 lb)   22 122.5 kg (270 lb)     Michael Cobb MD, MPH  Hospitalist,[facility]: [facility]  Phone: #[phone]    Medications:   Personally Reviewed.  Medications    sodium chloride 100 mL/hr at 23 0329      diltiazem  60 mg Oral Q6H CLARY    insulin aspart  1-7 Units Subcutaneous TID AC    insulin aspart  1-5 Units Subcutaneous At Bedtime    piperacillin-tazobactam  3.375 g Intravenous Q8H    vancomycin  1,250 mg Intravenous Q12H       Clinically Significant Risk Factors Present on Admission        # Hypokalemia: Lowest K = 3.3 mmol/L in last 2 days, will replace as needed    # Hypercalcemia: corrected  "calcium is >10.1, will monitor as appropriate    # Hypoalbuminemia: Lowest albumin = 2.6 g/dL at 7/30/2023  5:49 AM, will monitor as appropriate   # Drug Induced Platelet Defect: home medication list includes an antiplatelet medication   # Hypertension: Noted on problem list      # Obesity: Estimated body mass index is 37.68 kg/m  as calculated from the following:    Height as of this encounter: 1.829 m (6' 0.01\").    Weight as of this encounter: 126.1 kg (277 lb 14.4 oz).              "

## 2023-07-30 NOTE — CONSULTS
HEART CARE CONSULTATON NOTE            Reason for consult: 57-year-old male asked to see secondary to atrial fibrillation with increased ventricular response in the setting of fever.  He was seen by Dr. Medellin December 2020.     Assessment:   1.  Atrial fibrillation with rapid ventricular response in the setting of acute urinary tract infection status post recent bilateral ureteral stents and stone removal.  Patient was placed on diltiazem by the admitting physician.  Rate control is improved this morning.  Patient endorses some occasional awareness of an irregular heart rhythm perhaps once every few months.  Planning cardiac echocardiogram today.  CHADS2 vascular score is 2 for hypertension and diabetes mellitus and should be considered for anticoagulation.  Given recent ureteral stents, low hemoglobin will need additional input from primary care and urology pending the timing of anticoagulation.  We will plan ongoing rate control strategy for now.  Troponins are negative x2.01 and 0.08.     Plan:   1.  Continue to monitor on telemetry.  We will continue with short acting diltiazem for now and reassess pending the echocardiogram.  May benefit from beta-blocker in place of the diltiazem.  2.  Discussion with primary care team and urology regarding timing of anticoagulation.  In the interim would strive for rate control given chads vascular score of 2 and uncertain duration of the atrial fibrillation.  3.  Fever evaluation and treatment of the urinary tract infection per the primary care team.  4.  Patient has multiple risk factors for coronary artery disease and would benefit from additional evaluation as an outpatient in the near future.  5.  Potassium replacement per primary care.     History:      HPI: 57-year-old male with a history by report of an episode of atrial fibrillation in 2015, hypertension, hyperlipidemia, type 2 diabetes who presented to the emergency room with fever.  He developed fever on  Thursday which has been persistent.  He noted a temperature at home of 101.  He reported nausea and blood in his urine.  He recently underwent kidney stone removal July 26, 2023 with bilateral ureteroscopy, stone removal and bilateral ureteral stents.  This was completed July 26, 2023.  In the emergency room he was noted to be in atrial fibrillation with increased ventricular response.  Urinalysis suggested infectious source.  He was started on Rocephin.  Addition he was given IV diltiazem in the emergency room.  He was subsequently placed on diltiazem 60 mg 3 6 hours.  She was seen by Dr. Wang December 2020.  History of hypertension with left ventricular hypertrophy.  History of hyperlipidemia.  Lone atrial fibrillation with a history of obstructive sleep apnea per report in the past.    Denies complaints of chest discomfort or shortness of breath when he is sedentary.  He does indicate that he participates in pool exercise.  He did notice some mild chest discomfort last evening in the emergency room but no reoccurrence.    Cardiac risk factors are pertinent for negative for tobacco, positive for hypertension, positive for diabetes, significant family history including mother and brother who have had myocardial infarctions.  There is a lipid result report from November 2022 at which time cholesterol was 104 and LDL 41.    Past Medical History:   Diagnosis Date    Depression 12/24/2014    Essential hypertension     Infection due to 2019 novel coronavirus 01/11/2022    Multiple thyroid nodules 09/08/2016    Type 2 diabetes mellitus without complication (H) 11/07/2016      Past Surgical History:   Procedure Laterality Date    BIOPSY  9/18/16    CARPAL TUNNEL RELEASE RT/LT Right     COLONOSCOPY  6/15/18    COMBINED CYSTOSCOPY, RETROGRADES, URETEROSCOPY, LASER HOLMIUM LITHOTRIPSY URETER(S), INSERT STENT Bilateral 7/26/2023    Procedure: cystoscopy, bilateral ureteroscopy with holmium laser lithotripsy, bilateral  ureteroscopy with stone basketing, bilateral retrograde pyelogram, bilateral ureteral stent placement;  Surgeon: Mark Orta MD;  Location: [facility] OR    HC REPAIR ROTATOR CUFF,ACUTE      Description: Rotator Cuff Repair Acute;  Recorded: 01/13/2010;  Comments: cortisone    [facility]  11/30/2015         REMOVAL OF SPERM DUCT(S)      Description: Surgery Of Male Genitalia Vasectomy;  Recorded: 01/13/2010;    WISDOM TOOTH EXTRACTION        Social History     Socioeconomic History    Marital status:      Spouse name: Not on file    Number of children: Not on file    Years of education: Not on file    Highest education level: Not on file   Occupational History    Not on file   Tobacco Use    Smoking status: Former     Types: Dip, chew, snus or snuff    Smokeless tobacco: Former     Types: Chew     Quit date: 12/24/2004   Vaping Use    Vaping Use: Never used   Substance and Sexual Activity    Alcohol use: Yes     Comment: Alcoholic Drinks/day: 1 every 2 weeks    Drug use: No    Sexual activity: Yes     Partners: Female     Birth control/protection: None   Other Topics Concern    Not on file   Social History Narrative    Not on file     Social Determinants of Health     Financial Resource Strain: Not on file   Food Insecurity: Not on file   Transportation Needs: Not on file   Physical Activity: Not on file   Stress: Not on file   Social Connections: Not on file   Intimate Partner Violence: Not on file   Housing Stability: Not on file     Family History   Problem Relation Age of Onset    Heart Disease Mother     Kidney Disease Mother     Thyroid Cancer Father     Kidney Disease Father     Heart Disease Father     Hypertension Father     Thyroid Disease Father         cancer    No Known Problems Sister     Hypertension Brother     Depression Daughter     Anxiety Disorder Daughter     Autism Spectrum Disorder Son     Heart Disease Paternal Grandmother     Hypertension Sister      No Known Allergies  No current outpatient  "medications on file.         Review of Systems  All pertinent positives and negatives reviewed in History.  All other 10 point review of systems reviewed and negative.      Objective:    Physical Exam  VITALS: /75 (BP Location: Left arm)   Pulse 120   Temp 100.1  F (37.8  C) (Oral)   Resp 18   Ht 1.829 m (6' 0.01\")   Wt 126.1 kg (277 lb 14.4 oz)   SpO2 93%   BMI 37.68 kg/m    BMI: Body mass index is 37.68 kg/m .  Wt Readings from Last 3 Encounters:   23 126.1 kg (277 lb 14.4 oz)   23 125.2 kg (276 lb 1.6 oz)   23 122.5 kg (270 lb)       Intake/Output Summary (Last 24 hours) at 2023 0636  Last data filed at 2023 0409  Gross per 24 hour   Intake 2000 ml   Output 100 ml   Net 1900 ml     General Appearance:  Alert, cooperative, no distress, appears stated age   HEENT:  Normocephalic, without obvious abnormality   Neck: Supple, symmetrical, t thyroid: not enlarged, symmetric, no carotid bruit or venous JVD   Back:   Symmetric,    Lungs:   Clear to auscultation bilaterally, respirations unlabored   Chest Wall:  No tenderness or deformity   Heart:  Stent, irregular rhythm, no obvious significant murmur   Abdomen:   Soft, non-tender, bowel sounds active   Extremities: Extremities normal, atraumatic, no cyanosis or significant edema   Skin: Skin color, texture, turgor normal, no rashes or lesions   Neurologic: Alert and oriented X 3, Moves all 4 extremities     Cardiographics  EC2023, atrial fibrillation with increased ventricular response, nonspecific ST-T changes.  Cannot exclude inferior ischemia.  Compared to 2019 atrial fibrillation with increased ventricular response has replaced sinus rhythm.   2300 hrs. atrial fibrillation with increased ventricular response although improved from previous, occasional PVC or aberrant conduction, nonspecific ST-T changes.  Echocardiogram:  Pending from today.  Reading Physician Reading Date Result Priority   Smita, " Yasir MALDONADO  [phone] 5/24/2019 Routine   Provider, Historical 5/24/2019      Narrative & Impression  1. Normal left ventricular size and systolic performance with a visually estimated ejection fraction of 55%.   2. There is moderate to severe concentric increase in left ventricular wall thickness.   3. There is trace aortic insufficiency.   4. Normal right ventricular size and systolic performance.   5. There is mild left atrial enlargement.   6. There is mild enlargement of the proximal ascending aorta.     When compared to the prior real-time echocardiogram dated 1 December 2015, left ventricular wall thickness appears somewhat more increased.  There is now noted to be mild enlargement of the proximal ascending aorta.    Imaging  Chest x-ray:   Narrative & Impression   EXAM: XR CHEST PORT 1 VIEW  LOCATION: [facility]  DATE: 7/29/2023     INDICATION: Fevers  COMPARISON: 11/30/2015                                                                      IMPRESSION: Heart size within normal limits. No evidence of pneumonia. No pneumothorax or pleural effusion.     EXAM: CT ABDOMEN PELVIS W CONTRAST  LOCATION: [facility]  DATE: 7/29/2023     INDICATION: Fevers post bilateral stent placement  COMPARISON: CT from 07/08/2023  TECHNIQUE: CT scan of the abdomen and pelvis was performed following injection of IV contrast. Multiplanar reformats were obtained. Dose reduction techniques were used.  CONTRAST: 100ml Isovue 370     FINDINGS:   LOWER CHEST: Normal.     HEPATOBILIARY: Diffuse hepatic steatosis. Normal gallbladder.     PANCREAS: Normal.     SPLEEN: The spleen measures 18 cm craniocaudal, previously 15 cm.     ADRENAL GLANDS: Normal.     KIDNEYS/BLADDER: Bilateral nephroureteral stents in place, proximal pigtails in the right upper pole calyx and left renal pelvis. There is some inflammatory change about the right renal pelvis. There is no residual  hydronephrosis. Stents terminate in the   bladder. No perinephric collection. Hyperdense foci in the lower poles of the renal collecting system are new and may reflect early calyceal tip contrast excretion.     BOWEL: No bowel obstruction or free air. No focal bowel wall thickening. Diffuse colonic diverticulosis. Normal appendix.     LYMPH NODES: Some mild edema in the central mesentery adjacent to subcentimeter lymph nodes.     VASCULATURE: Unremarkable.     PELVIC ORGANS: Prostate is mildly enlarged.     MUSCULOSKELETAL: L4-L5 degenerative disc height loss. Arthritic joint space loss of the hips.                                                                      IMPRESSION:   1.  Interval placement of bilateral nephroureteral stents. No residual hydronephrosis or drainable perinephric collections. There is some persistent inflammation about the right renal pelvis.     2.  Increased splenomegaly.     3.  Mild haziness/edema adjacent to some nonenlarged mesenteric lymph nodes, likely reactive.   Lab Results    Chemistry/lipid CBC Cardiac Enzymes/BNP/TSH/INR   Recent Labs   Lab Test 11/29/22  0800   CHOL 104   HDL 36*   LDL 41   TRIG 135     Recent Labs   Lab Test 11/29/22  0800 12/24/21  0742 12/03/21  0817   LDL 41 56 54     Recent Labs   Lab Test 07/30/23  0549      POTASSIUM 3.3*   CHLORIDE 110*   CO2 20*   *   BUN 16   CR 1.07   GFRESTIMATED 80   LEV 8.5     Recent Labs   Lab Test 07/30/23  0549 07/29/23 2014 07/08/23  1734   CR 1.07 1.05 1.02     Recent Labs   Lab Test 11/29/22  0800 12/24/21  0742 12/03/21  0817   A1C 5.0 6.9* 6.9*          Recent Labs   Lab Test 07/30/23  0549   WBC 6.3   HGB 9.8*   HCT 28.8*   MCV 92   PLT 94*     Recent Labs   Lab Test 07/30/23  0549 07/29/23 2014 07/08/23  1734   HGB 9.8* 11.1* 13.5    Recent Labs   Lab Test 07/30/23  0549 07/29/23 2014   TROPONINI 0.08 0.01     No results for input(s): BNP, NTBNPI, NTBNP in the last 89205 hours.  Recent Labs   Lab Test  12/24/21  0742   TSH 0.73     No results for input(s): INR in the last 75958 hours.

## 2023-07-31 ENCOUNTER — TELEPHONE (OUTPATIENT)
Dept: UROLOGY | Facility: CLINIC | Age: 58
End: 2023-07-31
Payer: COMMERCIAL

## 2023-07-31 PROBLEM — R78.81 BACTEREMIA: Status: ACTIVE | Noted: 2023-07-31

## 2023-07-31 LAB
ALBUMIN SERPL-MCNC: 2.4 G/DL (ref 3.5–5)
ALP SERPL-CCNC: 123 U/L (ref 45–120)
ALT SERPL W P-5'-P-CCNC: 71 U/L (ref 0–45)
ANION GAP SERPL CALCULATED.3IONS-SCNC: 9 MMOL/L (ref 5–18)
AST SERPL W P-5'-P-CCNC: 38 U/L (ref 0–40)
BACTERIA UR CULT: ABNORMAL
BASOPHILS # BLD AUTO: 0 10E3/UL (ref 0–0.2)
BASOPHILS NFR BLD AUTO: 0 %
BILIRUB DIRECT SERPL-MCNC: 1.2 MG/DL
BILIRUB SERPL-MCNC: 2.1 MG/DL (ref 0–1)
BUN SERPL-MCNC: 19 MG/DL (ref 8–22)
CALCIUM SERPL-MCNC: 8.6 MG/DL (ref 8.5–10.5)
CHLORIDE BLD-SCNC: 111 MMOL/L (ref 98–107)
CO2 SERPL-SCNC: 21 MMOL/L (ref 22–31)
CREAT SERPL-MCNC: 1.08 MG/DL (ref 0.7–1.3)
EOSINOPHIL # BLD AUTO: 0.4 10E3/UL (ref 0–0.7)
EOSINOPHIL NFR BLD AUTO: 7 %
ERYTHROCYTE [DISTWIDTH] IN BLOOD BY AUTOMATED COUNT: 14.6 % (ref 10–15)
ERYTHROCYTE [DISTWIDTH] IN BLOOD BY AUTOMATED COUNT: 14.7 % (ref 10–15)
GFR SERPL CREATININE-BSD FRML MDRD: 79 ML/MIN/1.73M2
GLUCOSE BLD-MCNC: 102 MG/DL (ref 70–125)
GLUCOSE BLDC GLUCOMTR-MCNC: 117 MG/DL (ref 70–99)
GLUCOSE BLDC GLUCOMTR-MCNC: 146 MG/DL (ref 70–99)
GLUCOSE BLDC GLUCOMTR-MCNC: 178 MG/DL (ref 70–99)
GLUCOSE BLDC GLUCOMTR-MCNC: 95 MG/DL (ref 70–99)
GLUCOSE BLDC GLUCOMTR-MCNC: 97 MG/DL (ref 70–99)
HAPTOGLOB SERPL-MCNC: 149 MG/DL (ref 32–197)
HCT VFR BLD AUTO: 29 % (ref 40–53)
HCT VFR BLD AUTO: 31.4 % (ref 40–53)
HGB BLD-MCNC: 10.3 G/DL (ref 13.3–17.7)
HGB BLD-MCNC: 9.5 G/DL (ref 13.3–17.7)
IMM GRANULOCYTES # BLD: 0 10E3/UL
IMM GRANULOCYTES NFR BLD: 0 %
LYMPHOCYTES # BLD AUTO: 0.3 10E3/UL (ref 0.8–5.3)
LYMPHOCYTES NFR BLD AUTO: 5 %
MAGNESIUM SERPL-MCNC: 2.3 MG/DL (ref 1.8–2.6)
MCH RBC QN AUTO: 30.7 PG (ref 26.5–33)
MCH RBC QN AUTO: 30.9 PG (ref 26.5–33)
MCHC RBC AUTO-ENTMCNC: 32.8 G/DL (ref 31.5–36.5)
MCHC RBC AUTO-ENTMCNC: 32.8 G/DL (ref 31.5–36.5)
MCV RBC AUTO: 94 FL (ref 78–100)
MCV RBC AUTO: 94 FL (ref 78–100)
MONOCYTES # BLD AUTO: 0.4 10E3/UL (ref 0–1.3)
MONOCYTES NFR BLD AUTO: 6 %
NEUTROPHILS # BLD AUTO: 5 10E3/UL (ref 1.6–8.3)
NEUTROPHILS NFR BLD AUTO: 82 %
NRBC # BLD AUTO: 0 10E3/UL
NRBC BLD AUTO-RTO: 0 /100
PATH REPORT.COMMENTS IMP SPEC: NORMAL
PATH REPORT.COMMENTS IMP SPEC: NORMAL
PATH REPORT.FINAL DX SPEC: NORMAL
PATH REPORT.RELEVANT HX SPEC: NORMAL
PLATELET # BLD AUTO: 114 10E3/UL (ref 150–450)
PLATELET # BLD AUTO: 120 10E3/UL (ref 150–450)
POTASSIUM BLD-SCNC: 3.6 MMOL/L (ref 3.5–5)
PROT SERPL-MCNC: 5.8 G/DL (ref 6–8)
RBC # BLD AUTO: 3.09 10E6/UL (ref 4.4–5.9)
RBC # BLD AUTO: 3.33 10E6/UL (ref 4.4–5.9)
SODIUM SERPL-SCNC: 141 MMOL/L (ref 136–145)
WBC # BLD AUTO: 5 10E3/UL (ref 4–11)
WBC # BLD AUTO: 6.1 10E3/UL (ref 4–11)

## 2023-07-31 PROCEDURE — 82962 GLUCOSE BLOOD TEST: CPT

## 2023-07-31 PROCEDURE — 80053 COMPREHEN METABOLIC PANEL: CPT | Performed by: HOSPITALIST

## 2023-07-31 PROCEDURE — 82248 BILIRUBIN DIRECT: CPT | Performed by: INTERNAL MEDICINE

## 2023-07-31 PROCEDURE — 36415 COLL VENOUS BLD VENIPUNCTURE: CPT | Performed by: INTERNAL MEDICINE

## 2023-07-31 PROCEDURE — 99222 1ST HOSP IP/OBS MODERATE 55: CPT | Performed by: INTERNAL MEDICINE

## 2023-07-31 PROCEDURE — 258N000003 HC RX IP 258 OP 636: Performed by: INTERNAL MEDICINE

## 2023-07-31 PROCEDURE — 250N000013 HC RX MED GY IP 250 OP 250 PS 637: Performed by: HOSPITALIST

## 2023-07-31 PROCEDURE — 85027 COMPLETE CBC AUTOMATED: CPT | Performed by: INTERNAL MEDICINE

## 2023-07-31 PROCEDURE — G0378 HOSPITAL OBSERVATION PER HR: HCPCS

## 2023-07-31 PROCEDURE — 250N000011 HC RX IP 250 OP 636: Performed by: INTERNAL MEDICINE

## 2023-07-31 PROCEDURE — 83735 ASSAY OF MAGNESIUM: CPT | Performed by: INTERNAL MEDICINE

## 2023-07-31 PROCEDURE — 36415 COLL VENOUS BLD VENIPUNCTURE: CPT | Performed by: HOSPITALIST

## 2023-07-31 PROCEDURE — 250N000013 HC RX MED GY IP 250 OP 250 PS 637: Performed by: INTERNAL MEDICINE

## 2023-07-31 PROCEDURE — 250N000012 HC RX MED GY IP 250 OP 636 PS 637: Performed by: INTERNAL MEDICINE

## 2023-07-31 PROCEDURE — 96376 TX/PRO/DX INJ SAME DRUG ADON: CPT

## 2023-07-31 PROCEDURE — 210N000002 HC R&B HEART CARE

## 2023-07-31 PROCEDURE — 250N000011 HC RX IP 250 OP 636: Mod: JZ | Performed by: INTERNAL MEDICINE

## 2023-07-31 PROCEDURE — 85027 COMPLETE CBC AUTOMATED: CPT | Performed by: HOSPITALIST

## 2023-07-31 PROCEDURE — 99232 SBSQ HOSP IP/OBS MODERATE 35: CPT | Performed by: INTERNAL MEDICINE

## 2023-07-31 RX ORDER — LISINOPRIL 5 MG/1
5 TABLET ORAL DAILY
Status: DISCONTINUED | OUTPATIENT
Start: 2023-08-01 | End: 2023-08-04 | Stop reason: HOSPADM

## 2023-07-31 RX ORDER — DILTIAZEM HYDROCHLORIDE 120 MG/1
240 CAPSULE, COATED, EXTENDED RELEASE ORAL DAILY
Status: DISCONTINUED | OUTPATIENT
Start: 2023-07-31 | End: 2023-08-01

## 2023-07-31 RX ORDER — SPIRONOLACTONE 25 MG
12.5 TABLET ORAL DAILY
Status: DISCONTINUED | OUTPATIENT
Start: 2023-08-01 | End: 2023-08-04 | Stop reason: HOSPADM

## 2023-07-31 RX ORDER — VANCOMYCIN HYDROCHLORIDE 1 G/200ML
1000 INJECTION, SOLUTION INTRAVENOUS EVERY 12 HOURS
Status: DISCONTINUED | OUTPATIENT
Start: 2023-07-31 | End: 2023-07-31

## 2023-07-31 RX ORDER — LISINOPRIL 10 MG/1
10 TABLET ORAL DAILY
Status: DISCONTINUED | OUTPATIENT
Start: 2023-08-01 | End: 2023-07-31

## 2023-07-31 RX ORDER — CEFTRIAXONE 2 G/1
2 INJECTION, POWDER, FOR SOLUTION INTRAMUSCULAR; INTRAVENOUS EVERY 24 HOURS
Status: DISCONTINUED | OUTPATIENT
Start: 2023-07-31 | End: 2023-08-02

## 2023-07-31 RX ADMIN — VANCOMYCIN HYDROCHLORIDE 1250 MG: 5 INJECTION, POWDER, LYOPHILIZED, FOR SOLUTION INTRAVENOUS at 05:03

## 2023-07-31 RX ADMIN — VANCOMYCIN HYDROCHLORIDE 1250 MG: 5 INJECTION, POWDER, LYOPHILIZED, FOR SOLUTION INTRAVENOUS at 17:07

## 2023-07-31 RX ADMIN — PIPERACILLIN AND TAZOBACTAM 3.38 G: 3; .375 INJECTION, POWDER, LYOPHILIZED, FOR SOLUTION INTRAVENOUS at 08:43

## 2023-07-31 RX ADMIN — ACETAMINOPHEN 650 MG: 325 TABLET ORAL at 15:42

## 2023-07-31 RX ADMIN — PIPERACILLIN AND TAZOBACTAM 3.38 G: 3; .375 INJECTION, POWDER, LYOPHILIZED, FOR SOLUTION INTRAVENOUS at 00:55

## 2023-07-31 RX ADMIN — DILTIAZEM HYDROCHLORIDE 60 MG: 30 TABLET, FILM COATED ORAL at 06:41

## 2023-07-31 RX ADMIN — TAMSULOSIN HYDROCHLORIDE 0.4 MG: 0.4 CAPSULE ORAL at 08:43

## 2023-07-31 RX ADMIN — INSULIN ASPART 1 UNITS: 100 INJECTION, SOLUTION INTRAVENOUS; SUBCUTANEOUS at 12:33

## 2023-07-31 RX ADMIN — CEFTRIAXONE SODIUM 2 G: 2 INJECTION, POWDER, FOR SOLUTION INTRAMUSCULAR; INTRAVENOUS at 10:29

## 2023-07-31 RX ADMIN — ACETAMINOPHEN 650 MG: 325 TABLET ORAL at 00:55

## 2023-07-31 RX ADMIN — DILTIAZEM HYDROCHLORIDE 240 MG: 120 CAPSULE, COATED, EXTENDED RELEASE ORAL at 12:28

## 2023-07-31 ASSESSMENT — ACTIVITIES OF DAILY LIVING (ADL)
ADLS_ACUITY_SCORE: 28
ADLS_ACUITY_SCORE: 22
ADLS_ACUITY_SCORE: 28
ADLS_ACUITY_SCORE: 22
ADLS_ACUITY_SCORE: 28
ADLS_ACUITY_SCORE: 22

## 2023-07-31 NOTE — TELEPHONE ENCOUNTER
[facility] Call Center    Phone Message    May a detailed message be left on voicemail: yes     Reason for Call: Other: Govind Wang's wife is calling stating that he is in the hospital with infection and that they are putting him on blood thinners. She is asking when they should reschedule the cysto to. Please review and call back, thanks.     Action Taken: Other: ub uro    Travel Screening: Not Applicable

## 2023-07-31 NOTE — PLAN OF CARE
Goal Outcome Evaluation:      Plan of Care Reviewed With: patient, spouse    Reviewed care plan: IV antibiotics pending cultures  Encouraged oral intake fluids: very dark urine  Discussed activity: patient ambulated 2 times in hallway and out of bed most of day.  Low grade temp 99 F  A-fib well controlled: MD discontinued monitoring  BP meds adjusted secondary to soft blood pressures/asymptomatic.

## 2023-07-31 NOTE — CONSULTS
[facility]    Infectious Disease Consultation     Date of Admission:  7/29/2023  Date of Consult (When I saw the patient): 07/31/23    Assessment & Plan   Govind Alexandre is a 57 year old male who was admitted on 7/29/2023.     Impression:    Enterococcus faecalis bacteremia, UTI  Renal stones status post bilateral stent placement-no hydronephrosis, persistent inflammation in right pelvis--recent cystoscopy bilateral ureteroscopy with laser lithotripsy and by lateral ureteral stent placement  Type 2 diabetes hypertension paroxysmal atrial fibrillation  Admitted with atrial fibrillation with RVR, responded to diltiazem  History of traumatic brain injury  BMI elevated  ID consulted due to Enterococcus faecalis bacteremia and urinary tract infection    Recommendations    Repeat blood cultures  Follow susceptibility results  Patient is currently on vancomycin, ceftriaxone  Will modify antibiotics based on final culture result  Due to bacteremia, recent urinary tract procedures, patient will likely need IV antibiotics on discharge  Thank you for consulting infectious disease.  We will follow with you        Nya Hines MD  [facility]  Answering Service: [phone]  On-Call ID provider: [phone], option: [phone]      Reason for Consult   Reason for consult: I was asked to evaluate this patient for Enterococcus bacteremia and UTI .    Primary Care Physician   Lachelle Summers    Chief Complaint   Fever, shaking chills,     History is obtained from the patient and medical records    History of Present Illness   Govind Alexandre is a 57 year old male who presents with recent cystoscopy bilateral ureteroscopy with laser lithotripsy and by lateral ureteral stent placement, admitted with fevers shaking chills for 4 days prior to presentation.  Patient was admitted started antibiotics, found to have bacteremia  Patient also has atrial fibrillation  Tachycardic, RVR,  cardiology following  At the time of my evaluation patient is feeling better    Past Medical History   I have reviewed this patient's medical history and updated it with pertinent information if needed.   Past Medical History:   Diagnosis Date    Depression 12/24/2014    Essential hypertension     Infection due to 2019 novel coronavirus 01/11/2022    Multiple thyroid nodules 09/08/2016    Type 2 diabetes mellitus without complication (H) 11/07/2016       Past Surgical History   I have reviewed this patient's surgical history and updated it with pertinent information if needed.  Past Surgical History:   Procedure Laterality Date    BIOPSY  9/18/16    CARPAL TUNNEL RELEASE RT/LT Right     COLONOSCOPY  6/15/18    COMBINED CYSTOSCOPY, RETROGRADES, URETEROSCOPY, LASER HOLMIUM LITHOTRIPSY URETER(S), INSERT STENT Bilateral 7/26/2023    Procedure: cystoscopy, bilateral ureteroscopy with holmium laser lithotripsy, bilateral ureteroscopy with stone basketing, bilateral retrograde pyelogram, bilateral ureteral stent placement;  Surgeon: Mark Orta MD;  Location: [facility] OR    HC REPAIR ROTATOR CUFF,ACUTE      Description: Rotator Cuff Repair Acute;  Recorded: 01/13/2010;  Comments: cortisone    PIC  11/30/2015         REMOVAL OF SPERM DUCT(S)      Description: Surgery Of Male Genitalia Vasectomy;  Recorded: 01/13/2010;    WISDOM TOOTH EXTRACTION         Prior to Admission Medications   Prior to Admission Medications   Prescriptions Last Dose Informant Patient Reported? Taking?   ACCU-CHEK FASTCLIX LANCET DRUM   No No   Sig: [ACCU-CHEK FASTCLIX LANCET DRUM] USE TO TEST BLOOD SUGARS 2 TO 3 TIMES A DAY   NIFEdipine ER OSMOTIC (PROCARDIA XL) 60 MG 24 hr tablet Past Week  No Yes   Sig: Take 1 tablet (60 mg) by mouth daily   Semaglutide, 1 MG/DOSE, (OZEMPIC, 1 MG/DOSE,) 4 MG/3ML SOPN 7/21/2023  No Yes   Sig: Inject 1 mg Subcutaneous once a week   Patient taking differently: Inject 1 mg Subcutaneous once a week Every Friday or  Saturday   acetaminophen (TYLENOL) 500 MG tablet 7/29/2023  No Yes   Sig: Take 2 tablets (1,000 mg) by mouth every 6 hours as needed for mild pain   aspirin 81 mg chewable tablet Past Week  Yes Yes   Sig: [ASPIRIN 81 MG CHEWABLE TABLET] Chew 81 mg daily.   blood glucose test (ACCU-CHEK NATHANIEL PLUS TEST STRP) strips   No No   Sig: [BLOOD GLUCOSE TEST (ACCU-CHEK NATHANIEL PLUS TEST STRP) STRIPS] Use 1 each As Directed 3 (three) times a day.   docusate sodium (COLACE) 100 MG capsule Has not started  No No   Sig: Take 1 capsule (100 mg) by mouth 2 times daily   fexofenadine (ALLEGRA) 180 MG tablet Past Week  Yes Yes   Sig: [FEXOFENADINE (ALLEGRA) 180 MG TABLET] Take 180 mg by mouth daily.   ibuprofen (ADVIL/MOTRIN) 800 MG tablet Has not started  No No   Sig: Take 1 tablet (800 mg) by mouth every 6 hours as needed   lisinopril (ZESTRIL) 40 MG tablet Past Week  No Yes   Sig: Take 1 tablet (40 mg) by mouth daily   metFORMIN (GLUCOPHAGE XR) 500 MG 24 hr tablet Past Week  No Yes   Sig: Take 2 tablets (1,000 mg) by mouth 2 times daily (with meals)   ondansetron (ZOFRAN ODT) 4 MG ODT tab 7/29/2023  No Yes   Sig: Take 1 tablet (4 mg) by mouth every 8 hours as needed for nausea   rosuvastatin (CRESTOR) 10 MG tablet Past Week  No Yes   Sig: TAKE 1 TABLET(10 MG) BY MOUTH AT BEDTIME Strength: 10 mg   spironolactone (ALDACTONE) 25 MG tablet Past Week at AM  No Yes   Sig: Take 1 tablet (25 mg) by mouth daily   tadalafil (CIALIS) 10 MG tablet Past Month  No Yes   Sig: Take 1 tablet (10 mg) by mouth daily as needed (erectile dsyfunction) 10 mg as a single dose 30 minutes prior to anticipated sexual activity; do not take more than once daily.  Adjust dose based on effectiveness and tolerability; may decrease to 5 mg or increase to 20 mg per dose.   tamsulosin (FLOMAX) 0.4 MG capsule 7/29/2023 at AM  No Yes   Sig: Take 1 capsule (0.4 mg) by mouth daily While the stent is in place, for stent pain and irritation      Facility-Administered  Medications: None     Allergies   No Known Allergies    Immunization History   Immunization History   Administered Date(s) Administered    COVID-19 Bivalent 18+ (Moderna) 09/19/2022    COVID-19 MONOVALENT 12+ (Pfizer) 04/22/2021, 05/13/2021, 12/20/2021    FLU 6-35 months 01/13/2010, 10/13/2014    Flu, Unspecified 01/13/2010, 01/15/2010    HepA, Unspecified 12/04/2008, 06/04/2010    Hepatitis A (ADULT 19+) 12/04/2008, 06/04/2010    Hepatitis B, Adult 04/26/2017, 06/29/2017, 10/31/2017    Influenza (IIV3) PF 11/15/2010    Influenza Vaccine 50-64 or 18-64 w/egg allergy (Flublok) 11/21/2019, 09/19/2022    Influenza Vaccine >6 months (Alfuria,Fluzone) 12/03/2015, 08/24/2016, 11/09/2020, 10/23/2021    Influenza Vaccine, 6+MO IM (QUADRIVALENT W/PRESERVATIVES) 11/15/2010, 09/05/2018    Influenza, Whole Virus 12/04/2008    Pneumococcal 20 valent Conjugate (Prevnar 20) 11/29/2022    Pneumococcal 23 valent 12/21/2016    TDAP (Adacel,Boostrix) 12/04/2008, 09/05/2018    Zoster recombinant adjuvanted (SHINGRIX) 11/29/2022, 01/24/2023    Zoster vaccine, live 11/21/2016       Social History   I have reviewed this patient's social history and updated it with pertinent information if needed. Govind Alexandre  reports that he has quit smoking. His smoking use included dip, chew, snus or snuff. He quit smokeless tobacco use about 18 years ago.  His smokeless tobacco use included chew. He reports current alcohol use. He reports that he does not use drugs.    Family History   I have reviewed this patient's family history and updated it with pertinent information if needed.   Family History   Problem Relation Age of Onset    Heart Disease Mother     Kidney Disease Mother     Thyroid Cancer Father     Kidney Disease Father     Heart Disease Father     Hypertension Father     Thyroid Disease Father         cancer    No Known Problems Sister     Hypertension Brother     Depression Daughter     Anxiety Disorder Daughter     Autism Spectrum  Disorder Son     Heart Disease Paternal Grandmother     Hypertension Sister        Review of Systems   The 10 point Review of Systems is negative other than noted in the HPI or here.     Physical Exam   Temp: 99.3  F (37.4  C) Temp src: Oral BP: 110/70 Pulse: 87   Resp: 20 SpO2: 96 % O2 Device: None (Room air)    Vital Signs with Ranges  Temp:  [98.3  F (36.8  C)-99.3  F (37.4  C)] 99.3  F (37.4  C)  Pulse:  [67-97] 87  Resp:  [18-20] 20  BP: ()/(56-70) 110/70  SpO2:  [95 %-96 %] 96 %  277 lbs 14.4 oz  Body mass index is 37.68 kg/m .    GENERAL APPEARANCE:  awake, NAD  EYES: Eyes grossly normal to inspection, conjunctivae and sclerae normal  HENT: mouth without ulcers or lesions  NECK: supple  RESP: normal breathing pattern  CV: regular rates and rhythm, S1 S2  LYMPHATICS: no swelling  ABDOMEN: soft, nontender, distended  MS: extremities normal- no gross deformities noted  SKIN: Slight erythema upper back, no hives  NEURO: coherent      LABORATORY DATA:  Reviewed    CBC RESULTS:   Recent Labs   Lab Test 07/31/23  0952   WBC 6.1   RBC 3.33*   HGB 10.3*   HCT 31.4*   MCV 94   MCH 30.9   MCHC 32.8   RDW 14.6   *        Last Comprehensive Metabolic Panel:  Sodium   Date Value Ref Range Status   07/31/2023 141 136 - 145 mmol/L Final     Potassium   Date Value Ref Range Status   07/31/2023 3.6 3.5 - 5.0 mmol/L Final     Chloride   Date Value Ref Range Status   07/31/2023 111 (H) 98 - 107 mmol/L Final     Carbon Dioxide (CO2)   Date Value Ref Range Status   07/31/2023 21 (L) 22 - 31 mmol/L Final     Anion Gap   Date Value Ref Range Status   07/31/2023 9 5 - 18 mmol/L Final     Glucose   Date Value Ref Range Status   07/31/2023 102 70 - 125 mg/dL Final     GLUCOSE BY METER POCT   Date Value Ref Range Status   07/31/2023 95 70 - 99 mg/dL Final     Urea Nitrogen   Date Value Ref Range Status   07/31/2023 19 8 - 22 mg/dL Final     Creatinine   Date Value Ref Range Status   07/31/2023 1.08 0.70 - 1.30 mg/dL Final      GFR Estimate   Date Value Ref Range Status   07/31/2023 79 >60 mL/min/1.73m2 Final   06/14/2021 >60 >60 mL/min/1.73m2 Final     Calcium   Date Value Ref Range Status   07/31/2023 8.6 8.5 - 10.5 mg/dL Final     Bilirubin Total   Date Value Ref Range Status   07/31/2023 2.1 (H) 0.0 - 1.0 mg/dL Final     Alkaline Phosphatase   Date Value Ref Range Status   07/31/2023 123 (H) 45 - 120 U/L Final     ALT   Date Value Ref Range Status   07/31/2023 71 (H) 0 - 45 U/L Final     AST   Date Value Ref Range Status   07/31/2023 38 0 - 40 U/L Final     Reviewed CBC, creatinine, ALT, AST        No results found for: CRP         MICROBIOLOGY:    Reviewed    Blood cultures  Other Micro: Blood cultures Enterococcus  7-Day Micro Results       Collected Updated Procedure Result Status      07/29/2023 2156 07/31/2023 1049 Urine Culture [[id number]]   (Abnormal)   Urine, Clean Catch    Preliminary result Component Value   Culture >100,000 CFU/mL Enterococcus faecalis  [P]                07/29/2023 2137 07/29/2023 2226 Symptomatic Influenza A/B, RSV, & SARS-CoV2 PCR (COVID-19) Nasopharyngeal [[id number]]    Swab from Nasopharyngeal    Final result Component Value   Influenza A PCR Negative   Influenza B PCR Negative   RSV PCR Negative   SARS CoV2 PCR Negative   NEGATIVE: SARS-CoV-2 (COVID-19) RNA not detected, presumed negative.            07/29/2023 2126 07/31/2023 1205 Blood Culture Peripheral Blood [[id number]]   (Abnormal)   Peripheral Blood    Preliminary result Component Value   Culture Positive on the 1st day of incubation  [P]     Enterococcus faecalis  [P]     2 of 2 bottlesSusceptibilities done on previous cultures               07/29/2023 2014 07/31/2023 1148 Blood Culture Peripheral Blood [[id number]]   (Abnormal)   Peripheral Blood    Preliminary result Component Value   Culture Positive on the 1st day of incubation  [P]     Enterococcus faecalis  [P]     2 of 2 bottles               07/29/2023 2014 07/30/2023 2115  Verigene GP Panel [[id number]]    (Abnormal)   Peripheral Blood    Final result Component Value   Staphylococcus species Not Detected   Staphylococcus aureus Not Detected   Staphylococcus epidermidis Not Detected   Staphylococcus lugdunensis Not Detected   Enterococcus faecalis Detected   Positive for Enterococcus faecalis and negative for [place]/vanB genes (not resistant to vancomycin) by Abacastigene multiplex nucleic acid test. Final identification and antimicrobial susceptibility testing will be verified by standard methods.   Enterococcus faecium Not Detected   Streptococcus species Not Detected   Streptococcus agalactiae Not Detected   Streptococcus anginosus group Not Detected   Streptococcus pneumoniae Not Detected   Streptococcus pyogenes Not Detected   Listeria species Not Detected                       RADIOLOGY:    US Abdomen Limited w Abd/Pelvis Duplex Complete    Result Date: 7/31/2023  EXAM: US ABDOMEN LIMITED WITH DOPPLER COMPLETE LOCATION: [facility] DATE: 7/31/2023 INDICATION: Anemia. Acutely enlarging spleen. Transaminitis. Evaluate for portal vein or splenic vein thrombosis. COMPARISON: CT abdomen/pelvis 07/29/2023 TECHNIQUE: Complete abdominal ultrasound. Color flow with spectral Doppler and waveform analysis performed.  FINDINGS: GALLBLADDER: Normal. No gallstones, wall thickening, or pericholecystic fluid. Negative sonographic Guzman's sign. BILE DUCTS: No biliary dilatation. The common duct measures 5 mm. LIVER: Increased echogenicity from diffuse fatty infiltration. 2 areas of focal fatty sparing adjacent to the gallbladder. No suspicious mass. RIGHT KIDNEY: 13.0 cm in length. Normal echogenicity with no hydronephrosis or mass. SPLEEN: Splenomegaly measuring 19.1 cm in length. PANCREAS: The visualized portions are normal. No ascites. ABDOMINAL DUPLEX: The hepatic artery, hepatic veins, IVC, portal veins, and splenic vein are patent with flow in the normal direction.      IMPRESSION: 1.  Diffuse hepatic steatosis. 2.  No cholelithiasis or evidence of acute cholecystitis. 3.  Splenomegaly. 4.  Normal abdominal liver duplex. No portal vein or splenic vein thrombosis.     Echocardiogram Complete    Result Date: 2023  [id number]^[place] [address]  Name: ANITHA MARSHALL MRN: [id number] : 1965 Study Date: 2023 08:45 AM Age: 57 yrs Gender: Male Patient Location: [facility] Reason For Study: Chest Pain Ordering Physician: GREG GRIMM Performed By: AT  BSA: 2.4 m2 Height: 72 in Weight: 277 lb HR: 93 ______________________________________________________________________________ Procedure Complete Echo Adult. ______________________________________________________________________________ Interpretation Summary  Normal left ventricular size. Moderate concentric left ventricular hypertrophy with sigmoid shaped septum. Visually estimated ejection fraction of 55 to 60% without observed wall motion abnormality. Right ventricle is mildly dilated. Right ventricular systolic function appears grossly normal. Mild biatrial enlargement. Mild mitral regurgitation. Mild tricuspid regurgitation. Unable to estimate right ventricular systolic function secondary to incomplete tricuspid regurgitation velocity envelope. Mild enlargement of the proximal ascending aorta. Underlying rhythm is atrial fibrillation. ______________________________________________________________________________ Left Ventricle The left ventricle is normal in size. There is moderate concentric left ventricular hypertrophy. A sigmoid septum is present. Diastolic Doppler findings (E/E' ratio and/or other parameters) suggest left ventricular filling pressures are normal. The visual ejection fraction is 55-60%. No regional wall motion abnormalities noted.  Right Ventricle The right ventricle is mildly dilated. The right ventricular  systolic function is normal.  Atria The left atrium is mildly dilated. The right atrium is mildly dilated.  Mitral Valve Mitral valve leaflets appear normal. There is mild (1+) mitral regurgitation.  Tricuspid Valve The tricuspid valve is not well visualized. There is mild (1+) tricuspid regurgitation.  Aortic Valve The aortic valve is trileaflet with aortic valve sclerosis. No hemodynamically significant valvular aortic stenosis.  Pulmonic Valve There is trace pulmonic valvular regurgitation.  Vessels The aorta root is normal. The ascending aorta is Mildly dilated. IVC diameter and respiratory changes fall into an intermediate range suggesting an RA pressure of 8 mmHg.  Pericardium There is no pericardial effusion.  Rhythm The rhythm was atrial fibrillation. ______________________________________________________________________________ MMode/2D Measurements & Calculations IVSd: 1.7 cm LVIDd: 5.1 cm LVIDs: 3.4 cm LVPWd: 1.4 cm FS: 32.9 % LV mass(C)d: 332.7 grams LV mass(C)dI: 136.0 grams/m2 Ao root diam: 3.3 cm asc Aorta Diam: 4.2 cm LVOT diam: 2.5 cm LVOT area: 4.9 cm2 LA Volume Indexed (AL/bp): 34.1 ml/m2 RV Base: 4.2 cm  RWT: 0.54 TAPSE: 2.3 cm  Time Measurements MM HR: 90.0 BPM  Doppler Measurements & Calculations MV E max brayden: 136.0 cm/sec MV max P.8 mmHg MV mean PG: 3.0 mmHg MV V2 VTI: 28.3 cm MVA(VTI): 3.2 cm2 MV dec time: 0.24 sec Ao V2 max: 154.0 cm/sec Ao max P.0 mmHg Ao V2 mean: 107.0 cm/sec Ao mean P.0 mmHg Ao V2 VTI: 26.5 cm STEPHANIE(I,D): 3.4 cm2 STEPHANIE(V,D): 3.5 cm2 LV V1 max P.8 mmHg LV V1 max: 109.0 cm/sec LV V1 VTI: 18.2 cm SV(LVOT): 89.3 ml SI(LVOT): 36.5 ml/m2 PA acc time: 0.09 sec AV Brayden Ratio (DI): 0.71 STEPHANIE Index (cm2/m2): 1.4  E/E': 10.7 E/E' avg: 10.0 Lateral E/e': 9.3 Medial E/e': 10.7 Peak E' Brayden: 12.7 cm/sec RV S Brayden: 12.5 cm/sec  ______________________________________________________________________________ Report approved by: Ar Louise 2023 10:32 AM       XR Chest  Port 1 View    Result Date: 7/29/2023  EXAM: XR CHEST PORT 1 VIEW LOCATION: [facility] DATE: 7/29/2023 INDICATION: Fevers COMPARISON: 11/30/2015     IMPRESSION: Heart size within normal limits. No evidence of pneumonia. No pneumothorax or pleural effusion.    CT Abdomen Pelvis w Contrast    Result Date: 7/29/2023  EXAM: CT ABDOMEN PELVIS W CONTRAST LOCATION: [facility] DATE: 7/29/2023 INDICATION: Fevers post bilateral stent placement COMPARISON: CT from 07/08/2023 TECHNIQUE: CT scan of the abdomen and pelvis was performed following injection of IV contrast. Multiplanar reformats were obtained. Dose reduction techniques were used. CONTRAST: 100ml Isovue 370 FINDINGS: LOWER CHEST: Normal. HEPATOBILIARY: Diffuse hepatic steatosis. Normal gallbladder. PANCREAS: Normal. SPLEEN: The spleen measures 18 cm craniocaudal, previously 15 cm. ADRENAL GLANDS: Normal. KIDNEYS/BLADDER: Bilateral nephroureteral stents in place, proximal pigtails in the right upper pole calyx and left renal pelvis. There is some inflammatory change about the right renal pelvis. There is no residual hydronephrosis. Stents terminate in the  bladder. No perinephric collection. Hyperdense foci in the lower poles of the renal collecting system are new and may reflect early calyceal tip contrast excretion. BOWEL: No bowel obstruction or free air. No focal bowel wall thickening. Diffuse colonic diverticulosis. Normal appendix. LYMPH NODES: Some mild edema in the central mesentery adjacent to subcentimeter lymph nodes. VASCULATURE: Unremarkable. PELVIC ORGANS: Prostate is mildly enlarged. MUSCULOSKELETAL: L4-L5 degenerative disc height loss. Arthritic joint space loss of the hips.     IMPRESSION: 1.  Interval placement of bilateral nephroureteral stents. No residual hydronephrosis or drainable perinephric collections. There is some persistent inflammation about the right renal pelvis. 2.  Increased  splenomegaly. 3.  Mild haziness/edema adjacent to some nonenlarged mesenteric lymph nodes, likely reactive.    XR Surgery RODERICK Fluoro Less Than 5 Min w Stills    Result Date: 7/26/2023  This exam was marked as non-reportable because it will not be read by a radiologist or a [facility] non-radiologist provider.     CT Abdomen Pelvis w Contrast    Result Date: 7/8/2023  EXAM: CT ABDOMEN PELVIS W CONTRAST LOCATION: [facility] DATE: 7/8/2023 INDICATION: RLQ abd pain COMPARISON: None. TECHNIQUE: CT scan of the abdomen and pelvis was performed following injection of IV contrast. Multiplanar reformats were obtained. Dose reduction techniques were used. CONTRAST: 100ml Isovue 370 FINDINGS: LOWER CHEST: Fluid-filled lower esophagus. HEPATOBILIARY: Fatty infiltration of liver. PANCREAS: Normal. SPLEEN: Mild splenomegaly. ADRENAL GLANDS: Normal. KIDNEYS/BLADDER: There is a 10 x 9 x 6 mm stone within right renal pelvis. There is mild uroepithelial thickening and fairly prominent soft tissue stranding surrounding renal pelvis and proximal ureter. Mild hydronephrosis. 2 additional right-sided kidney stones present, largest of these in the upper pole measuring 4 mm. Left kidney demonstrates 2 mid pole stones, largest measures 4 mm. No left hydronephrosis. Bladder negative. BOWEL: Diverticulosis of the colon. No acute inflammatory change. No obstruction. Appendix normal. Fluid is seen throughout the distal colon raising question of diarrheal illness. LYMPH NODES: Normal. VASCULATURE: Unremarkable. PELVIC ORGANS: Modest prostatic enlargement. MUSCULOSKELETAL: Normal.     IMPRESSION: 1.  Right renal pelvic stone with mild hydronephrosis and significant inflammatory soft tissue stranding surrounding renal pelvis and proximal ureter. 2.  Each kidney contains an additional 2 stones. 3.  Fatty infiltration of liver. Mild splenomegaly. 4.  Fluid throughout the GI tract without obstruction or inflammatory wall  thickening, superimposed gastroenteritis not excluded..       Labs reviewed  Images reviewed  Notes reviewed  Discussed with patient and patient's nurse

## 2023-07-31 NOTE — PLAN OF CARE
Problem: Dysrhythmia  Goal: Normalized Cardiac Rhythm  Outcome: Progressing     Problem: UTI (Urinary Tract Infection)  Goal: Improved Infection Symptoms  Outcome: Progressing    Goal Outcome Evaluation:  PRIMARY DIAGNOSIS: PYELONEPHRITIS  OUTPATIENT/OBSERVATION GOALS TO BE MET BEFORE DISCHARGE:  Diagnostic test and consults (if applicable) complete: Yes    Vitals signs stable or return to baseline: Yes    Tolerate oral intake to maintain hydration: Yes    Pain status: Pain free.    Tolerating oral antibiotics or has plans for home infusion setup:  Yes    Return to near baseline physical activity: Yes    Discharge Planner Nurse   Safe discharge environment identified: Yes  Barriers to discharge: Yes       Entered by: HONORIO TRAYLOR RN 07/31/2023 5:36 AM     Please review provider order for any additional goals.   Nurse to notify provider when observation goals have been met and patient is ready for discharge.

## 2023-07-31 NOTE — PLAN OF CARE
Problem: Dysrhythmia  Goal: Normalized Cardiac Rhythm  Outcome: Progressing     Problem: UTI (Urinary Tract Infection)  Goal: Improved Infection Symptoms  Outcome: Progressing    Goal Outcome Evaluation:  PRIMARY DIAGNOSIS: PYELONEPHRITIS  OUTPATIENT/OBSERVATION GOALS TO BE MET BEFORE DISCHARGE:  Diagnostic test and consults (if applicable) complete: Yes    Vitals signs stable or return to baseline: Yes    Tolerate oral intake to maintain hydration: Yes    Pain status: Pain free.    Tolerating oral antibiotics or has plans for home infusion setup:  Yes    Return to near baseline physical activity: Yes    Discharge Planner Nurse   Safe discharge environment identified: Yes  Barriers to discharge: Yes     Please review provider order for any additional goals.   Nurse to notify provider when observation goals have been met and patient is ready for discharge.

## 2023-07-31 NOTE — PROGRESS NOTES
" [facility]ARE   [address], [place] [address]   [email or web address]   OFFICE: [phone]            Impression and Plan     Atrial fibrillation.  Ventricular response fairly reasonable this morning with heart rate in the 70s-80s.  Continue diltiazem, but convert to long-acting formulation (Cardizem  mg daily).  Discussed with Dr. Sirisha Clark and agree with institution of anticoagulation.    2.  Hypertension.  Blood pressure this morning reasonable at 105/66 mmHg.  Has had at times tendency toward lower blood pressure.  Will reduce lisinopril to 5 mg daily.    3.  Pyelonephritis.  Management plan as outlined by the providers.    Primary Cardiologist: Dr. Bryson Goldman     Govind reports no concerning cardiac symptoms.    Cardiac Diagnostics   Telemetry (personally reviewed): Atrial fibrillation with heart rate in the 70s-80s.    Echocardiogram 30 July 2023:  Normal left ventricular size. Moderate concentric left ventricular hypertrophy with sigmoid shaped septum. Visually estimated ejection fraction of 55 to 60% without observed wall motion abnormality.  Right ventricle is mildly dilated. Right ventricular systolic function appears grossly normal.  Mild biatrial enlargement.  Mild mitral regurgitation.  Mild tricuspid regurgitation. Unable to estimate right ventricular systolic function secondary to incomplete tricuspid regurgitation velocity envelope.  Mild enlargement of the proximal ascending aorta.  Underlying rhythm is atrial fibrillation.    Physical Examination       /66 (BP Location: Left arm)   Pulse 67   Temp 98.3  F (36.8  C) (Oral)   Resp 20   Ht 1.829 m (6' 0.01\")   Wt 126.1 kg (277 lb 14.4 oz)   SpO2 95%   BMI 37.68 kg/m          Vitals:    07/29/23 2004 07/30/23 0323   Weight: 124.7 kg (275 lb) 126.1 kg (277 lb 14.4 oz)            Intake/Output Summary (Last 24 hours) at 7/31/2023 0919  Last data filed at " 7/31/2023 0800  Gross per 24 hour   Intake 650 ml   Output 1150 ml   Net -500 ml       The patient is alert and oriented times three. Sclerae are anicteric. Mucosal membranes are moist. Jugular venous pressure is normal. No significant adenopathy/thyromegally appreciated. Lungs are clear with good expansion. On cardiovascular exam, the patient has a regular S1 and S2. Abdomen is soft and non-tender. Extremities reveal no clubbing, cyanosis, or edema.         Imaging     Chest radiograph 29 July 2023:  Heart size within normal limits.   No evidence of pneumonia.   No pneumothorax or pleural effusion.     Lab Results     Recent Labs   Lab 07/31/23  0829 07/31/23  0640 07/31/23  0218 07/30/23  1732 07/30/23  1320 07/30/23  1236 07/30/23  0818 07/30/23  0549 07/30/23  0139 07/29/23 2014   WBC  --  5.0  --   --  5.6  --   --  6.3  --  6.3   HGB  --  9.5*  --   --  9.6*  --   --  9.8*  --  11.1*   MCV  --  94  --   --  93  --   --  92  --  92   PLT  --  114*  --   --  106*  --   --  94*  --  106*   NA  --  141  --   --   --   --   --  140  --  139   POTASSIUM  --  3.6  --   --   --  3.5  --  3.3*  --  3.4*   CHLORIDE  --  111*  --   --   --   --   --  110*  --  106   CO2  --  21*  --   --   --   --   --  20*  --  23   BUN  --  19  --   --   --   --   --  16  --  14   CR  --  1.08  --   --   --   --   --  1.07  --  1.05   ANIONGAP  --  9  --   --   --   --   --  10  --  10   LEV  --  8.6  --   --   --   --   --  8.5  --  9.6   GLC 97 102 178*   < >  --   --    < > 144*   < > 147*   ALBUMIN  --  2.4*  --   --   --   --   --  2.6*  --   --    PROTTOTAL  --  5.8*  --   --   --   --   --  5.7*  --   --    BILITOTAL  --  2.1*  --   --   --   --   --  4.5*  --   --    ALKPHOS  --  123*  --   --   --   --   --  130*  --   --    ALT  --  71*  --   --   --   --   --  97*  --   --    AST  --  38  --   --   --   --   --  72*  --   --     < > = values in this interval not displayed.     Recent Labs   Lab Test 07/30/23  0549   BNP  307*     No lab results found in last 7 days.    Invalid input(s): LDLCALC  Lab Results   Component Value Date     07/31/2023    CO2 21 07/31/2023    BUN 19 07/31/2023     Lab Results   Component Value Date    WBC 5.0 07/31/2023    HGB 9.5 07/31/2023    HCT 29.0 07/31/2023    MCV 94 07/31/2023     07/31/2023     Lab Results   Component Value Date    CHOL 104 11/29/2022    TRIG 135 11/29/2022    HDL 36 11/29/2022     No results found for: INR  Lab Results   Component Value Date    TROPONINI 0.08 07/30/2023    TROPONINI 0.01 07/29/2023     Lab Results   Component Value Date    TSH 0.73 12/24/2021           Current Inpatient Scheduled Medications   Scheduled Meds:   cefTRIAXone  2 g Intravenous Q24H    diltiazem  60 mg Oral Q6H CLARY    insulin aspart  1-7 Units Subcutaneous TID AC    insulin aspart  1-5 Units Subcutaneous At Bedtime    lisinopril  40 mg Oral Daily    [Held by provider] NIFEdipine ER OSMOTIC  60 mg Oral Daily    [Held by provider] rosuvastatin  10 mg Oral Daily    spironolactone  25 mg Oral Daily    tamsulosin  0.4 mg Oral Daily     Continuous Infusions:         Medications Prior to Admission   Prior to Admission medications    Medication Sig Start Date End Date Taking? Authorizing Provider   acetaminophen (TYLENOL) 500 MG tablet Take 2 tablets (1,000 mg) by mouth every 6 hours as needed for mild pain 7/26/23  Yes Mark Orta MD   aspirin 81 mg chewable tablet [ASPIRIN 81 MG CHEWABLE TABLET] Chew 81 mg daily. 12/21/16  Yes Provider, Historical   fexofenadine (ALLEGRA) 180 MG tablet [FEXOFENADINE (ALLEGRA) 180 MG TABLET] Take 180 mg by mouth daily. 11/21/16  Yes Provider, Historical   lisinopril (ZESTRIL) 40 MG tablet Take 1 tablet (40 mg) by mouth daily 11/29/22  Yes Lachelle Summers APRN CNP   metFORMIN (GLUCOPHAGE XR) 500 MG 24 hr tablet Take 2 tablets (1,000 mg) by mouth 2 times daily (with meals) 11/29/22  Yes Lachelle Summers APRN CNP   NIFEdipine ER OSMOTIC (PROCARDIA XL) 60  MG 24 hr tablet Take 1 tablet (60 mg) by mouth daily 1/1/23  Yes Lachelle Summers APRN CNP   ondansetron (ZOFRAN ODT) 4 MG ODT tab Take 1 tablet (4 mg) by mouth every 8 hours as needed for nausea 7/8/23  Yes Brijesh See MD   rosuvastatin (CRESTOR) 10 MG tablet TAKE 1 TABLET(10 MG) BY MOUTH AT BEDTIME Strength: 10 mg 12/2/22  Yes Lachelle Summers APRN CNP   Semaglutide, 1 MG/DOSE, (OZEMPIC, 1 MG/DOSE,) 4 MG/3ML SOPN Inject 1 mg Subcutaneous once a week  Patient taking differently: Inject 1 mg Subcutaneous once a week Every Friday or Saturday 12/20/22  Yes Lachelle Summers APRN CNP   spironolactone (ALDACTONE) 25 MG tablet Take 1 tablet (25 mg) by mouth daily 11/29/22  Yes Lachelle Summers APRN CNP   tadalafil (CIALIS) 10 MG tablet Take 1 tablet (10 mg) by mouth daily as needed (erectile dsyfunction) 10 mg as a single dose 30 minutes prior to anticipated sexual activity; do not take more than once daily.  Adjust dose based on effectiveness and tolerability; may decrease to 5 mg or increase to 20 mg per dose. 11/29/22  Yes Lachelle Summers APRN CNP   tamsulosin (FLOMAX) 0.4 MG capsule Take 1 capsule (0.4 mg) by mouth daily While the stent is in place, for stent pain and irritation 7/26/23  Yes Mark Orta MD   ACCU-CHEK FASTCLIX LANCET DRUM [ACCU-CHEK FASTCLIX LANCET DRUM] USE TO TEST BLOOD SUGARS 2 TO 3 TIMES A DAY 9/30/19   Deepa Short MD   blood glucose test (ACCU-CHEK NATHANIEL PLUS TEST STRP) strips [BLOOD GLUCOSE TEST (ACCU-CHEK NATHANIEL PLUS TEST STRP) STRIPS] Use 1 each As Directed 3 (three) times a day. 11/21/16   Deepa Short MD   docusate sodium (COLACE) 100 MG capsule Take 1 capsule (100 mg) by mouth 2 times daily 7/26/23   Mark Orta MD   ibuprofen (ADVIL/MOTRIN) 800 MG tablet Take 1 tablet (800 mg) by mouth every 6 hours as needed 7/26/23   Mark Orta MD

## 2023-07-31 NOTE — PROGRESS NOTES
[facility]    Medicine Progress Note - Hospitalist Service    Date of Admission:  7/29/2023    Assessment & Plan   Govind Alexandre is a 57 year old male admitted on 7/29/2023. He has a past medical history of traumatic brain injury, obesity, paroxysmal atrial fibrillation, renal stone status post bilateral stents placement, type 2 diabetes mellitus, hypertension.  Patient was admitted with complaints of rapid heart rate was found to be in A-fib with RVR which responded very appropriately to diltiazem, was evaluated by cardiology and recommendations were made to initiate DOAC as well as monitor heart rate.  Cardiac echogram is unremarkable.  Patient is noted to be bacteremic with Enterococcus faecalis sensitivities pending, urine culture is also growing the same.    1/.  A-fib with RVR likely paroxysmal in nature secondary to bacteremia/sepsis.  Currently normal sinus rhythm adequate rate control will diltiazem, appreciate cardiology input.  2/.  Anticoagulation: I think it is appropriate to start the patient on a DOAC, request pharmacy to evaluate for affordability.  3/.  Enterococcus bacteremia: This is definitely a matter of concern.  Patient was on vancomycin and Zosyn.  I think it is appropriate to treat the patient with ceftriaxone and vancomycin till the sensitivities come back.  He may require repeat blood cultures to ensure that there is adequate clearance and if there is persistent bacteremia will require transesophageal echogram.  Also infectious disease consult for duration of antibiotic treatment.    /.  Type 2 diabetes mellitus continue with Accu-Cheks and sliding scale insulin, metformin on hold at this time.  Ozempic on hold.  5/.  Hypotension noted since initiation of diltiazem.  Will decrease lisinopril to 10 mg a day and decrease Aldactone to 12.5 mg daily       Diet: Moderate Consistent Carb (60 g CHO per Meal) Diet    DVT Prophylaxis: DOAC  Ibarra Catheter: Not  "present  Lines: None     Cardiac Monitoring: None  Code Status: Full Code      Clinically Significant Risk Factors Present on Admission        # Hypokalemia: Lowest K = 3.3 mmol/L in last 2 days, will replace as needed    # Hypercalcemia: corrected calcium is >10.1, will monitor as appropriate    # Hypoalbuminemia: Lowest albumin = 2.4 g/dL at 7/31/2023  6:40 AM, will monitor as appropriate   # Drug Induced Platelet Defect: home medication list includes an antiplatelet medication   # Hypertension: Noted on problem list      # Obesity: Estimated body mass index is 37.68 kg/m  as calculated from the following:    Height as of this encounter: 1.829 m (6' 0.01\").    Weight as of this encounter: 126.1 kg (277 lb 14.4 oz).            Disposition Plan      Expected Discharge Date: To be determined        Discharge Comments: Awaiting sensitivities for bacteremia          Sirisha Clark MD  Hospitalist Service  [facility]  Securely message with [employer] (more info)  Text page via [employer] Paging/Directory   ______________________________________________________________________    Interval History   No acute issues overnight some transient hypotension which was asymptomatic noted, heart rate is adequately controlled.  Patient is accompanied by his wife this morning.      Physical Exam   Vital Signs: Temp: 98.3  F (36.8  C) Temp src: Oral BP: 105/66 Pulse: 67   Resp: 20 SpO2: 95 % O2 Device: None (Room air) Oxygen Delivery: 2 LPM  Weight: 277 lbs 14.4 oz    General Appearance: Alert awake oriented x3 no acute distress obese man  Respiratory: Clear to auscultation  Cardiovascular: S1-S2  GI: Obese nontender bowel sounds are present  Skin: No rash or lesions  Other: Trace lower extremity edema    Medical Decision Making       45 MINUTES SPENT BY ME on the date of service doing chart review, history, exam, documentation & further activities per the note.  MANAGEMENT DISCUSSED with the following over the past " 24 hours: Patient, wife, cardiology, pharmacy and nursing   NOTE(S)/MEDICAL RECORDS REVIEWED over the past 24 hours: Old records including Care Everywhere       Data     I have personally reviewed the following data over the past 24 hrs:    6.1  \   10.3 (L)   / 120 (L)     141 111 (H) 19 /  146 (H)   3.6 21 (L) 1.08 \     ALT: 71 (H) AST: 38 AP: 123 (H) TBILI: 2.1 (H)   ALB: 2.4 (L) TOT PROTEIN: 5.8 (L) LIPASE: N/A     Ferritin:  N/A % Retic:  2.5 LDH:  226 (H)     Imaging results reviewed over the past 24 hrs:   Recent Results (from the past 24 hour(s))   Echocardiogram Complete   Result Value    LVEF  55-60%    Narrative    [id number]  [id number]  [id number]  [id number] [address]     Name: ANITHA MARSHALL  MRN: [id number]  : 1965  Study Date: 2023 08:45 AM  Age: 57 yrs  Gender: Male  Patient Location: [facility]  Reason For Study: Chest Pain  Ordering Physician: GREG GRIMM  Performed By: AT     BSA: 2.4 m2  Height: 72 in  Weight: 277 lb  HR: 93  ______________________________________________________________________________  Procedure  Complete Echo Adult.  ______________________________________________________________________________  Interpretation Summary     Normal left ventricular size. Moderate concentric left ventricular hypertrophy  with sigmoid shaped septum. Visually estimated ejection fraction of 55 to 60%  without observed wall motion abnormality.  Right ventricle is mildly dilated. Right ventricular systolic function appears  grossly normal.  Mild biatrial enlargement.  Mild mitral regurgitation.  Mild tricuspid regurgitation. Unable to estimate right ventricular systolic  function secondary to incomplete tricuspid regurgitation velocity envelope.  Mild enlargement of the proximal ascending aorta.  Underlying rhythm is atrial fibrillation.  ______________________________________________________________________________  Left  Ventricle  The left ventricle is normal in size. There is moderate concentric left  ventricular hypertrophy. A sigmoid septum is present. Diastolic Doppler  findings (E/E' ratio and/or other parameters) suggest left ventricular filling  pressures are normal. The visual ejection fraction is 55-60%. No regional wall  motion abnormalities noted.     Right Ventricle  The right ventricle is mildly dilated. The right ventricular systolic function  is normal.     Atria  The left atrium is mildly dilated. The right atrium is mildly dilated.     Mitral Valve  Mitral valve leaflets appear normal. There is mild (1+) mitral regurgitation.     Tricuspid Valve  The tricuspid valve is not well visualized. There is mild (1+) tricuspid  regurgitation.     Aortic Valve  The aortic valve is trileaflet with aortic valve sclerosis. No hemodynamically  significant valvular aortic stenosis.     Pulmonic Valve  There is trace pulmonic valvular regurgitation.     Vessels  The aorta root is normal. The ascending aorta is Mildly dilated. IVC diameter  and respiratory changes fall into an intermediate range suggesting an RA  pressure of 8 mmHg.     Pericardium  There is no pericardial effusion.     Rhythm  The rhythm was atrial fibrillation.  ______________________________________________________________________________  MMode/2D Measurements & Calculations  IVSd: 1.7 cm  LVIDd: 5.1 cm  LVIDs: 3.4 cm  LVPWd: 1.4 cm  FS: 32.9 %  LV mass(C)d: 332.7 grams  LV mass(C)dI: 136.0 grams/m2  Ao root diam: 3.3 cm  asc Aorta Diam: 4.2 cm  LVOT diam: 2.5 cm  LVOT area: 4.9 cm2  LA Volume Indexed (AL/bp): 34.1 ml/m2  RV Base: 4.2 cm     RWT: 0.54  TAPSE: 2.3 cm     Time Measurements  MM HR: 90.0 BPM     Doppler Measurements & Calculations  MV E max elsi: 136.0 cm/sec  MV max P.8 mmHg  MV mean PG: 3.0 mmHg  MV V2 VTI: 28.3 cm  MVA(VTI): 3.2 cm2  MV dec time: 0.24 sec  Ao V2 max: 154.0 cm/sec  Ao max P.0 mmHg  Ao V2 mean: 107.0 cm/sec  Ao mean P.0  mmHg  Ao V2 VTI: 26.5 cm  STEPHANIE(I,D): 3.4 cm2  STEPHANIE(V,D): 3.5 cm2  LV V1 max P.8 mmHg  LV V1 max: 109.0 cm/sec  LV V1 VTI: 18.2 cm  SV(LVOT): 89.3 ml  SI(LVOT): 36.5 ml/m2  PA acc time: 0.09 sec  AV Brayden Ratio (DI): 0.71  STEPHANIE Index (cm2/m2): 1.4     E/E': 10.7  E/E' avg: 10.0  Lateral E/e': 9.3  Medial E/e': 10.7  Peak E' Brayden: 12.7 cm/sec  RV S Brayden: 12.5 cm/sec     ______________________________________________________________________________  Report approved by: Ar Louise 2023 10:32 AM         US Abdomen Limited w Abd/Pelvis Duplex Complete    Narrative    EXAM: US ABDOMEN LIMITED WITH DOPPLER COMPLETE  LOCATION: [facility]  DATE: 2023    INDICATION: Anemia. Acutely enlarging spleen. Transaminitis. Evaluate for portal vein or splenic vein thrombosis.  COMPARISON: CT abdomen/pelvis 2023  TECHNIQUE: Complete abdominal ultrasound. Color flow with spectral Doppler and waveform analysis performed.     FINDINGS:    GALLBLADDER: Normal. No gallstones, wall thickening, or pericholecystic fluid. Negative sonographic Guzman's sign.     BILE DUCTS: No biliary dilatation. The common duct measures 5 mm.    LIVER: Increased echogenicity from diffuse fatty infiltration. 2 areas of focal fatty sparing adjacent to the gallbladder. No suspicious mass.     RIGHT KIDNEY: 13.0 cm in length. Normal echogenicity with no hydronephrosis or mass.     SPLEEN: Splenomegaly measuring 19.1 cm in length.    PANCREAS: The visualized portions are normal.    No ascites.    ABDOMINAL DUPLEX: The hepatic artery, hepatic veins, IVC, portal veins, and splenic vein are patent with flow in the normal direction.       Impression    IMPRESSION:  1.  Diffuse hepatic steatosis.    2.  No cholelithiasis or evidence of acute cholecystitis.    3.  Splenomegaly.    4.  Normal abdominal liver duplex. No portal vein or splenic vein thrombosis.

## 2023-08-01 ENCOUNTER — HOME INFUSION (PRE-WILLOW HOME INFUSION) (OUTPATIENT)
Dept: PHARMACY | Facility: CLINIC | Age: 58
End: 2023-08-01
Payer: COMMERCIAL

## 2023-08-01 LAB
BACTERIA BLD CULT: ABNORMAL
C PNEUM DNA SPEC QL NAA+PROBE: NOT DETECTED
FLUAV H1 2009 PAND RNA SPEC QL NAA+PROBE: NOT DETECTED
FLUAV H1 RNA SPEC QL NAA+PROBE: NOT DETECTED
FLUAV H3 RNA SPEC QL NAA+PROBE: NOT DETECTED
FLUAV RNA SPEC QL NAA+PROBE: NOT DETECTED
FLUBV RNA SPEC QL NAA+PROBE: NOT DETECTED
GLUCOSE BLDC GLUCOMTR-MCNC: 121 MG/DL (ref 70–99)
GLUCOSE BLDC GLUCOMTR-MCNC: 142 MG/DL (ref 70–99)
GLUCOSE BLDC GLUCOMTR-MCNC: 149 MG/DL (ref 70–99)
GLUCOSE BLDC GLUCOMTR-MCNC: 92 MG/DL (ref 70–99)
GROUP A STREP BY PCR: NOT DETECTED
GROUP A STREP BY PCR: NOT DETECTED
HADV DNA SPEC QL NAA+PROBE: NOT DETECTED
HCOV PNL SPEC NAA+PROBE: NOT DETECTED
HGB BLD-MCNC: 10 G/DL (ref 13.3–17.7)
HMPV RNA SPEC QL NAA+PROBE: NOT DETECTED
HPIV1 RNA SPEC QL NAA+PROBE: NOT DETECTED
HPIV2 RNA SPEC QL NAA+PROBE: NOT DETECTED
HPIV3 RNA SPEC QL NAA+PROBE: NOT DETECTED
HPIV4 RNA SPEC QL NAA+PROBE: NOT DETECTED
M PNEUMO DNA SPEC QL NAA+PROBE: NOT DETECTED
RSV RNA SPEC QL NAA+PROBE: NOT DETECTED
RSV RNA SPEC QL NAA+PROBE: NOT DETECTED
RV+EV RNA SPEC QL NAA+PROBE: NOT DETECTED

## 2023-08-01 PROCEDURE — 250N000011 HC RX IP 250 OP 636: Performed by: INTERNAL MEDICINE

## 2023-08-01 PROCEDURE — 250N000013 HC RX MED GY IP 250 OP 250 PS 637: Performed by: INTERNAL MEDICINE

## 2023-08-01 PROCEDURE — 36415 COLL VENOUS BLD VENIPUNCTURE: CPT | Performed by: INTERNAL MEDICINE

## 2023-08-01 PROCEDURE — 87486 CHLMYD PNEUM DNA AMP PROBE: CPT | Performed by: INTERNAL MEDICINE

## 2023-08-01 PROCEDURE — 99232 SBSQ HOSP IP/OBS MODERATE 35: CPT | Mod: GC | Performed by: INTERNAL MEDICINE

## 2023-08-01 PROCEDURE — 87633 RESP VIRUS 12-25 TARGETS: CPT | Performed by: INTERNAL MEDICINE

## 2023-08-01 PROCEDURE — 250N000011 HC RX IP 250 OP 636: Mod: JZ | Performed by: STUDENT IN AN ORGANIZED HEALTH CARE EDUCATION/TRAINING PROGRAM

## 2023-08-01 PROCEDURE — 85018 HEMOGLOBIN: CPT | Performed by: INTERNAL MEDICINE

## 2023-08-01 PROCEDURE — 99232 SBSQ HOSP IP/OBS MODERATE 35: CPT | Performed by: INTERNAL MEDICINE

## 2023-08-01 PROCEDURE — 258N000003 HC RX IP 258 OP 636: Performed by: INTERNAL MEDICINE

## 2023-08-01 PROCEDURE — 87651 STREP A DNA AMP PROBE: CPT | Performed by: STUDENT IN AN ORGANIZED HEALTH CARE EDUCATION/TRAINING PROGRAM

## 2023-08-01 PROCEDURE — 120N000004 HC R&B MS OVERFLOW

## 2023-08-01 PROCEDURE — 87651 STREP A DNA AMP PROBE: CPT | Performed by: INTERNAL MEDICINE

## 2023-08-01 PROCEDURE — 250N000013 HC RX MED GY IP 250 OP 250 PS 637: Performed by: HOSPITALIST

## 2023-08-01 PROCEDURE — 87040 BLOOD CULTURE FOR BACTERIA: CPT | Performed by: INTERNAL MEDICINE

## 2023-08-01 RX ORDER — AMPICILLIN 2 G/1
2 INJECTION, POWDER, FOR SOLUTION INTRAVENOUS EVERY 6 HOURS
Status: DISCONTINUED | OUTPATIENT
Start: 2023-08-01 | End: 2023-08-01

## 2023-08-01 RX ORDER — DIPHENHYDRAMINE HCL 25 MG
25 CAPSULE ORAL EVERY 6 HOURS PRN
Status: DISCONTINUED | OUTPATIENT
Start: 2023-08-01 | End: 2023-08-04 | Stop reason: HOSPADM

## 2023-08-01 RX ORDER — NYSTATIN 100000/ML
1000000 SUSPENSION, ORAL (FINAL DOSE FORM) ORAL 4 TIMES DAILY
Status: DISCONTINUED | OUTPATIENT
Start: 2023-08-01 | End: 2023-08-04 | Stop reason: HOSPADM

## 2023-08-01 RX ORDER — IODINE/SODIUM IODIDE 2 %
TINCTURE TOPICAL 3 TIMES DAILY
Status: DISCONTINUED | OUTPATIENT
Start: 2023-08-01 | End: 2023-08-04 | Stop reason: HOSPADM

## 2023-08-01 RX ORDER — AMPICILLIN 2 G/1
2 INJECTION, POWDER, FOR SOLUTION INTRAVENOUS EVERY 4 HOURS
Status: DISCONTINUED | OUTPATIENT
Start: 2023-08-01 | End: 2023-08-04 | Stop reason: HOSPADM

## 2023-08-01 RX ORDER — DIPHENHYDRAMINE HYDROCHLORIDE 50 MG/ML
25 INJECTION INTRAMUSCULAR; INTRAVENOUS EVERY 6 HOURS PRN
Status: DISCONTINUED | OUTPATIENT
Start: 2023-08-01 | End: 2023-08-04 | Stop reason: HOSPADM

## 2023-08-01 RX ADMIN — AMPICILLIN 2 G: 2 INJECTION, POWDER, FOR SOLUTION INTRAMUSCULAR; INTRAVENOUS at 23:21

## 2023-08-01 RX ADMIN — AMPICILLIN 2 G: 2 INJECTION, POWDER, FOR SOLUTION INTRAVENOUS at 18:44

## 2023-08-01 RX ADMIN — SPIRONOLACTONE 12.5 MG: 25 TABLET ORAL at 08:51

## 2023-08-01 RX ADMIN — ACETAMINOPHEN 650 MG: 325 TABLET ORAL at 19:03

## 2023-08-01 RX ADMIN — CALAMINE 8% AND ZINC OXIDE 8%: 160 LOTION TOPICAL at 21:04

## 2023-08-01 RX ADMIN — LISINOPRIL 5 MG: 5 TABLET ORAL at 08:51

## 2023-08-01 RX ADMIN — DILTIAZEM HYDROCHLORIDE 300 MG: 180 CAPSULE, EXTENDED RELEASE ORAL at 08:51

## 2023-08-01 RX ADMIN — NYSTATIN 1000000 UNITS: 100000 SUSPENSION ORAL at 21:04

## 2023-08-01 RX ADMIN — APIXABAN 5 MG: 5 TABLET, FILM COATED ORAL at 21:04

## 2023-08-01 RX ADMIN — CALAMINE 8% AND ZINC OXIDE 8%: 160 LOTION TOPICAL at 11:23

## 2023-08-01 RX ADMIN — INSULIN ASPART 1 UNITS: 100 INJECTION, SOLUTION INTRAVENOUS; SUBCUTANEOUS at 11:22

## 2023-08-01 RX ADMIN — VANCOMYCIN HYDROCHLORIDE 1250 MG: 5 INJECTION, POWDER, LYOPHILIZED, FOR SOLUTION INTRAVENOUS at 05:26

## 2023-08-01 RX ADMIN — APIXABAN 5 MG: 5 TABLET, FILM COATED ORAL at 10:38

## 2023-08-01 RX ADMIN — TAMSULOSIN HYDROCHLORIDE 0.4 MG: 0.4 CAPSULE ORAL at 08:51

## 2023-08-01 ASSESSMENT — ACTIVITIES OF DAILY LIVING (ADL)
ADLS_ACUITY_SCORE: 22

## 2023-08-01 NOTE — PROGRESS NOTES
[facility]    Medicine Progress Note - Hospitalist Service    Date of Admission:  7/29/2023    Assessment & Plan   Govind Alexandre is a 57 year old male admitted on 7/29/2023. He has a past medical history of traumatic brain injury, obesity, paroxysmal atrial fibrillation, renal stone status post bilateral stents placement, type 2 diabetes mellitus, hypertension.  Patient was admitted with complaints of rapid heart rate was found to be in A-fib with RVR which responded very appropriately to diltiazem, was evaluated by cardiology and recommendations were made to initiate DOAC as well as monitor heart rate.  Cardiac echogram is unremarkable.  Patient is noted to be bacteremic with Enterococcus faecalis sensitivities pending, urine culture is also growing the same.    1/.  A-fib with RVR likely paroxysmal in nature secondary to bacteremia/sepsis.  Cardiology following, Cardizem dose increased today  2/.  Anticoagulation: I think it is appropriate to start the patient on a DOAC, request pharmacy to evaluate for affordability.  Begin DOAC today, risks[which could include fatal bleeding ]/benefits discussed  3/.  Enterococcus bacteremia: To continue vancomycin and ceftriaxone, ID following, notes patient had a rash overnight, await ID evaluation    /.  Type 2 diabetes mellitus continue with Accu-Cheks and sliding scale insulin, metformin on hold at this time.  Ozempic on hold.  5/.  Hypotension this is resolved, continue with reduced dose of lisinopril to 10 mg a day and decrease Aldactone to 12.5 mg daily       Diet: Moderate Consistent Carb (60 g CHO per Meal) Diet    DVT Prophylaxis: DOAC  Ibarra Catheter: Not present  Lines: None     Cardiac Monitoring: None  Code Status: Full Code      Clinically Significant Risk Factors              # Hypoalbuminemia: Lowest albumin = 2.4 g/dL at 7/31/2023  6:40 AM, will monitor as appropriate     # Thrombocytopenia: Lowest platelets = 106 in last 2 days,  "will monitor for bleeding     # Hypertension: Noted on problem list        # Obesity: Estimated body mass index is 38.14 kg/m  as calculated from the following:    Height as of this encounter: 1.829 m (6' 0.01\").    Weight as of this encounter: 127.6 kg (281 lb 4.8 oz).  , PRESENT ON ADMISSION          Disposition Plan      Expected Discharge Date: To be determined        Discharge Comments: Awaiting sensitivities for bacteremia          Ruddy Mendes MD  Hospitalist Service  [facility]  Securely message with [email or web address] (more info)  Text page via [employer] Paging/Directory   ______________________________________________________________________    Interval History   Reports some rash on his upper back but no breathing difficulty, no pruritus    Physical Exam   Vital Signs: Temp: 99  F (37.2  C) Temp src: Oral BP: 119/68 Pulse: 97   Resp: 18 SpO2: 95 % O2 Device: None (Room air)    Weight: 281 lbs 4.8 oz    General Appearance: Alert awake oriented x3 no acute distress obese man  Respiratory: Clear to auscultation  Cardiovascular: S1-S2  GI: Obese nontender bowel sounds are present  Skin: Erythematous rash on his upper back  Other: Trace lower extremity edema    Medical Decision Making       45 MINUTES SPENT BY ME on the date of service doing chart review, history, exam, documentation & further activities per the note.  MANAGEMENT DISCUSSED with the following over the past 24 hours: Patient, wife, cardiology, pharmacy and nursing   NOTE(S)/MEDICAL RECORDS REVIEWED over the past 24 hours: Old records including Care Everywhere       Data       Imaging results reviewed over the past 24 hrs:   No results found for this or any previous visit (from the past 24 hour(s)).    "

## 2023-08-01 NOTE — PROGRESS NOTES
" [facility] HEART CARE   [address], [place] [address]   [email or web address]   OFFICE: [phone]            Impression and Plan     Atrial fibrillation.  Ventricular response fairly reasonable this morning with heart rate in the 80s-90s, but perhaps could further \"fine tune\" rate control.  Continue Cardizem CD, but will increase to 300 mg daily.  Discussed with Dr. Sirisha Clark yesterday and agree with institution of anticoagulation (will defer to Dr. Clark).    2.  Hypertension.  Blood pressure this morning reasonable at 121/79 mmHg.    3.  Pyelonephritis.  Management plan as outlined by the providers.    Primary Cardiologist: Dr. Bryson Golmdan     [place] reports no concerning cardiac symptoms.    Cardiac Diagnostics   Telemetry (personally reviewed): Atrial fibrillation with heart rate in the 80s-90s.    Echocardiogram 30 July 2023:  Normal left ventricular size. Moderate concentric left ventricular hypertrophy with sigmoid shaped septum. Visually estimated ejection fraction of 55 to 60% without observed wall motion abnormality.  Right ventricle is mildly dilated. Right ventricular systolic function appears grossly normal.  Mild biatrial enlargement.  Mild mitral regurgitation.  Mild tricuspid regurgitation. Unable to estimate right ventricular systolic function secondary to incomplete tricuspid regurgitation velocity envelope.  Mild enlargement of the proximal ascending aorta.  Underlying rhythm is atrial fibrillation.    Physical Examination       /79 (BP Location: Left arm)   Pulse 97   Temp 99  F (37.2  C) (Oral)   Resp 16   Ht 1.829 m (6' 0.01\")   Wt 127.6 kg (281 lb 4.8 oz)   SpO2 94%   BMI 38.14 kg/m          Vitals:    07/29/23 2004 07/30/23 0323 08/01/23 0343   Weight: 124.7 kg (275 lb) 126.1 kg (277 lb 14.4 oz) 127.6 kg (281 lb 4.8 oz)            Intake/Output Summary (Last 24 hours) at 7/31/2023 0919  Last data filed at " 7/31/2023 0800  Gross per 24 hour   Intake 650 ml   Output 1150 ml   Net -500 ml       The patient is alert and oriented times three. Sclerae are anicteric. Mucosal membranes are moist. Jugular venous pressure is normal. No significant adenopathy/thyromegally appreciated. Lungs are clear with good expansion. On cardiovascular exam, the patient has irregular S1 and S2. Abdomen is soft and non-tender. Extremities reveal no clubbing, cyanosis.         Imaging     Chest radiograph 29 July 2023:  Heart size within normal limits.   No evidence of pneumonia.   No pneumothorax or pleural effusion.     Lab Results     Recent Labs   Lab 07/31/23  2159 07/31/23  1640 07/31/23  1221 07/31/23  0952 07/31/23  0829 07/31/23  0640 07/30/23  1732 07/30/23  1320 07/30/23  1236 07/30/23  0818 07/30/23  0549 07/30/23  0139 07/29/23 2014   WBC  --   --   --  6.1  --  5.0  --  5.6  --   --  6.3  --  6.3   HGB  --   --   --  10.3*  --  9.5*  --  9.6*  --   --  9.8*  --  11.1*   MCV  --   --   --  94  --  94  --  93  --   --  92  --  92   PLT  --   --   --  120*  --  114*  --  106*  --   --  94*  --  106*   NA  --   --   --   --   --  141  --   --   --   --  140  --  139   POTASSIUM  --   --   --   --   --  3.6  --   --  3.5  --  3.3*  --  3.4*   CHLORIDE  --   --   --   --   --  111*  --   --   --   --  110*  --  106   CO2  --   --   --   --   --  21*  --   --   --   --  20*  --  23   BUN  --   --   --   --   --  19  --   --   --   --  16  --  14   CR  --   --   --   --   --  1.08  --   --   --   --  1.07  --  1.05   ANIONGAP  --   --   --   --   --  9  --   --   --   --  10  --  10   LEV  --   --   --   --   --  8.6  --   --   --   --  8.5  --  9.6   * 95 146*  --    < > 102   < >  --   --    < > 144*   < > 147*   ALBUMIN  --   --   --   --   --  2.4*  --   --   --   --  2.6*  --   --    PROTTOTAL  --   --   --   --   --  5.8*  --   --   --   --  5.7*  --   --    BILITOTAL  --   --   --   --   --  2.1*  --   --   --   --  4.5*   --   --    ALKPHOS  --   --   --   --   --  123*  --   --   --   --  130*  --   --    ALT  --   --   --   --   --  71*  --   --   --   --  97*  --   --    AST  --   --   --   --   --  38  --   --   --   --  72*  --   --     < > = values in this interval not displayed.       Recent Labs   Lab Test 07/30/23  0549   *     No lab results found in last 7 days.    Invalid input(s): LDLCALC  Lab Results   Component Value Date     07/31/2023    CO2 21 07/31/2023    BUN 19 07/31/2023     Lab Results   Component Value Date    WBC 5.0 07/31/2023    HGB 9.5 07/31/2023    HCT 29.0 07/31/2023    MCV 94 07/31/2023     07/31/2023     Lab Results   Component Value Date    CHOL 104 11/29/2022    TRIG 135 11/29/2022    HDL 36 11/29/2022     Lab Results   Component Value Date    TROPONINI 0.08 07/30/2023    TROPONINI 0.01 07/29/2023     Lab Results   Component Value Date    TSH 0.73 12/24/2021           Current Inpatient Scheduled Medications   Scheduled Meds:   cefTRIAXone  2 g Intravenous Q24H    diltiazem ER COATED BEADS  240 mg Oral Daily    insulin aspart  1-7 Units Subcutaneous TID AC    insulin aspart  1-5 Units Subcutaneous At Bedtime    lisinopril  5 mg Oral Daily    [Held by provider] rosuvastatin  10 mg Oral Daily    spironolactone  12.5 mg Oral Daily    tamsulosin  0.4 mg Oral Daily    vancomycin  1,250 mg Intravenous Q12H     Continuous Infusions:         Medications Prior to Admission   Prior to Admission medications    Medication Sig Start Date End Date Taking? Authorizing Provider   acetaminophen (TYLENOL) 500 MG tablet Take 2 tablets (1,000 mg) by mouth every 6 hours as needed for mild pain 7/26/23  Yes Mark Orta MD   aspirin 81 mg chewable tablet [ASPIRIN 81 MG CHEWABLE TABLET] Chew 81 mg daily. 12/21/16  Yes Provider, Historical   fexofenadine (ALLEGRA) 180 MG tablet [FEXOFENADINE (ALLEGRA) 180 MG TABLET] Take 180 mg by mouth daily. 11/21/16  Yes Provider, Historical   lisinopril (ZESTRIL)  40 MG tablet Take 1 tablet (40 mg) by mouth daily 11/29/22  Yes Lachelle Summers APRN CNP   metFORMIN (GLUCOPHAGE XR) 500 MG 24 hr tablet Take 2 tablets (1,000 mg) by mouth 2 times daily (with meals) 11/29/22  Yes Lachelle Summers APRN CNP   NIFEdipine ER OSMOTIC (PROCARDIA XL) 60 MG 24 hr tablet Take 1 tablet (60 mg) by mouth daily 1/1/23  Yes Lachelle uSmmers APRN CNP   ondansetron (ZOFRAN ODT) 4 MG ODT tab Take 1 tablet (4 mg) by mouth every 8 hours as needed for nausea 7/8/23  Yes Brijesh See MD   rosuvastatin (CRESTOR) 10 MG tablet TAKE 1 TABLET(10 MG) BY MOUTH AT BEDTIME Strength: 10 mg 12/2/22  Yes Lachelle Summers APRN CNP   Semaglutide, 1 MG/DOSE, (OZEMPIC, 1 MG/DOSE,) 4 MG/3ML SOPN Inject 1 mg Subcutaneous once a week  Patient taking differently: Inject 1 mg Subcutaneous once a week Every Friday or Saturday 12/20/22  Yes Lachelle Summers APRN CNP   spironolactone (ALDACTONE) 25 MG tablet Take 1 tablet (25 mg) by mouth daily 11/29/22  Yes Lachelle Summers APRN CNP   tadalafil (CIALIS) 10 MG tablet Take 1 tablet (10 mg) by mouth daily as needed (erectile dsyfunction) 10 mg as a single dose 30 minutes prior to anticipated sexual activity; do not take more than once daily.  Adjust dose based on effectiveness and tolerability; may decrease to 5 mg or increase to 20 mg per dose. 11/29/22  Yes Lachelle Summers APRN CNP   tamsulosin (FLOMAX) 0.4 MG capsule Take 1 capsule (0.4 mg) by mouth daily While the stent is in place, for stent pain and irritation 7/26/23  Yes Mark Orta MD   ACCU-CHEK FASTCLIX LANCET DRUM [ACCU-CHEK FASTCLIX LANCET DRUM] USE TO TEST BLOOD SUGARS 2 TO 3 TIMES A DAY 9/30/19   Deepa Short MD   blood glucose test (ACCU-CHEK NATHANIEL PLUS TEST STRP) strips [BLOOD GLUCOSE TEST (ACCU-CHEK NATHANIEL PLUS TEST STRP) STRIPS] Use 1 each As Directed 3 (three) times a day. 11/21/16   Deepa Short MD   docusate sodium (COLACE) 100 MG capsule Take 1 capsule (100 mg) by mouth 2  times daily 7/26/23   Mark Orta MD   ibuprofen (ADVIL/MOTRIN) 800 MG tablet Take 1 tablet (800 mg) by mouth every 6 hours as needed 7/26/23   Mark Orta MD

## 2023-08-01 NOTE — PROGRESS NOTES
Care Management Follow Up    Length of Stay (days): 1    Expected Discharge Date: 08/03/2023     Concerns to be Addressed:   Discharge needs?    Additional Information:  CM following for final discharge needs. Patient does have 100% coverage for home infusion if he needs home infusion needs. CM will continue to follow along, and assist as needed with discharge if needed.     Lachelle Saldana RN

## 2023-08-01 NOTE — TELEPHONE ENCOUNTER
Patient calling in regards to below message. Patient currently in the hospital fighting an infection so they're not going to take the stent out. Per protocols clinic has to cancel patient's Cysto for tomorrow 08/02. Please contact patients spouse Kathleen via phone to let them know Cysto was cancelled. Thank you

## 2023-08-01 NOTE — PROGRESS NOTES
"INFECTIOUS DISEASE FOLLOW UP NOTE    Date: 08/01/2023   CHIEF COMPLAINT:   Chief Complaint   Patient presents with    Fever        ASSESSMENT:  Govind Alexandre is a 57 year old male who was admitted on 7/29/2023 with:       Enterococcus faecalis bacteremia, UTI  Renal stones status post bilateral stent placement-no hydronephrosis, persistent inflammation in right pelvis--recent cystoscopy bilateral ureteroscopy with laser lithotripsy and by lateral ureteral stent placement  Type 2 diabetes hypertension paroxysmal atrial fibrillation  Admitted with atrial fibrillation with RVR, responded to diltiazem  History of traumatic brain injury  BMI elevated    PLAN:  Antibiotics: Started ampicillin, will continue with CTX for synergy. Discontinued vancomycin   Follow up Cultures, repeat blood cultures ordered for tomorrow  Due to bacteremia, recent urinary tract procedures, patient will likely need IV antibiotics on discharge  Follow up RVP panel and strep PCR throat swab due to reported throat pain today.   Nystatin swish and swallow empirically for possible early thrush  ID will continue to follow       Infectious Disease Staff Physician Attestation    I have seen and examined the patient. I have discussed the case with the resident physician, Dr. Bailey. I agree with the findings, assessment and plan.    Nya Hines MD  [facility]  [phone]      ______________________________________________________________________    SUBJECTIVE / INTERVAL HISTORY: Patient reporting that the dysuria and hematuria has improved. The calamine lotion has helped with the back rash/pruritus. He noticed difficulty with swallowing and mild sore throat today. He felt chills last night but no fevers.     ROS: All other systems negative except as listed above.    SH/FH/Habits/PMH reviewed and unchanged.    OBJECTIVE:  /66   Pulse 85   Temp 99.5  F (37.5  C) (Oral)   Resp 18   Ht 1.829 m (6' 0.01\")   Wt " 127.6 kg (281 lb 4.8 oz)   SpO2 97%   BMI 38.14 kg/m       Resp: 18      Vital Signs  Temp: 99.5  F (37.5  C)  Temp src: Oral  Resp: 18  Pulse: 85  Pulse Rate Source: Monitor  BP: 113/66  BP Location: Right arm    Temp (24hrs), Av.1  F (37.3  C), Min:98.5  F (36.9  C), Max:99.6  F (37.6  C)      GEN: No acute distress.   MOUTH: No pharyngeal erythema    RESPIRATORY:  Normal breathing pattern. Clear to auscultation bilaterally  CARDIOVASCULAR:  Regular rate and rhythm. No murmur, click, gallop or rub.   ABDOMEN:  Soft, normal bowel sounds, non-tender,   EXTREMITIES: trace pitted edema.  SKIN/HAIR/NAILS:  Diffuse mild erythema of the back, no visible or palpable raised bumps  Lines/Drains: peripheral IV      Pertinent labs:  No results found for: CRP   CBC RESULTS:   Recent Labs   Lab Test 23  1037 23  0952   WBC  --  6.1   RBC  --  3.33*   HGB 10.0* 10.3*   HCT  --  31.4*   MCV  --  94   MCH  --  30.9   MCHC  --  32.8   RDW  --  14.6   PLT  --  120*      Last Comprehensive Metabolic Panel:  Sodium   Date Value Ref Range Status   2023 141 136 - 145 mmol/L Final     Potassium   Date Value Ref Range Status   2023 3.6 3.5 - 5.0 mmol/L Final     Chloride   Date Value Ref Range Status   2023 111 (H) 98 - 107 mmol/L Final     Carbon Dioxide (CO2)   Date Value Ref Range Status   2023 21 (L) 22 - 31 mmol/L Final     Anion Gap   Date Value Ref Range Status   2023 9 5 - 18 mmol/L Final     Glucose   Date Value Ref Range Status   2023 102 70 - 125 mg/dL Final     GLUCOSE BY METER POCT   Date Value Ref Range Status   2023 92 70 - 99 mg/dL Final     Urea Nitrogen   Date Value Ref Range Status   2023 19 8 - 22 mg/dL Final     Creatinine   Date Value Ref Range Status   2023 1.08 0.70 - 1.30 mg/dL Final     GFR Estimate   Date Value Ref Range Status   2023 79 >60 mL/min/1.73m2 Final   2021 >60 >60 mL/min/1.73m2 Final     Calcium   Date Value Ref Range  Status   07/31/2023 8.6 8.5 - 10.5 mg/dL Final        MICROBIOLOGY DATA:  Personally reviewed.  7-Day Micro Results       Collected Updated Procedure Result Status      08/01/2023 1844 08/01/2023 1845 Respiratory Panel PCR [[id number]]   Swab from Nasopharyngeal    In process Component Value   No component results            08/01/2023 1844 08/01/2023 1845 Group A Streptococcus PCR Throat Swab [[id number]]   Swab from Throat    In process Component Value   No component results            08/01/2023 0526 08/01/2023 0538 Blood Culture Peripheral Blood [[id number]]   Peripheral Blood    In process Component Value   No component results               08/01/2023 0526 08/01/2023 0538 Blood Culture Peripheral Blood [[id number]]   Peripheral Blood    In process Component Value   No component results               07/29/2023 2156 07/31/2023 2252 Urine Culture [[id number]]    (Abnormal)   Urine, Clean Catch    Final result Component Value   Culture >100,000 CFU/mL Enterococcus faecalis        Susceptibility        Enterococcus faecalis      PRAFUL      Ampicillin <=2 ug/mL Susceptible      Nitrofurantoin <=16 ug/mL Susceptible      Penicillin 4 ug/mL Susceptible      Vancomycin 1 ug/mL Susceptible                           07/29/2023 2137 07/29/2023 2226 Symptomatic Influenza A/B, RSV, & SARS-CoV2 PCR (COVID-19) Nasopharyngeal [[id number]]    Swab from Nasopharyngeal    Final result Component Value   Influenza A PCR Negative   Influenza B PCR Negative   RSV PCR Negative   SARS CoV2 PCR Negative   NEGATIVE: SARS-CoV-2 (COVID-19) RNA not detected, presumed negative.            07/29/2023 2126 08/01/2023 0742 Blood Culture Peripheral Blood [[id number]]   (Abnormal)   Peripheral Blood    Final result Component Value   Culture Positive on the 1st day of incubation    Enterococcus faecalis    2 of 2 bottlesSusceptibilities done on previous cultures               07/29/2023 2014 08/01/2023 0826 Blood Culture Peripheral  Blood [[id number]]    (Abnormal)   Peripheral Blood    Final result Component Value   Culture Positive on the 1st day of incubation    Enterococcus faecalis    2 of 2 bottles        Susceptibility        Enterococcus faecalis      PRAFUL      Ampicillin <=2 ug/mL Susceptible      Gentamicin Synergy Susceptible ug/mL Susceptible  [1]       Penicillin 4 ug/mL Susceptible      Vancomycin 1 ug/mL Susceptible                   [1]  No high level gentamicin resistance found - therefore combination therapy with an aminoglycoside may be indicated for serious enterococcal infections such as bacteremia and endocarditis.                   07/29/2023 2014 07/30/2023 2115 Verigene GP Panel [[id number]]    (Abnormal)   Peripheral Blood    Final result Component Value   Staphylococcus species Not Detected   Staphylococcus aureus Not Detected   Staphylococcus epidermidis Not Detected   Staphylococcus lugdunensis Not Detected   Enterococcus faecalis Detected   Positive for Enterococcus faecalis and negative for [place]/vanB genes (not resistant to vancomycin) by [employer] multiplex nucleic acid test. Final identification and antimicrobial susceptibility testing will be verified by standard methods.   Enterococcus faecium Not Detected   Streptococcus species Not Detected   Streptococcus agalactiae Not Detected   Streptococcus anginosus group Not Detected   Streptococcus pneumoniae Not Detected   Streptococcus pyogenes Not Detected   Listeria species Not Detected                     RADIOLOGY:  Personally Reviewed.  No results found for this or any previous visit (from the past 24 hour(s)).    Principal Problem:    Atrial fibrillation with RVR (H)  Active Problems:    Complicated UTI (urinary tract infection)    Bacteremia

## 2023-08-01 NOTE — PROGRESS NOTES
[facility]    Referral received  from Eufemia Saldana RN CM, for IV antibiotics.    Benefits verified.  Pt has coverage for IV antibiotics under his [employer] plan.  Pt's deductible of $7000 has been met in full, so his coverage should be 100%.    Thank you for the referral.    Yee Bradshaw RN, BSN  [facility] Liaison  [phone] (Mon through Fri, 8:00 am-5:00 pm)  [phone] (Office)

## 2023-08-01 NOTE — PLAN OF CARE
Goal Outcome Evaluation:      Plan of Care Reviewed With: spouse, patient    Overall Patient Progress: improvingOverall Patient Progress: improving    Outcome Evaluation: VSS on RA. endorses some pain urinating with small amounts of urine, nothing with large amounts. Rash to upper back. topical applied with relief. IV  ABx changed per ID. Spouse at bedside and supportive.

## 2023-08-01 NOTE — PROGRESS NOTES
MD ordered to notify ID before administering rocephin, HUC paged ID, no call back yet, will notify oncoming RN

## 2023-08-01 NOTE — PROGRESS NOTES
Therapy: IV ABX  Insurance: [employer]     Patient has coverage for IV ABX with their [employer] insurance. Coverage is 100% since deductible $7000 has been met in full at this time.    Ded: $7000  Met: $7000    [facility] in reference to admission date 07/29/2023 to check IV ABX coverage.    Please contact Intake with any questions, [phone] or In Basket pool, [facility] ([phone]).

## 2023-08-02 LAB
ALBUMIN SERPL-MCNC: 2.5 G/DL (ref 3.5–5)
ALP SERPL-CCNC: 172 U/L (ref 45–120)
ALT SERPL W P-5'-P-CCNC: 53 U/L (ref 0–45)
ANION GAP SERPL CALCULATED.3IONS-SCNC: 9 MMOL/L (ref 5–18)
AST SERPL W P-5'-P-CCNC: 38 U/L (ref 0–40)
BASOPHILS # BLD AUTO: 0 10E3/UL (ref 0–0.2)
BASOPHILS NFR BLD AUTO: 0 %
BILIRUB SERPL-MCNC: 1 MG/DL (ref 0–1)
BUN SERPL-MCNC: 13 MG/DL (ref 8–22)
CALCIUM SERPL-MCNC: 8.7 MG/DL (ref 8.5–10.5)
CHLORIDE BLD-SCNC: 109 MMOL/L (ref 98–107)
CO2 SERPL-SCNC: 23 MMOL/L (ref 22–31)
CREAT SERPL-MCNC: 0.83 MG/DL (ref 0.7–1.3)
EOSINOPHIL # BLD AUTO: 0.5 10E3/UL (ref 0–0.7)
EOSINOPHIL NFR BLD AUTO: 8 %
ERYTHROCYTE [DISTWIDTH] IN BLOOD BY AUTOMATED COUNT: 14.3 % (ref 10–15)
GFR SERPL CREATININE-BSD FRML MDRD: >90 ML/MIN/1.73M2
GLUCOSE BLD-MCNC: 106 MG/DL (ref 70–125)
GLUCOSE BLDC GLUCOMTR-MCNC: 102 MG/DL (ref 70–99)
GLUCOSE BLDC GLUCOMTR-MCNC: 109 MG/DL (ref 70–99)
GLUCOSE BLDC GLUCOMTR-MCNC: 124 MG/DL (ref 70–99)
GLUCOSE BLDC GLUCOMTR-MCNC: 98 MG/DL (ref 70–99)
HCT VFR BLD AUTO: 29.9 % (ref 40–53)
HGB BLD-MCNC: 9.6 G/DL (ref 13.3–17.7)
IMM GRANULOCYTES # BLD: 0.1 10E3/UL
IMM GRANULOCYTES NFR BLD: 2 %
LYMPHOCYTES # BLD AUTO: 0.5 10E3/UL (ref 0.8–5.3)
LYMPHOCYTES NFR BLD AUTO: 8 %
MCH RBC QN AUTO: 30.4 PG (ref 26.5–33)
MCHC RBC AUTO-ENTMCNC: 32.1 G/DL (ref 31.5–36.5)
MCV RBC AUTO: 95 FL (ref 78–100)
MONOCYTES # BLD AUTO: 0.3 10E3/UL (ref 0–1.3)
MONOCYTES NFR BLD AUTO: 6 %
NEUTROPHILS # BLD AUTO: 4.6 10E3/UL (ref 1.6–8.3)
NEUTROPHILS NFR BLD AUTO: 76 %
NRBC # BLD AUTO: 0 10E3/UL
NRBC BLD AUTO-RTO: 0 /100
PLATELET # BLD AUTO: 138 10E3/UL (ref 150–450)
POTASSIUM BLD-SCNC: 3.3 MMOL/L (ref 3.5–5)
POTASSIUM BLD-SCNC: 3.3 MMOL/L (ref 3.5–5)
PROT SERPL-MCNC: 6.1 G/DL (ref 6–8)
RBC # BLD AUTO: 3.16 10E6/UL (ref 4.4–5.9)
SODIUM SERPL-SCNC: 141 MMOL/L (ref 136–145)
WBC # BLD AUTO: 6 10E3/UL (ref 4–11)

## 2023-08-02 PROCEDURE — 99207 PR NO CHARGE LOS: CPT | Performed by: INTERNAL MEDICINE

## 2023-08-02 PROCEDURE — 250N000013 HC RX MED GY IP 250 OP 250 PS 637: Performed by: INTERNAL MEDICINE

## 2023-08-02 PROCEDURE — 87040 BLOOD CULTURE FOR BACTERIA: CPT | Performed by: STUDENT IN AN ORGANIZED HEALTH CARE EDUCATION/TRAINING PROGRAM

## 2023-08-02 PROCEDURE — 85014 HEMATOCRIT: CPT | Performed by: INTERNAL MEDICINE

## 2023-08-02 PROCEDURE — 120N000004 HC R&B MS OVERFLOW

## 2023-08-02 PROCEDURE — 36415 COLL VENOUS BLD VENIPUNCTURE: CPT | Performed by: STUDENT IN AN ORGANIZED HEALTH CARE EDUCATION/TRAINING PROGRAM

## 2023-08-02 PROCEDURE — 250N000011 HC RX IP 250 OP 636: Performed by: INTERNAL MEDICINE

## 2023-08-02 PROCEDURE — 80053 COMPREHEN METABOLIC PANEL: CPT | Performed by: INTERNAL MEDICINE

## 2023-08-02 PROCEDURE — 99232 SBSQ HOSP IP/OBS MODERATE 35: CPT | Performed by: INTERNAL MEDICINE

## 2023-08-02 PROCEDURE — 99233 SBSQ HOSP IP/OBS HIGH 50: CPT | Performed by: INTERNAL MEDICINE

## 2023-08-02 PROCEDURE — 36415 COLL VENOUS BLD VENIPUNCTURE: CPT | Performed by: INTERNAL MEDICINE

## 2023-08-02 PROCEDURE — 250N000013 HC RX MED GY IP 250 OP 250 PS 637: Performed by: HOSPITALIST

## 2023-08-02 PROCEDURE — 84132 ASSAY OF SERUM POTASSIUM: CPT | Performed by: INTERNAL MEDICINE

## 2023-08-02 PROCEDURE — 250N000011 HC RX IP 250 OP 636: Mod: JZ | Performed by: STUDENT IN AN ORGANIZED HEALTH CARE EDUCATION/TRAINING PROGRAM

## 2023-08-02 PROCEDURE — 250N000011 HC RX IP 250 OP 636: Mod: JZ | Performed by: INTERNAL MEDICINE

## 2023-08-02 RX ORDER — POTASSIUM CHLORIDE 1500 MG/1
40 TABLET, EXTENDED RELEASE ORAL ONCE
Status: COMPLETED | OUTPATIENT
Start: 2023-08-03 | End: 2023-08-03

## 2023-08-02 RX ORDER — POTASSIUM CHLORIDE 1500 MG/1
40 TABLET, EXTENDED RELEASE ORAL ONCE
Status: COMPLETED | OUTPATIENT
Start: 2023-08-02 | End: 2023-08-02

## 2023-08-02 RX ORDER — CEFTRIAXONE 2 G/1
2 INJECTION, POWDER, FOR SOLUTION INTRAMUSCULAR; INTRAVENOUS 2 TIMES DAILY
Status: DISCONTINUED | OUTPATIENT
Start: 2023-08-02 | End: 2023-08-03

## 2023-08-02 RX ADMIN — AMPICILLIN 2 G: 2 INJECTION, POWDER, FOR SOLUTION INTRAMUSCULAR; INTRAVENOUS at 02:52

## 2023-08-02 RX ADMIN — AMPICILLIN 2 G: 2 INJECTION, POWDER, FOR SOLUTION INTRAMUSCULAR; INTRAVENOUS at 16:00

## 2023-08-02 RX ADMIN — DILTIAZEM HYDROCHLORIDE 300 MG: 180 CAPSULE, EXTENDED RELEASE ORAL at 08:46

## 2023-08-02 RX ADMIN — APIXABAN 5 MG: 5 TABLET, FILM COATED ORAL at 21:28

## 2023-08-02 RX ADMIN — SPIRONOLACTONE 12.5 MG: 25 TABLET ORAL at 08:54

## 2023-08-02 RX ADMIN — CEFTRIAXONE SODIUM 2 G: 2 INJECTION, POWDER, FOR SOLUTION INTRAMUSCULAR; INTRAVENOUS at 08:51

## 2023-08-02 RX ADMIN — NYSTATIN 1000000 UNITS: 100000 SUSPENSION ORAL at 08:46

## 2023-08-02 RX ADMIN — TAMSULOSIN HYDROCHLORIDE 0.4 MG: 0.4 CAPSULE ORAL at 08:46

## 2023-08-02 RX ADMIN — AMPICILLIN 2 G: 2 INJECTION, POWDER, FOR SOLUTION INTRAMUSCULAR; INTRAVENOUS at 06:44

## 2023-08-02 RX ADMIN — LISINOPRIL 5 MG: 5 TABLET ORAL at 08:45

## 2023-08-02 RX ADMIN — APIXABAN 5 MG: 5 TABLET, FILM COATED ORAL at 08:45

## 2023-08-02 RX ADMIN — CEFTRIAXONE SODIUM 2 G: 2 INJECTION, POWDER, FOR SOLUTION INTRAMUSCULAR; INTRAVENOUS at 21:28

## 2023-08-02 RX ADMIN — AMPICILLIN 2 G: 2 INJECTION, POWDER, FOR SOLUTION INTRAMUSCULAR; INTRAVENOUS at 22:43

## 2023-08-02 RX ADMIN — NYSTATIN 1000000 UNITS: 100000 SUSPENSION ORAL at 16:00

## 2023-08-02 RX ADMIN — AMPICILLIN 2 G: 2 INJECTION, POWDER, FOR SOLUTION INTRAMUSCULAR; INTRAVENOUS at 18:51

## 2023-08-02 RX ADMIN — AMPICILLIN 2 G: 2 INJECTION, POWDER, FOR SOLUTION INTRAMUSCULAR; INTRAVENOUS at 11:46

## 2023-08-02 RX ADMIN — POTASSIUM CHLORIDE 40 MEQ: 1500 TABLET, EXTENDED RELEASE ORAL at 18:51

## 2023-08-02 RX ADMIN — NYSTATIN 1000000 UNITS: 100000 SUSPENSION ORAL at 12:05

## 2023-08-02 RX ADMIN — NYSTATIN 1000000 UNITS: 100000 SUSPENSION ORAL at 21:28

## 2023-08-02 ASSESSMENT — ACTIVITIES OF DAILY LIVING (ADL)
ADLS_ACUITY_SCORE: 22

## 2023-08-02 NOTE — PROGRESS NOTES
[facility] HEART CARE [address], [place] [address]   [email or web address]   OFFICE: [phone]                 Cardiology chart check.  Ventricular response would appear to be reasonable.  I will make arrangements for patient to follow-up in the Atrial Fibrillation Clinic in approximately 3-4 weeks from now.  Will sign off, however, do not hesitate to call with additional questions.  Thanks.

## 2023-08-02 NOTE — PROGRESS NOTES
[facility]    Medicine Progress Note - Hospitalist Service    Date of Admission:  7/29/2023    Assessment & Plan   Govind Alexandre is a 57 year old male admitted on 7/29/2023. He has a past medical history of traumatic brain injury, obesity, paroxysmal atrial fibrillation, renal stone status post bilateral stents placement, type 2 diabetes mellitus, hypertension.  Patient was admitted with complaints of rapid heart rate was found to be in A-fib with RVR which responded very appropriately to diltiazem, was evaluated by cardiology and recommendations were made to initiate DOAC as well as monitor heart rate.  Cardiac echogram is unremarkable.  Patient is noted to be bacteremic with Enterococcus faecalis sensitivities pending, urine culture is also growing the same.    1/.  A-fib with RVR likely paroxysmal in nature secondary to bacteremia/sepsis.  Cardiology following, Cardizem dose increased yesterday    2/.  Anticoagulation: I think it is appropriate to start the patient on a DOAC, request pharmacy to evaluate for affordability.  Patient on DOAC , risks[which could include fatal bleeding ]/benefits discussed    3/.  Enterococcus bacteremia: On ampicillin and ceftriaxone, ID following, rash noted yesterday is now resolved    /.  Type 2 diabetes mellitus continue with Accu-Cheks and sliding scale insulin, metformin on hold at this time.  Ozempic on hold.    5/.  Hypotension this is resolved, continue with reduced dose of lisinopril to 10 mg a day and decrease Aldactone to 12.5 mg daily    6.  Recent bilateral ureteral stent placement for kidney stone, I spoke to the urologist Dr. Orta at the [facility], he does not see consult at this facility.  He recommends continue antibiotics and follow-up in clinic next week for stent removal.         Diet: Moderate Consistent Carb (60 g CHO per Meal) Diet    DVT Prophylaxis: DOAC  Ibarra Catheter: Not present  Lines: None     Cardiac  "Monitoring: None  Code Status: Full Code      Clinically Significant Risk Factors              # Hypoalbuminemia: Lowest albumin = 2.4 g/dL at 7/31/2023  6:40 AM, will monitor as appropriate         # Hypertension: Noted on problem list        # Obesity: Estimated body mass index is 38.14 kg/m  as calculated from the following:    Height as of this encounter: 1.829 m (6' 0.01\").    Weight as of this encounter: 127.6 kg (281 lb 4.8 oz).  , PRESENT ON ADMISSION          Disposition Plan      Expected Discharge Date: To be determined        Discharge Comments: Awaiting sensitivities for bacteremia          Ruddy Mendes MD  Hospitalist Service  [facility]  Securely message with ERN (more info)  Text page via [employer] Paging/Directory   ______________________________________________________________________    Interval History rashes resolved, no events overnight feels comfortable    Physical Exam   Vital Signs: Temp: 99.1  F (37.3  C) Temp src: Oral BP: 117/74 Pulse: 94   Resp: 18 SpO2: 95 % O2 Device: BiPAP/CPAP    Weight: 281 lbs 4.8 oz    General Appearance: Alert awake oriented x3 no acute distress obese man  Respiratory: Clear to auscultation  Cardiovascular: S1-S2  GI: Obese nontender bowel sounds are present  Skin: Erythematous rash on his upper back  Other: Trace lower extremity edema    Medical Decision Making       45 MINUTES SPENT BY ME on the date of service doing chart review, history, exam, documentation & further activities per the note.  MANAGEMENT DISCUSSED with the following over the past 24 hours: Patient, wife, cardiology, pharmacy and nursing   NOTE(S)/MEDICAL RECORDS REVIEWED over the past 24 hours: Old records including Care Everywhere       Data       Imaging results reviewed over the past 24 hrs:   No results found for this or any previous visit (from the past 24 hour(s)).    "

## 2023-08-02 NOTE — PLAN OF CARE
Goal Outcome Evaluation:      Plan of Care Reviewed With: spouse, patient    Overall Patient Progress: improvingOverall Patient Progress: improving    Outcome Evaluation: VSS on RA. endorses some pain urinating with small amounts of urine, nothing with large amounts. Rash to upper back. topical applied with relief. IV  ABx changed per ID. Spouse at bedside and supportive.    Urine output adequate but remains dark curt in color.     Problem: UTI (Urinary Tract Infection)  Goal: Improved Infection Symptoms  Outcome: Progressing     Problem: Plan of Care - These are the overarching goals to be used throughout the patient stay.    Goal: Absence of Hospital-Acquired Illness or Injury  Intervention: Identify and Manage Fall Risk  Recent Flowsheet Documentation  Taken 8/2/2023 0750 by Sarah Magallanes RN  Safety Promotion/Fall Prevention:   clutter free environment maintained   room near nurse's station   room organization consistent   safety round/check completed   nonskid shoes/slippers when out of bed     Problem: Plan of Care - These are the overarching goals to be used throughout the patient stay.    Goal: Absence of Hospital-Acquired Illness or Injury  Intervention: Prevent Skin Injury  Recent Flowsheet Documentation  Taken 8/2/2023 0750 by Sarah Magallanes RN  Body Position: position changed independently

## 2023-08-02 NOTE — PLAN OF CARE
Goal: Optimal Comfort and Wellbeing  Outcome: Progressing     Problem: UTI (Urinary Tract Infection)  Goal: Improved Infection Symptoms  Outcome: Progressing     Pt A&Ox4, denies pain, VSS on RA. No acute events overnight. Pt resting, will continue to monitor.

## 2023-08-02 NOTE — PROGRESS NOTES
INFECTIOUS DISEASE FOLLOW UP NOTE    Date: 08/02/2023   CHIEF COMPLAINT:   Chief Complaint   Patient presents with    Fever        ASSESSMENT:    Govind Alexandre is a 57 year old male who was admitted on 7/29/2023 with:       Enterococcus faecalis bacteremia, UTI  Renal stones status post bilateral stent placement-no hydronephrosis, persistent inflammation in right pelvis--recent cystoscopy bilateral ureteroscopy with laser lithotripsy and by lateral ureteral stent placement  Type 2 diabetes hypertension paroxysmal atrial fibrillation  Admitted with atrial fibrillation with RVR, responded to diltiazem  History of traumatic brain injury  BMI elevated     PLAN:  Antibiotics: Started ampicillin 8/1/23, will continue with CTX for synergy.  Previously on Vancomycin 7/30-8/1/23.   Due to bacteremia, recent urinary tract procedures, patient will need IV antibiotics on discharge. If there are plans to remove his stent soon, would recommend to continue abx with end date of 8/15/23. If there are no plans to remove the stent soon, would continue antibiotics past 8/15/23  Plan for PICC placement on Friday. Based on negative blood cultures for 72 hours from 8/1  Follow up cultures   RVP and Group A strep negative. Added Nystatin swish and swallow empirically for possible early thrush given throat pain.   ID will continue to follow        Rimma Bailey MD PGY2  [facility]     Infectious Disease Staff Physician Attestation    I have seen and examined the patient. I have discussed the case with the resident physician, Dr. Bailey. I agree with the findings, assessment and plan.  Detailed discussion with patient and patient's wife  Anticipate discharge on IV Amp and IV Ceftriaxone x 2 weeks with close Urology follow up  PICC once bc are negative for 48-72 hours    Nya Hines MD  [facility]  "[facility]  [phone]      ______________________________________________________________________    SUBJECTIVE / INTERVAL HISTORY: No acute events overnight. Today he is reporting improvement in difficulty with swallowing and sore throat. Itchiness on his back has also improved. Otherwise denies fevers     ROS: All other systems negative except as listed above.    SH/FH/Habits/PMH reviewed and unchanged.    OBJECTIVE:  /74 (BP Location: Right arm)   Pulse 94   Temp 99.1  F (37.3  C) (Oral)   Resp 18   Ht 1.829 m (6' 0.01\")   Wt 127.6 kg (281 lb 4.8 oz)   SpO2 95%   BMI 38.14 kg/m       Resp: 18      Vital Signs  Temp: 99.1  F (37.3  C)  Temp src: Oral  Resp: 18  Pulse: 94  Pulse Rate Source: Monitor  BP: 117/74  BP Location: Right arm    Temp (24hrs), Av  F (37.2  C), Min:98.4  F (36.9  C), Max:99.5  F (37.5  C)      GEN: No acute distress.    RESPIRATORY:  Normal breathing pattern. Clear to auscultation bilaterally  CARDIOVASCULAR:  Regular rate and rhythm. No murmur, click, gallop or rub.   ABDOMEN:  Soft, normal bowel sounds, non-tender,   EXTREMITIES: Trace pitted LE edema   SKIN/HAIR/NAILS:  Faint erythema of the back.   Lines/Drains: peripheral IV        Pertinent labs:  No results found for: CRP   CBC RESULTS:   Recent Labs   Lab Test 23  1037 23  0952   WBC  --  6.1   RBC  --  3.33*   HGB 10.0* 10.3*   HCT  --  31.4*   MCV  --  94   MCH  --  30.9   MCHC  --  32.8   RDW  --  14.6   PLT  --  120*      Last Comprehensive Metabolic Panel:  Sodium   Date Value Ref Range Status   2023 141 136 - 145 mmol/L Final     Potassium   Date Value Ref Range Status   2023 3.6 3.5 - 5.0 mmol/L Final     Chloride   Date Value Ref Range Status   2023 111 (H) 98 - 107 mmol/L Final     Carbon Dioxide (CO2)   Date Value Ref Range Status   2023 21 (L) 22 - 31 mmol/L Final     Anion Gap   Date Value Ref Range Status   2023 9 5 - 18 mmol/L Final     Glucose   Date Value " Ref Range Status   07/31/2023 102 70 - 125 mg/dL Final     GLUCOSE BY METER POCT   Date Value Ref Range Status   08/02/2023 124 (H) 70 - 99 mg/dL Final     Urea Nitrogen   Date Value Ref Range Status   07/31/2023 19 8 - 22 mg/dL Final     Creatinine   Date Value Ref Range Status   07/31/2023 1.08 0.70 - 1.30 mg/dL Final     GFR Estimate   Date Value Ref Range Status   07/31/2023 79 >60 mL/min/1.73m2 Final   06/14/2021 >60 >60 mL/min/1.73m2 Final     Calcium   Date Value Ref Range Status   07/31/2023 8.6 8.5 - 10.5 mg/dL Final        MICROBIOLOGY DATA:  Personally reviewed.  7-Day Micro Results       Collected Updated Procedure Result Status      08/02/2023 0515 08/02/2023 0521 Blood Culture Peripheral Blood [[id number]]   Peripheral Blood    In process Component Value   No component results               08/02/2023 0515 08/02/2023 0521 Blood Culture Peripheral Blood [[id number]]   Peripheral Blood    In process Component Value   No component results               08/01/2023 2007 08/01/2023 2042 Group A Streptococcus PCR Throat Swab [[id number]]    Swab from Throat    Final result Component Value   Group A strep by PCR Not Detected            08/01/2023 1844 08/01/2023 2333 Respiratory Panel PCR [[id number]]    Swab from Nasopharyngeal    Final result Component Value   Adenovirus Not Detected   Coronavirus Not Detected   This test detects Coronavirus 229E, HKU1, NL63 and OC43 but does not distinguish between them. It does not detect MERS ( Respiratory Syndrome), SARS (Severe Acute Respiratory Syndrome) or 2019-nCoV (Novel 2019) Coronavirus.   Human Metapneumovirus Not Detected   Human Rhin/Enterovirus Not Detected   Influenza A Not Detected   Influenza A, H1 Not Detected   Influenza A 2009 H1N1 Not Detected   Influenza A, H3 Not Detected   Influenza B Not Detected   Parainfluenza Virus 1 Not Detected   Parainfluenza Virus 2 Not Detected   Parainfluenza Virus 3 Not Detected   Parainfluenza Virus  4 Not Detected   Respiratory Syncytial Virus A Not Detected   Respiratory Syncytial Virus B Not Detected   Chlamydia Pneumoniae Not Detected   Mycoplasma Pneumoniae Not Detected            08/01/2023 1844 08/01/2023 1912 Group A Streptococcus PCR Throat Swab [[id number]]    Swab from Throat    Final result Component Value   Group A strep by PCR Not Detected            08/01/2023 0526 08/02/2023 1004 Blood Culture Peripheral Blood [[id number]]   Peripheral Blood    Preliminary result Component Value   Culture No growth after 1 day  [P]                08/01/2023 0526 08/02/2023 1004 Blood Culture Peripheral Blood [[id number]]   Peripheral Blood    Preliminary result Component Value   Culture No growth after 1 day  [P]                07/29/2023 2156 07/31/2023 2252 Urine Culture [[id number]]    (Abnormal)   Urine, Clean Catch    Final result Component Value   Culture >100,000 CFU/mL Enterococcus faecalis        Susceptibility        Enterococcus faecalis      PRAFUL      Ampicillin <=2 ug/mL Susceptible      Nitrofurantoin <=16 ug/mL Susceptible      Penicillin 4 ug/mL Susceptible      Vancomycin 1 ug/mL Susceptible                           07/29/2023 2137 07/29/2023 2226 Symptomatic Influenza A/B, RSV, & SARS-CoV2 PCR (COVID-19) Nasopharyngeal [[id number]]    Swab from Nasopharyngeal    Final result Component Value   Influenza A PCR Negative   Influenza B PCR Negative   RSV PCR Negative   SARS CoV2 PCR Negative   NEGATIVE: SARS-CoV-2 (COVID-19) RNA not detected, presumed negative.            07/29/2023 2126 08/01/2023 0742 Blood Culture Peripheral Blood [[id number]]   (Abnormal)   Peripheral Blood    Final result Component Value   Culture Positive on the 1st day of incubation    Enterococcus faecalis    2 of 2 bottlesSusceptibilities done on previous cultures               07/29/2023 2014 08/01/2023 0826 Blood Culture Peripheral Blood [[id number]]    (Abnormal)   Peripheral Blood    Final result Component  Value   Culture Positive on the 1st day of incubation    Enterococcus faecalis    2 of 2 bottles        Susceptibility        Enterococcus faecalis      PRAFUL      Ampicillin <=2 ug/mL Susceptible      Gentamicin Synergy Susceptible ug/mL Susceptible  [1]       Penicillin 4 ug/mL Susceptible      Vancomycin 1 ug/mL Susceptible                   [1]  No high level gentamicin resistance found - therefore combination therapy with an aminoglycoside may be indicated for serious enterococcal infections such as bacteremia and endocarditis.                   07/29/2023 2014 07/30/2023 2115 Verigene GP Panel [[id number]]    (Abnormal)   Peripheral Blood    Final result Component Value   Staphylococcus species Not Detected   Staphylococcus aureus Not Detected   Staphylococcus epidermidis Not Detected   Staphylococcus lugdunensis Not Detected   Enterococcus faecalis Detected   Positive for Enterococcus faecalis and negative for [place]/vanB genes (not resistant to vancomycin) by [employer] multiplex nucleic acid test. Final identification and antimicrobial susceptibility testing will be verified by standard methods.   Enterococcus faecium Not Detected   Streptococcus species Not Detected   Streptococcus agalactiae Not Detected   Streptococcus anginosus group Not Detected   Streptococcus pneumoniae Not Detected   Streptococcus pyogenes Not Detected   Listeria species Not Detected                     RADIOLOGY:  Personally Reviewed.  No results found for this or any previous visit (from the past 24 hour(s)).    Principal Problem:    Atrial fibrillation with RVR (H)  Active Problems:    Complicated UTI (urinary tract infection)    Bacteremia

## 2023-08-03 ENCOUNTER — APPOINTMENT (OUTPATIENT)
Dept: RADIOLOGY | Facility: CLINIC | Age: 58
DRG: 872 | End: 2023-08-03
Attending: STUDENT IN AN ORGANIZED HEALTH CARE EDUCATION/TRAINING PROGRAM
Payer: COMMERCIAL

## 2023-08-03 LAB
ALBUMIN SERPL-MCNC: 2.4 G/DL (ref 3.5–5)
ALP SERPL-CCNC: 151 U/L (ref 45–120)
ALT SERPL W P-5'-P-CCNC: 50 U/L (ref 0–45)
ANION GAP SERPL CALCULATED.3IONS-SCNC: 8 MMOL/L (ref 5–18)
AST SERPL W P-5'-P-CCNC: 31 U/L (ref 0–40)
BASOPHILS # BLD AUTO: 0 10E3/UL (ref 0–0.2)
BASOPHILS NFR BLD AUTO: 0 %
BILIRUB SERPL-MCNC: 1.1 MG/DL (ref 0–1)
BUN SERPL-MCNC: 10 MG/DL (ref 8–22)
CALCIUM SERPL-MCNC: 8.6 MG/DL (ref 8.5–10.5)
CHLORIDE BLD-SCNC: 111 MMOL/L (ref 98–107)
CO2 SERPL-SCNC: 24 MMOL/L (ref 22–31)
CREAT SERPL-MCNC: 0.82 MG/DL (ref 0.7–1.3)
EOSINOPHIL # BLD AUTO: 0.6 10E3/UL (ref 0–0.7)
EOSINOPHIL NFR BLD AUTO: 9 %
ERYTHROCYTE [DISTWIDTH] IN BLOOD BY AUTOMATED COUNT: 14.3 % (ref 10–15)
GFR SERPL CREATININE-BSD FRML MDRD: >90 ML/MIN/1.73M2
GLUCOSE BLD-MCNC: 124 MG/DL (ref 70–125)
GLUCOSE BLDC GLUCOMTR-MCNC: 105 MG/DL (ref 70–99)
GLUCOSE BLDC GLUCOMTR-MCNC: 117 MG/DL (ref 70–99)
GLUCOSE BLDC GLUCOMTR-MCNC: 136 MG/DL (ref 70–99)
GLUCOSE BLDC GLUCOMTR-MCNC: 187 MG/DL (ref 70–99)
HCT VFR BLD AUTO: 28.5 % (ref 40–53)
HGB BLD-MCNC: 9.4 G/DL (ref 13.3–17.7)
IMM GRANULOCYTES # BLD: 0.2 10E3/UL
IMM GRANULOCYTES NFR BLD: 3 %
LYMPHOCYTES # BLD AUTO: 0.5 10E3/UL (ref 0.8–5.3)
LYMPHOCYTES NFR BLD AUTO: 8 %
MCH RBC QN AUTO: 30.7 PG (ref 26.5–33)
MCHC RBC AUTO-ENTMCNC: 33 G/DL (ref 31.5–36.5)
MCV RBC AUTO: 93 FL (ref 78–100)
MONOCYTES # BLD AUTO: 0.4 10E3/UL (ref 0–1.3)
MONOCYTES NFR BLD AUTO: 6 %
NEUTROPHILS # BLD AUTO: 4.8 10E3/UL (ref 1.6–8.3)
NEUTROPHILS NFR BLD AUTO: 74 %
NRBC # BLD AUTO: 0 10E3/UL
NRBC BLD AUTO-RTO: 0 /100
PLATELET # BLD AUTO: 136 10E3/UL (ref 150–450)
POTASSIUM BLD-SCNC: 3.6 MMOL/L (ref 3.5–5)
POTASSIUM BLD-SCNC: 3.6 MMOL/L (ref 3.5–5)
PROT SERPL-MCNC: 5.8 G/DL (ref 6–8)
RBC # BLD AUTO: 3.06 10E6/UL (ref 4.4–5.9)
SODIUM SERPL-SCNC: 143 MMOL/L (ref 136–145)
WBC # BLD AUTO: 6.4 10E3/UL (ref 4–11)

## 2023-08-03 PROCEDURE — 250N000013 HC RX MED GY IP 250 OP 250 PS 637: Performed by: INTERNAL MEDICINE

## 2023-08-03 PROCEDURE — 250N000011 HC RX IP 250 OP 636: Performed by: INTERNAL MEDICINE

## 2023-08-03 PROCEDURE — 85025 COMPLETE CBC W/AUTO DIFF WBC: CPT | Performed by: INTERNAL MEDICINE

## 2023-08-03 PROCEDURE — 80053 COMPREHEN METABOLIC PANEL: CPT | Performed by: INTERNAL MEDICINE

## 2023-08-03 PROCEDURE — 272N000452 HC KIT SHRLOCK 5FR POWER PICC TRIPLE LUMEN

## 2023-08-03 PROCEDURE — 36415 COLL VENOUS BLD VENIPUNCTURE: CPT | Performed by: INTERNAL MEDICINE

## 2023-08-03 PROCEDURE — 250N000009 HC RX 250: Performed by: INTERNAL MEDICINE

## 2023-08-03 PROCEDURE — 250N000011 HC RX IP 250 OP 636: Mod: JZ | Performed by: STUDENT IN AN ORGANIZED HEALTH CARE EDUCATION/TRAINING PROGRAM

## 2023-08-03 PROCEDURE — 99232 SBSQ HOSP IP/OBS MODERATE 35: CPT | Performed by: INTERNAL MEDICINE

## 2023-08-03 PROCEDURE — 120N000004 HC R&B MS OVERFLOW

## 2023-08-03 PROCEDURE — 99233 SBSQ HOSP IP/OBS HIGH 50: CPT | Performed by: STUDENT IN AN ORGANIZED HEALTH CARE EDUCATION/TRAINING PROGRAM

## 2023-08-03 PROCEDURE — 84132 ASSAY OF SERUM POTASSIUM: CPT | Performed by: INTERNAL MEDICINE

## 2023-08-03 PROCEDURE — 36569 INSJ PICC 5 YR+ W/O IMAGING: CPT

## 2023-08-03 PROCEDURE — 250N000013 HC RX MED GY IP 250 OP 250 PS 637: Performed by: HOSPITALIST

## 2023-08-03 PROCEDURE — 999N000065 XR CHEST PORT 1 VIEW

## 2023-08-03 RX ORDER — AMPICILLIN 2 G/1
2 INJECTION, POWDER, FOR SOLUTION INTRAVENOUS EVERY 4 HOURS
Qty: 624 ML | Refills: 0
Start: 2023-08-04 | End: 2023-08-17

## 2023-08-03 RX ORDER — CEFTRIAXONE 2 G/1
2 INJECTION, POWDER, FOR SOLUTION INTRAMUSCULAR; INTRAVENOUS EVERY 24 HOURS
Status: DISCONTINUED | OUTPATIENT
Start: 2023-08-04 | End: 2023-08-04 | Stop reason: HOSPADM

## 2023-08-03 RX ORDER — CEFTRIAXONE 2 G/1
2 INJECTION, POWDER, FOR SOLUTION INTRAMUSCULAR; INTRAVENOUS EVERY 24 HOURS
Qty: 260 ML | Refills: 0
Start: 2023-08-04 | End: 2023-08-17

## 2023-08-03 RX ORDER — LIDOCAINE 40 MG/G
CREAM TOPICAL
Status: DISCONTINUED | OUTPATIENT
Start: 2023-08-03 | End: 2023-08-04 | Stop reason: HOSPADM

## 2023-08-03 RX ADMIN — AMPICILLIN 2 G: 2 INJECTION, POWDER, FOR SOLUTION INTRAMUSCULAR; INTRAVENOUS at 03:09

## 2023-08-03 RX ADMIN — ONDANSETRON 4 MG: 4 TABLET, ORALLY DISINTEGRATING ORAL at 13:24

## 2023-08-03 RX ADMIN — NYSTATIN 1000000 UNITS: 100000 SUSPENSION ORAL at 13:25

## 2023-08-03 RX ADMIN — AMPICILLIN 2 G: 2 INJECTION, POWDER, FOR SOLUTION INTRAMUSCULAR; INTRAVENOUS at 18:51

## 2023-08-03 RX ADMIN — NYSTATIN 1000000 UNITS: 100000 SUSPENSION ORAL at 08:38

## 2023-08-03 RX ADMIN — SPIRONOLACTONE 12.5 MG: 25 TABLET ORAL at 08:38

## 2023-08-03 RX ADMIN — APIXABAN 5 MG: 5 TABLET, FILM COATED ORAL at 08:38

## 2023-08-03 RX ADMIN — AMPICILLIN 2 G: 2 INJECTION, POWDER, FOR SOLUTION INTRAMUSCULAR; INTRAVENOUS at 06:39

## 2023-08-03 RX ADMIN — LIDOCAINE HYDROCHLORIDE 3 ML: 10 INJECTION, SOLUTION EPIDURAL; INFILTRATION; INTRACAUDAL; PERINEURAL at 15:30

## 2023-08-03 RX ADMIN — LISINOPRIL 5 MG: 5 TABLET ORAL at 08:38

## 2023-08-03 RX ADMIN — INSULIN ASPART 1 UNITS: 100 INJECTION, SOLUTION INTRAVENOUS; SUBCUTANEOUS at 11:51

## 2023-08-03 RX ADMIN — AMPICILLIN 2 G: 2 INJECTION, POWDER, FOR SOLUTION INTRAMUSCULAR; INTRAVENOUS at 16:50

## 2023-08-03 RX ADMIN — CEFTRIAXONE SODIUM 2 G: 2 INJECTION, POWDER, FOR SOLUTION INTRAMUSCULAR; INTRAVENOUS at 08:39

## 2023-08-03 RX ADMIN — NYSTATIN 1000000 UNITS: 100000 SUSPENSION ORAL at 21:35

## 2023-08-03 RX ADMIN — AMPICILLIN 2 G: 2 INJECTION, POWDER, FOR SOLUTION INTRAMUSCULAR; INTRAVENOUS at 11:52

## 2023-08-03 RX ADMIN — APIXABAN 5 MG: 5 TABLET, FILM COATED ORAL at 21:35

## 2023-08-03 RX ADMIN — TAMSULOSIN HYDROCHLORIDE 0.4 MG: 0.4 CAPSULE ORAL at 08:38

## 2023-08-03 RX ADMIN — POTASSIUM CHLORIDE 40 MEQ: 1500 TABLET, EXTENDED RELEASE ORAL at 00:06

## 2023-08-03 RX ADMIN — NYSTATIN 1000000 UNITS: 100000 SUSPENSION ORAL at 16:50

## 2023-08-03 RX ADMIN — DILTIAZEM HYDROCHLORIDE 300 MG: 180 CAPSULE, EXTENDED RELEASE ORAL at 08:38

## 2023-08-03 RX ADMIN — AMPICILLIN 2 G: 2 INJECTION, POWDER, FOR SOLUTION INTRAMUSCULAR; INTRAVENOUS at 23:25

## 2023-08-03 ASSESSMENT — ACTIVITIES OF DAILY LIVING (ADL)
ADLS_ACUITY_SCORE: 22

## 2023-08-03 NOTE — PROGRESS NOTES
Care Management Follow Up    Length of Stay (days): 3    Expected Discharge Date: 08/04/2023     Concerns to be Addressed:     discharge planning, IV antibx    Additional Information:  CM assisting with home infusion at discharge. Patient will be followed by I, they are just waiting for final orders/dispo. Patient getting picc line today, later, and will be ready to discharge tomorrow. [facility] will need to do a teach prior to patient leaving, and make sure wife is avail as well.   Nurse updated, [facility] updated. Home infusion will set up with patient when they will come to hospital to do teach and will send all needed supplies to home once they have orders.     Lachelle Saldana RN

## 2023-08-03 NOTE — PLAN OF CARE
Goal Outcome Evaluation:      Plan of Care Reviewed With: patient    Overall Patient Progress: improvingOverall Patient Progress: improving    Outcome Evaluation: VSS adequate U/O. PICC placed today. Plan to d/c with home infusion tomorrow.      Problem: Plan of Care - These are the overarching goals to be used throughout the patient stay.    Goal: Readiness for Transition of Care  Outcome: Progressing     Problem: Dysrhythmia  Goal: Normalized Cardiac Rhythm  Outcome: Progressing     Problem: UTI (Urinary Tract Infection)  Goal: Improved Infection Symptoms  Outcome: Progressing

## 2023-08-03 NOTE — PROGRESS NOTES
Ideally with gadolinium/contrast however, the patient is trying to conceive.   She needs a pregnancy test before she gets any gadolinium/contrast.  Pregnancy is an absolute contraindication for gadolinium contrast. [facility]    Medicine Progress Note - Hospitalist Service    Date of Admission:  7/29/2023    Assessment & Plan   Mr. Alexandre is a 57 years old man with past medical history significant for diabetes, hypertension, paroxysmal atrial fibrillation, nephrolithiasis status post recent bilateral stent placement who presented to hospital on 7/29 with with palpitation, mild shortness of breath he was diagnosed with Enterococcus faecalis bacteremia (UTI) and A-fib with RVR.  Initiated on diltiazem with adequate rate control.  Started on DOAC.  Currently on ampicillin and ceftriaxone per ID recommendation.  Plan to discharge with IV antibiotic.  Possible discharge on 8/4/2023 pending PICC line placement and arranging for outpatient IV antibiotic.    Urine tract infection  Enterococcus faecalis bacteremia  History of unilateral sinuses status post recent stent placement  -Per patient had recent cystoscopy with bilateral ureteral stent placement a few days prior to this hospitalization.  -Urine cultures on presentation grew Enterococcus faecalis.  Repeat cultures on 8/1 negative to date.  -Given recent drainage site infection and stent placement, ID recommended discharge the patient with IV antibiotic.  Per documentation PICC line is planned for Friday 8/4.   -Per prior provider documentation I spoke to the urologist Dr. Orta at the [facility], he does not see consult at this facility.  He recommends continue antibiotics and follow-up in clinic next week for stent removal.     Plan:  [] PICC line placement 8/3 or 8/4. (Nursing staff will discuss with ID)  [] Discharge on ampicillin and ceftriaxone until 8/17/2023 if ureteral stents were removed otherwise continue pass that date.  [] Follow-up with urology (per patient next week to remove ureteral stents)      History of paroxysmal atrial fibrillation  -Was not on anticoagulation at presentation.  -A-fib with RVR on presentation  -Started  "on diltiazem 300 mg daily.  -Rate well controlled.  -Also initiated on Eliquis 5 mg twice daily    Plan:  [] Continue diltiazem 300 mg daily  [] Continue Eliquis 5 mg twice daily.  [] Patient will call his urologist to discuss if he needs to hold his anticoagulation prior to stent removals.    Hypertension  -At home he was on lisinopril 40 mg daily, nifedipine 60 mg daily, spironolactone 25 mg daily  -Antihypertensive adjusted during this hospitalization given his A-fib with RVR.  Nifedipine was held, spironolactone dose decreased to 12.5 mg daily, lisinopril dose decreased to 5 mg daily.  -Blood pressures currently around 120/60.    Plan:  [] Continue current regimen. (Plan to discharge on current regimen)    Diabetes  -At home on metformin and Ozempic.  On hold during this hospitalization plan:    Plan:  [] Continue sliding scale insulin.       Diet: Moderate Consistent Carb (60 g CHO per Meal) Diet    DVT Prophylaxis: DOAC  Ibarra Catheter: Not present  Lines: PRESENT      PICC 08/03/23 Right Basilic-Site Assessment: WDL      Cardiac Monitoring: None  Code Status: Full Code      Clinically Significant Risk Factors        # Hypokalemia: Lowest K = 3.3 mmol/L in last 2 days, will replace as needed       # Hypoalbuminemia: Lowest albumin = 2.4 g/dL at 8/3/2023  4:17 AM, will monitor as appropriate     # Hypertension: Noted on problem list        # Obesity: Estimated body mass index is 38.14 kg/m  as calculated from the following:    Height as of this encounter: 1.829 m (6' 0.01\").    Weight as of this encounter: 127.6 kg (281 lb 4.8 oz)., PRESENT ON ADMISSION          Disposition Plan Home     Expected Discharge Date: 08/04/2023    Discharge Delays: IV Medication - consider oral or Home Infusion    Discharge Comments: IV ABX-ampicillin and rocephin. abx through 8/15, PICC 8/4 if BC remain negative          ROMELIA CARTER MD  Hospitalist Service  [facility]  Securely message with Eren (more " info)  Text page via [facility] Paging/Directory   ______________________________________________________________________    Interval History   No significant events.  Patient is feeling well.  He denies any chest pain.  He reported some mild shortness of breath.  He denies any burning with urination.    Physical Exam   Vital Signs: Temp: 98  F (36.7  C) Temp src: Oral BP: 114/63 Pulse: 80   Resp: 18 SpO2: 95 % O2 Device: None (Room air)    Weight: 281 lbs 4.8 oz    Physical Exam  Constitutional:       General: He is not in acute distress.     Appearance: He is not ill-appearing or toxic-appearing.   Cardiovascular:      Rate and Rhythm: Normal rate. Rhythm irregular.   Pulmonary:      Effort: No respiratory distress.      Breath sounds: No wheezing.   Skin:     General: Skin is warm and dry.   Neurological:      Mental Status: He is alert.   Psychiatric:         Mood and Affect: Mood normal.          Medical Decision Making       60 MINUTES SPENT BY ME on the date of service doing chart review, history, exam, documentation & further activities per the note.      Data     I have personally reviewed the following data over the past 24 hrs:    6.4  \   9.4 (L)   / 136 (L)     143 111 (H) 10 /  136 (H)   3.6; 3.6 24 0.82 \     ALT: 50 (H) AST: 31 AP: 151 (H) TBILI: 1.1 (H)   ALB: 2.4 (L) TOT PROTEIN: 5.8 (L) LIPASE: N/A       Imaging results reviewed over the past 24 hrs:   Recent Results (from the past 24 hour(s))   XR Chest Port 1 View    Narrative    EXAM: XR CHEST PORT 1 VIEW  LOCATION: [facility]  DATE: 8/3/2023    INDICATION: picc  COMPARISON: AP view of the chest 07/29/2023      Impression    IMPRESSION:     Right PICC terminates in the mid SVC.    Cardiac silhouette is normal in size, accentuated by technique. Mediastinal borders are well-defined.    The lungs are symmetrically inflated and clear.    No pleural effusion.

## 2023-08-03 NOTE — PROCEDURES
"PICC Line Insertion Procedure Note  Pt. Name: Govind Alexandre  MRN:        [id number]    Procedure: Insertion of a  single Lumen  4 fr  Bard SOLO (valved) Power PICC, Lot number [id number]    Indications: long term antibiotics    Contraindications : none noted    Procedure Details     Patient identified with 2 identifiers and \"Time Out\" conducted.  .     Central line insertion bundle followed: hand hygiene performed prior to procedure, site cleansed with cholraprep, hat, mask, sterile gloves, sterile gown worn, patient draped with maximum barrier head to toe drape, sterile field maintained.    The vein was assessed and found to be compressible and of adequate size.     Lidocaine 1% 3 ml administered sq to the insertion site. A 4 Fr PICC was inserted into the basilic vein of the right arm with ultrasound guidance. One attempt(s) required to access vein.   Catheter threaded without difficulty. Good blood return noted.    Modified Seldinger Technique used for insertion.    The 8 sharps that are included in the PICC insertion kit were accounted for and disposed of in the sharps container prior to breakdown of the sterile field.    Catheter secured with Statlock, biopatch and Tegaderm dressing applied.    Findings:    Total catheter length  47 cm, with 0 cm exposed. Mid upper arm circumference is 39 cm. Catheter was flushed with 20 cc NS. Patient  tolerated procedure well.    Tip placement verified by xray. Xray read by Dr. [place] Echavarria . Tip placement in the SVC.    CLABSI prevention brochure left at bedside.    Patient's primary RN notified PICC is ready for use.      Comments:          [facility] [place]    Procedures    "

## 2023-08-03 NOTE — PLAN OF CARE
Goal Outcome Evaluation:      Plan of Care Reviewed With: patient    Overall Patient Progress: improvingOverall Patient Progress: improving    Outcome Evaluation: VSS. Urinating well, urine remains dark curt. BG WNL. Tolerating abx without issue. Slept well through the noc.      Problem: UTI (Urinary Tract Infection)  Goal: Improved Infection Symptoms  Outcome: Progressing

## 2023-08-03 NOTE — PROGRESS NOTES
INFECTIOUS DISEASE FOLLOW UP NOTE    Date: 08/03/2023       ASSESSMENT:    Govind Alexandre is a 57 year old male who was admitted on 7/29/2023 with:       Enterococcus faecalis bacteremia, UTI-active issue, repeat blood cultures in process  Renal stones status post bilateral stent placement-no hydronephrosis, persistent inflammation in right pelvis--recent cystoscopy bilateral ureteroscopy with laser lithotripsy and by lateral ureteral stent placement  Type 2 diabetes hypertension paroxysmal atrial fibrillation  Admitted with atrial fibrillation with RVR, responded to diltiazem  History of traumatic brain injury  BMI elevated  1 loose stool.  If diarrhea persist or patient continues to have abdominal pain, would need to check for C. difficile and repeat imaging of the abdomen     PLAN:  Antibiotics: Started ampicillin 8/1/23, will continue with CTX for synergy.  Previously on Vancomycin 7/30-8/1/23.   Due to bacteremia, recent urinary tract procedures, patient will need IV antibiotics on discharge. If there are plans to remove his stent soon, would recommend to continue abx with end date of 8/17/23. If there are no plans to remove the stent soon, would continue antibiotics past 8/17/23  Plan for PICC placement on Friday. Based on negative blood cultures for 72 hours from 8/1  Follow up cultures   RVP and Group A strep negative. Added Nystatin swish and swallow empirically for possible early thrush given throat pain  Placement as blood cultures have been negative for 48 hours  IV antibiotic orders for ampicillin and ceftriaxone entered into the discharge navigator.  Weekly labs: CBC with diff, CMP, ESR, CRP. PICC line cares.  Follow-up with urology as scheduled.  Follow-up with ID in 2 to 3 weeks  If diarrhea persist or patient continues to have abdominal pain, would need to check for C. difficile and repeat imaging of the abdomen  Nurse, patient and patient's wife updated    Nya Hines MD  [facility]  [phone]      ______________________________________________________________________    SUBJECTIVE / INTERVAL HISTORY: 1 episode of loose stools  Still abdominal gaseous distention  Lower extremity swelling  Overall slightly better.  Urine clearing    Discussed with patient regarding PICC line and antibiotic plan    Previous note: No acute events overnight. Today he is reporting improvement in difficulty with swallowing and sore throat. Itchiness on his back has also improved. Otherwise denies fevers     ROS: All other systems negative except as listed above.    SH/FH/Habits/PMH reviewed and unchanged.    OBJECTIVE:  /71 (BP Location: Right arm)   Pulse 91   Temp 98  F (36.7  C) (Oral)   Resp 19   Ht 1.829 m (6' 0.01\")   Wt 127.6 kg (281 lb 4.8 oz)   SpO2 95%   BMI 38.14 kg/m       Resp: 19      Vital Signs  Temp: 99.1  F (37.3  C)  Temp src: Oral  Resp: 18  Pulse: 94  Pulse Rate Source: Monitor  BP: 117/74  BP Location: Right arm    Temp (24hrs), Av  F (37.2  C), Min:98.4  F (36.9  C), Max:99.5  F (37.5  C)      GEN: No acute distress.    RESPIRATORY:  Normal breathing pattern. Clear to auscultation bilaterally  CARDIOVASCULAR:  Regular rate and rhythm. No murmur, click, gallop or rub.   ABDOMEN:  Soft, normal bowel sounds, non-tender, mild distention  EXTREMITIES: Lower extremity edema  SKIN/HAIR/NAILS:  Faint erythema of the back.  Improving  Lines/Drains: peripheral IV        Pertinent labs:  No results found for: CRP   CBC RESULTS:   Recent Labs   Lab Test 23  1037 23  0952   WBC  --  6.1   RBC  --  3.33*   HGB 10.0* 10.3*   HCT  --  31.4*   MCV  --  94   MCH  --  30.9   MCHC  --  32.8   RDW  --  14.6   PLT  --  120*      Last Comprehensive Metabolic Panel:  Sodium   Date Value Ref Range Status   2023 143 136 - 145 mmol/L Final     Potassium   Date Value Ref Range Status   2023 3.6 3.5 - 5.0 mmol/L Final   2023 3.6 3.5 - 5.0 mmol/L Final "     Chloride   Date Value Ref Range Status   08/03/2023 111 (H) 98 - 107 mmol/L Final     Carbon Dioxide (CO2)   Date Value Ref Range Status   08/03/2023 24 22 - 31 mmol/L Final     Anion Gap   Date Value Ref Range Status   08/03/2023 8 5 - 18 mmol/L Final     Glucose   Date Value Ref Range Status   08/03/2023 124 70 - 125 mg/dL Final     GLUCOSE BY METER POCT   Date Value Ref Range Status   08/03/2023 187 (H) 70 - 99 mg/dL Final     Urea Nitrogen   Date Value Ref Range Status   08/03/2023 10 8 - 22 mg/dL Final     Creatinine   Date Value Ref Range Status   08/03/2023 0.82 0.70 - 1.30 mg/dL Final     GFR Estimate   Date Value Ref Range Status   08/03/2023 >90 >60 mL/min/1.73m2 Final   06/14/2021 >60 >60 mL/min/1.73m2 Final     Calcium   Date Value Ref Range Status   08/03/2023 8.6 8.5 - 10.5 mg/dL Final          MICROBIOLOGY DATA:  Personally reviewed.  7-Day Micro Results       Collected Updated Procedure Result Status      08/02/2023 0515 08/03/2023 0947 Blood Culture Peripheral Blood [[id number]]   Peripheral Blood    Preliminary result Component Value   Culture No growth after 1 day  [P]                08/02/2023 0515 08/03/2023 0947 Blood Culture Peripheral Blood [[id number]]   Peripheral Blood    Preliminary result Component Value   Culture No growth after 1 day  [P]                08/01/2023 2007 08/01/2023 2042 Group A Streptococcus PCR Throat Swab [[id number]]    Swab from Throat    Final result Component Value   Group A strep by PCR Not Detected            08/01/2023 1844 08/01/2023 2333 Respiratory Panel PCR [[id number]]    Swab from Nasopharyngeal    Final result Component Value   Adenovirus Not Detected   Coronavirus Not Detected   This test detects Coronavirus 229E, HKU1, NL63 and OC43 but does not distinguish between them. It does not detect MERS ( Respiratory Syndrome), SARS (Severe Acute Respiratory Syndrome) or 2019-nCoV (Novel 2019) Coronavirus.   Human Metapneumovirus Not  Detected   Human Rhin/Enterovirus Not Detected   Influenza A Not Detected   Influenza A, H1 Not Detected   Influenza A 2009 H1N1 Not Detected   Influenza A, H3 Not Detected   Influenza B Not Detected   Parainfluenza Virus 1 Not Detected   Parainfluenza Virus 2 Not Detected   Parainfluenza Virus 3 Not Detected   Parainfluenza Virus 4 Not Detected   Respiratory Syncytial Virus A Not Detected   Respiratory Syncytial Virus B Not Detected   Chlamydia Pneumoniae Not Detected   Mycoplasma Pneumoniae Not Detected            08/01/2023 1844 08/01/2023 1912 Group A Streptococcus PCR Throat Swab [[id number]]    Swab from Throat    Final result Component Value   Group A strep by PCR Not Detected            08/01/2023 0526 08/03/2023 1004 Blood Culture Peripheral Blood [[id number]]   Peripheral Blood    Preliminary result Component Value   Culture No growth after 2 days  [P]                08/01/2023 0526 08/03/2023 1004 Blood Culture Peripheral Blood [[id number]]   Peripheral Blood    Preliminary result Component Value   Culture No growth after 2 days  [P]                07/29/2023 2156 07/31/2023 2252 Urine Culture [[id number]]    (Abnormal)   Urine, Clean Catch    Final result Component Value   Culture >100,000 CFU/mL Enterococcus faecalis        Susceptibility        Enterococcus faecalis      PRAFUL      Ampicillin <=2 ug/mL Susceptible      Nitrofurantoin <=16 ug/mL Susceptible      Penicillin 4 ug/mL Susceptible      Vancomycin 1 ug/mL Susceptible                           07/29/2023 2137 07/29/2023 2226 Symptomatic Influenza A/B, RSV, & SARS-CoV2 PCR (COVID-19) Nasopharyngeal [[id number]]    Swab from Nasopharyngeal    Final result Component Value   Influenza A PCR Negative   Influenza B PCR Negative   RSV PCR Negative   SARS CoV2 PCR Negative   NEGATIVE: SARS-CoV-2 (COVID-19) RNA not detected, presumed negative.            07/29/2023 2126 08/01/2023 0742 Blood Culture Peripheral Blood [[id number]]   (Abnormal)    Peripheral Blood    Final result Component Value   Culture Positive on the 1st day of incubation    Enterococcus faecalis    2 of 2 bottlesSusceptibilities done on previous cultures               07/29/2023 2014 08/01/2023 0826 Blood Culture Peripheral Blood [[id number]]    (Abnormal)   Peripheral Blood    Final result Component Value   Culture Positive on the 1st day of incubation    Enterococcus faecalis    2 of 2 bottles        Susceptibility        Enterococcus faecalis      PRAFUL      Ampicillin <=2 ug/mL Susceptible      Gentamicin Synergy Susceptible ug/mL Susceptible  [1]       Penicillin 4 ug/mL Susceptible      Vancomycin 1 ug/mL Susceptible                   [1]  No high level gentamicin resistance found - therefore combination therapy with an aminoglycoside may be indicated for serious enterococcal infections such as bacteremia and endocarditis.                   07/29/2023 2014 07/30/2023 2115 Verigene GP Panel [[id number]]    (Abnormal)   Peripheral Blood    Final result Component Value   Staphylococcus species Not Detected   Staphylococcus aureus Not Detected   Staphylococcus epidermidis Not Detected   Staphylococcus lugdunensis Not Detected   Enterococcus faecalis Detected   Positive for Enterococcus faecalis and negative for [place]/vanB genes (not resistant to vancomycin) by [employer] multiplex nucleic acid test. Final identification and antimicrobial susceptibility testing will be verified by standard methods.   Enterococcus faecium Not Detected   Streptococcus species Not Detected   Streptococcus agalactiae Not Detected   Streptococcus anginosus group Not Detected   Streptococcus pneumoniae Not Detected   Streptococcus pyogenes Not Detected   Listeria species Not Detected                     RADIOLOGY:  Personally Reviewed.  No results found for this or any previous visit (from the past 24 hour(s)).    Principal Problem:    Atrial fibrillation with RVR (H)  Active Problems:    Complicated UTI  (urinary tract infection)    Bacteremia

## 2023-08-04 VITALS
HEART RATE: 93 BPM | DIASTOLIC BLOOD PRESSURE: 74 MMHG | WEIGHT: 281.3 LBS | HEIGHT: 72 IN | TEMPERATURE: 98.3 F | OXYGEN SATURATION: 95 % | BODY MASS INDEX: 38.1 KG/M2 | RESPIRATION RATE: 18 BRPM | SYSTOLIC BLOOD PRESSURE: 124 MMHG

## 2023-08-04 PROBLEM — I48.91 ATRIAL FIBRILLATION WITH RVR (H): Chronic | Status: ACTIVE | Noted: 2023-07-29

## 2023-08-04 PROBLEM — Z96.0 S/P URETERAL STENT PLACEMENT: Chronic | Status: ACTIVE | Noted: 2023-08-04

## 2023-08-04 PROBLEM — Z79.01 CHRONIC ANTICOAGULATION: Status: ACTIVE | Noted: 2023-08-04

## 2023-08-04 PROBLEM — Z96.0 S/P URETERAL STENT PLACEMENT: Status: ACTIVE | Noted: 2023-08-04

## 2023-08-04 PROBLEM — B95.2 ENTEROCOCCAL BACTEREMIA: Chronic | Status: ACTIVE | Noted: 2023-07-31

## 2023-08-04 PROBLEM — R16.1 SPLENOMEGALY: Status: ACTIVE | Noted: 2023-08-04

## 2023-08-04 PROBLEM — R16.1 SPLENOMEGALY: Chronic | Status: ACTIVE | Noted: 2023-08-04

## 2023-08-04 PROBLEM — N39.0 COMPLICATED UTI (URINARY TRACT INFECTION): Chronic | Status: ACTIVE | Noted: 2023-07-29

## 2023-08-04 PROBLEM — B95.2 ENTEROCOCCAL BACTEREMIA: Status: ACTIVE | Noted: 2023-07-31

## 2023-08-04 PROBLEM — R78.81 BACTEREMIA: Chronic | Status: ACTIVE | Noted: 2023-07-31

## 2023-08-04 PROBLEM — N20.0 NEPHROLITHIASIS: Status: ACTIVE | Noted: 2023-08-04

## 2023-08-04 PROBLEM — Z79.01 CHRONIC ANTICOAGULATION: Chronic | Status: ACTIVE | Noted: 2023-08-04

## 2023-08-04 LAB
ALBUMIN SERPL-MCNC: 2.3 G/DL (ref 3.5–5)
ALP SERPL-CCNC: 162 U/L (ref 45–120)
ALT SERPL W P-5'-P-CCNC: 48 U/L (ref 0–45)
ANION GAP SERPL CALCULATED.3IONS-SCNC: 9 MMOL/L (ref 5–18)
AST SERPL W P-5'-P-CCNC: 30 U/L (ref 0–40)
BILIRUB SERPL-MCNC: 1 MG/DL (ref 0–1)
BUN SERPL-MCNC: 8 MG/DL (ref 8–22)
CALCIUM SERPL-MCNC: 8.7 MG/DL (ref 8.5–10.5)
CHLORIDE BLD-SCNC: 109 MMOL/L (ref 98–107)
CO2 SERPL-SCNC: 26 MMOL/L (ref 22–31)
CREAT SERPL-MCNC: 0.79 MG/DL (ref 0.7–1.3)
GFR SERPL CREATININE-BSD FRML MDRD: >90 ML/MIN/1.73M2
GLUCOSE BLD-MCNC: 111 MG/DL (ref 70–125)
GLUCOSE BLDC GLUCOMTR-MCNC: 120 MG/DL (ref 70–99)
POTASSIUM BLD-SCNC: 3.6 MMOL/L (ref 3.5–5)
PROT SERPL-MCNC: 5.9 G/DL (ref 6–8)
SODIUM SERPL-SCNC: 144 MMOL/L (ref 136–145)

## 2023-08-04 PROCEDURE — 250N000013 HC RX MED GY IP 250 OP 250 PS 637: Performed by: HOSPITALIST

## 2023-08-04 PROCEDURE — 80053 COMPREHEN METABOLIC PANEL: CPT | Performed by: INTERNAL MEDICINE

## 2023-08-04 PROCEDURE — 250N000013 HC RX MED GY IP 250 OP 250 PS 637: Performed by: INTERNAL MEDICINE

## 2023-08-04 PROCEDURE — 99239 HOSP IP/OBS DSCHRG MGMT >30: CPT | Performed by: INTERNAL MEDICINE

## 2023-08-04 PROCEDURE — 250N000011 HC RX IP 250 OP 636: Mod: JZ | Performed by: INTERNAL MEDICINE

## 2023-08-04 RX ORDER — DILTIAZEM HYDROCHLORIDE 300 MG/1
300 CAPSULE, COATED, EXTENDED RELEASE ORAL DAILY
Qty: 30 CAPSULE | Refills: 0 | Status: SHIPPED | OUTPATIENT
Start: 2023-08-04 | End: 2023-08-22

## 2023-08-04 RX ORDER — SPIRONOLACTONE 25 MG/1
12.5 TABLET ORAL DAILY
Qty: 30 TABLET
Start: 2023-08-04 | End: 2023-10-05

## 2023-08-04 RX ORDER — DIPHENHYDRAMINE HCL 25 MG
25 CAPSULE ORAL EVERY 6 HOURS PRN
Start: 2023-08-04 | End: 2023-09-06

## 2023-08-04 RX ORDER — NYSTATIN 100000/ML
1000000 SUSPENSION, ORAL (FINAL DOSE FORM) ORAL 4 TIMES DAILY
Qty: 473 ML | Refills: 1 | Status: SHIPPED | OUTPATIENT
Start: 2023-08-04 | End: 2023-09-06

## 2023-08-04 RX ORDER — LISINOPRIL 5 MG/1
5 TABLET ORAL DAILY
Qty: 30 TABLET | Refills: 0 | Status: SHIPPED | OUTPATIENT
Start: 2023-08-04 | End: 2023-08-22

## 2023-08-04 RX ORDER — FEXOFENADINE HCL 180 MG/1
180 TABLET ORAL DAILY PRN
Start: 2023-08-04 | End: 2023-12-22

## 2023-08-04 RX ORDER — SEMAGLUTIDE 1.34 MG/ML
1 INJECTION, SOLUTION SUBCUTANEOUS WEEKLY
Start: 2023-08-04 | End: 2023-09-15

## 2023-08-04 RX ADMIN — CEFTRIAXONE SODIUM 2 G: 2 INJECTION, POWDER, FOR SOLUTION INTRAMUSCULAR; INTRAVENOUS at 08:50

## 2023-08-04 RX ADMIN — APIXABAN 5 MG: 5 TABLET, FILM COATED ORAL at 08:52

## 2023-08-04 RX ADMIN — TAMSULOSIN HYDROCHLORIDE 0.4 MG: 0.4 CAPSULE ORAL at 08:52

## 2023-08-04 RX ADMIN — AMPICILLIN 2 G: 2 INJECTION, POWDER, FOR SOLUTION INTRAMUSCULAR; INTRAVENOUS at 03:14

## 2023-08-04 RX ADMIN — DILTIAZEM HYDROCHLORIDE 300 MG: 180 CAPSULE, EXTENDED RELEASE ORAL at 08:51

## 2023-08-04 RX ADMIN — NYSTATIN 1000000 UNITS: 100000 SUSPENSION ORAL at 08:51

## 2023-08-04 RX ADMIN — AMPICILLIN 2 G: 2 INJECTION, POWDER, FOR SOLUTION INTRAMUSCULAR; INTRAVENOUS at 10:59

## 2023-08-04 RX ADMIN — AMPICILLIN 2 G: 2 INJECTION, POWDER, FOR SOLUTION INTRAMUSCULAR; INTRAVENOUS at 07:03

## 2023-08-04 RX ADMIN — LISINOPRIL 5 MG: 5 TABLET ORAL at 08:52

## 2023-08-04 RX ADMIN — SPIRONOLACTONE 12.5 MG: 25 TABLET ORAL at 08:51

## 2023-08-04 ASSESSMENT — ACTIVITIES OF DAILY LIVING (ADL)
ADLS_ACUITY_SCORE: 22

## 2023-08-04 NOTE — PROGRESS NOTES
Care Management Discharge Note    Discharge Date: 08/04/2023       Discharge Disposition:  Home with [place] home infusion    Discharge Services:  Home infusion nurse    Discharge DME:      Discharge Transportation:  Wife will transport.    Private pay costs discussed: Not applicable    Does the patient's insurance plan have a 3 day qualifying hospital stay waiver?  No      Education Provided on the Discharge Plan:  patient, wife, nurse.  Persons Notified of Discharge Plans: patient, wife, nurse  Patient/Family in Agreement with the Plan:  yes    Handoff Referral Completed: No    Additional Information:  CM assisting with discharge planning. Patient will ldischarge today with home infusion, after his 11am dose of IV antibx. [facility] will follow up with patient at home, and teach he and his spouse prior to his next infusion. They will provide all needed supplies in the home.   Wife will transport home.   Patient and wife updated and in agreement with plan.     Lachelle Saldana RN

## 2023-08-04 NOTE — PLAN OF CARE
Goal Outcome Evaluation:  DISCHARGE                         08/04/23 11:30 am  ----------------------------------------------------------------------------  Discharged to: Home  Via: Automobile  Accompanied by: Family  Discharge Instructions: diet, activity, medications, follow up appointments, when to call the MD, aftercare instructions, and what to watchout for (i.e. s/s of infection, increasing SOB, palpitations, chest pain,)  Prescriptions: To be filled by   [facility]    pharmacy per pt's request; medication list reviewed & sent with pt  Follow Up Appointments: arranged; information given  Belongings: All sent with pt  IV: PICC remains for IV antibiotic use  Telemetry: off  Pt exhibits understanding of above discharge instructions; all questions answered.    No A1c ordered--instructed to follow up with PCP.    Discharge Paperwork: Signed, copied, and sent home with patient.            Problem: Plan of Care - These are the overarching goals to be used throughout the patient stay.    Goal: Plan of Care Review  Description: The Plan of Care Review/Shift note should be completed every shift.  The Outcome Evaluation is a brief statement about your assessment that the patient is improving, declining, or no change.  This information will be displayed automatically on your shift note.  Outcome: Adequate for Care Transition     Problem: Dysrhythmia  Goal: Normalized Cardiac Rhythm  Outcome: Adequate for Care Transition     Problem: UTI (Urinary Tract Infection)  Goal: Improved Infection Symptoms  Outcome: Adequate for Care Transition

## 2023-08-04 NOTE — PLAN OF CARE
Problem: UTI (Urinary Tract Infection)  Goal: Improved Infection Symptoms  Outcome: Progressing     Pt A&Ox4, able to make needs known. Pt denies pain this shift. Pt on CPAP @ HS, no SOB reported this shift. Pt received IV abx per MAR. Pt sleeping between cares. No other acute changes noted this shift.     Fang Olson RN  [id number]

## 2023-08-05 NOTE — DISCHARGE SUMMARY
"[facility]  Hospitalist Discharge Summary      Date of Admission:  7/29/2023  Date of Discharge:  8/4/2023 11:30 AM  Discharging Provider: Ankit Mancilla MD  Discharge Service: Hospitalist Service    Discharge Diagnoses   Enterococcal bacteremia/sepsis-urologic source  Nephrolithiasis bilateral ureteral stents  Atrial fibrillation rapid ventricular response  Anticoagulation with Eliquis  Type 2 diabetes  Splenomegaly          7/31 ID Consult, Hines: Enterococcus faecalis bacteremia, UTI  Renal stones status post bilateral stent placement-no hydronephrosis, persistent inflammation in right pelvis--recent cystoscopy bilateral ureteroscopy with laser lithotripsy and by lateral ureteral stent placement     8/3 [place]: Per patient had recent cystoscopy with bilateral ureteral stent placement a few days prior to this hospitalization.  -Urine cultures on presentation grew Enterococcus faecalis.  Repeat cultures on 8/1 negative to date.  -Given recent drainage site infection and stent placement, ID recommended discharge the patient with IV antibiotic.           Active Problems:    Atrial fibrillation with RVR (H) (7/29/2023)    Complicated UTI (urinary tract infection) (7/29/2023)    Enterococcal bacteremia (7/31/2023)    Chronic anticoagulation (8/4/2023)    Essential hypertension (12/24/2014)    Hx of traumatic brain injury (11/30/2015)    Type 2 diabetes mellitus without complication (H) (11/7/2016)    Nephrolithiasis (8/4/2023)    Splenomegaly (8/4/2023)    S/P ureteral stent placement (8/4/2023)       Clinically Significant Risk Factors     # Obesity: Estimated body mass index is 38.14 kg/m  as calculated from the following:    Height as of this encounter: 1.829 m (6' 0.01\").    Weight as of this encounter: 127.6 kg (281 lb 4.8 oz).       Follow-ups Needed After Discharge   Follow-up Appointments     Follow-up and recommended labs and tests       Follow up with primary care provider, " Lachelle Summers, within 7-14    days for hospital follow- up.   Follow up with ID and home care labs as ordered  Follow up with urology as arranged (stents) .        {Additional follow-up instructions/to-do's for PCP    :    Unresulted Labs Ordered in the Past 30 Days of this Admission       Date and Time Order Name Status Description    8/2/2023 12:02 AM Blood Culture Peripheral Blood Preliminary     8/2/2023 12:02 AM Blood Culture Peripheral Blood Preliminary     8/1/2023 12:02 AM Blood Culture Peripheral Blood Preliminary     8/1/2023 12:02 AM Blood Culture Peripheral Blood Preliminary         These results will be followed up by     Discharge Disposition   Discharged to home with home care and infusion agency  Condition at discharge: Stable    Hospital Course     57-year-old man with recent bilateral nephrolithiasis requiring ureteral stents and stone extraction on the left who presented with fever and had enterococcal bacteremia  He also had atrial fibrillation with rapid ventricular response previous history of atrial fibs  Rate was controlled and he was started on anticoagulants after being seen by cardiology  He was seen by infectious disease who recommended to at least 13 more days of ceftriaxone for his enterococcal infection  Urology has plans to remove the stents in approximately 1 week  Incidental note was made of mild splenomegaly mild thrombocytopenia of questionable significance  This can be monitored as an outpatient  Plan is discharged home with home infusion service for continuation of the ceftriaxone and home care evaluation    Consultations This Hospital Stay   CARDIOLOGY IP CONSULT  PHARMACY TO DOSE VANCO  UROLOGY IP CONSULT  PHARMACY IP CONSULT  INFECTIOUS DISEASES IP CONSULT  VASCULAR ACCESS ADULT IP CONSULT    Code Status   Prior    Time Spent on this Encounter   Ankit WHITE MD, personally saw the patient today and spent greater than 30 minutes discharging this patient.       Ankit  "JESSICA Mancilla MD  [facility]  [address] [place] [address]  Phone: [phone]  Fax: [phone]  ______________________________________________________________________    Physical Exam   Vital Signs: Temp: 98.3  F (36.8  C) Temp src: Axillary BP: 124/74 Pulse: 93     SpO2: 95 % O2 Device: None (Room air)    Weight: 281 lbs 4.8 oz  Day discharge very pleasant anxious to be discharged home he is feeling healthy and almost back to baseline wife is here and supportive pulse remains irregular irregular rate 85       Primary Care Physician   Lachelle Summers    Discharge Orders      Follow-Up with Cardiology LELAND      Home infusion referral      Home infusion referral      Home Care Referral      Nursing Communication 1    Weekly labs: CBC with diff, CMP, ESR, CRP. Fax to Nya Hines MD at [phone] Infectious Disease. PICC line cares.     Follow Up (TCM)    Follow up with Nya Hines MD, Infectious Disease at the [facility] ID clinic (near [facility]) in 2-3  weeks (or next available). Phone: [phone]     Reason for your hospital stay    Infection in urinary tract spilling over into bloodstream \"enterococcus\" pathogen     Activity    Your activity upon discharge: activity as tolerated     Follow-up and recommended labs and tests     Follow up with primary care provider, Lachelle Summers, within 7-14  days for hospital follow- up.   Follow up with ID and home care labs as ordered  Follow up with urology as arranged (stents) .     Diet    Follow this diet upon discharge: Orders Placed This Encounter      Moderate Consistent Carb (60 g CHO per Meal) Diet       Significant Results and Procedures   Results for orders placed or performed during the hospital encounter of 07/29/23   CT Abdomen Pelvis w Contrast    Narrative    EXAM: CT ABDOMEN PELVIS W CONTRAST  LOCATION: [facility]  DATE: 7/29/2023    INDICATION: Fevers post " bilateral stent placement  COMPARISON: CT from 07/08/2023  TECHNIQUE: CT scan of the abdomen and pelvis was performed following injection of IV contrast. Multiplanar reformats were obtained. Dose reduction techniques were used.  CONTRAST: 100ml Isovue 370    FINDINGS:   LOWER CHEST: Normal.    HEPATOBILIARY: Diffuse hepatic steatosis. Normal gallbladder.    PANCREAS: Normal.    SPLEEN: The spleen measures 18 cm craniocaudal, previously 15 cm.    ADRENAL GLANDS: Normal.    KIDNEYS/BLADDER: Bilateral nephroureteral stents in place, proximal pigtails in the right upper pole calyx and left renal pelvis. There is some inflammatory change about the right renal pelvis. There is no residual hydronephrosis. Stents terminate in the   bladder. No perinephric collection. Hyperdense foci in the lower poles of the renal collecting system are new and may reflect early calyceal tip contrast excretion.    BOWEL: No bowel obstruction or free air. No focal bowel wall thickening. Diffuse colonic diverticulosis. Normal appendix.    LYMPH NODES: Some mild edema in the central mesentery adjacent to subcentimeter lymph nodes.    VASCULATURE: Unremarkable.    PELVIC ORGANS: Prostate is mildly enlarged.    MUSCULOSKELETAL: L4-L5 degenerative disc height loss. Arthritic joint space loss of the hips.      Impression    IMPRESSION:   1.  Interval placement of bilateral nephroureteral stents. No residual hydronephrosis or drainable perinephric collections. There is some persistent inflammation about the right renal pelvis.    2.  Increased splenomegaly.    3.  Mild haziness/edema adjacent to some nonenlarged mesenteric lymph nodes, likely reactive.   XR Chest Port 1 View    Narrative    EXAM: XR CHEST PORT 1 VIEW  LOCATION: [facility]  DATE: 7/29/2023    INDICATION: Fevers  COMPARISON: 11/30/2015      Impression    IMPRESSION: Heart size within normal limits. No evidence of pneumonia. No pneumothorax or pleural  effusion.   US Abdomen Limited w Abd/Pelvis Duplex Complete    Narrative    EXAM: US ABDOMEN LIMITED WITH DOPPLER COMPLETE  LOCATION: [facility]  DATE: 7/31/2023    INDICATION: Anemia. Acutely enlarging spleen. Transaminitis. Evaluate for portal vein or splenic vein thrombosis.  COMPARISON: CT abdomen/pelvis 07/29/2023  TECHNIQUE: Complete abdominal ultrasound. Color flow with spectral Doppler and waveform analysis performed.     FINDINGS:    GALLBLADDER: Normal. No gallstones, wall thickening, or pericholecystic fluid. Negative sonographic Guzman's sign.     BILE DUCTS: No biliary dilatation. The common duct measures 5 mm.    LIVER: Increased echogenicity from diffuse fatty infiltration. 2 areas of focal fatty sparing adjacent to the gallbladder. No suspicious mass.     RIGHT KIDNEY: 13.0 cm in length. Normal echogenicity with no hydronephrosis or mass.     SPLEEN: Splenomegaly measuring 19.1 cm in length.    PANCREAS: The visualized portions are normal.    No ascites.    ABDOMINAL DUPLEX: The hepatic artery, hepatic veins, IVC, portal veins, and splenic vein are patent with flow in the normal direction.       Impression    IMPRESSION:  1.  Diffuse hepatic steatosis.    2.  No cholelithiasis or evidence of acute cholecystitis.    3.  Splenomegaly.    4.  Normal abdominal liver duplex. No portal vein or splenic vein thrombosis.       XR Chest Port 1 View    Narrative    EXAM: XR CHEST PORT 1 VIEW  LOCATION: [facility]  DATE: 8/3/2023    INDICATION: picc  COMPARISON: AP view of the chest 07/29/2023      Impression    IMPRESSION:     Right PICC terminates in the mid SVC.    Cardiac silhouette is normal in size, accentuated by technique. Mediastinal borders are well-defined.    The lungs are symmetrically inflated and clear.    No pleural effusion.   Echocardiogram Complete     Value    LVEF  55-60%    Narrative     [id number]  [id number]  [id number]  [id number]^[place] [address]     Name: ANITHA MARSHALL  MRN: [id number]  : 1965  Study Date: 2023 08:45 AM  Age: 57 yrs  Gender: Male  Patient Location: [facility]  Reason For Study: Chest Pain  Ordering Physician: GREG GRIMM  Performed By: AT     BSA: 2.4 m2  Height: 72 in  Weight: 277 lb  HR: 93  ______________________________________________________________________________  Procedure  Complete Echo Adult.  ______________________________________________________________________________  Interpretation Summary     Normal left ventricular size. Moderate concentric left ventricular hypertrophy  with sigmoid shaped septum. Visually estimated ejection fraction of 55 to 60%  without observed wall motion abnormality.  Right ventricle is mildly dilated. Right ventricular systolic function appears  grossly normal.  Mild biatrial enlargement.  Mild mitral regurgitation.  Mild tricuspid regurgitation. Unable to estimate right ventricular systolic  function secondary to incomplete tricuspid regurgitation velocity envelope.  Mild enlargement of the proximal ascending aorta.  Underlying rhythm is atrial fibrillation.  ______________________________________________________________________________  Left Ventricle  The left ventricle is normal in size. There is moderate concentric left  ventricular hypertrophy. A sigmoid septum is present. Diastolic Doppler  findings (E/E' ratio and/or other parameters) suggest left ventricular filling  pressures are normal. The visual ejection fraction is 55-60%. No regional wall  motion abnormalities noted.     Right Ventricle  The right ventricle is mildly dilated. The right ventricular systolic function  is normal.     Atria  The left atrium is mildly dilated. The right atrium is mildly dilated.     Mitral Valve  Mitral valve leaflets appear normal. There is mild (1+) mitral  regurgitation.     Tricuspid Valve  The tricuspid valve is not well visualized. There is mild (1+) tricuspid  regurgitation.     Aortic Valve  The aortic valve is trileaflet with aortic valve sclerosis. No hemodynamically  significant valvular aortic stenosis.     Pulmonic Valve  There is trace pulmonic valvular regurgitation.     Vessels  The aorta root is normal. The ascending aorta is Mildly dilated. IVC diameter  and respiratory changes fall into an intermediate range suggesting an RA  pressure of 8 mmHg.     Pericardium  There is no pericardial effusion.     Rhythm  The rhythm was atrial fibrillation.  ______________________________________________________________________________  MMode/2D Measurements & Calculations  IVSd: 1.7 cm  LVIDd: 5.1 cm  LVIDs: 3.4 cm  LVPWd: 1.4 cm  FS: 32.9 %  LV mass(C)d: 332.7 grams  LV mass(C)dI: 136.0 grams/m2  Ao root diam: 3.3 cm  asc Aorta Diam: 4.2 cm  LVOT diam: 2.5 cm  LVOT area: 4.9 cm2  LA Volume Indexed (AL/bp): 34.1 ml/m2  RV Base: 4.2 cm     RWT: 0.54  TAPSE: 2.3 cm     Time Measurements  MM HR: 90.0 BPM     Doppler Measurements & Calculations  MV E max brayden: 136.0 cm/sec  MV max P.8 mmHg  MV mean PG: 3.0 mmHg  MV V2 VTI: 28.3 cm  MVA(VTI): 3.2 cm2  MV dec time: 0.24 sec  Ao V2 max: 154.0 cm/sec  Ao max P.0 mmHg  Ao V2 mean: 107.0 cm/sec  Ao mean P.0 mmHg  Ao V2 VTI: 26.5 cm  STEPHANIE(I,D): 3.4 cm2  STEPHANIE(V,D): 3.5 cm2  LV V1 max P.8 mmHg  LV V1 max: 109.0 cm/sec  LV V1 VTI: 18.2 cm  SV(LVOT): 89.3 ml  SI(LVOT): 36.5 ml/m2  PA acc time: 0.09 sec  AV Brayden Ratio (DI): 0.71  STEPHANIE Index (cm2/m2): 1.4     E/E': 10.7  E/E' avg: 10.0  Lateral E/e': 9.3  Medial E/e': 10.7  Peak E' Brayden: 12.7 cm/sec  RV S Brayden: 12.5 cm/sec     ______________________________________________________________________________  Report approved by: Ar Louise 2023 10:32 AM             Discharge Medications   Discharge Medication List as of 2023 10:20 AM        START taking these  medications    Details   ampicillin (OMNIPEN) 2 g vial Inject 2 g into the vein every 4 hours for 13 days, Disp-624 mL, R-0, No Print OutOk to give via pump/continuous infusion- home care Pharm D to arrange. Weekly labs: CBC with diff, CMP, ESR, CRP. Fax to Nya Hines MD at [phone] Infectious Dise ase. PICC line cares.      apixaban ANTICOAGULANT (ELIQUIS) 5 MG tablet Take 1 tablet (5 mg) by mouth 2 times daily for 60 days, Disp-120 tablet, R-0, E-Prescribe      cefTRIAXone (ROCEPHIN) 2 GM vial Inject 2 g into the vein every 24 hours for 13 days, Disp-260 mL, R-0, No Print OutWeekly labs: CBC with diff, CMP, ESR, CRP. Fax to Nya Hines MD at [phone] Infectious Disease. PICC line cares.      diltiazem ER COATED BEADS (CARDIZEM CD/CARTIA XT) 300 MG 24 hr capsule Take 1 capsule (300 mg) by mouth daily, Disp-30 capsule, R-0, E-Prescribe      diphenhydrAMINE (BENADRYL) 25 MG capsule Take 1 capsule (25 mg) by mouth every 6 hours as needed for itching, allergies or other (rash), No Print Out      nystatin (MYCOSTATIN) 222194 UNIT/ML suspension Swish and swallow 10 mLs (1,000,000 Units) in mouth 4 times dailyDisp-473 mL, [id number]           CONTINUE these medications which have CHANGED    Details   fexofenadine (ALLEGRA) 180 MG tablet Take 1 tablet (180 mg) by mouth daily as needed for allergies, No Print Out      lisinopril (ZESTRIL) 5 MG tablet Take 1 tablet (5 mg) by mouth daily, Disp-30 tablet, R-0, E-Prescribe      Semaglutide, 1 MG/DOSE, (OZEMPIC, 1 MG/DOSE,) 4 MG/3ML pen Inject 1 mg Subcutaneous once a week Every Friday or Saturday, No Print Out      spironolactone (ALDACTONE) 25 MG tablet Take 0.5 tablets (12.5 mg) by mouth daily, Disp-30 tablet, No Print Out           CONTINUE these medications which have NOT CHANGED    Details   ACCU-CHEK FASTCLIX LANCET DRUM [ACCU-CHEK FASTCLIX LANCET DRUM] USE TO TEST BLOOD SUGARS 2 TO 3 TIMES A DAY, Disp-300 each, R-4, Normal      acetaminophen  (TYLENOL) 500 MG tablet Take 2 tablets (1,000 mg) by mouth every 6 hours as needed for mild pain, Disp-100 tablet, R-0, E-Prescribe      aspirin 81 mg chewable tablet [ASPIRIN 81 MG CHEWABLE TABLET] Chew 81 mg daily., Historical      blood glucose test (ACCU-CHEK NATHANIEL PLUS TEST STRP) strips [BLOOD GLUCOSE TEST (ACCU-CHEK NATHANIEL PLUS TEST STRP) STRIPS] Use 1 each As Directed 3 (three) times a day., Disp-300 each, R-3, Normal      docusate sodium (COLACE) 100 MG capsule Take 1 capsule (100 mg) by mouth 2 times daily, Disp-30 capsule, R-0, E-Prescribe      metFORMIN (GLUCOPHAGE XR) 500 MG 24 hr tablet Take 2 tablets (1,000 mg) by mouth 2 times daily (with meals), Disp-360 tablet, R-3, E-Prescribe      rosuvastatin (CRESTOR) 10 MG tablet TAKE 1 TABLET(10 MG) BY MOUTH AT BEDTIME Strength: 10 mg, Disp-90 tablet, R-3, E-Prescribe      tadalafil (CIALIS) 10 MG tablet Take 1 tablet (10 mg) by mouth daily as needed (erectile dsyfunction) 10 mg as a single dose 30 minutes prior to anticipated sexual activity; do not take more than once daily.  Adjust dose based on effectiveness and tolerability; may decrease to 5 mg or  increase to 20 mg per dose., Disp-30 tablet, R-3, E-Prescribe      tamsulosin (FLOMAX) 0.4 MG capsule Take 1 capsule (0.4 mg) by mouth daily While the stent is in place, for stent pain and irritation, Disp-7 capsule, R-0, E-Prescribe           STOP taking these medications       ibuprofen (ADVIL/MOTRIN) 800 MG tablet Comments:   Reason for Stopping:         NIFEdipine ER OSMOTIC (PROCARDIA XL) 60 MG 24 hr tablet Comments:   Reason for Stopping:         ondansetron (ZOFRAN ODT) 4 MG ODT tab Comments:   Reason for Stopping:             Allergies   No Known Allergies

## 2023-08-06 LAB
BACTERIA BLD CULT: NO GROWTH
BACTERIA BLD CULT: NO GROWTH

## 2023-08-07 LAB
BACTERIA BLD CULT: NO GROWTH
BACTERIA BLD CULT: NO GROWTH

## 2023-08-08 ENCOUNTER — OFFICE VISIT (OUTPATIENT)
Dept: UROLOGY | Facility: CLINIC | Age: 58
End: 2023-08-08
Payer: COMMERCIAL

## 2023-08-08 ENCOUNTER — LAB REQUISITION (OUTPATIENT)
Dept: LAB | Facility: CLINIC | Age: 58
End: 2023-08-08
Payer: COMMERCIAL

## 2023-08-08 VITALS
HEIGHT: 72 IN | WEIGHT: 281 LBS | BODY MASS INDEX: 38.06 KG/M2 | SYSTOLIC BLOOD PRESSURE: 96 MMHG | DIASTOLIC BLOOD PRESSURE: 64 MMHG

## 2023-08-08 DIAGNOSIS — N39.0 ENTEROCOCCUS UTI: ICD-10-CM

## 2023-08-08 DIAGNOSIS — R78.81 BACTEREMIA: ICD-10-CM

## 2023-08-08 DIAGNOSIS — N20.0 CALCIUM OXALATE STONES: ICD-10-CM

## 2023-08-08 DIAGNOSIS — N20.0 KIDNEY STONE: Primary | ICD-10-CM

## 2023-08-08 DIAGNOSIS — B95.2 ENTEROCOCCUS UTI: ICD-10-CM

## 2023-08-08 LAB
ALBUMIN SERPL BCG-MCNC: 3.6 G/DL (ref 3.5–5.2)
ALP SERPL-CCNC: 152 U/L (ref 40–129)
ALT SERPL W P-5'-P-CCNC: 33 U/L (ref 0–70)
ANION GAP SERPL CALCULATED.3IONS-SCNC: 14 MMOL/L (ref 7–15)
AST SERPL W P-5'-P-CCNC: 28 U/L (ref 0–45)
BASOPHILS # BLD AUTO: 0 10E3/UL (ref 0–0.2)
BASOPHILS NFR BLD AUTO: 0 %
BILIRUB SERPL-MCNC: 0.7 MG/DL
BUN SERPL-MCNC: 8.4 MG/DL (ref 8–23)
CALCIUM SERPL-MCNC: 9.2 MG/DL (ref 8.6–10)
CHLORIDE SERPL-SCNC: 108 MMOL/L (ref 98–107)
CREAT SERPL-MCNC: 1.01 MG/DL (ref 0.67–1.17)
CRP SERPL-MCNC: 24.1 MG/L
DEPRECATED HCO3 PLAS-SCNC: 24 MMOL/L (ref 22–29)
EOSINOPHIL # BLD AUTO: 0.4 10E3/UL (ref 0–0.7)
EOSINOPHIL NFR BLD AUTO: 5 %
ERYTHROCYTE [DISTWIDTH] IN BLOOD BY AUTOMATED COUNT: 13.9 % (ref 10–15)
ERYTHROCYTE [SEDIMENTATION RATE] IN BLOOD BY WESTERGREN METHOD: 58 MM/HR (ref 0–20)
GFR SERPL CREATININE-BSD FRML MDRD: 86 ML/MIN/1.73M2
GLUCOSE SERPL-MCNC: 115 MG/DL (ref 70–99)
HCT VFR BLD AUTO: 35.1 % (ref 40–53)
HGB BLD-MCNC: 11.3 G/DL (ref 13.3–17.7)
HOLD SPECIMEN: NORMAL
IMM GRANULOCYTES # BLD: 0.1 10E3/UL
IMM GRANULOCYTES NFR BLD: 1 %
LYMPHOCYTES # BLD AUTO: 0.8 10E3/UL (ref 0.8–5.3)
LYMPHOCYTES NFR BLD AUTO: 9 %
MCH RBC QN AUTO: 30.8 PG (ref 26.5–33)
MCHC RBC AUTO-ENTMCNC: 32.2 G/DL (ref 31.5–36.5)
MCV RBC AUTO: 96 FL (ref 78–100)
MONOCYTES # BLD AUTO: 0.6 10E3/UL (ref 0–1.3)
MONOCYTES NFR BLD AUTO: 6 %
NEUTROPHILS # BLD AUTO: 7.3 10E3/UL (ref 1.6–8.3)
NEUTROPHILS NFR BLD AUTO: 79 %
NRBC # BLD AUTO: 0 10E3/UL
NRBC BLD AUTO-RTO: 0 /100
PLATELET # BLD AUTO: 234 10E3/UL (ref 150–450)
POTASSIUM SERPL-SCNC: 3.6 MMOL/L (ref 3.4–5.3)
PROT SERPL-MCNC: 6.9 G/DL (ref 6.4–8.3)
RBC # BLD AUTO: 3.67 10E6/UL (ref 4.4–5.9)
SODIUM SERPL-SCNC: 146 MMOL/L (ref 136–145)
WBC # BLD AUTO: 9.2 10E3/UL (ref 4–11)

## 2023-08-08 PROCEDURE — 99213 OFFICE O/P EST LOW 20 MIN: CPT | Mod: 25 | Performed by: STUDENT IN AN ORGANIZED HEALTH CARE EDUCATION/TRAINING PROGRAM

## 2023-08-08 PROCEDURE — 80053 COMPREHEN METABOLIC PANEL: CPT | Performed by: INTERNAL MEDICINE

## 2023-08-08 PROCEDURE — 86140 C-REACTIVE PROTEIN: CPT | Performed by: INTERNAL MEDICINE

## 2023-08-08 PROCEDURE — 52310 CYSTOSCOPY AND TREATMENT: CPT | Performed by: STUDENT IN AN ORGANIZED HEALTH CARE EDUCATION/TRAINING PROGRAM

## 2023-08-08 PROCEDURE — 85004 AUTOMATED DIFF WBC COUNT: CPT | Performed by: INTERNAL MEDICINE

## 2023-08-08 PROCEDURE — 85652 RBC SED RATE AUTOMATED: CPT | Performed by: INTERNAL MEDICINE

## 2023-08-08 RX ORDER — LIDOCAINE HYDROCHLORIDE 20 MG/ML
JELLY TOPICAL ONCE
Status: COMPLETED | OUTPATIENT
Start: 2023-08-08 | End: 2023-08-08

## 2023-08-08 RX ADMIN — LIDOCAINE HYDROCHLORIDE 5 ML: 20 JELLY TOPICAL at 11:28

## 2023-08-08 ASSESSMENT — PAIN SCALES - GENERAL: PAINLEVEL: NO PAIN (0)

## 2023-08-08 NOTE — NURSING NOTE
Chief Complaint   Patient presents with    Kidney Stone Related     Pt here for in office cystoscopy        Prior to the start of the procedure and with procedural staff participation, I verbally confirmed the patient s identity using two indicators, relevant allergies, that the procedure was appropriate and matched the consent or emergent situation, and that the correct equipment/implants were available. Immediately prior to starting the procedure I conducted the Time Out with the procedural staff and re-confirmed the patient s name, procedure, and site/side. I have wiped the patient off with the povidone-Iodine solution, draped them,  used Lidocaine hydrochloride jelly, and instilled sterile water into the bladder. (The Joint Commission universal protocol was followed.)  Yes    Sedation (Moderate or Deep): None     5mL 2% lidocaine hydrochloride Urojet instilled into urethra.    NDC# [address]-4  Lot #: [id number]  Expiration Date:  12/24    Funmilayo Boyd CMA

## 2023-08-08 NOTE — LETTER
8/8/2023       RE: Govind Alexandre  [address] [address]     Dear Colleague,    Thank you for referring your patient, Govind Alexandre, to the [facility] [place] [facility]. Please see a copy of my visit note below.    CHIEF COMPLAINT   Govind Alexandre who is a 57 year old male returns today for follow-up of BPH, bilateral nephrolithiasis s/p bilateral URS 7/26/2023 c/b postop sepsis.      HPI   Govind Alexandre is a 57 year old male returns today for follow-up of BPH, bilateral nephrolithiasis s/p bilateral URS 7/26/2023 c/b postop sepsis.      Enteroccous faecalis infection, receiving ampicillin and ceftriaxone per ID    Takes a MVI and drinks milk every morning    PHYSICAL EXAM  Patient is a 57 year old  male   Vitals: Blood pressure 96/64, height 1.829 m (6'), weight 127.5 kg (281 lb).  Body mass index is 38.11 kg/m .  General Appearance Adult:   Alert, no acute distress, oriented  HENT: throat/mouth:normal, good dentition  Lungs: no respiratory distress, or pursed lip breathing  Heart: No obvious jugular venous distension present  Abdomen: nondistended  Musculoskeltal: extremities normal, no peripheral edema  Skin: no suspicious lesions or rashes  Neuro: Alert, oriented, speech and mentation normal  Psych: affect and mood normal  Gait: Normal      PRE-PROCEDURE DIAGNOSIS: bilateral renal stones    POST-PROCEDURE DIAGNOSIS: bilateral renal stones    PROCEDURE: Cystoscopy with bilateral ureteral stent removal    DESCRIPTION OF PROCEDURE: After informed consent was obtained, the patient was brought to the procedure room where he was placed in the supine position with all pressure points well padded.  The penis was prepped and draped in sterile fashion. A flexible cystoscope was introduced through a well-lubricated urethra. The right stent was visualized, grasped with a flexible grasper and removed intact and discarded. This  process was repeated for the left side    The flexible cystoscope was removed and the findings were described to the patient.     Stone analysis 7/26/2023  Calculi composed primarily of:   90% calcium oxalate monohydrate, and   10% calcium oxalate dihydrate.       ASSESSMENT and PLAN  57 year old male returns today for follow-up of BPH, bilateral nephrolithiasis s/p bilateral URS 7/26/2023 c/b postop sepsis.    Reviewed CT from hospitalization. Appears to have some residual stone dust in the right kidney (vs calyceal tip contrast excretion), no significant dust remaining on left side. Will get follow up imaging in a few months to reassess this    Long discussion with patient regarding stone prevention diet for caox stones. Notably he drinks milk daily and takes a multivitamin; would suggest that he takes these with food to absorb any oxalate. He also drinks a lot of unsweetened tea, needs to reduce this. Increased fluid intake, decreased salt, decreased animal protein also discussed. Handout given    Re: Enteroccoccus UTI, follow ID recs, he is currently receiving IV abx per protocol    I wrote patient a letter confirming my recommendation of no travel starting on 7/14/2023    -  Follow up 3 months with renal ultrasound, KUB, litholink x2 prior, with me      Mark Orta MD   [facility]  [facility] Phone: [phone]

## 2023-08-08 NOTE — PROGRESS NOTES
CHIEF COMPLAINT   Govind Alexandre who is a 57 year old male returns today for follow-up of BPH, bilateral nephrolithiasis s/p bilateral URS 7/26/2023 c/b postop sepsis.      HPI   Govind Alexandre is a 57 year old male returns today for follow-up of BPH, bilateral nephrolithiasis s/p bilateral URS 7/26/2023 c/b postop sepsis.      Enteroccous faecalis infection, receiving ampicillin and ceftriaxone per ID    Takes a MVI and drinks milk every morning    PHYSICAL EXAM  Patient is a 57 year old  male   Vitals: Blood pressure 96/64, height 1.829 m (6'), weight 127.5 kg (281 lb).  Body mass index is 38.11 kg/m .  General Appearance Adult:   Alert, no acute distress, oriented  HENT: throat/mouth:normal, good dentition  Lungs: no respiratory distress, or pursed lip breathing  Heart: No obvious jugular venous distension present  Abdomen: nondistended  Musculoskeltal: extremities normal, no peripheral edema  Skin: no suspicious lesions or rashes  Neuro: Alert, oriented, speech and mentation normal  Psych: affect and mood normal  Gait: Normal      PRE-PROCEDURE DIAGNOSIS: bilateral renal stones    POST-PROCEDURE DIAGNOSIS: bilateral renal stones    PROCEDURE: Cystoscopy with bilateral ureteral stent removal    DESCRIPTION OF PROCEDURE: After informed consent was obtained, the patient was brought to the procedure room where he was placed in the supine position with all pressure points well padded.  The penis was prepped and draped in sterile fashion. A flexible cystoscope was introduced through a well-lubricated urethra. The right stent was visualized, grasped with a flexible grasper and removed intact and discarded. This process was repeated for the left side    The flexible cystoscope was removed and the findings were described to the patient.     Stone analysis 7/26/2023  Calculi composed primarily of:   90% calcium oxalate monohydrate, and   10% calcium oxalate dihydrate.       ASSESSMENT and PLAN  57 year old male returns  today for follow-up of BPH, bilateral nephrolithiasis s/p bilateral URS 7/26/2023 c/b postop sepsis.    Reviewed CT from hospitalization. Appears to have some residual stone dust in the right kidney (vs calyceal tip contrast excretion), no significant dust remaining on left side. Will get follow up imaging in a few months to reassess this    Long discussion with patient regarding stone prevention diet for caox stones. Notably he drinks milk daily and takes a multivitamin; would suggest that he takes these with food to absorb any oxalate. He also drinks a lot of unsweetened tea, needs to reduce this. Increased fluid intake, decreased salt, decreased animal protein also discussed. Handout given    Re: Enteroccoccus UTI, follow ID recs, he is currently receiving IV abx per protocol    I wrote patient a letter confirming my recommendation of no travel starting on 7/14/2023    -  Follow up 3 months with renal ultrasound, KUB, litholink x2 prior, with me      Mark Orta MD   [facility]  [facility] Phone: [phone]

## 2023-08-08 NOTE — LETTER
August 8, 2023      Govind Alexandre  [address] [address] [address]        To Whom It May Concern:    Govind Alexandre has been under my care since July 14, 2023. At that time I advised him that traveling on a cruise was against my medical recommendation.      If there are any further questions, please contact the office        Sincerely,        Mark Orta MD

## 2023-08-08 NOTE — PATIENT INSTRUCTIONS
"You had 90% calcium oxalate monohydrate, and   10% calcium oxalate dihydrate.     General recommendations for stone prevention are listed below    - Drink more water, to maintain urine output >2 liters a day  - Start or maintain a low sodium diet  - eat less animal protein  - Reduce the amount of oxalate in your diet (a chemical found in certain types of plants for example tea, chocolate, spinach, rhubarb, kale)  - Do not take calcium supplements but continue to have a moderate amount of calcium in your diet.    You may read more about stone prevention at [email or web address]://[email or web address]/urology-[email or web address]-[email or web address]/[email or web address]    Regards,    Mark Orta MD   [facility]  [phone] clinic phone        AFTER YOUR CYSTOSCOPY  ?  ?  You have just completed a cystoscopy, or \"cysto\", which allowed your physician to learn more about your bladder (or to remove a stent placed after surgery). We suggest that you continue to avoid caffeine, fruit juice, and alcohol for the next 24 hours, however, you are encouraged to return to your normal activities.  ?  ?  A few things that are considered normal after your cystoscopy:  ?  * small amount of bleeding (or spotting) that clears within the next 24 hours  ?  * slight burning sensation with urination  ?  * sensation of needing to void (urinate) more frequently  ?  * the feeling of \"air\" in your urine  ?  * mild discomfort that is relieved with Tylenol    * bladder spasms  ?  ?  ?  Please contact our office promptly if you:  ?  * develop a fever above 101 degrees  ?  * are unable to urinate  ?  * develop bright red blood that does not stop  ?  * experience severe pain or swelling  ?  ?  ?  And of course, please contact our office with any concerns or questions [phone].  ?   "

## 2023-08-15 ENCOUNTER — LAB REQUISITION (OUTPATIENT)
Dept: LAB | Facility: CLINIC | Age: 58
End: 2023-08-15
Payer: COMMERCIAL

## 2023-08-15 DIAGNOSIS — R78.81 BACTEREMIA: ICD-10-CM

## 2023-08-15 LAB
ALBUMIN SERPL BCG-MCNC: 3.7 G/DL (ref 3.5–5.2)
ALP SERPL-CCNC: 112 U/L (ref 40–129)
ALT SERPL W P-5'-P-CCNC: 18 U/L (ref 0–70)
ANION GAP SERPL CALCULATED.3IONS-SCNC: 13 MMOL/L (ref 7–15)
AST SERPL W P-5'-P-CCNC: 26 U/L (ref 0–45)
BASOPHILS # BLD AUTO: 0 10E3/UL (ref 0–0.2)
BASOPHILS NFR BLD AUTO: 0 %
BILIRUB SERPL-MCNC: 0.6 MG/DL
BUN SERPL-MCNC: 9.8 MG/DL (ref 8–23)
CALCIUM SERPL-MCNC: 9.5 MG/DL (ref 8.6–10)
CHLORIDE SERPL-SCNC: 106 MMOL/L (ref 98–107)
CREAT SERPL-MCNC: 0.89 MG/DL (ref 0.67–1.17)
CRP SERPL-MCNC: 10.51 MG/L
DEPRECATED HCO3 PLAS-SCNC: 24 MMOL/L (ref 22–29)
EOSINOPHIL # BLD AUTO: 0.3 10E3/UL (ref 0–0.7)
EOSINOPHIL NFR BLD AUTO: 4 %
ERYTHROCYTE [DISTWIDTH] IN BLOOD BY AUTOMATED COUNT: 14.4 % (ref 10–15)
ERYTHROCYTE [SEDIMENTATION RATE] IN BLOOD BY WESTERGREN METHOD: 37 MM/HR (ref 0–20)
GFR SERPL CREATININE-BSD FRML MDRD: >90 ML/MIN/1.73M2
GLUCOSE SERPL-MCNC: 71 MG/DL (ref 70–99)
HCT VFR BLD AUTO: 34.2 % (ref 40–53)
HGB BLD-MCNC: 10.9 G/DL (ref 13.3–17.7)
HOLD SPECIMEN: NORMAL
IMM GRANULOCYTES # BLD: 0 10E3/UL
IMM GRANULOCYTES NFR BLD: 0 %
LYMPHOCYTES # BLD AUTO: 1 10E3/UL (ref 0.8–5.3)
LYMPHOCYTES NFR BLD AUTO: 16 %
MCH RBC QN AUTO: 30.2 PG (ref 26.5–33)
MCHC RBC AUTO-ENTMCNC: 31.9 G/DL (ref 31.5–36.5)
MCV RBC AUTO: 95 FL (ref 78–100)
MONOCYTES # BLD AUTO: 0.5 10E3/UL (ref 0–1.3)
MONOCYTES NFR BLD AUTO: 7 %
NEUTROPHILS # BLD AUTO: 4.8 10E3/UL (ref 1.6–8.3)
NEUTROPHILS NFR BLD AUTO: 73 %
NRBC # BLD AUTO: 0 10E3/UL
NRBC BLD AUTO-RTO: 0 /100
PLATELET # BLD AUTO: 163 10E3/UL (ref 150–450)
POTASSIUM SERPL-SCNC: 3.7 MMOL/L (ref 3.4–5.3)
PROT SERPL-MCNC: 6.7 G/DL (ref 6.4–8.3)
RBC # BLD AUTO: 3.61 10E6/UL (ref 4.4–5.9)
SODIUM SERPL-SCNC: 143 MMOL/L (ref 136–145)
WBC # BLD AUTO: 6.7 10E3/UL (ref 4–11)

## 2023-08-15 PROCEDURE — 86140 C-REACTIVE PROTEIN: CPT | Performed by: INTERNAL MEDICINE

## 2023-08-15 PROCEDURE — 85025 COMPLETE CBC W/AUTO DIFF WBC: CPT | Performed by: INTERNAL MEDICINE

## 2023-08-15 PROCEDURE — 80053 COMPREHEN METABOLIC PANEL: CPT | Performed by: INTERNAL MEDICINE

## 2023-08-15 PROCEDURE — 85652 RBC SED RATE AUTOMATED: CPT | Performed by: INTERNAL MEDICINE

## 2023-08-16 ENCOUNTER — TELEPHONE (OUTPATIENT)
Dept: INFECTIOUS DISEASES | Facility: CLINIC | Age: 58
End: 2023-08-16
Payer: COMMERCIAL

## 2023-08-16 NOTE — TELEPHONE ENCOUNTER
----- Message from Nya Hines MD sent at 8/15/2023  5:07 PM CDT -----  Regarding: RE:  Ok to end antibiotics on 8/17/2023 and remove PICC.   Thank you     AR  ----- Message -----  From: Nataliya Meade RN  Sent: 8/15/2023   2:36 PM CDT  To: Nya Hines MD; [facility]    Please advise on abx end date. Pt did see Urology 8/8, I see that the stents were removed bilaterally. Ok to end IV abx on 8/17?  Thanks!

## 2023-08-18 ENCOUNTER — LAB (OUTPATIENT)
Dept: LAB | Facility: CLINIC | Age: 58
End: 2023-08-18
Payer: COMMERCIAL

## 2023-08-18 DIAGNOSIS — E11.9 TYPE 2 DIABETES MELLITUS WITHOUT COMPLICATION, WITHOUT LONG-TERM CURRENT USE OF INSULIN (H): ICD-10-CM

## 2023-08-18 LAB
ALBUMIN SERPL BCG-MCNC: 4 G/DL (ref 3.5–5.2)
ALP SERPL-CCNC: 101 U/L (ref 40–129)
ALT SERPL W P-5'-P-CCNC: 16 U/L (ref 0–70)
ANION GAP SERPL CALCULATED.3IONS-SCNC: 12 MMOL/L (ref 7–15)
AST SERPL W P-5'-P-CCNC: 26 U/L (ref 0–45)
BILIRUB SERPL-MCNC: 0.7 MG/DL
BUN SERPL-MCNC: 7.9 MG/DL (ref 8–23)
CALCIUM SERPL-MCNC: 9.4 MG/DL (ref 8.6–10)
CHLORIDE SERPL-SCNC: 109 MMOL/L (ref 98–107)
CHOLEST SERPL-MCNC: 109 MG/DL
CREAT SERPL-MCNC: 0.87 MG/DL (ref 0.67–1.17)
DEPRECATED CALCIDIOL+CALCIFEROL SERPL-MC: 45 UG/L (ref 20–75)
DEPRECATED HCO3 PLAS-SCNC: 23 MMOL/L (ref 22–29)
GFR SERPL CREATININE-BSD FRML MDRD: >90 ML/MIN/1.73M2
GLUCOSE SERPL-MCNC: 128 MG/DL (ref 70–99)
HBA1C MFR BLD: 5.3 % (ref 0–5.6)
HDLC SERPL-MCNC: 33 MG/DL
LDLC SERPL CALC-MCNC: 52 MG/DL
NONHDLC SERPL-MCNC: 76 MG/DL
POTASSIUM SERPL-SCNC: 3.9 MMOL/L (ref 3.4–5.3)
PROT SERPL-MCNC: 6.9 G/DL (ref 6.4–8.3)
SODIUM SERPL-SCNC: 144 MMOL/L (ref 136–145)
TRIGL SERPL-MCNC: 118 MG/DL

## 2023-08-18 PROCEDURE — 83036 HEMOGLOBIN GLYCOSYLATED A1C: CPT

## 2023-08-18 PROCEDURE — 80061 LIPID PANEL: CPT

## 2023-08-18 PROCEDURE — 36415 COLL VENOUS BLD VENIPUNCTURE: CPT

## 2023-08-18 PROCEDURE — 82306 VITAMIN D 25 HYDROXY: CPT

## 2023-08-18 PROCEDURE — 80053 COMPREHEN METABOLIC PANEL: CPT

## 2023-08-21 ENCOUNTER — OFFICE VISIT (OUTPATIENT)
Dept: INFECTIOUS DISEASES | Facility: CLINIC | Age: 58
End: 2023-08-21
Payer: COMMERCIAL

## 2023-08-21 VITALS
WEIGHT: 271.8 LBS | SYSTOLIC BLOOD PRESSURE: 110 MMHG | DIASTOLIC BLOOD PRESSURE: 80 MMHG | OXYGEN SATURATION: 95 % | BODY MASS INDEX: 36.86 KG/M2 | HEART RATE: 78 BPM

## 2023-08-21 DIAGNOSIS — R78.81 BACTEREMIA DUE TO ENTEROCOCCUS: Primary | ICD-10-CM

## 2023-08-21 DIAGNOSIS — B95.2 BACTEREMIA DUE TO ENTEROCOCCUS: Primary | ICD-10-CM

## 2023-08-21 PROCEDURE — 99203 OFFICE O/P NEW LOW 30 MIN: CPT

## 2023-08-21 NOTE — PROGRESS NOTES
Infectious diseases follow-up visit    Patient: Govind Alexandre  YOB: 1965, MRN: [id number]  Date of Admission:  (Not on file)  Date of Consult: 08/21/2023  Consult Requested by: No att. providers found  Admission Diagnosis: No admission diagnoses are documented for this encounter.      ID Assessment & Plan   ASSESSMENT: Enterococcus bacteremia following treatment for kidney stones.    Patient finished about 3 weeks of IV antibiotics on August 17.  He is doing well.    DISCUSSION: Patient and his spouse had many many questions about causality of his infection as well as likelihood of relapse or return of infection.    RECOMMENDATION:  1.  Patient advised that should he develop fevers chills sweats in the next couple weeks he should have some blood cultures obtained.    He was also noted to have some mild splenomegaly on imaging and this recommend he follow-up with his primary care team about that.    The patient's care was discussed with the Patient and Patient's Family.    BERNY GARCÍA MD    ______________________________________________________________________    Chief Complaint   Hospital follow-up.    History is obtained from the patient    History of Present Illness   Govind Alexandre is a 57 year old male who was seen by my colleague, Dr. Nya Hines, at [facility] when he had Enterococcus bacteremia.  Possibly related to nephrolithiasis and stent placement.    Per Dr. Hines, he was treated with 3 weeks of IV ampicillin and ceftriaxone which she tolerated well.    His last dose of antibiotics was 4 days ago when Dr. Hines advised that he could be discontinued.    At this point, he has many questions regarding causality of his infection.  He has questions about prognosis and whether or not infection may return.    These questions were responded to him with the best of my ability.    He did develop some rash on his palms and back.  It is improving.  Possibly related to 1 or both  of the antibiotics he was receiving.    Review of Systems   The 5 point Review of Systems is negative other than noted in the HPI or here.     Past Medical History    Past Medical History:   Diagnosis Date    Depression 12/24/2014    Essential hypertension     Infection due to 2019 novel coronavirus 01/11/2022    Multiple thyroid nodules 09/08/2016    Type 2 diabetes mellitus without complication (H) 11/07/2016       Past Surgical History   Past Surgical History:   Procedure Laterality Date    BIOPSY  9/18/16    CARPAL TUNNEL RELEASE RT/LT Right     COLONOSCOPY  6/15/18    COMBINED CYSTOSCOPY, RETROGRADES, URETEROSCOPY, LASER HOLMIUM LITHOTRIPSY URETER(S), INSERT STENT Bilateral 7/26/2023    Procedure: cystoscopy, bilateral ureteroscopy with holmium laser lithotripsy, bilateral ureteroscopy with stone basketing, bilateral retrograde pyelogram, bilateral ureteral stent placement;  Surgeon: Mark Orta MD;  Location: [facility] OR    HC REPAIR ROTATOR CUFF,ACUTE      Description: Rotator Cuff Repair Acute;  Recorded: 01/13/2010;  Comments: cortisone    PICC  11/30/2015         REMOVAL OF SPERM DUCT(S)      Description: Surgery Of Male Genitalia Vasectomy;  Recorded: 01/13/2010;    WISDOM TOOTH EXTRACTION         Social History   Social History     Tobacco Use    Smoking status: Former     Types: Dip, chew, snus or snuff    Smokeless tobacco: Former     Types: Chew     Quit date: 12/24/2004   Vaping Use    Vaping Use: Never used   Substance Use Topics    Alcohol use: Yes     Comment: Alcoholic Drinks/day: 1 every 2 weeks    Drug use: No       Family History   I have reviewed this patient's family history and updated it with pertinent information if needed.  Family History   Problem Relation Age of Onset    Heart Disease Mother     Kidney Disease Mother     Thyroid Cancer Father     Kidney Disease Father     Heart Disease Father     Hypertension Father     Thyroid Disease Father         cancer    No Known Problems Sister      Hypertension Brother     Depression Daughter     Anxiety Disorder Daughter     Autism Spectrum Disorder Son     Heart Disease Paternal Grandmother     Hypertension Sister        Medications   I have reviewed this patient's current medications  Current Outpatient Medications   Medication Sig    ACCU-CHEK FASTCLIX LANCET DRUM [ACCU-CHEK FASTCLIX LANCET DRUM] USE TO TEST BLOOD SUGARS 2 TO 3 TIMES A DAY    apixaban ANTICOAGULANT (ELIQUIS) 5 MG tablet Take 1 tablet (5 mg) by mouth 2 times daily for 60 days    blood glucose test (ACCU-CHEK NATHANIEL PLUS TEST STRP) strips [BLOOD GLUCOSE TEST (ACCU-CHEK NATHANIEL PLUS TEST STRP) STRIPS] Use 1 each As Directed 3 (three) times a day.    diltiazem ER COATED BEADS (CARDIZEM CD/CARTIA XT) 300 MG 24 hr capsule Take 1 capsule (300 mg) by mouth daily    docusate sodium (COLACE) 100 MG capsule Take 1 capsule (100 mg) by mouth 2 times daily    lisinopril (ZESTRIL) 5 MG tablet Take 1 tablet (5 mg) by mouth daily    metFORMIN (GLUCOPHAGE XR) 500 MG 24 hr tablet Take 2 tablets (1,000 mg) by mouth 2 times daily (with meals)    rosuvastatin (CRESTOR) 10 MG tablet TAKE 1 TABLET(10 MG) BY MOUTH AT BEDTIME Strength: 10 mg    Semaglutide, 1 MG/DOSE, (OZEMPIC, 1 MG/DOSE,) 4 MG/3ML pen Inject 1 mg Subcutaneous once a week Every Friday or Saturday    spironolactone (ALDACTONE) 25 MG tablet Take 0.5 tablets (12.5 mg) by mouth daily    tadalafil (CIALIS) 10 MG tablet Take 1 tablet (10 mg) by mouth daily as needed (erectile dsyfunction) 10 mg as a single dose 30 minutes prior to anticipated sexual activity; do not take more than once daily.  Adjust dose based on effectiveness and tolerability; may decrease to 5 mg or increase to 20 mg per dose.    acetaminophen (TYLENOL) 500 MG tablet Take 2 tablets (1,000 mg) by mouth every 6 hours as needed for mild pain (Patient not taking: Reported on 8/8/2023)    aspirin 81 mg chewable tablet [ASPIRIN 81 MG CHEWABLE TABLET] Chew 81 mg daily. (Patient not taking:  Reported on 8/8/2023)    diphenhydrAMINE (BENADRYL) 25 MG capsule Take 1 capsule (25 mg) by mouth every 6 hours as needed for itching, allergies or other (rash) (Patient not taking: Reported on 8/8/2023)    fexofenadine (ALLEGRA) 180 MG tablet Take 1 tablet (180 mg) by mouth daily as needed for allergies (Patient not taking: Reported on 8/8/2023)    nystatin (MYCOSTATIN) 283831 UNIT/ML suspension Swish and swallow 10 mLs (1,000,000 Units) in mouth 4 times daily (Patient not taking: Reported on 8/21/2023)    tamsulosin (FLOMAX) 0.4 MG capsule Take 1 capsule (0.4 mg) by mouth daily While the stent is in place, for stent pain and irritation (Patient not taking: Reported on 8/21/2023)     No current facility-administered medications for this visit.       Allergies   No Known Allergies    Physical Exam   Vital Signs:     BP: 110/80 Pulse: 78     SpO2: 95 %      Weight: 271 lbs 12.8 oz    Exam:  Constitutional: healthy, alert, no distress, and moderately obese  Head: Normocephalic. No masses, lesions, tenderness or abnormalities  Neck: Neck supple. No adenopathy. Thyroid symmetric, normal size,  ENT: ENT exam normal, no neck nodes or sinus tenderness  Cardiovascular: PMI normal. No lifts, heaves, or thrills. RRR. No murmurs, clicks gallops or rub  Respiratory: negative  Gastrointestinal: Abdomen soft, non-tender. BS normal. No masses, organomegaly  : Deferred  Musculoskeletal: Upper extremity where his PICC line has been in place appears benign.  He says it did feel warm the day before.  Skin: no suspicious lesions or rashes and he has some peeling skin on the palms of his hands which he says is improving.  Neurologic: negative  Psychiatric: mentation appears normal and affect normal/bright  Hematologic/Lymphatic/Immunologic:       Data   Inflammatory Markers   Recent Labs   Lab Test 08/15/23  1600 08/08/23  1500   SED 37* 58*   CRPI 10.51* 24.10*        Hematology Studies   Recent Labs   Lab Test 08/15/23  1600  08/08/23  1500 08/03/23  0417 08/02/23  1546 08/01/23  1037 07/31/23  0952 07/31/23  0640   WBC 6.7 9.2 6.4 6.0  --  6.1 5.0   HGB 10.9* 11.3* 9.4* 9.6* 10.0* 10.3* 9.5*   MCV 95 96 93 95  --  94 94    234 136* 138*  --  120* 114*       Metabolic Studies   Recent Labs   Lab Test 08/18/23  0857 08/15/23  1600 08/08/23  1500 08/04/23  0508 08/03/23  0417    143 146* 144 143   POTASSIUM 3.9 3.7 3.6 3.6 3.6  3.6   CHLORIDE 109* 106 108* 109* 111*   CO2 23 24 24 26 24   BUN 7.9* 9.8 8.4 8 10   CR 0.87 0.89 1.01 0.79 0.82   GFRESTIMATED >90 >90 86 >90 >90       Hepatic Studies    Recent Labs   Lab Test 08/18/23  0857 08/15/23  1600 08/08/23  1500 08/04/23  0508 08/03/23  0417 08/02/23  1546   BILITOTAL 0.7 0.6 0.7 1.0 1.1* 1.0   ALKPHOS 101 112 152* 162* 151* 172*   ALBUMIN 4.0 3.7 3.6 2.3* 2.4* 2.5*   AST 26 26 28 30 31 38   ALT 16 18 33 48* 50* 53*       Most Recent 6 Bacteria Isolates From Any Culture (See EPIC Reports for Culture Details):No lab results found.    Urine Studies    Recent Labs   Lab Test 07/29/23  2156 07/08/23  1905 05/20/19  1107   LEUKEST 500 Sherrie/uL* 75 Sherrie/uL* Negative   WBCU >182* 10* None Seen       Vancomycin Levels  No lab results found.    Invalid input(s): VANCO    Hepatitis B Testing   Recent Labs   Lab Test 12/11/15  0823 11/30/15  1350   HEPBANG Negative Negative   HBCM Negative Negative     Hepatitis C Testing     Hepatitis C Antibody   Date Value Ref Range Status   12/11/2015 Negative Negative Final   11/30/2015 Negative Negative Final     HIVTesting No lab results found.    Respiratory Virus Testing    No results found for: RS, FLUAG  COVID-19 Antibody Results, Testing for Immunity           No data to display              COVID-19 PCR Results          7/29/2023    21:37   COVID-19 PCR Results   SARS CoV2 PCR Negative

## 2023-08-21 NOTE — PATIENT INSTRUCTIONS
IF you get fevers, chills, sweats in next couple of weeks, you should have some blood cultures.

## 2023-08-22 ENCOUNTER — OFFICE VISIT (OUTPATIENT)
Dept: FAMILY MEDICINE | Facility: CLINIC | Age: 58
End: 2023-08-22
Payer: COMMERCIAL

## 2023-08-22 VITALS
WEIGHT: 273 LBS | HEIGHT: 72 IN | DIASTOLIC BLOOD PRESSURE: 76 MMHG | RESPIRATION RATE: 12 BRPM | HEART RATE: 77 BPM | TEMPERATURE: 98.8 F | BODY MASS INDEX: 36.98 KG/M2 | SYSTOLIC BLOOD PRESSURE: 127 MMHG | OXYGEN SATURATION: 97 %

## 2023-08-22 DIAGNOSIS — Z09 HOSPITAL DISCHARGE FOLLOW-UP: Primary | ICD-10-CM

## 2023-08-22 DIAGNOSIS — R16.1 SPLENOMEGALY: ICD-10-CM

## 2023-08-22 DIAGNOSIS — L91.8 SKIN TAG: ICD-10-CM

## 2023-08-22 DIAGNOSIS — I48.91 ATRIAL FIBRILLATION WITH RVR (H): ICD-10-CM

## 2023-08-22 DIAGNOSIS — I10 ESSENTIAL HYPERTENSION: ICD-10-CM

## 2023-08-22 DIAGNOSIS — R78.81 ENTEROCOCCAL BACTEREMIA: Chronic | ICD-10-CM

## 2023-08-22 DIAGNOSIS — E66.01 MORBID OBESITY (H): ICD-10-CM

## 2023-08-22 DIAGNOSIS — B95.2 ENTEROCOCCAL BACTEREMIA: Chronic | ICD-10-CM

## 2023-08-22 DIAGNOSIS — E11.9 TYPE 2 DIABETES MELLITUS WITHOUT COMPLICATION, WITHOUT LONG-TERM CURRENT USE OF INSULIN (H): ICD-10-CM

## 2023-08-22 PROCEDURE — 99214 OFFICE O/P EST MOD 30 MIN: CPT | Performed by: NURSE PRACTITIONER

## 2023-08-22 RX ORDER — DILTIAZEM HYDROCHLORIDE 300 MG/1
300 CAPSULE, COATED, EXTENDED RELEASE ORAL DAILY
Qty: 30 CAPSULE | Refills: 1 | Status: SHIPPED | OUTPATIENT
Start: 2023-08-22 | End: 2023-09-06

## 2023-08-22 RX ORDER — LISINOPRIL 5 MG/1
5 TABLET ORAL DAILY
Qty: 30 TABLET | Refills: 1 | Status: SHIPPED | OUTPATIENT
Start: 2023-08-22 | End: 2023-12-22

## 2023-08-22 ASSESSMENT — ENCOUNTER SYMPTOMS
CHILLS: 0
CHEST TIGHTNESS: 0
FEVER: 0
SHORTNESS OF BREATH: 0
CONSTIPATION: 0
ABDOMINAL PAIN: 0
WHEEZING: 0
UNEXPECTED WEIGHT CHANGE: 0
HEMATURIA: 0
DIARRHEA: 0
NAUSEA: 0
FLANK PAIN: 0
DYSURIA: 0

## 2023-08-22 NOTE — PROGRESS NOTES
Assessment & Plan     Encounter Diagnoses   Name Primary?    Type 2 diabetes mellitus without complication, without long-term current use of insulin (H)     Atrial fibrillation with RVR (H)     Essential hypertension     Skin tag     Hospital discharge follow-up Yes    Obesity (BMI 35.0-39.9) with comorbidity (H)     Splenomegaly     Enterococcal bacteremia        Type 2 diabetes mellitus without complication (H)  Reviewed Hgb A1C results with patient, stable at 5.3. Currently on metformin and ozempic. Diabetic foot exam done today and was normal.    Atrial fibrillation with RVR (H)  Experienced afib with RVR during hospitalization, cardiology follow-up was recommended at discharge but patient reports he has not been contacted to schedule an appointment. Referral to cardiology ordered. Heart rate and rhythm regular on examination today.   - Adult Cardiology Eval  Referral; Future  - US Abdomen Limited; Future  - Adult Dermatology Referral; Future  - CBC with platelets; Future  - diltiazem ER COATED BEADS (CARDIZEM CD/CARTIA XT) 300 MG 24 hr capsule; Take 1 capsule (300 mg) by mouth daily    Essential hypertension  BP stable 127/76 today. Lisinopril dose was adjusted during hospitalization due to hypotension. Follow-up with cardiology.  - lisinopril (ZESTRIL) 5 MG tablet; Take 1 tablet (5 mg) by mouth daily    Skin tag  He reports skin tags to his armpits and groin, asking for dermatology referral for skin tag removal.   - Adult Dermatology Referral; Future    We reviewed his recent laboratory results and imaging. Regarding splenomegaly, he is not having any concerning signs or symptoms, discussed causes of splenomegaly and that it could be related to his infection. His liver and kidney function are normal. Follow-up ultrasound ordered to be completed in 6 to 12 months. We reviewed his previous CBC results and discussed how acute infection can impact hemoglobin levels. Follow-up CBC ordered for next month,  he is not experiencing any concerning signs or symptoms.            MED REC REQUIRED  Post Medication Reconciliation Status: discharge medications reconciled, continue medications without change  BMI:   Estimated body mass index is 37.03 kg/m  as calculated from the following:    Height as of this encounter: 1.829 m (6').    Weight as of this encounter: 123.8 kg (273 lb).   Weight management plan: Discussed healthy diet and exercise guidelines        CECILY Lo CNP  [facility]    Susi Faust is a 57 year old, presenting for the following health issues:  Hospital F/U (Kidney stone, infection, AFIB)      8/22/2023     1:58 PM   Additional Questions   Roomed by Memo   Accompanied by Wife       HPI     Patient reports feeling well overall. He finished IV antibiotics and had his PICC line removed. He already had a follow-up visit with urology and had his stents removed and he saw infectious disease yesterday. He denies fever, chest pain, shortness of breath, abdominal pain, nausea, vomiting, or diarrhea. He is wondering if he should follow-up on his splenomegaly that was noted in the hospital. He states a provider in the hospital brought up possibly seeing oncology for his enlarged spleen. He reports his father has a history of myelodysplastic syndrome and he is wondering if his lower hemoglobin values are something that should be monitored. He reports he mentioned this to the infectious disease provider and was told it could be related to the antibiotics because blood was being drawn from his PICC line.  He would like a referral to cardiology for follow-up and dermatology for skin tags and mole follow-up.     Hospital Follow-up Visit:    Hospital/Nursing Home/IP Rehab Facility: [facility]  Date of Admission: 07/29/2023  Date of Discharge: 08/04/2023  Reason(s) for Admission: Enterococcal bacteremia/sepsis-urologic source, afib with RVR    Was  your hospitalization related to COVID-19? No   Problems taking medications regularly:  None  Medication changes since discharge: None  Problems adhering to non-medication therapy:  None    Summary of hospitalization:  [facility] discharge summary reviewed  Diagnostic Tests/Treatments reviewed.  Follow up needed: Referral to cardiology  Other Healthcare Providers Involved in Patient s Care:         Specialist appointment - Cardiology, urology, and infectious disease  Update since discharge: improved.         Plan of care communicated with patient and patient's wife.                   Review of Systems   Constitutional:  Negative for chills, fever and unexpected weight change.   Respiratory:  Negative for chest tightness, shortness of breath and wheezing.    Cardiovascular:  Negative for chest pain and peripheral edema.   Gastrointestinal:  Negative for abdominal pain, constipation, diarrhea and nausea.   Genitourinary:  Negative for dysuria, flank pain and hematuria.   All other systems reviewed and are negative.           Objective    /76   Pulse 77   Temp 98.8  F (37.1  C) (Oral)   Resp 12   Ht 1.829 m (6')   Wt 123.8 kg (273 lb)   SpO2 97%   BMI 37.03 kg/m    Body mass index is 37.03 kg/m .  Physical Exam  Constitutional:       General: He is not in acute distress.     Appearance: Normal appearance. He is not ill-appearing.   HENT:      Head: Normocephalic and atraumatic.   Cardiovascular:      Rate and Rhythm: Normal rate and regular rhythm.      Heart sounds: S1 normal and S2 normal.      No friction rub. No gallop.   Pulmonary:      Effort: Pulmonary effort is normal.      Breath sounds: Normal breath sounds.   Abdominal:      General: There is no distension.      Tenderness: There is no abdominal tenderness. There is no guarding.   Musculoskeletal:      Right lower leg: No edema.      Left lower leg: No edema.   Neurological:      Mental Status: He is alert.            Lab on  08/18/2023   Component Date Value Ref Range Status    Cholesterol 08/18/2023 109  <200 mg/dL Final    Triglycerides 08/18/2023 118  <150 mg/dL Final    Direct Measure HDL 08/18/2023 33 (L)  >=40 mg/dL Final    LDL Cholesterol Calculated 08/18/2023 52  <=100 mg/dL Final    Non HDL Cholesterol 08/18/2023 76  <130 mg/dL Final    Hemoglobin A1C 08/18/2023 5.3  0.0 - 5.6 % Final    Normal <5.7%   Prediabetes 5.7-6.4%    Diabetes 6.5% or higher     Note: Adopted from ADA consensus guidelines.    Vitamin D, Total (25-Hydroxy) 08/18/2023 45  20 - 75 ug/L Final    Sodium 08/18/2023 144  136 - 145 mmol/L Final    Potassium 08/18/2023 3.9  3.4 - 5.3 mmol/L Final    Chloride 08/18/2023 109 (H)  98 - 107 mmol/L Final    Carbon Dioxide (CO2) 08/18/2023 23  22 - 29 mmol/L Final    Anion Gap 08/18/2023 12  7 - 15 mmol/L Final    Urea Nitrogen 08/18/2023 7.9 (L)  8.0 - 23.0 mg/dL Final    Creatinine 08/18/2023 0.87  0.67 - 1.17 mg/dL Final    Calcium 08/18/2023 9.4  8.6 - 10.0 mg/dL Final    Glucose 08/18/2023 128 (H)  70 - 99 mg/dL Final    Alkaline Phosphatase 08/18/2023 101  40 - 129 U/L Final    AST 08/18/2023 26  0 - 45 U/L Final    Reference intervals for this test were updated on 6/12/2023 to more accurately reflect our healthy population. There may be differences in the flagging of prior results with similar values performed with this method. Interpretation of those prior results can be made in the context of the updated reference intervals.    ALT 08/18/2023 16  0 - 70 U/L Final    Reference intervals for this test were updated on 6/12/2023 to more accurately reflect our healthy population. There may be differences in the flagging of prior results with similar values performed with this method. Interpretation of those prior results can be made in the context of the updated reference intervals.      Protein Total 08/18/2023 6.9  6.4 - 8.3 g/dL Final    Albumin 08/18/2023 4.0  3.5 - 5.2 g/dL Final    Bilirubin Total  08/18/2023 0.7  <=1.2 mg/dL Final    GFR Estimate 08/18/2023 >90  >60 mL/min/1.73m2 Final

## 2023-09-01 ENCOUNTER — OFFICE VISIT (OUTPATIENT)
Dept: DERMATOLOGY | Facility: CLINIC | Age: 58
End: 2023-09-01
Attending: NURSE PRACTITIONER
Payer: COMMERCIAL

## 2023-09-01 DIAGNOSIS — D48.5 NEOPLASM OF UNCERTAIN BEHAVIOR OF SKIN: ICD-10-CM

## 2023-09-01 DIAGNOSIS — L91.8 INFLAMED ACROCHORDON: Primary | ICD-10-CM

## 2023-09-01 PROCEDURE — 11200 RMVL SKIN TAGS UP TO&INC 15: CPT | Mod: 59 | Performed by: PHYSICIAN ASSISTANT

## 2023-09-01 PROCEDURE — 11103 TANGNTL BX SKIN EA SEP/ADDL: CPT | Performed by: PHYSICIAN ASSISTANT

## 2023-09-01 PROCEDURE — 88305 TISSUE EXAM BY PATHOLOGIST: CPT | Performed by: PATHOLOGY

## 2023-09-01 PROCEDURE — 11102 TANGNTL BX SKIN SINGLE LES: CPT | Performed by: PHYSICIAN ASSISTANT

## 2023-09-01 ASSESSMENT — PAIN SCALES - GENERAL: PAINLEVEL: NO PAIN (0)

## 2023-09-01 NOTE — PROGRESS NOTES
HPI:   Chief complaints: Govind Alexandre is a pleasant 57 year old male who presents for evaluation of several spots of concern. He has a stubborn wart on the left thumb present for awhile. He has had LN2 and has tried OTC treatments but these have not helped. He has an irritated mole in the right groin which will rub and become painful. He also has several irritated skin tags he would like removed. Lastly he has a new mole on the right side of his face that appeared within the past 6 months.       PHYSICAL EXAM:    There were no vitals taken for this visit.  Skin exam performed as follows: Type 2 skin. Mood appropriate  Alert and Oriented X 3. Well developed, well nourished in no distress.  General appearance: Normal  Head including face: Normal  Eyes: conjunctiva and lids: Normal  Mouth: Lips, teeth, gums: Normal  Neck: Normal  Skin: Scalp and body hair: See below    12 mm brown patch on the right jawline  2.    4 mm verrucous papule on the left thumb  3.   7 mm pink fleshy papule on the right inguinal crease  4.   Inflamed pedunculated papules on the left axilla x 5, right axilla x 5, left groin x 2      ASSESSMENT/PLAN:     Atypical lentigo r/o lentigo maligna on the right jaw line. Shave biopsy performed.  Area cleaned and anesthetized with 1% lidocaine with epinephrine.  Dermablade used to remove the lesion and sent to pathology. Bleeding was cauterized. Pt tolerated procedure well with no complications.   Wart r/o other on the left thumb. Shave biopsy performed.  Area cleaned and anesthetized with 1% lidocaine with epinephrine.  Dermablade used to remove the lesion and sent to pathology. Bleeding was cauterized. Pt tolerated procedure well with no complications.   Dermal nevus r/o atypicality on the right inguinal crease. Shave biopsy performed.  Area cleaned and anesthetized with 1% lidocaine with epinephrine.  Dermablade used to remove the lesion and sent to pathology. Bleeding was cauterized. Pt tolerated  procedure well with no complications.   Inflamed acrochordon on the left axilla x 5, right axilla x 5, left groin x 2 Irritated per patient. Snip excision performed to all areas. Bleeding stopped. Pt tolerated well with no complications.             Follow-up: PRN  CC:   Scribed By: Lili Parra MS, PAILEANA

## 2023-09-01 NOTE — LETTER
9/1/2023         RE: Govind Alexandre  [id number] [facility] [id number]        Dear Colleague,    Thank you for referring your patient, Govind Alexandre, to the [facility]. Please see a copy of my visit note below.    HPI:   Chief complaints: Govind Alexandre is a pleasant 57 year old male who presents for evaluation of several spots of concern. He has a stubborn wart on the left thumb present for awhile. He has had LN2 and has tried OTC treatments but these have not helped. He has an irritated mole in the right groin which will rub and become painful. He also has several irritated skin tags he would like removed. Lastly he has a new mole on the right side of his face that appeared within the past 6 months.       PHYSICAL EXAM:    There were no vitals taken for this visit.  Skin exam performed as follows: Type 2 skin. Mood appropriate  Alert and Oriented X 3. Well developed, well nourished in no distress.  General appearance: Normal  Head including face: Normal  Eyes: conjunctiva and lids: Normal  Mouth: Lips, teeth, gums: Normal  Neck: Normal  Skin: Scalp and body hair: See below    12 mm brown patch on the right jawline  2.    4 mm verrucous papule on the left thumb  3.   7 mm pink fleshy papule on the right inguinal crease  4.   Inflamed pedunculated papules on the left axilla x 5, right axilla x 5, left groin x 2      ASSESSMENT/PLAN:     Atypical lentigo r/o lentigo maligna on the right jaw line. Shave biopsy performed.  Area cleaned and anesthetized with 1% lidocaine with epinephrine.  Dermablade used to remove the lesion and sent to pathology. Bleeding was cauterized. Pt tolerated procedure well with no complications.   Wart r/o other on the left thumb. Shave biopsy performed.  Area cleaned and anesthetized with 1% lidocaine with epinephrine.  Dermablade used to remove the lesion and sent to pathology. Bleeding was cauterized. Pt tolerated procedure well with no  complications.   Dermal nevus r/o atypicality on the right inguinal crease. Shave biopsy performed.  Area cleaned and anesthetized with 1% lidocaine with epinephrine.  Dermablade used to remove the lesion and sent to pathology. Bleeding was cauterized. Pt tolerated procedure well with no complications.   Inflamed acrochordon on the left axilla x 5, right axilla x 5, left groin x 2 Irritated per patient. Snip excision performed to all areas. Bleeding stopped. Pt tolerated well with no complications.             Follow-up: PRN  CC:   Scribed By: Lili Parra MS, MARLA      Again, thank you for allowing me to participate in the care of your patient.        Sincerely,        Lili Parra PA-C

## 2023-09-01 NOTE — PATIENT INSTRUCTIONS
Wound Care Instructions     FOR SUPERFICIAL WOUNDS     [facility]  [phone]                 AFTER 24 HOURS YOU SHOULD REMOVE THE BANDAGE AND BEGIN DAILY DRESSING CHANGES AS FOLLOWS:     1) Remove Dressing.     2) Clean and dry the area with tap water using a Q-tip or sterile gauze pad.     3) Apply Vaseline, Aquaphor, Polysporin ointment or Bacitracin ointment over entire wound.  Do NOT use Neosporin ointment.     4) Cover the wound with a band-aid, or a sterile non-stick gauze pad and micropore paper tape    REPEAT THESE INSTRUCTIONS AT LEAST ONCE A DAY UNTIL THE WOUND HAS COMPLETELY HEALED.    It is an old wives tale that a wound heals better when it is exposed to air and allowed to dry out. The wound will heal faster with a better cosmetic result if it is kept moist with ointment and covered with a bandage.    **Do not let the wound dry out.**    Supplies Needed:      *Cotton tipped applicators (Q-tips)    *Vaseline, Aquaphor, Polysporin or Bacitracin Ointment (NOT NEOSPORIN)    *Band-aids or non-stick gauze pads and micropore paper tape.      PATIENT INFORMATION:    During the healing process you will notice a number of changes. All wounds develop a small halo of redness surrounding the wound.  This means healing is occurring. Severe itching with extensive redness usually indicates sensitivity to the ointment or bandage tape used to dress the wound.  You should call our office if this develops.      Swelling  and/or discoloration around your surgical site is common, particularly when performed around the eye.    All wounds normally drain.  The larger the wound the more drainage there will be.  After 7-10 days, you will notice the wound beginning to shrink and new skin will begin to grow.  The wound is healed when you can see skin has formed over the entire area.  A healed wound has a healthy, shiny look to the surface and is red to dark pink in color to normalize.  Wounds may  take approximately 4-6 weeks to heal.  Larger wounds may take 6-8 weeks.  After the wound is healed you may discontinue dressing changes.    You may experience a sensation of tightness as your wound heals. This is normal and will gradually subside.    Your healed wound may be sensitive to temperature changes. This sensitivity improves with time, but if you re having a lot of discomfort, try to avoid temperature extremes.    Patients frequently experience itching after their wound appears to have healed because of the continue healing under the skin.  Plain Vaseline will help relieve the itching.      POSSIBLE COMPLICATIONS    BLEEDING:    Leave the bandage in place.  Use tightly rolled up gauze or a cloth to apply direct pressure over the bandage for 30  minutes.  Reapply pressure for an additional 30 minutes if necessary  Use additional gauze and tape to maintain pressure once the bleeding has stopped.

## 2023-09-05 ENCOUNTER — MYC MEDICAL ADVICE (OUTPATIENT)
Dept: FAMILY MEDICINE | Facility: CLINIC | Age: 58
End: 2023-09-05
Payer: COMMERCIAL

## 2023-09-06 ENCOUNTER — OFFICE VISIT (OUTPATIENT)
Dept: CARDIOLOGY | Facility: CLINIC | Age: 58
End: 2023-09-06
Attending: NURSE PRACTITIONER
Payer: COMMERCIAL

## 2023-09-06 ENCOUNTER — TELEPHONE (OUTPATIENT)
Dept: CARDIOLOGY | Facility: CLINIC | Age: 58
End: 2023-09-06

## 2023-09-06 VITALS
OXYGEN SATURATION: 94 % | WEIGHT: 266 LBS | SYSTOLIC BLOOD PRESSURE: 106 MMHG | HEART RATE: 86 BPM | DIASTOLIC BLOOD PRESSURE: 62 MMHG | RESPIRATION RATE: 18 BRPM | BODY MASS INDEX: 36.08 KG/M2

## 2023-09-06 DIAGNOSIS — I48.91 ATRIAL FIBRILLATION WITH RVR (H): ICD-10-CM

## 2023-09-06 DIAGNOSIS — R06.09 DYSPNEA ON EXERTION: ICD-10-CM

## 2023-09-06 DIAGNOSIS — I48.19 PERSISTENT ATRIAL FIBRILLATION (H): Primary | ICD-10-CM

## 2023-09-06 LAB
PATH REPORT.COMMENTS IMP SPEC: NORMAL
PATH REPORT.COMMENTS IMP SPEC: NORMAL
PATH REPORT.FINAL DX SPEC: NORMAL
PATH REPORT.GROSS SPEC: NORMAL
PATH REPORT.MICROSCOPIC SPEC OTHER STN: NORMAL
PATH REPORT.RELEVANT HX SPEC: NORMAL

## 2023-09-06 PROCEDURE — 99205 OFFICE O/P NEW HI 60 MIN: CPT | Performed by: INTERNAL MEDICINE

## 2023-09-06 PROCEDURE — 93000 ELECTROCARDIOGRAM COMPLETE: CPT | Performed by: INTERNAL MEDICINE

## 2023-09-06 RX ORDER — DILTIAZEM HYDROCHLORIDE 180 MG/1
180 CAPSULE, COATED, EXTENDED RELEASE ORAL DAILY
Qty: 60 CAPSULE | Refills: 1 | Status: SHIPPED | OUTPATIENT
Start: 2023-09-06 | End: 2023-09-07

## 2023-09-06 NOTE — PROGRESS NOTES
[facility]NCOUNTER [facility]      [facility]  [phone]      Assessment/Recommendations   Assessment/Plan:    Govind Alexandre is a very pleasant 57 year old male with PMH of atrial fibrillation, hypertension, hyperlipidemia, type 2 diabetes, kidney stones s/p bilateral ureteroscopy, stone removal and bilateral ureteral stents who presents today to the EP clinic.    Paroxysmal atrial fibrillation  - We reviewed the pathophysiology of atrial fibrillation and management considerations including stroke risk and anticoagulation, rate control, cardioversion, antiarrhythmic drug therapy, and catheter ablation. We discussed atrial fibrillation ablation procedures, anticipated success rates, the potential need for re-do ablation vs addition of anti-arrhythmic drugs, procedural risks (including groin bleeding, tamponade, phrenic or esophageal injury, stroke, pulmonary vein stenosis) and recovery expectations.  - continue Diltiazem , reduce dose to 180 mg given orthostatic symptoms  - he will think about an ablation, we will call him in 2 weeks  - we discussed the ongoing importance of lifestyle modification (maintaining a healthy weight, sleep apnea diagnosis and management, alcohol avoidance) as part of a long term strategy for atrial fibrillation management    2. Anticoagulation  - CHADSVASC score 2  - continue Eliquis    3. Dyspnea on exertion  - Has HTN, Obesity, DM and significant cardiac history in family  - will get a stress test.    Time spent: 60 minutes spent on the date of the encounter doing chart review, history and exam, documentation and further activities as noted above.         History of Present Illness/Subjective    HPI: Govind Alexandre is a very pleasant 57 year old male with PMH of atrial fibrillation, hypertension, hyperlipidemia, type 2 diabetes, kidney stones s/p bilateral ureteroscopy, stone removal and bilateral ureteral stents who presents today to the EP  clinic.    Govind was first diagnosed with AF about 6-7 years ago in the setting of a TBI. More recently he was told again that he was in AF when he was in the hospital for sepsis. Perception of AF has been tricky in him given his recent hospitalization. He has noted having episodes of AF on his apple watch.    He has been compliant with his meds since discharge. He does have orthostatic symptoms.    Recent Echocardiogram Results (personally reviewed):    July 2023    Interpretation Summary     Normal left ventricular size. Moderate concentric left ventricular hypertrophy  with sigmoid shaped septum. Visually estimated ejection fraction of 55 to 60%  without observed wall motion abnormality.  Right ventricle is mildly dilated. Right ventricular systolic function appears  grossly normal.  Mild biatrial enlargement.  Mild mitral regurgitation.  Mild tricuspid regurgitation. Unable to estimate right ventricular systolic  function secondary to incomplete tricuspid regurgitation velocity envelope.  Mild enlargement of the proximal ascending aorta.  Underlying rhythm is atrial fibrillation.      Labs below reviewed personally     Physical Examination  Review of Systems   Vitals: /62 (BP Location: Right arm, Patient Position: Sitting, Cuff Size: Adult Large)   Pulse 86   Resp 18   Wt 120.7 kg (266 lb)   SpO2 94%   BMI 36.08 kg/m    BMI= Body mass index is 36.08 kg/m .  Wt Readings from Last 3 Encounters:   09/06/23 120.7 kg (266 lb)   08/22/23 123.8 kg (273 lb)   08/21/23 123.3 kg (271 lb 12.8 oz)       General Appearance:   no distress, normal body habitus   ENT/Mouth: membranes moist, no oral lesions or bleeding gums.      EYES:  no scleral icterus, normal conjunctivae   Neck: no carotid bruits or thyromegaly   Chest/Lungs:   lungs are clear to auscultation, no rales or wheezing, no sternal scar, equal chest wall expansion    Cardiovascular:   Regular. Normal first and second heart sounds with no murmurs,  rubs, or gallops; the carotid, radial and posterior tibial pulses are intact, no edema bilaterally    Abdomen:  no organomegaly, masses, bruits, or tenderness; bowel sounds are present   Extremities: no cyanosis or clubbing   Skin: no xanthelasma, warm.    Neurologic: normal  bilateral, no tremors     Psychiatric: alert and oriented x3, calm        Please refer above for cardiac ROS details.        Medical History  Surgical History Family History Social History   Past Medical History:   Diagnosis Date    Depression 12/24/2014    Essential hypertension     Infection due to 2019 novel coronavirus 01/11/2022    Multiple thyroid nodules 09/08/2016    Type 2 diabetes mellitus without complication (H) 11/07/2016     Past Surgical History:   Procedure Laterality Date    BIOPSY  9/18/16    CARPAL TUNNEL RELEASE RT/LT Right     COLONOSCOPY  6/15/18    COMBINED CYSTOSCOPY, RETROGRADES, URETEROSCOPY, LASER HOLMIUM LITHOTRIPSY URETER(S), INSERT STENT Bilateral 7/26/2023    Procedure: cystoscopy, bilateral ureteroscopy with holmium laser lithotripsy, bilateral ureteroscopy with stone basketing, bilateral retrograde pyelogram, bilateral ureteral stent placement;  Surgeon: Mark Orta MD;  Location: [facility] OR     REPAIR ROTATOR CUFF,ACUTE      Description: Rotator Cuff Repair Acute;  Recorded: 01/13/2010;  Comments: cortisone    PICC  11/30/2015         REMOVAL OF SPERM DUCT(S)      Description: Surgery Of Male Genitalia Vasectomy;  Recorded: 01/13/2010;    WISDOM TOOTH EXTRACTION       Family History   Problem Relation Age of Onset    Heart Disease Mother     Kidney Disease Mother     Thyroid Cancer Father     Kidney Disease Father     Heart Disease Father     Hypertension Father     Thyroid Disease Father         cancer    Cancer Father         myoleodysplansic syndrome    No Known Problems Sister     Hypertension Sister     Hypertension Brother     Heart Disease Paternal Grandmother     Depression Daughter     Anxiety  Disorder Daughter     Autism Spectrum Disorder Son         Social History     Socioeconomic History    Marital status:      Spouse name: Not on file    Number of children: Not on file    Years of education: Not on file    Highest education level: Not on file   Occupational History    Not on file   Tobacco Use    Smoking status: Former     Types: Dip, chew, snus or snuff     Passive exposure: Never    Smokeless tobacco: Former     Types: Chew     Quit date: 12/24/2004   Vaping Use    Vaping Use: Never used   Substance and Sexual Activity    Alcohol use: Yes     Comment: Alcoholic Drinks/day: 1 every 2 weeks    Drug use: No    Sexual activity: Yes     Partners: Female     Birth control/protection: None   Other Topics Concern    Not on file   Social History Narrative    Not on file     Social Determinants of Health     Financial Resource Strain: Not on file   Food Insecurity: Not on file   Transportation Needs: Not on file   Physical Activity: Not on file   Stress: Not on file   Social Connections: Not on file   Intimate Partner Violence: Not on file   Housing Stability: Not on file           Medications  Allergies   Current Outpatient Medications   Medication Sig Dispense Refill    ACCU-CHEK FASTCLIX LANCET DRUM [ACCU-CHEK FASTCLIX LANCET DRUM] USE TO TEST BLOOD SUGARS 2 TO 3 TIMES A  each 4    apixaban ANTICOAGULANT (ELIQUIS) 5 MG tablet Take 1 tablet (5 mg) by mouth 2 times daily for 60 days 120 tablet 0    blood glucose test (ACCU-CHEK NATHANIEL PLUS TEST STRP) strips [BLOOD GLUCOSE TEST (ACCU-CHEK NATHANIEL PLUS TEST STRP) STRIPS] Use 1 each As Directed 3 (three) times a day. 300 each 3    diltiazem ER COATED BEADS (CARDIZEM CD/CARTIA XT) 180 MG 24 hr capsule Take 1 capsule (180 mg) by mouth daily 60 capsule 1    lisinopril (ZESTRIL) 5 MG tablet Take 1 tablet (5 mg) by mouth daily 30 tablet 1    metFORMIN (GLUCOPHAGE XR) 500 MG 24 hr tablet Take 2 tablets (1,000 mg) by mouth 2 times daily (with meals) 360  tablet 3    rosuvastatin (CRESTOR) 10 MG tablet TAKE 1 TABLET(10 MG) BY MOUTH AT BEDTIME Strength: 10 mg 90 tablet 3    Semaglutide, 1 MG/DOSE, (OZEMPIC, 1 MG/DOSE,) 4 MG/3ML pen Inject 1 mg Subcutaneous once a week Every Friday or Saturday      spironolactone (ALDACTONE) 25 MG tablet Take 0.5 tablets (12.5 mg) by mouth daily 30 tablet     tadalafil (CIALIS) 10 MG tablet Take 1 tablet (10 mg) by mouth daily as needed (erectile dsyfunction) 10 mg as a single dose 30 minutes prior to anticipated sexual activity; do not take more than once daily.  Adjust dose based on effectiveness and tolerability; may decrease to 5 mg or increase to 20 mg per dose. 30 tablet 3    acetaminophen (TYLENOL) 500 MG tablet Take 2 tablets (1,000 mg) by mouth every 6 hours as needed for mild pain (Patient not taking: Reported on 8/8/2023) 100 tablet 0    fexofenadine (ALLEGRA) 180 MG tablet Take 1 tablet (180 mg) by mouth daily as needed for allergies (Patient not taking: Reported on 8/8/2023)       No Known Allergies       Lab Results    Chemistry/lipid CBC Cardiac Enzymes/BNP/TSH/INR   Recent Labs   Lab Test 08/18/23  0857   CHOL 109   HDL 33*   LDL 52   TRIG 118     Recent Labs   Lab Test 08/18/23  0857 11/29/22  0800 12/24/21  0742   LDL 52 41 56     Recent Labs   Lab Test 08/18/23  0857      POTASSIUM 3.9   CHLORIDE 109*   CO2 23   *   BUN 7.9*   CR 0.87   GFRESTIMATED >90   LEV 9.4     Recent Labs   Lab Test 08/18/23  0857 08/15/23  1600 08/08/23  1500   CR 0.87 0.89 1.01     Recent Labs   Lab Test 08/18/23  0857 11/29/22  0800 12/24/21  0742   A1C 5.3 5.0 6.9*          Recent Labs   Lab Test 08/15/23  1600   WBC 6.7   HGB 10.9*   HCT 34.2*   MCV 95        Recent Labs   Lab Test 08/15/23  1600 08/08/23  1500 08/03/23  0417   HGB 10.9* 11.3* 9.4*    Recent Labs   Lab Test 07/30/23  0549 07/29/23 2014   TROPONINI 0.08 0.01     Recent Labs   Lab Test 07/30/23  0549   *     Recent Labs   Lab Test 12/24/21  0742    TSH 0.73     No results for input(s): INR in the last 77752 hours.     Kenny Chaves MD

## 2023-09-06 NOTE — LETTER
9/6/2023    Lachelle Summers, APRN CNP  [address] [facility] [address]    RE: Govind Alexandre       Dear Colleague,     I had the pleasure of seeing Govind Alexandre in the [facility].    HEART CARE ENCOUNTER CONSULTATON NOTE      [facility]  [phone]      Assessment/Recommendations   Assessment/Plan:    Govind Alexandre is a very pleasant 57 year old male with PMH of atrial fibrillation, hypertension, hyperlipidemia, type 2 diabetes, kidney stones s/p bilateral ureteroscopy, stone removal and bilateral ureteral stents who presents today to the EP clinic.    Paroxysmal atrial fibrillation  - We reviewed the pathophysiology of atrial fibrillation and management considerations including stroke risk and anticoagulation, rate control, cardioversion, antiarrhythmic drug therapy, and catheter ablation. We discussed atrial fibrillation ablation procedures, anticipated success rates, the potential need for re-do ablation vs addition of anti-arrhythmic drugs, procedural risks (including groin bleeding, tamponade, phrenic or esophageal injury, stroke, pulmonary vein stenosis) and recovery expectations.  - continue Diltiazem , reduce dose to 180 mg given orthostatic symptoms  - he will think about an ablation, we will call him in 2 weeks  - we discussed the ongoing importance of lifestyle modification (maintaining a healthy weight, sleep apnea diagnosis and management, alcohol avoidance) as part of a long term strategy for atrial fibrillation management    2. Anticoagulation  - CHADSVASC score 2  - continue Eliquis    3. Dyspnea on exertion  - Has HTN, Obesity, DM and significant cardiac history in family  - will get a stress test.    Time spent: 60 minutes spent on the date of the encounter doing chart review, history and exam, documentation and further activities as noted above.         History of Present Illness/Subjective    HPI: Govind Alexandre is a very pleasant 57 year old  male with PMH of atrial fibrillation, hypertension, hyperlipidemia, type 2 diabetes, kidney stones s/p bilateral ureteroscopy, stone removal and bilateral ureteral stents who presents today to the EP clinic.    Govind was first diagnosed with AF about 6-7 years ago in the setting of a TBI. More recently he was told again that he was in AF when he was in the hospital for sepsis. Perception of AF has been tricky in him given his recent hospitalization. He has noted having episodes of AF on his apple watch.    He has been compliant with his meds since discharge. He does have orthostatic symptoms.    Recent Echocardiogram Results (personally reviewed):    July 2023    Interpretation Summary     Normal left ventricular size. Moderate concentric left ventricular hypertrophy  with sigmoid shaped septum. Visually estimated ejection fraction of 55 to 60%  without observed wall motion abnormality.  Right ventricle is mildly dilated. Right ventricular systolic function appears  grossly normal.  Mild biatrial enlargement.  Mild mitral regurgitation.  Mild tricuspid regurgitation. Unable to estimate right ventricular systolic  function secondary to incomplete tricuspid regurgitation velocity envelope.  Mild enlargement of the proximal ascending aorta.  Underlying rhythm is atrial fibrillation.      Labs below reviewed personally     Physical Examination  Review of Systems   Vitals: /62 (BP Location: Right arm, Patient Position: Sitting, Cuff Size: Adult Large)   Pulse 86   Resp 18   Wt 120.7 kg (266 lb)   SpO2 94%   BMI 36.08 kg/m    BMI= Body mass index is 36.08 kg/m .  Wt Readings from Last 3 Encounters:   09/06/23 120.7 kg (266 lb)   08/22/23 123.8 kg (273 lb)   08/21/23 123.3 kg (271 lb 12.8 oz)       General Appearance:   no distress, normal body habitus   ENT/Mouth: membranes moist, no oral lesions or bleeding gums.      EYES:  no scleral icterus, normal conjunctivae   Neck: no carotid bruits or thyromegaly    Chest/Lungs:   lungs are clear to auscultation, no rales or wheezing, no sternal scar, equal chest wall expansion    Cardiovascular:   Regular. Normal first and second heart sounds with no murmurs, rubs, or gallops; the carotid, radial and posterior tibial pulses are intact, no edema bilaterally    Abdomen:  no organomegaly, masses, bruits, or tenderness; bowel sounds are present   Extremities: no cyanosis or clubbing   Skin: no xanthelasma, warm.    Neurologic: normal  bilateral, no tremors     Psychiatric: alert and oriented x3, calm        Please refer above for cardiac ROS details.        Medical History  Surgical History Family History Social History   Past Medical History:   Diagnosis Date    Depression 12/24/2014    Essential hypertension     Infection due to 2019 novel coronavirus 01/11/2022    Multiple thyroid nodules 09/08/2016    Type 2 diabetes mellitus without complication (H) 11/07/2016     Past Surgical History:   Procedure Laterality Date    BIOPSY  9/18/16    CARPAL TUNNEL RELEASE RT/LT Right     COLONOSCOPY  6/15/18    COMBINED CYSTOSCOPY, RETROGRADES, URETEROSCOPY, LASER HOLMIUM LITHOTRIPSY URETER(S), INSERT STENT Bilateral 7/26/2023    Procedure: cystoscopy, bilateral ureteroscopy with holmium laser lithotripsy, bilateral ureteroscopy with stone basketing, bilateral retrograde pyelogram, bilateral ureteral stent placement;  Surgeon: Mark Orta MD;  Location:  OR     REPAIR ROTATOR CUFF,ACUTE      Description: Rotator Cuff Repair Acute;  Recorded: 01/13/2010;  Comments: cortisone    PICC  11/30/2015         REMOVAL OF SPERM DUCT(S)      Description: Surgery Of Male Genitalia Vasectomy;  Recorded: 01/13/2010;    WISDOM TOOTH EXTRACTION       Family History   Problem Relation Age of Onset    Heart Disease Mother     Kidney Disease Mother     Thyroid Cancer Father     Kidney Disease Father     Heart Disease Father     Hypertension Father     Thyroid Disease Father         cancer     Cancer Father         myoleodysplansic syndrome    No Known Problems Sister     Hypertension Sister     Hypertension Brother     Heart Disease Paternal Grandmother     Depression Daughter     Anxiety Disorder Daughter     Autism Spectrum Disorder Son         Social History     Socioeconomic History    Marital status:      Spouse name: Not on file    Number of children: Not on file    Years of education: Not on file    Highest education level: Not on file   Occupational History    Not on file   Tobacco Use    Smoking status: Former     Types: Dip, chew, snus or snuff     Passive exposure: Never    Smokeless tobacco: Former     Types: Chew     Quit date: 12/24/2004   Vaping Use    Vaping Use: Never used   Substance and Sexual Activity    Alcohol use: Yes     Comment: Alcoholic Drinks/day: 1 every 2 weeks    Drug use: No    Sexual activity: Yes     Partners: Female     Birth control/protection: None   Other Topics Concern    Not on file   Social History Narrative    Not on file     Social Determinants of Health     Financial Resource Strain: Not on file   Food Insecurity: Not on file   Transportation Needs: Not on file   Physical Activity: Not on file   Stress: Not on file   Social Connections: Not on file   Intimate Partner Violence: Not on file   Housing Stability: Not on file           Medications  Allergies   Current Outpatient Medications   Medication Sig Dispense Refill    ACCU-CHEK FASTCLIX LANCET DRUM [ACCU-CHEK FASTCLIX LANCET DRUM] USE TO TEST BLOOD SUGARS 2 TO 3 TIMES A  each 4    apixaban ANTICOAGULANT (ELIQUIS) 5 MG tablet Take 1 tablet (5 mg) by mouth 2 times daily for 60 days 120 tablet 0    blood glucose test (ACCU-CHEK NATHANIEL PLUS TEST STRP) strips [BLOOD GLUCOSE TEST (ACCU-CHEK NATHANIEL PLUS TEST STRP) STRIPS] Use 1 each As Directed 3 (three) times a day. 300 each 3    diltiazem ER COATED BEADS (CARDIZEM CD/CARTIA XT) 180 MG 24 hr capsule Take 1 capsule (180 mg) by mouth daily 60 capsule 1     lisinopril (ZESTRIL) 5 MG tablet Take 1 tablet (5 mg) by mouth daily 30 tablet 1    metFORMIN (GLUCOPHAGE XR) 500 MG 24 hr tablet Take 2 tablets (1,000 mg) by mouth 2 times daily (with meals) 360 tablet 3    rosuvastatin (CRESTOR) 10 MG tablet TAKE 1 TABLET(10 MG) BY MOUTH AT BEDTIME Strength: 10 mg 90 tablet 3    Semaglutide, 1 MG/DOSE, (OZEMPIC, 1 MG/DOSE,) 4 MG/3ML pen Inject 1 mg Subcutaneous once a week Every Friday or Saturday      spironolactone (ALDACTONE) 25 MG tablet Take 0.5 tablets (12.5 mg) by mouth daily 30 tablet     tadalafil (CIALIS) 10 MG tablet Take 1 tablet (10 mg) by mouth daily as needed (erectile dsyfunction) 10 mg as a single dose 30 minutes prior to anticipated sexual activity; do not take more than once daily.  Adjust dose based on effectiveness and tolerability; may decrease to 5 mg or increase to 20 mg per dose. 30 tablet 3    acetaminophen (TYLENOL) 500 MG tablet Take 2 tablets (1,000 mg) by mouth every 6 hours as needed for mild pain (Patient not taking: Reported on 8/8/2023) 100 tablet 0    fexofenadine (ALLEGRA) 180 MG tablet Take 1 tablet (180 mg) by mouth daily as needed for allergies (Patient not taking: Reported on 8/8/2023)       No Known Allergies       Lab Results    Chemistry/lipid CBC Cardiac Enzymes/BNP/TSH/INR   Recent Labs   Lab Test 08/18/23  0857   CHOL 109   HDL 33*   LDL 52   TRIG 118     Recent Labs   Lab Test 08/18/23  0857 11/29/22  0800 12/24/21  0742   LDL 52 41 56     Recent Labs   Lab Test 08/18/23  0857      POTASSIUM 3.9   CHLORIDE 109*   CO2 23   *   BUN 7.9*   CR 0.87   GFRESTIMATED >90   LEV 9.4     Recent Labs   Lab Test 08/18/23  0857 08/15/23  1600 08/08/23  1500   CR 0.87 0.89 1.01     Recent Labs   Lab Test 08/18/23  0857 11/29/22  0800 12/24/21  0742   A1C 5.3 5.0 6.9*          Recent Labs   Lab Test 08/15/23  1600   WBC 6.7   HGB 10.9*   HCT 34.2*   MCV 95        Recent Labs   Lab Test 08/15/23  1600 08/08/23  1500  08/03/23  0417   HGB 10.9* 11.3* 9.4*    Recent Labs   Lab Test 07/30/23  0549 07/29/23 2014   TROPONINI 0.08 0.01     Recent Labs   Lab Test 07/30/23  0549   *     Recent Labs   Lab Test 12/24/21  0742   TSH 0.73     No results for input(s): INR in the last 82517 hours.     Kenny Chaves MD      Thank you for allowing me to participate in the care of your patient.      Sincerely,     Kenny Chaves MD     [facility]  cc:   Lachelle Summers, APRN CNP  [address] [id number]

## 2023-09-06 NOTE — TELEPHONE ENCOUNTER
----- Message from Kenny Chaves MD sent at 9/6/2023 11:54 AM CDT -----  Can we please call Govind in 2 weeks to check if he would like to proceed with an ablation?    I also ordered a stress test for him in  the interim.

## 2023-09-06 NOTE — PATIENT INSTRUCTIONS
[facility]  Cardiac Electrophysiology  [address]  [place] [address]   Office: [phone]  Fax: [phone]       Thank you for seeing us in clinic today - it is a pleasure to be a part of your care team.  Below is a summary of our plan from today's visit.       You have paroxysmal atrial fibrillation, presently being managed with Diltiazem  We reviewed physiology and management options including antiarrhythmic drug therapy, catheter ablation and lifestyle modification.  We will plan for the following:  - consider options further, and let us know with any questions or decision as to how you would like to proceed  - continue Diltiazem for now  - continue Eliquis.  - will get a stress test     Please do not hesitate to be in touch with our office at [phone] with any questions that may arise.       Thank you for trusting us with your care,    Kenny Chaves MD  Clinical Cardiac Electrophysiology  [facility]  [address]  [place] [address]   Office: [phone]  Fax: [phone]                 ATRIAL FIBRILLATION: Patient Information     What is atrial fibrillation?  Atrial fibrillation (AF, A-fib) is a common heart rhythm problem (arrhythmia) occurring within the upper chambers of the heart (the atria).  In normal rhythm, the upper and lower chambers of the heart are electrically driven to contract in a coordinated sequence.  In atrial fibrillation, the atria lose their ability to contract because of rapid and chaotic electrical activity.  The lower chambers of the heart (the ventricles) continue to pump blood throughout the body, though with irregular and often faster rate due to the chaotic activity within the atria.          How do I know if I have atrial fibrillation?   Some people may feel their heart beating faster, harder, or irregularly while in atrial fibrillation.  Others may be lightheaded, fatigued, feel weak  or tired or become more short of breath especially with activities.  Some patients have no symptoms at all.  Atrial fibrillation may be found due to an irregular pulse or on an electrocardiogram (ECG). Atrial fibrillation can start and stop on its own, and episodes can last from seconds to several months.       How common is atrial fibrillation?   An estimated 3-6 million people in the [place] have atrial fibrillation.  Atrial fibrillation is a common heart rhythm problem for older persons, affecting as estimated 12-15% of people over the age of 65 years of age.     What causes atrial fibrillation?   Age is the most important risk factor for atrial fibrillation.  Atrial fibrillation is more common in people with other heart disease, high blood pressure, diabetes, obesity, sleep apnea and in people who regularly consume alcohol.  Surgery, lung disease, or thyroid problems can lead to atrial fibrillation.  Atrial fibrillation has multiple possible causes, and in most cases a single cause cannot be found.  Atrial fibrillation is a progressive condition, usually starting with at an early stage with short and infrequent episodes.  In later stages of disease, more frequent and longer lasting episodes of atrial fibrillation occur, ultimately culminating in episodes which do not spontaneously terminate.  Generally, more enlargement and scarring within the upper chambers of the heart is observed as atrial fibrillation progresses from early to late-stage disease.     How is atrial fibrillation diagnosed and evaluated?    Because of its start-stop nature, atrial fibrillation can be challenging to diagnose.  Atrial fibrillation is most commonly diagnosed via cardiac rhythm recordings - either an ECG or wearable cardiac rhythm monitor.  For patients with pacemakers, defibrillators or implantable loop recorders, atrial fibrillation may be recorded via these devices.  Recently, commercially available devices (eg. Apple  Watch, Kardia device, certain FitBit devices, others) can allow patients to take 30 second cardiac rhythm recordings which may document atrial fibrillation.  Once atrial fibrillation is diagnosed, additional tests include blood tests and an echocardiogram.  The echocardiogram uses ultrasound to look at your heart to assess your cardiac function and evaluate for other heart disease.  Additional evaluation may include CT or MRI studies.     Is atrial fibrillation dangerous?   Atrial fibrillation is not usually a life-threatening arrhythmia.  The most serious consequences of atrial fibrillation including stroke and worsening of overall cardiac function.  While in atrial fibrillation, the upper cardiac chambers do not contract normally, resulting in slower blood flow and increased risk of clot formation.  If this blood clot becomes detached from the heart a stroke can occur.  Unfortunately, stroke can be the first sign of atrial fibrillation for some people.  With a stroke, you may notice abnormal sensation, weakness on one side of the body or face, changes in your vision or speech.  If you have any of these signs, you should contact EMS and be evaluated in an emergency room as soon as possible.       How is atrial fibrillation treated?     Several treatment options exist for suppressing atrial fibrillation - however, it is not an easily curable arrhythmia.  The first goal in managing atrial fibrillation is to minimize stroke risk.  The second goal is to improve symptoms associated with atrial fibrillation.  Finally, in patients with reduced cardiac function, maintaining normal rhythm can help improve cardiac function.       Blood thinners are used to reduce the risk of stroke in patients with high estimated stroke risk related to atrial fibrillation.  For patients at higher risk of bleeding related to blood thinner use, implantable devices may be an option to reduce stroke risk without the need for long term blood  thinner use.       Atrial fibrillation can be managed via two strategies: rate control and rhythm control.  In a rate control strategy, continued atrial fibrillation is accepted and medications (eg. beta-blockers or calcium channel blockers) are used to control the lower chamber rate.  In a rhythm control strategy, anti-arrhythmic medications or catheter ablation are used to maintain normal cardiac rhythm and slow disease progression by suppressing atrial fibrillation.  A procedure called a cardioversion, in which an electric shock is delivered through patches placed on the chest wall while under deep sedation, can be performed to temporarily restore normal cardiac rhythm, though does not address the chance of atrial fibrillation recurrence.  Treatments are more effective for earlier rather than later stage atrial fibrillation.  Lifestyle modifications (maintaining a healthy weight, aerobic exercise, diagnosing and treating sleep apnea, and minimizing alcohol intake) are important elements of atrial fibrillation rhythm control.      What is catheter ablation for atrial fibrillation?  Cardiac catheter ablation is a commonly performed, minimally invasive procedure performed by a cardiac electrophysiologist to treat many different cardiac rhythm abnormalities.  During catheter ablation, long, thin, flexible tubes are advanced into the heart via small sheaths inserted into the femoral veins and thermal energy (either heating or cooling) is applied within the heart to disrupt abnormal electrical activity.  Atrial fibrillation ablation is performed under general anesthesia, with procedures generally taking approximately 2-3 hours.  Patients are typically observed for 3-5 hours after the ablation, and in most cases can be discharged home the same day.  Atrial fibrillation ablation is associated with better outcomes (mortality, cardiovascular hospitalizations, atrial arrhythmia recurrences) compared to antiarrhythmic drug  therapy.  However, atrial fibrillation recurrences are not uncommon, and repeat catheter ablation may be offered.  Your electrophysiology team can review atrial fibrillation ablation, anticipated success rates, risks, and recovery expectations with you.     What are anti-arrhythmic medications?  Anti-arrhythmic medications are specialized drugs which alter cardiac electrical functioning to suppress arrhythmias.  There are several anti-arrhythmic medications available, each with its own success rate and side effects.  Some anti-arrhythmic medications are less effective though safer to use, others are more effective though have serious potential toxicities.  Atrial fibrillation recurrences are common and may require dose adjustment or change in antiarrhythmic therapy.  Your electrophysiology team will carefully consider which medication would be the best and safest for your particular case.       Can I live a normal life?    The goal of atrial fibrillation management is for patients to live normal lives without being limited by symptoms related to atrial fibrillation.     Are any additional educational resources available?  There are a number of excellent atrial fibrillation education resources available to you online.  A few options you may wish to review include:  hrsonline.org/guide-atrial-fibrillation  afibmatters.org  getsmartaboutafib.com  stopaf.com     What comes next?    Consider your management options and let us know how we can help in your decision process.  Please take medications as they have been prescribed.  You should also get any tests that may have been ordered for you.       When to Call Your Doctor or seek emergency care:  Call your doctor or seek emergency care if you have any significant changes with the following:  Weakness  Dizziness  Fainting  Fatigue  Shortness of breath  Chest pain with increased activity  If you are concerned that your heart rate is too fast or too slow  Bleeding that does  not stop in 10 minutes  Coughing or throwing up blood  Bloody diarrhea or bleeding hemorrhoids  Dark-colored urine or black stool  Allergic reactions:  Rash  Itching  Swelling  Trouble breathing or swallowing        Please call the [facility] at [phone] if you have concerns about your symptoms, your medicines, or your follow-up appointments.

## 2023-09-07 DIAGNOSIS — Z79.01 CHRONIC ANTICOAGULATION: Chronic | ICD-10-CM

## 2023-09-07 DIAGNOSIS — I48.91 ATRIAL FIBRILLATION WITH RVR (H): ICD-10-CM

## 2023-09-07 RX ORDER — DILTIAZEM HYDROCHLORIDE 180 MG/1
180 CAPSULE, COATED, EXTENDED RELEASE ORAL DAILY
Qty: 90 CAPSULE | Refills: 3 | Status: SHIPPED | OUTPATIENT
Start: 2023-09-07 | End: 2024-02-01

## 2023-09-08 LAB
ATRIAL RATE - MUSE: 78 BPM
DIASTOLIC BLOOD PRESSURE - MUSE: NORMAL MMHG
INTERPRETATION ECG - MUSE: NORMAL
P AXIS - MUSE: 21 DEGREES
PR INTERVAL - MUSE: 194 MS
QRS DURATION - MUSE: 102 MS
QT - MUSE: 370 MS
QTC - MUSE: 421 MS
R AXIS - MUSE: 40 DEGREES
SYSTOLIC BLOOD PRESSURE - MUSE: NORMAL MMHG
T AXIS - MUSE: 2 DEGREES
VENTRICULAR RATE- MUSE: 78 BPM

## 2023-09-12 ENCOUNTER — OFFICE VISIT (OUTPATIENT)
Dept: FAMILY MEDICINE | Facility: CLINIC | Age: 58
End: 2023-09-12
Payer: COMMERCIAL

## 2023-09-12 VITALS
HEART RATE: 81 BPM | SYSTOLIC BLOOD PRESSURE: 133 MMHG | TEMPERATURE: 98.5 F | HEIGHT: 72 IN | BODY MASS INDEX: 36.3 KG/M2 | OXYGEN SATURATION: 98 % | DIASTOLIC BLOOD PRESSURE: 73 MMHG | RESPIRATION RATE: 14 BRPM | WEIGHT: 268 LBS

## 2023-09-12 DIAGNOSIS — E11.29 TYPE 2 DIABETES MELLITUS WITH MICROALBUMINURIA, WITHOUT LONG-TERM CURRENT USE OF INSULIN (H): ICD-10-CM

## 2023-09-12 DIAGNOSIS — R80.9 TYPE 2 DIABETES MELLITUS WITH MICROALBUMINURIA, WITHOUT LONG-TERM CURRENT USE OF INSULIN (H): ICD-10-CM

## 2023-09-12 DIAGNOSIS — R11.0 NAUSEA: ICD-10-CM

## 2023-09-12 DIAGNOSIS — E11.9 TYPE 2 DIABETES MELLITUS WITHOUT COMPLICATION, WITHOUT LONG-TERM CURRENT USE OF INSULIN (H): Primary | ICD-10-CM

## 2023-09-12 PROCEDURE — 99207 PR FOOT EXAM NO CHARGE: CPT | Performed by: NURSE PRACTITIONER

## 2023-09-12 PROCEDURE — 99213 OFFICE O/P EST LOW 20 MIN: CPT | Performed by: NURSE PRACTITIONER

## 2023-09-12 RX ORDER — ONDANSETRON 4 MG/1
4 TABLET, ORALLY DISINTEGRATING ORAL EVERY 8 HOURS PRN
Qty: 20 TABLET | Refills: 1 | Status: SHIPPED | OUTPATIENT
Start: 2023-09-12 | End: 2023-12-22

## 2023-09-12 ASSESSMENT — ENCOUNTER SYMPTOMS
DIARRHEA: 1
NAUSEA: 1

## 2023-09-12 NOTE — PROGRESS NOTES
Assessment & Plan     Type 2 diabetes mellitus without complication, without long-term current use of insulin (H)    - FOOT EXAM    Nausea    - ondansetron (ZOFRAN ODT) 4 MG ODT tab  Dispense: 20 tablet; Refill: 1    Type 2 diabetes mellitus with microalbuminuria, without long-term current use of insulin (H)    -Your has been helping his symptoms for nausea and diarrhea.  He is currently not experience any symptoms today.  He can continue to eat yogurt every day.  I suggest a low sugar yogurt such as Activia.  As for his trip, he can start a probiotic orally.   -Intermittent nausea has not been a problem, but I refilled his Zofran just in case he gets nausea on his trip.   -He can use Pepto-Bismol if needed.                    CECILY Lo CNP  [facility]   Govind is a 57 year old, presenting for the following health issues:  Gastrointestinal Problem (GI issue and help moving forward after use of antibiotics ), Diarrhea, Nausea, and Recheck Medication      9/12/2023    10:01 AM   Additional Questions   Roomed by Memo     -He was hospitalized for sepsis.  He has been noticing that sometimes he will have nausea and diarrhea.  This will improve with the digestion aid of yogurt.  If he does not take yogurt on a daily basis, he will notice some loose stools.  He will be leaving for a  trip in 1 month and he would like to get a refill of his antinausea medication.  This medication he would just like on hand just in case he develops nausea.  He really has been experiencing too many episodes of nausea, and he denied any vomiting or blood in the stools.   -He had a foot exam at last visit and keeps getting a notification that is due in my chart.  He denied any open sores to his feet.  He denied any numbness and tingling to his feet or lower legs.   -He overall has still been feeling healthy since her last visit.     Diarrhea  Associated symptoms include  nausea.   Nausea  Associated symptoms include nausea.   History of Present Illness       CKD: He is not using over the counter pain medicine.     Reason for visit:  Stomach distress continues    He eats 2-3 servings of fruits and vegetables daily.He consumes 1 sweetened beverage(s) daily.He exercises with enough effort to increase his heart rate 10 to 19 minutes per day.  He exercises with enough effort to increase his heart rate 4 days per week.   He is taking medications regularly.               Review of Systems   Gastrointestinal:  Positive for diarrhea and nausea.            Objective    /73   Pulse 81   Temp 98.5  F (36.9  C) (Oral)   Resp 14   Ht 1.829 m (6')   Wt 121.6 kg (268 lb)   SpO2 98%   BMI 36.35 kg/m    Body mass index is 36.35 kg/m .  Physical Exam   GENERAL: healthy, alert and no distress  MS: no gross musculoskeletal defects noted, no edema  SKIN: no suspicious lesions or rashes  NEURO: Normal strength and tone, mentation intact and speech normal  Diabetic foot exam: no trophic changes or ulcerative lesions and normal sensory exam    Office Visit on 09/06/2023   Component Date Value Ref Range Status    Ventricular Rate 09/06/2023 78  BPM Final    Atrial Rate 09/06/2023 78  BPM Final    MN Interval 09/06/2023 194  ms Final    QRS Duration 09/06/2023 102  ms Final    QT 09/06/2023 370  ms Final    QTc 09/06/2023 421  ms Final    P Axis 09/06/2023 21  degrees Final    R AXIS 09/06/2023 40  degrees Final    T [place] 09/06/2023 2  degrees Final    Interpretation ECG 09/06/2023    Final                    Value:Sinus rhythm  Minimal voltage criteria for LVH, may be normal variant ( Bertin product )  Possible Inferior infarct , age undetermined  Abnormal ECG  When compared with ECG of 29-JUL-2023 23:07,  Sinus rhythm has replaced Atrial fibrillation  Vent. rate has decreased BY  38 BPM  Borderline criteria for Inferior infarct are now Present  Confirmed by DINAH MADRIGAL, JIMENA LOC: ([id number]) on  9/8/2023 8:08:15 AM

## 2023-09-15 ENCOUNTER — MYC REFILL (OUTPATIENT)
Dept: FAMILY MEDICINE | Facility: CLINIC | Age: 58
End: 2023-09-15
Payer: COMMERCIAL

## 2023-09-15 DIAGNOSIS — E11.9 TYPE 2 DIABETES MELLITUS WITHOUT COMPLICATION, WITHOUT LONG-TERM CURRENT USE OF INSULIN (H): ICD-10-CM

## 2023-09-15 RX ORDER — SEMAGLUTIDE 1.34 MG/ML
1 INJECTION, SOLUTION SUBCUTANEOUS WEEKLY
Qty: 3 ML | Refills: 0 | Status: SHIPPED | OUTPATIENT
Start: 2023-09-15 | End: 2023-10-10

## 2023-09-18 ENCOUNTER — HOSPITAL ENCOUNTER (OUTPATIENT)
Dept: NUCLEAR MEDICINE | Facility: CLINIC | Age: 58
Discharge: HOME | End: 2023-09-18
Attending: INTERNAL MEDICINE
Payer: COMMERCIAL

## 2023-09-18 ENCOUNTER — HOSPITAL ENCOUNTER (OUTPATIENT)
Dept: CARDIOLOGY | Facility: CLINIC | Age: 58
Discharge: HOME | End: 2023-09-18
Attending: INTERNAL MEDICINE
Payer: COMMERCIAL

## 2023-09-18 DIAGNOSIS — R06.09 DYSPNEA ON EXERTION: ICD-10-CM

## 2023-09-18 LAB
CV STRESS CURRENT BP HE: NORMAL
CV STRESS CURRENT HR HE: 103
CV STRESS CURRENT HR HE: 103
CV STRESS CURRENT HR HE: 105
CV STRESS CURRENT HR HE: 106
CV STRESS CURRENT HR HE: 78
CV STRESS CURRENT HR HE: 80
CV STRESS CURRENT HR HE: 84
CV STRESS CURRENT HR HE: 90
CV STRESS CURRENT HR HE: 90
CV STRESS CURRENT HR HE: 91
CV STRESS CURRENT HR HE: 91
CV STRESS CURRENT HR HE: 94
CV STRESS CURRENT HR HE: 94
CV STRESS CURRENT HR HE: 95
CV STRESS CURRENT HR HE: 99
CV STRESS CURRENT HR HE: 99
CV STRESS DEVIATION TIME HE: NORMAL
CV STRESS ECHO PERCENT HR HE: NORMAL
CV STRESS EXERCISE STAGE HE: NORMAL
CV STRESS FINAL RESTING BP HE: NORMAL
CV STRESS FINAL RESTING HR HE: 90
CV STRESS MAX HR HE: 106
CV STRESS MAX TREADMILL GRADE HE: 0
CV STRESS MAX TREADMILL SPEED HE: 0
CV STRESS PEAK DIA BP HE: NORMAL
CV STRESS PEAK SYS BP HE: NORMAL
CV STRESS PHASE HE: NORMAL
CV STRESS PROTOCOL HE: NORMAL
CV STRESS RESTING PT POSITION HE: NORMAL
CV STRESS ST DEVIATION AMOUNT HE: NORMAL
CV STRESS ST DEVIATION ELEVATION HE: NORMAL
CV STRESS ST EVELATION AMOUNT HE: NORMAL
CV STRESS TEST TYPE HE: NORMAL
CV STRESS TOTAL STAGE TIME MIN 1 HE: NORMAL
NUC STRESS EJECTION FRACTION: 58 %
RATE PRESSURE PRODUCT: NORMAL
STRESS ECHO BASELINE DIASTOLIC HE: 68
STRESS ECHO BASELINE HR: 86
STRESS ECHO BASELINE SYSTOLIC BP: 116
STRESS ECHO CALCULATED PERCENT HR: 66 %
STRESS ECHO LAST STRESS DIASTOLIC BP: 82
STRESS ECHO LAST STRESS HR: 99
STRESS ECHO LAST STRESS SYSTOLIC BP: 133
STRESS ECHO TARGET HR: 161

## 2023-09-18 PROCEDURE — 93016 CV STRESS TEST SUPVJ ONLY: CPT | Performed by: INTERNAL MEDICINE

## 2023-09-18 PROCEDURE — 93018 CV STRESS TEST I&R ONLY: CPT | Performed by: GENERAL ACUTE CARE HOSPITAL

## 2023-09-18 PROCEDURE — A9500 TC99M SESTAMIBI: HCPCS | Performed by: INTERNAL MEDICINE

## 2023-09-18 PROCEDURE — 250N000011 HC RX IP 250 OP 636: Performed by: INTERNAL MEDICINE

## 2023-09-18 PROCEDURE — 93017 CV STRESS TEST TRACING ONLY: CPT

## 2023-09-18 PROCEDURE — 78452 HT MUSCLE IMAGE SPECT MULT: CPT

## 2023-09-18 PROCEDURE — 78452 HT MUSCLE IMAGE SPECT MULT: CPT | Mod: 26 | Performed by: GENERAL ACUTE CARE HOSPITAL

## 2023-09-18 PROCEDURE — 343N000001 HC RX 343: Performed by: INTERNAL MEDICINE

## 2023-09-18 RX ORDER — AMINOPHYLLINE 25 MG/ML
50 INJECTION, SOLUTION INTRAVENOUS
Status: DISCONTINUED | OUTPATIENT
Start: 2023-09-18 | End: 2023-09-18 | Stop reason: HOSPADM

## 2023-09-18 RX ORDER — CAFFEINE 200 MG
200 TABLET ORAL
Status: DISCONTINUED | OUTPATIENT
Start: 2023-09-18 | End: 2023-09-18 | Stop reason: HOSPADM

## 2023-09-18 RX ORDER — ALBUTEROL SULFATE 0.83 MG/ML
2.5 SOLUTION RESPIRATORY (INHALATION)
Status: DISCONTINUED | OUTPATIENT
Start: 2023-09-18 | End: 2023-09-18 | Stop reason: HOSPADM

## 2023-09-18 RX ORDER — REGADENOSON 0.08 MG/ML
0.4 INJECTION, SOLUTION INTRAVENOUS ONCE
Status: COMPLETED | OUTPATIENT
Start: 2023-09-18 | End: 2023-09-18

## 2023-09-18 RX ORDER — NIFEDIPINE 30 MG
30 TABLET, EXTENDED RELEASE ORAL DAILY
COMMUNITY
End: 2023-10-05

## 2023-09-18 RX ORDER — CAFFEINE CITRATE 20 MG/ML
60 SOLUTION INTRAVENOUS
Status: DISCONTINUED | OUTPATIENT
Start: 2023-09-18 | End: 2023-09-18 | Stop reason: HOSPADM

## 2023-09-18 RX ADMIN — Medication 40.3 MILLICURIE: at 09:03

## 2023-09-18 RX ADMIN — REGADENOSON 0.4 MG: 0.08 INJECTION, SOLUTION INTRAVENOUS at 08:00

## 2023-09-18 RX ADMIN — Medication 10 MILLICURIE: at 07:13

## 2023-09-28 NOTE — TELEPHONE ENCOUNTER
3rd Attempt to contact pt, voicemail message was left with contact information and instructing pt to call back.  9/28/2023 8:52 AM  Cristina Leon RN

## 2023-09-29 NOTE — TELEPHONE ENCOUNTER
4th Attempt to contact pt, after multiple attempts to contact pt without success or return call back, pt was sent letter via mail/[employer]  9/29/2023 11:46 AM  Cristina Leon RN

## 2023-09-29 NOTE — TELEPHONE ENCOUNTER
Return call from patient.  He states he would like to proceed with PVI with KA but not until after 4-15-23 of next year.  He is an  and would prefer to wait until that time.     Will postpone note to 12-15-23 to prep chart and send orders to scheduling.

## 2023-10-05 ENCOUNTER — MYC REFILL (OUTPATIENT)
Dept: FAMILY MEDICINE | Facility: CLINIC | Age: 58
End: 2023-10-05
Payer: COMMERCIAL

## 2023-10-05 DIAGNOSIS — I10 ESSENTIAL HYPERTENSION: ICD-10-CM

## 2023-10-05 DIAGNOSIS — I10 ESSENTIAL HYPERTENSION: Primary | ICD-10-CM

## 2023-10-06 RX ORDER — NIFEDIPINE 30 MG
30 TABLET, EXTENDED RELEASE ORAL DAILY
Qty: 30 TABLET | Refills: 0 | Status: SHIPPED | OUTPATIENT
Start: 2023-10-06 | End: 2023-12-05

## 2023-10-06 RX ORDER — SPIRONOLACTONE 25 MG/1
12.5 TABLET ORAL DAILY
Qty: 30 TABLET | Refills: 0 | Status: SHIPPED | OUTPATIENT
Start: 2023-10-06 | End: 2023-12-22

## 2023-10-06 NOTE — TELEPHONE ENCOUNTER
Writer reviewed pt's chart.    08/03/2023 Progress note by Dr Weber    Nifedipine ER 60mg 24 hour was held at hospital as medications were titrated for HTN. Med was discontinued at hospital discharge with stable BP's.          Writer was unable to find where nifedipine ER 30 mg was initiated in chart with 09/18/2023 start date.     Tiesha Roman RN

## 2023-10-06 NOTE — TELEPHONE ENCOUNTER
I refilled his Nifidipine but please make sure he is suppose to be on this med. From Discharge notes they stopped his 60 mg dose.     CECILY Lo CNP     .

## 2023-10-10 DIAGNOSIS — E11.9 TYPE 2 DIABETES MELLITUS WITHOUT COMPLICATION, WITHOUT LONG-TERM CURRENT USE OF INSULIN (H): ICD-10-CM

## 2023-10-11 RX ORDER — SEMAGLUTIDE 1.34 MG/ML
1 INJECTION, SOLUTION SUBCUTANEOUS WEEKLY
Qty: 3 ML | Refills: 0 | Status: SHIPPED | OUTPATIENT
Start: 2023-10-11 | End: 2023-12-22

## 2023-10-11 NOTE — TELEPHONE ENCOUNTER
Left voice message requesting patient callback to review medications.  Please see MOHSEN Summers note regarding Nifedipine 30 mg, other medications and how is patient's BP and overall feeling.

## 2023-10-11 NOTE — TELEPHONE ENCOUNTER
I would like patient called and go over his medications. I refilled his Nifedipine 30 mg but not sure if the hospitalist wanted this on refilled.  How is his BP and overall feeling?    Eufemia

## 2023-10-16 ENCOUNTER — TELEPHONE (OUTPATIENT)
Dept: FAMILY MEDICINE | Facility: CLINIC | Age: 58
End: 2023-10-16
Payer: COMMERCIAL

## 2023-10-16 NOTE — TELEPHONE ENCOUNTER
General Call    Contacts         Type Contact Phone/Fax    10/16/2023 10:41 AM CDT Phone (Incoming) Govind Alexandre (Self) [phone] (M)          Reason for Call:  Medication question    What are your questions or concerns:  Pt called and stated that even  though he has a 90 day supply of medication his pharmacy will only fill 30 days at a time (per insurance). Pt states he is going to be out of the [facility] from 09/19-11/07 and he will need more medication while [place] is [place] of the [place]. Pt is wondering if PCP/care team can call the pharmacy and tell them it is OK to fill his medication early due to him traveling out of the country and he will need his medications. He needs early refills on the 2 below medications.    diltiazem ER COATED BEADS (CARDIZEM CD/CARTIA XT) 180 MG 24 hr capsule      apixaban ANTICOAGULANT (ELIQUIS) 5 MG tablet     Date of last appointment with provider: 08/21/2023    Could we send this information to you in [place] or would you prefer to receive a phone call?:   Patient would prefer a phone call     Okay to leave a detailed message?: Yes at Cell number on file:    Telephone Information:   Mobile [phone]     Celsa Ji

## 2023-10-18 NOTE — TELEPHONE ENCOUNTER
Writer called pharmacy relayed pt will be on vacation 10/19/2023-11/07/2023. Pt requesting early refill for apixaban and diltiazem. Tech will have pharmacist call insurance for approval for early fill due to vacation out of the country. [facility] will follow-up with patient.     Tiesha Roman RN

## 2023-10-19 ENCOUNTER — IMMUNIZATION (OUTPATIENT)
Dept: FAMILY MEDICINE | Facility: CLINIC | Age: 58
End: 2023-10-19
Payer: COMMERCIAL

## 2023-10-19 PROCEDURE — 90480 ADMN SARSCOV2 VAC 1/ONLY CMP: CPT

## 2023-10-19 PROCEDURE — 91320 SARSCV2 VAC 30MCG TRS-SUC IM: CPT

## 2023-10-19 PROCEDURE — 90682 RIV4 VACC RECOMBINANT DNA IM: CPT

## 2023-10-19 PROCEDURE — 90471 IMMUNIZATION ADMIN: CPT

## 2023-10-26 NOTE — TELEPHONE ENCOUNTER
MyC message sent to patient requesting nurse only visit for BP check and medication reconciliation.    Sussy PRICE, RN, PHN  [facility]

## 2023-10-30 ENCOUNTER — PATIENT OUTREACH (OUTPATIENT)
Dept: CARE COORDINATION | Facility: CLINIC | Age: 58
End: 2023-10-30
Payer: COMMERCIAL

## 2023-11-13 ENCOUNTER — PATIENT OUTREACH (OUTPATIENT)
Dept: CARE COORDINATION | Facility: CLINIC | Age: 58
End: 2023-11-13
Payer: COMMERCIAL

## 2023-11-22 ENCOUNTER — TRANSFERRED RECORDS (OUTPATIENT)
Dept: HEALTH INFORMATION MANAGEMENT | Facility: CLINIC | Age: 58
End: 2023-11-22
Payer: COMMERCIAL

## 2023-11-22 LAB — RETINOPATHY: NEGATIVE

## 2023-12-01 DIAGNOSIS — E11.29 TYPE 2 DIABETES MELLITUS WITH MICROALBUMINURIA, WITHOUT LONG-TERM CURRENT USE OF INSULIN (H): ICD-10-CM

## 2023-12-01 DIAGNOSIS — R80.9 TYPE 2 DIABETES MELLITUS WITH MICROALBUMINURIA, WITHOUT LONG-TERM CURRENT USE OF INSULIN (H): ICD-10-CM

## 2023-12-01 DIAGNOSIS — E78.00 PURE HYPERCHOLESTEROLEMIA: ICD-10-CM

## 2023-12-04 ENCOUNTER — TELEPHONE (OUTPATIENT)
Dept: FAMILY MEDICINE | Facility: CLINIC | Age: 58
End: 2023-12-04
Payer: COMMERCIAL

## 2023-12-04 ENCOUNTER — HOSPITAL ENCOUNTER (OUTPATIENT)
Dept: RADIOLOGY | Facility: CLINIC | Age: 58
Discharge: HOME | End: 2023-12-04
Attending: STUDENT IN AN ORGANIZED HEALTH CARE EDUCATION/TRAINING PROGRAM
Payer: COMMERCIAL

## 2023-12-04 ENCOUNTER — HOSPITAL ENCOUNTER (OUTPATIENT)
Dept: ULTRASOUND IMAGING | Facility: CLINIC | Age: 58
Discharge: HOME | End: 2023-12-04
Attending: STUDENT IN AN ORGANIZED HEALTH CARE EDUCATION/TRAINING PROGRAM
Payer: COMMERCIAL

## 2023-12-04 DIAGNOSIS — N20.0 KIDNEY STONE: ICD-10-CM

## 2023-12-04 PROCEDURE — 76770 US EXAM ABDO BACK WALL COMP: CPT

## 2023-12-04 PROCEDURE — 74018 RADEX ABDOMEN 1 VIEW: CPT

## 2023-12-05 ENCOUNTER — OFFICE VISIT (OUTPATIENT)
Dept: FAMILY MEDICINE | Facility: CLINIC | Age: 58
End: 2023-12-05
Payer: COMMERCIAL

## 2023-12-05 VITALS
BODY MASS INDEX: 34.54 KG/M2 | HEART RATE: 66 BPM | DIASTOLIC BLOOD PRESSURE: 84 MMHG | HEIGHT: 72 IN | RESPIRATION RATE: 15 BRPM | WEIGHT: 255 LBS | TEMPERATURE: 96.9 F | OXYGEN SATURATION: 99 % | SYSTOLIC BLOOD PRESSURE: 118 MMHG

## 2023-12-05 DIAGNOSIS — I10 ESSENTIAL HYPERTENSION: ICD-10-CM
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
## 2023-12-05 DIAGNOSIS — Z87.820 HX OF TRAUMATIC BRAIN INJURY: ICD-10-CM

## 2023-12-05 PROCEDURE — 99213 OFFICE O/P EST LOW 20 MIN: CPT | Performed by: FAMILY MEDICINE

## 2023-12-05 RX ORDER — ROSUVASTATIN CALCIUM 10 MG/1
TABLET, COATED ORAL
Qty: 90 TABLET | Refills: 3 | Status: SHIPPED | OUTPATIENT
Start: 2023-12-05 | End: 2024-01-23

## 2023-12-05 RX ORDER — NIFEDIPINE 30 MG
30 TABLET, EXTENDED RELEASE ORAL DAILY
Qty: 30 TABLET | Refills: 0 | Status: SHIPPED | OUTPATIENT
Start: 2023-12-05 | End: 2023-12-22

## 2023-12-05 RX ORDER — METFORMIN HCL 500 MG
1000 TABLET, EXTENDED RELEASE 24 HR ORAL 2 TIMES DAILY WITH MEALS
Qty: 360 TABLET | Refills: 3 | Status: SHIPPED | OUTPATIENT
Start: 2023-12-05 | End: 2023-12-22
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
## 2023-12-05 NOTE — TELEPHONE ENCOUNTER
empagliflozin (JARDIANCE) 25 MG TABS tablet      Last Written Prescription Date:  1/10/22  Last Fill Quantity: 90,   # refills: 1  Last Office Visit : 12/22/21 recommended 3 month follow up  Future Office visit:  12/28/22    Routing refill request to provider for review/approval because:  Overdue for A1C  Lab Test 12/24/21  0742   A1C 6.9*     Nothing more recent in care care everywhere nor media       Prior to Admission

## 2023-12-15 ENCOUNTER — DOCUMENTATION ONLY (OUTPATIENT)
Dept: CARDIOLOGY | Facility: CLINIC | Age: 58
End: 2023-12-15
Payer: COMMERCIAL

## 2023-12-15 ENCOUNTER — PREP FOR PROCEDURE (OUTPATIENT)
Dept: CARDIOLOGY | Facility: CLINIC | Age: 58
End: 2023-12-15
Payer: COMMERCIAL

## 2023-12-15 DIAGNOSIS — I48.91 ATRIAL FIBRILLATION WITH RVR (H): Primary | ICD-10-CM

## 2023-12-15 RX ORDER — LIDOCAINE 40 MG/G
CREAM TOPICAL
Status: CANCELLED | OUTPATIENT
Start: 2023-12-15

## 2023-12-15 RX ORDER — SODIUM CHLORIDE 9 MG/ML
100 INJECTION, SOLUTION INTRAVENOUS CONTINUOUS
Status: CANCELLED | OUTPATIENT
Start: 2023-12-15

## 2023-12-15 NOTE — PROGRESS NOTES
H&P Date: Teach Date: Imaging None   PVI  Order Case Req Y  Order Set Y Lab/EKG  Orders [] Letter [] F/U RN Date:  NP follow-up Date:     AC Eliquis-CONTINUE   AAD Diltiazem-Hold 3 days prior   PPI/H2 Blocker Start Protonix 40mg Daily 3 days prior, 6wk post   Diuretics Spironolactone   DM/GLP-1 DM Meds-  Metformin  GLP-1- Semaglutide (Ozempic & Wagovy) Once weekly     Kenny Chaves MD  [facility] Ep Support Pool - [facility]  Caller: Unspecified (2 months ago)  General Anesthesia  CARTO mapping system  Hold Diltiazem 3 days prior to ablation  Continue anticoagulation  CBC, BMP  on day of procedure    1965  Home:[phone] (home) Cell:[phone] (mobile)  Emergency Contact: Kathleen Alexandre   PCP: Lachelle Summers, [phone]    Important patient information for CSC/Cath Lab staff : None    [facility] EP Cath Lab Procedure Order   Ablation Type:  PVI- Atrial Fibrillation  Ordering Provider: Dr Chaevs  Date Ordered and Prepped: 12/15/2023 Carolina Dodge RN    Scheduling Information:  Anticipated Case Duration:  Standard ( Case per day SA 2:1, DW 4:1, KA 3:1)   Scheduling Timeframe:   Pt would like to go  after 4/15/24.  Scheduling Restrictions: None  Scheduling Contact: Please contact pt to schedule, if you are unable to schedule date within the next 24 hours please contact pt to update on scheduling process  EP RN Follow Up Apt: Schedule EP RN PC visit 3-4 days s/p PVI  MD Preference: Scheduling with ordering provider  Current Device/Device Co Needed for Procedure: None NoneNone  Pre-Procedural Testing needed: None  Mapping System Required:  Carto (Dr Ayala and Dr Chaves)  ICE Needed:  Yes  Anesthesia:General Anesthesia    [facility] EP Cath Lab Prep   H&P:  Schedule H&P with EP LELAND, RN Teach, and Labs within 30 days of PVI  Pre-op Labs: CBC, BMP, Beta HcG if appropriate, and INR if on Warfarin will be ordered AM of procedure, if not completed at pre-op H&P within 7 days of procedure.  T&S Pre-Procedure Review: Does  not need for PVI procedures  Medical Records Pertinent for Procedure:  Stress Test 9/18/23, Echo 7/29/23, and EKG 9/6/23.  Iodinated Contrast Dye Allergies (Does not include Shellfish, Egg, and/or Iodine Allergy): NA  GLP-1 Protocol: If on Dulaglutide (Trulicity) (weekly)- Injection hold 7 days prior to procedure  , Exenatide extended release (Bydureon bcise) (weekly)- Injection hold 7 days prior to procedure, Exenatide (Byetta) (twice daily)- Oral Tablet hold day prior and morning of procedure and for Injection hold 7 days prior to procedure, Semaglutide (Ozempic) (weekly)- Injection and Oral hold 7 days prior to procedure, Liraglutide (Victoza, Saxenda) (daily)- Injection hold day prior and morning of procedure    No Known Allergies    Current Outpatient Medications:     ACCU-CHEK FASTCLIX LANCET DRUM, [ACCU-CHEK FASTCLIX LANCET DRUM] USE TO TEST BLOOD SUGARS 2 TO 3 TIMES A DAY, Disp: 300 each, Rfl: 4    acetaminophen (TYLENOL) 500 MG tablet, Take 2 tablets (1,000 mg) by mouth every 6 hours as needed for mild pain, Disp: 100 tablet, Rfl: 0    apixaban ANTICOAGULANT (ELIQUIS) 5 MG tablet, Take 1 tablet (5 mg) by mouth 2 times daily, Disp: 180 tablet, Rfl: 3    blood glucose test (ACCU-CHEK NATHANIEL PLUS TEST STRP) strips, [BLOOD GLUCOSE TEST (ACCU-CHEK NATHANIEL PLUS TEST STRP) STRIPS] Use 1 each As Directed 3 (three) times a day., Disp: 300 each, Rfl: 3    diltiazem ER COATED BEADS (CARDIZEM CD/CARTIA XT) 180 MG 24 hr capsule, Take 1 capsule (180 mg) by mouth daily, Disp: 90 capsule, Rfl: 3    fexofenadine (ALLEGRA) 180 MG tablet, Take 1 tablet (180 mg) by mouth daily as needed for allergies, Disp: , Rfl:     lisinopril (ZESTRIL) 5 MG tablet, Take 1 tablet (5 mg) by mouth daily, Disp: 30 tablet, Rfl: 1    metFORMIN (GLUCOPHAGE XR) 500 MG 24 hr tablet, Take 2 tablets (1,000 mg) by mouth 2 times daily (with meals), Disp: 360 tablet, Rfl: 3    NIFEdipine ER (ADALAT CC) 30 MG 24 hr tablet, Take 1 tablet (30 mg) by mouth  daily, Disp: 30 tablet, Rfl: 0    ondansetron (ZOFRAN ODT) 4 MG ODT tab, Take 1 tablet (4 mg) by mouth every 8 hours as needed for nausea, Disp: 20 tablet, Rfl: 1    rosuvastatin (CRESTOR) 10 MG tablet, TAKE 1 TABLET(10 MG) BY MOUTH AT BEDTIME Strength: 10 mg, Disp: 90 tablet, Rfl: 3    Semaglutide, 1 MG/DOSE, (OZEMPIC, 1 MG/DOSE,) 4 MG/3ML pen, Inject 1 mg Subcutaneous once a week Every Friday or Saturday, Disp: 3 mL, Rfl: 0    spironolactone (ALDACTONE) 25 MG tablet, Take 0.5 tablets (12.5 mg) by mouth daily, Disp: 30 tablet, Rfl: 0    tadalafil (CIALIS) 10 MG tablet, Take 1 tablet (10 mg) by mouth daily as needed (erectile dsyfunction) 10 mg as a single dose 30 minutes prior to anticipated sexual activity; do not take more than once daily.  Adjust dose based on effectiveness and tolerability; may decrease to 5 mg or increase to 20 mg per dose., Disp: 30 tablet, Rfl: 3    Documentation Date:12/15/2023 11:36 AM  Carolina Dodge RN

## 2023-12-21 NOTE — PROGRESS NOTES
Medication Therapy Management (MTM) Encounter    ASSESSMENT:                            1. Type 2 diabetes mellitus   A1c very well controlled at 4.2%, meeting goal less than 7% per ADA guidelines. To simplify medication regimen and also given possible side effect to metformin of loose stool, recommend reducing dose. Depending on blood sugar response, can then consider full discontinuation. Continue to monitor blood sugars.     2. Essential hypertension  Blood pressure is well controlled and meeting goal of <130/80 mm Hg per ACC/AHA hypertension guidelines. Duplicative therapy with two CCBs - nifedipine and diltiazem - although do have slightly different mechanisms of action. Given well controlled blood pressure on multiple low dose anti-hypertensives, recommend stopping low dose nifedipine and increasing lisinopril dose to help simplify regimen. Patient will monitor blood pressure at home and if elevated, can continue increasing lisinopril dose especially given microalbuminuria.     3. Mixed hyperlipidemia  Appropriately on a moderate intensity statin given age and diabetes diagnosis per ACC/AHA guidelines.     4. Atrial fibrillation  Appropriately on anticoagulation with DOAC, Eliquis. Rate control with diltiazem.     5. Allergic rhinitis  Fexofenadine ineffective. Recommend changing to nasal steroid. Education provided on use of fluticasone nasal spray.     PLAN:                            Decrease metformin ER to 2 tablets (1000 mg) daily.     Stop Allegra (fexofenadine).     Try Flonase (fluticasone) 1-2 sprays in each nostril daily as needed for congestion/allergies.     Stop nifedipine.     Increase lisinopril to 10 mg daily. Monitor blood pressure at home. If it is consistently above 130/80, let Flora know and we can increase the dose again.       Follow-up: Return in about 3 months (around 3/22/2024).    SUBJECTIVE/OBJECTIVE:                          Govind Alexandre is a 58 year old male coming in for a follow  up visit. This is a follow up from 22.      Reason for visit: diabetes medication; medication review.    Allergies/ADRs: Reviewed in chart  Past Medical History: Reviewed in chart  Tobacco: He reports that he has quit smoking. His smoking use included dip, chew, snus or snuff. He has never been exposed to tobacco smoke. He quit smokeless tobacco use about 19 years ago.  His smokeless tobacco use included chew.  Alcohol: Reviewed in chart    Medication Adherence/Access: Has high deductible insurance plan. Would like to take less medication. Patient takes medications 2 time(s) per day. The patient fills medications at [facility] [place]: YES.    1. Type 2 diabetes mellitus   Metformin ER 1000 mg twice daily  Ozempic 1 mg subcutaneous weekly    Patient reports some loose stool at times. Was inadvertently taking a stool softener during hospitalization this summer for sepsis, Afib, and kidney stones. Once stopping that, symptoms did improve, but still loose. Noting more weight loss since his hospitalization which he's pleased about.   History of thyroid nodules and father had thyroid cancer. Evaluated by endocrinology and determined ok to be on GLP-1 agonist.  Blood sugar monitorin time(s) daily. Ranges (patient reported): Fasting- , Post-Prandial- 130s  Symptoms of low blood sugar? none  Eye exam: up to date  Foot exam: up to date  Diet/Exercise: some exercise. Trying to eat healthy to lose weight. 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   Aspirin: Not taking now on anticoagulation  Statin: Yes: rosuvastatin   ACEi/ARB: Yes: lisinopril     Urine Albumin:   Lab Results   Component Value Date    UMALCR 47.00 (H) 2022      Lab Results   Component Value Date    A1C 4.2 2023     2. Essential hypertension  Lisinopril 5 mg daily  Nifedipine ER 30 mg daily  Spironolactone 12.5 mg daily    Patient does self-monitor blood  pressure, but not recently. Patient reports no current medication side effects. Since having more weight loss, not noticing puffiness in his ankles like before. Doses have been lowered after starting diltiazem for Afib and having orthostasis symptoms. None recently.    BP Readings from Last 3 Encounters:   12/22/23 118/70   12/05/23 118/84   09/12/23 133/73     3. Mixed hyperlipidemia  Rosuvastatin 10 mg daily    Patient reports no significant myalgias or other side effects.    The ASCVD Risk score (Pia DK, et al., 2019) failed to calculate for the following reasons:    The valid total cholesterol range is 130 to 320 mg/dL    Recent Labs   Lab Test 08/18/23  0857 11/29/22  0800   CHOL 109 104   HDL 33* 36*   LDL 52 41   TRIG 118 135     4. Atrial fibrillation  Eliquis 5mg twice daily for stroke prevention  Diltiazem  mg daily    Patient reports no current medication side effects.  Dose of diltiazem lowered when having orthostatic symptoms and that resolved.  Patient does not have a history of GI bleed. Wears Apple Watch and can see when he's in Afib. Hasn't happened recently since starting diltiazem.     [id number] Score    Date Calculated: 12/5/2023  9:27 AM  [id number] Score: 2       5. Allergic rhinitis  Fexofenadine 180 mg daily    Experiencing frequent nasal congestion. Doesn't think fexofenadine does anything. Has not tried other anti-histamines or nasal spray.     Vitals:   BP Readings from Last 3 Encounters:   12/22/23 118/70   12/05/23 118/84   09/12/23 133/73      Pulse Readings from Last 3 Encounters:   12/05/23 66   09/12/23 81   09/06/23 86     Wt Readings from Last 3 Encounters:   12/22/23 257 lb (116.6 kg)   12/05/23 255 lb (115.7 kg)   09/12/23 268 lb (121.6 kg)     ----------------    I spent 30 minutes with this patient today. All changes were made via collaborative practice agreement with CECILY Lo CNP. A copy of the visit note was provided to the patient's  provider(s).    A summary of these recommendations was sent via Playerize.    Flora Chavis, PharmD, BCACP  Medication Management (MTM) Pharmacist  [facility]        Medication Therapy Recommendations  Essential hypertension    Current Medication: NIFEdipine ER (ADALAT CC) 30 MG 24 hr tablet (Discontinued)   Rationale: Duplicate Therapy - Unnecessary medication therapy - Indication   Recommendation: Discontinue Medication   Status: Accepted per CPA          Current Medication: lisinopril (ZESTRIL) 5 MG tablet (Discontinued)   Rationale: Dose too low - Dosage too low - Effectiveness   Recommendation: Increase Dose   Status: Accepted per CPA         Seasonal allergic rhinitis, unspecified trigger    Current Medication: fexofenadine (ALLEGRA) 180 MG tablet (Discontinued)   Rationale: More effective medication available - Ineffective medication - Effectiveness   Recommendation: Change Medication - fluticasone 50 MCG/ACT nasal spray   Status: Accepted per CPA         Type 2 diabetes mellitus with microalbuminuria, without long-term current use of insulin (H)    Current Medication: metFORMIN (GLUCOPHAGE XR) 500 MG 24 hr tablet   Rationale: Dose too high - Dosage too high - Safety   Recommendation: Decrease Dose   Status: Accepted per CPA

## 2023-12-22 ENCOUNTER — OFFICE VISIT (OUTPATIENT)
Dept: PHARMACY | Facility: CLINIC | Age: 58
End: 2023-12-22
Payer: COMMERCIAL

## 2023-12-22 ENCOUNTER — LAB (OUTPATIENT)
Dept: LAB | Facility: CLINIC | Age: 58
End: 2023-12-22
Payer: COMMERCIAL

## 2023-12-22 VITALS — WEIGHT: 257 LBS | BODY MASS INDEX: 34.86 KG/M2 | DIASTOLIC BLOOD PRESSURE: 70 MMHG | SYSTOLIC BLOOD PRESSURE: 118 MMHG

## 2023-12-22 DIAGNOSIS — E11.29 TYPE 2 DIABETES MELLITUS WITH MICROALBUMINURIA, WITHOUT LONG-TERM CURRENT USE OF INSULIN (H): Primary | ICD-10-CM

## 2023-12-22 DIAGNOSIS — R80.9 TYPE 2 DIABETES MELLITUS WITH MICROALBUMINURIA, WITHOUT LONG-TERM CURRENT USE OF INSULIN (H): Primary | ICD-10-CM

## 2023-12-22 DIAGNOSIS — E11.9 TYPE 2 DIABETES MELLITUS WITHOUT COMPLICATION, WITHOUT LONG-TERM CURRENT USE OF INSULIN (H): ICD-10-CM

## 2023-12-22 DIAGNOSIS — F52.8 OTHER SEXUAL DYSFUNCTION NOT DUE TO A SUBSTANCE OR KNOWN PHYSIOLOGICAL CONDITION: ICD-10-CM

## 2023-12-22 DIAGNOSIS — I48.91 ATRIAL FIBRILLATION WITH RVR (H): Chronic | ICD-10-CM

## 2023-12-22 DIAGNOSIS — R80.9 TYPE 2 DIABETES MELLITUS WITH MICROALBUMINURIA, WITHOUT LONG-TERM CURRENT USE OF INSULIN (H): ICD-10-CM

## 2023-12-22 DIAGNOSIS — E11.29 TYPE 2 DIABETES MELLITUS WITH MICROALBUMINURIA, WITHOUT LONG-TERM CURRENT USE OF INSULIN (H): ICD-10-CM

## 2023-12-22 DIAGNOSIS — J30.2 SEASONAL ALLERGIC RHINITIS, UNSPECIFIED TRIGGER: ICD-10-CM

## 2023-12-22 DIAGNOSIS — E78.2 MIXED HYPERLIPIDEMIA: ICD-10-CM

## 2023-12-22 DIAGNOSIS — I10 ESSENTIAL HYPERTENSION: ICD-10-CM

## 2023-12-22 LAB
ANION GAP SERPL CALCULATED.3IONS-SCNC: 11 MMOL/L (ref 7–15)
BUN SERPL-MCNC: 19.2 MG/DL (ref 8–23)
CALCIUM SERPL-MCNC: 9.8 MG/DL (ref 8.8–10.2)
CHLORIDE SERPL-SCNC: 107 MMOL/L (ref 98–107)
CREAT SERPL-MCNC: 1.08 MG/DL (ref 0.67–1.17)
CREAT UR-MCNC: 171 MG/DL
DEPRECATED HCO3 PLAS-SCNC: 25 MMOL/L (ref 22–29)
EGFRCR SERPLBLD CKD-EPI 2021: 79 ML/MIN/1.73M2
GLUCOSE SERPL-MCNC: 121 MG/DL (ref 70–99)
HBA1C MFR BLD: 4.2 % (ref 0–5.6)
MICROALBUMIN UR-MCNC: 55.4 MG/L
MICROALBUMIN/CREAT UR: 32.4 MG/G CR (ref 0–17)
POTASSIUM SERPL-SCNC: 4.7 MMOL/L (ref 3.4–5.3)
SODIUM SERPL-SCNC: 143 MMOL/L (ref 135–145)

## 2023-12-22 PROCEDURE — 83036 HEMOGLOBIN GLYCOSYLATED A1C: CPT

## 2023-12-22 PROCEDURE — 82570 ASSAY OF URINE CREATININE: CPT

## 2023-12-22 PROCEDURE — 80048 BASIC METABOLIC PNL TOTAL CA: CPT

## 2023-12-22 PROCEDURE — 82043 UR ALBUMIN QUANTITATIVE: CPT

## 2023-12-22 PROCEDURE — 99605 MTMS BY PHARM NP 15 MIN: CPT | Performed by: PHARMACIST

## 2023-12-22 PROCEDURE — 99607 MTMS BY PHARM ADDL 15 MIN: CPT | Performed by: PHARMACIST

## 2023-12-22 PROCEDURE — 36415 COLL VENOUS BLD VENIPUNCTURE: CPT

## 2023-12-22 RX ORDER — TADALAFIL 10 MG/1
10 TABLET ORAL DAILY PRN
Qty: 30 TABLET | Refills: 3 | Status: SHIPPED | OUTPATIENT
Start: 2023-12-22 | End: 2024-01-23

## 2023-12-22 RX ORDER — LISINOPRIL 10 MG/1
10 TABLET ORAL DAILY
Qty: 90 TABLET | Refills: 3 | Status: SHIPPED | OUTPATIENT
Start: 2023-12-22 | End: 2024-01-23

## 2023-12-22 RX ORDER — SPIRONOLACTONE 25 MG/1
12.5 TABLET ORAL DAILY
Qty: 45 TABLET | Refills: 3 | Status: SHIPPED | OUTPATIENT
Start: 2023-12-22 | End: 2024-01-23

## 2023-12-22 RX ORDER — FLUTICASONE PROPIONATE 50 MCG
2 SPRAY, SUSPENSION (ML) NASAL DAILY PRN
COMMUNITY
End: 2024-09-25

## 2023-12-22 RX ORDER — SEMAGLUTIDE 1.34 MG/ML
1 INJECTION, SOLUTION SUBCUTANEOUS WEEKLY
Qty: 9 ML | Refills: 3 | Status: SHIPPED | OUTPATIENT
Start: 2023-12-22 | End: 2024-01-23

## 2023-12-22 RX ORDER — METFORMIN HCL 500 MG
1000 TABLET, EXTENDED RELEASE 24 HR ORAL DAILY
Qty: 180 TABLET | Refills: 3 | Status: SHIPPED | OUTPATIENT
Start: 2023-12-22 | End: 2024-01-23

## 2023-12-22 NOTE — PATIENT INSTRUCTIONS
"Recommendations from today's Medication Management (MTM) visit:                                                      Decrease metformin ER to 2 tablets (1000 mg) daily.     Stop Allegra (fexofenadine).     Try Flonase (fluticasone) 1-2 sprays in each nostril daily as needed for congestion/allergies.     Stop nifedipine.     Increase lisinopril to 10 mg daily. Monitor blood pressure at home. If it is consistently above 130/80, let Flora know and we can increase the dose again.       To schedule another MTM appointment, please call the MTM scheduling line at [phone] or toll-free at [phone].     My MTM pharmacist's contact information:      Please feel free to contact me with any questions or concerns you have via Sh[employer] or calling the clinic.       Flora Chavis, PharmD, [facility] HospitalCP  Medication Management (MTM) Pharmacist  [facility]     It was great speaking with you today.  I value your experience and would be very thankful for your time in providing feedback in our clinic survey. In the next few days, you may receive an email or text message from [employer] with a link to a survey related to your  clinical pharmacist.\"     "

## 2023-12-27 ENCOUNTER — MYC MEDICAL ADVICE (OUTPATIENT)
Dept: FAMILY MEDICINE | Facility: CLINIC | Age: 58
End: 2023-12-27
Payer: COMMERCIAL
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
## 2023-12-28 DIAGNOSIS — G44.309 POST-TRAUMATIC HEADACHE, NOT INTRACTABLE, UNSPECIFIED CHRONICITY PATTERN: ICD-10-CM

## 2023-12-28 NOTE — TELEPHONE ENCOUNTER
12-28-23  [facility] REFERRALS:  Please fax neurology order to [facility]  I called pt informed him order will be faxed to [facility] & to call [phone] to schedule  Devika

## 2024-01-11 ENCOUNTER — TELEPHONE (OUTPATIENT)
Dept: FAMILY MEDICINE | Facility: CLINIC | Age: 59
End: 2024-01-11
Payer: COMMERCIAL

## 2024-01-11 NOTE — TELEPHONE ENCOUNTER
Prior Authorization Retail Medication Request    *Please forward response to Flora Chavis, Pam*    Medication/Dose: Ozempic  Diagnosis and ICD code (if different than what is on RX):  On Rx  New/renewal/insurance change PA/secondary ins. PA: New  Previously Tried and Failed:  Metformin, Jardiance  Rationale:  Patient has been well controlled on Ozempic - last A1c at goal     Insurance   The new insurance is [employer] [facility]  Member ID [id number]  Policy [id number]  BIN - [id number]  PCN - [id number]  Rx - [email or web address]

## 2024-01-19 NOTE — TELEPHONE ENCOUNTER
Note: Due to high request volumes our turn-around time taking longer than normal. We are working diligently to submit all requestsin a timely manner and by the order they are received.  If you have questions - please send a note/message in the active PA encounter and send back to the RPPA (Retail Pharmacy Prior Authorization) team [[id number]].    Thank you!       PRIOR AUTHORIZATION DENIED    Medication: OZEMPIC (1 MG/DOSE) 4 MG/3ML SC SOPN  Insurance Company: [employer] ([employer]) - Phone [phone] Fax [phone]  Denial Date: 1/19/2024  Denial Rational:         Appeal Information:   If provider would like to appeal please review the plan's reasons for denial listed above. Please utilize that information to complete letter and provide specific, detailed clinical information/rationale of your patient's health status to address their denial reasons.      Patient Notified: No

## 2024-01-19 NOTE — TELEPHONE ENCOUNTER
Retail Pharmacy Prior Authorization Team   Phone: [phone]    PA Initiation    Medication: OZEMPIC (1 MG/DOSE) 4 MG/3ML SC SOPN  Insurance Company: [employer] ([employer]) - Phone [phone] Fax [phone]  Pharmacy Filling the Rx: [facility] - [place], [place] - [facility] [address]  Filling Pharmacy Phone: [phone]  Filling Pharmacy Fax:    Start Date: 1/19/2024

## 2024-01-21 ENCOUNTER — HEALTH MAINTENANCE LETTER (OUTPATIENT)
Age: 59
End: 2024-01-21

## 2024-01-23 ENCOUNTER — TELEPHONE (OUTPATIENT)
Dept: FAMILY MEDICINE | Facility: CLINIC | Age: 59
End: 2024-01-23
Payer: COMMERCIAL

## 2024-01-23 NOTE — TELEPHONE ENCOUNTER
Central Prior Authorization Team - Phone: [phone]     PA Initiation    Medication: TRULICITY 0.75 MG/0.5ML SC SOPN  Insurance Company: [employer] Non-Specialty PA's - Phone [phone] Fax [phone]  Pharmacy Filling the Rx: [facility] - [address]  Filling Pharmacy Phone: [phone]  Filling Pharmacy Fax:    Start Date: 1/23/2024

## 2024-01-23 NOTE — TELEPHONE ENCOUNTER
Patient notified via PageBites.     Flora Chavis, PharmD, BCACP  Medication Management (MTM) Pharmacist  [facility]

## 2024-01-23 NOTE — TELEPHONE ENCOUNTER
[facility] PA REQUEST    Please route outcome to MTM/[facility]: Flora Chavis PharmD    Prior Authorization Retail Medication Request    Medication/Dose: Trulicity 0.75 mg injection  ICD code (if different than what is on RX):    Previously Tried and Failed:  Ozempic, metformin, Jardiance  Rationale:  Was well controlled on Ozempic, but prior authorization denied and told Trulicity is preferred but still requires prior authorization.     The new insurance is [employer] [facility]  Member ID [id number]  Policy [id number]  BIN - [id number]  N - [id number]  Rx - [id number]      Pharmacy Information (if different than what is on RX)  Name:  [facility]  Phone:  [phone]

## 2024-01-24 NOTE — TELEPHONE ENCOUNTER
Central Prior Authorization Team - Phone: [phone]     Prior Authorization Approval    Medication: TRULICITY 0.75 MG/0.5ML SC SOPN  Authorization Effective Date: 1/1/2024  Authorization Expiration Date: 1/30/2025  Approved Dose/Quantity: 2  Reference #:     Insurance Company: [employer] Non-Specialty PA's - Phone [phone] Fax [phone]  Expected CoPay: $    CoPay Card Available:      Financial Assistance Needed:   Which Pharmacy is filling the prescription: [facility] - [address]  Pharmacy Notified: YES  Patient Notified: YES pharmacy will notify when ready

## 2024-01-24 NOTE — TELEPHONE ENCOUNTER
Patient notified via [employer].     Flora Chavis, PharmD, BCACP  Medication Management (MTM) Pharmacist  [facility]

## 2024-02-01 ENCOUNTER — MYC REFILL (OUTPATIENT)
Dept: CARDIOLOGY | Facility: CLINIC | Age: 59
End: 2024-02-01
Payer: COMMERCIAL

## 2024-02-01 DIAGNOSIS — Z79.01 CHRONIC ANTICOAGULATION: Chronic | ICD-10-CM

## 2024-02-01 DIAGNOSIS — I48.91 ATRIAL FIBRILLATION WITH RVR (H): ICD-10-CM

## 2024-02-01 RX ORDER — DILTIAZEM HYDROCHLORIDE 180 MG/1
180 CAPSULE, COATED, EXTENDED RELEASE ORAL DAILY
Qty: 90 CAPSULE | Refills: 3 | Status: SHIPPED | OUTPATIENT
Start: 2024-02-01

## 2024-03-27 NOTE — PROGRESS NOTES
Hello,     Just wanting to send as FYI it has been two weeks since letter has been sent an have not heard back from this pt regarding scheduling [facility]. I will go ahead and remove his case from the que.     Thank you,   Angela

## 2024-06-24 SDOH — HEALTH STABILITY: PHYSICAL HEALTH: ON AVERAGE, HOW MANY DAYS PER WEEK DO YOU ENGAGE IN MODERATE TO STRENUOUS EXERCISE (LIKE A BRISK WALK)?: 3 DAYS

## 2024-06-24 SDOH — HEALTH STABILITY: PHYSICAL HEALTH: ON AVERAGE, HOW MANY MINUTES DO YOU ENGAGE IN EXERCISE AT THIS LEVEL?: 20 MIN

## 2024-06-24 ASSESSMENT — SOCIAL DETERMINANTS OF HEALTH (SDOH): HOW OFTEN DO YOU GET TOGETHER WITH FRIENDS OR RELATIVES?: TWICE A WEEK

## 2024-06-25 ENCOUNTER — OFFICE VISIT (OUTPATIENT)
Dept: FAMILY MEDICINE | Facility: CLINIC | Age: 59
End: 2024-06-25
Payer: COMMERCIAL

## 2024-06-25 VITALS
SYSTOLIC BLOOD PRESSURE: 138 MMHG | HEIGHT: 71 IN | OXYGEN SATURATION: 97 % | TEMPERATURE: 98.7 F | DIASTOLIC BLOOD PRESSURE: 88 MMHG | WEIGHT: 275 LBS | HEART RATE: 67 BPM | RESPIRATION RATE: 14 BRPM | BODY MASS INDEX: 38.5 KG/M2

## 2024-06-25 DIAGNOSIS — E66.01 MORBID OBESITY (H): ICD-10-CM

## 2024-06-25 DIAGNOSIS — E11.29 TYPE 2 DIABETES MELLITUS WITH MICROALBUMINURIA, WITHOUT LONG-TERM CURRENT USE OF INSULIN (H): Primary | ICD-10-CM

## 2024-06-25 DIAGNOSIS — Z13.29 SCREENING FOR THYROID DISORDER: ICD-10-CM

## 2024-06-25 DIAGNOSIS — S06.9X9S TRAUMATIC BRAIN INJURY WITH LOSS OF CONSCIOUSNESS, SEQUELA (H): ICD-10-CM

## 2024-06-25 DIAGNOSIS — R16.1 SPLENOMEGALY: ICD-10-CM

## 2024-06-25 DIAGNOSIS — Z86.79 HISTORY OF ATRIAL FIBRILLATION: ICD-10-CM

## 2024-06-25 DIAGNOSIS — Z91.89 WITNESS TO SUICIDE: ICD-10-CM

## 2024-06-25 DIAGNOSIS — I10 ESSENTIAL HYPERTENSION: ICD-10-CM

## 2024-06-25 DIAGNOSIS — E78.00 PURE HYPERCHOLESTEROLEMIA: ICD-10-CM

## 2024-06-25 DIAGNOSIS — R80.9 TYPE 2 DIABETES MELLITUS WITH MICROALBUMINURIA, WITHOUT LONG-TERM CURRENT USE OF INSULIN (H): Primary | ICD-10-CM

## 2024-06-25 DIAGNOSIS — B07.8 COMMON WART: ICD-10-CM

## 2024-06-25 DIAGNOSIS — E78.2 MIXED HYPERLIPIDEMIA: ICD-10-CM

## 2024-06-25 DIAGNOSIS — G47.30 SLEEP APNEA, UNSPECIFIED TYPE: ICD-10-CM

## 2024-06-25 DIAGNOSIS — Z79.01 CHRONIC ANTICOAGULATION: Chronic | ICD-10-CM

## 2024-06-25 DIAGNOSIS — K76.0 FATTY LIVER: ICD-10-CM

## 2024-06-25 PROBLEM — I48.91 ATRIAL FIBRILLATION WITH RVR (H): Chronic | Status: RESOLVED | Noted: 2023-07-29 | Resolved: 2024-06-25

## 2024-06-25 LAB
ALBUMIN SERPL BCG-MCNC: 4.5 G/DL (ref 3.5–5.2)
ALP SERPL-CCNC: 84 U/L (ref 40–150)
ALT SERPL W P-5'-P-CCNC: 16 U/L (ref 0–70)
ANION GAP SERPL CALCULATED.3IONS-SCNC: 11 MMOL/L (ref 7–15)
AST SERPL W P-5'-P-CCNC: 24 U/L (ref 0–45)
BILIRUB SERPL-MCNC: 1.1 MG/DL
BUN SERPL-MCNC: 12.7 MG/DL (ref 8–23)
CALCIUM SERPL-MCNC: 9.4 MG/DL (ref 8.8–10.2)
CHLORIDE SERPL-SCNC: 110 MMOL/L (ref 98–107)
CHOLEST SERPL-MCNC: 114 MG/DL
CREAT SERPL-MCNC: 0.96 MG/DL (ref 0.67–1.17)
DEPRECATED HCO3 PLAS-SCNC: 23 MMOL/L (ref 22–29)
EGFRCR SERPLBLD CKD-EPI 2021: 90 ML/MIN/1.73M2
ERYTHROCYTE [DISTWIDTH] IN BLOOD BY AUTOMATED COUNT: 14.9 % (ref 10–15)
FASTING STATUS PATIENT QL REPORTED: ABNORMAL
FASTING STATUS PATIENT QL REPORTED: ABNORMAL
GLUCOSE SERPL-MCNC: 90 MG/DL (ref 70–99)
HBA1C MFR BLD: 5 % (ref 0–5.6)
HCT VFR BLD AUTO: 37.2 % (ref 40–53)
HDLC SERPL-MCNC: 36 MG/DL
HGB BLD-MCNC: 13.2 G/DL (ref 13.3–17.7)
LDLC SERPL CALC-MCNC: 54 MG/DL
MCH RBC QN AUTO: 31.1 PG (ref 26.5–33)
MCHC RBC AUTO-ENTMCNC: 35.5 G/DL (ref 31.5–36.5)
MCV RBC AUTO: 88 FL (ref 78–100)
NONHDLC SERPL-MCNC: 78 MG/DL
PLATELET # BLD AUTO: 116 10E3/UL (ref 150–450)
POTASSIUM SERPL-SCNC: 4 MMOL/L (ref 3.4–5.3)
PROT SERPL-MCNC: 7.4 G/DL (ref 6.4–8.3)
RBC # BLD AUTO: 4.24 10E6/UL (ref 4.4–5.9)
SODIUM SERPL-SCNC: 144 MMOL/L (ref 135–145)
TRIGL SERPL-MCNC: 119 MG/DL
TSH SERPL DL<=0.005 MIU/L-ACNC: 0.56 UIU/ML (ref 0.3–4.2)
WBC # BLD AUTO: 7.7 10E3/UL (ref 4–11)

## 2024-06-25 PROCEDURE — 36415 COLL VENOUS BLD VENIPUNCTURE: CPT | Performed by: NURSE PRACTITIONER

## 2024-06-25 PROCEDURE — 83036 HEMOGLOBIN GLYCOSYLATED A1C: CPT | Performed by: NURSE PRACTITIONER

## 2024-06-25 PROCEDURE — 80061 LIPID PANEL: CPT | Performed by: NURSE PRACTITIONER

## 2024-06-25 PROCEDURE — 85027 COMPLETE CBC AUTOMATED: CPT | Performed by: NURSE PRACTITIONER

## 2024-06-25 PROCEDURE — 80053 COMPREHEN METABOLIC PANEL: CPT | Performed by: NURSE PRACTITIONER

## 2024-06-25 PROCEDURE — 17110 DESTRUCTION B9 LES UP TO 14: CPT | Performed by: NURSE PRACTITIONER

## 2024-06-25 PROCEDURE — 84443 ASSAY THYROID STIM HORMONE: CPT | Performed by: NURSE PRACTITIONER

## 2024-06-25 PROCEDURE — 99396 PREV VISIT EST AGE 40-64: CPT | Mod: 25 | Performed by: NURSE PRACTITIONER

## 2024-06-25 PROCEDURE — 99214 OFFICE O/P EST MOD 30 MIN: CPT | Mod: 25 | Performed by: NURSE PRACTITIONER

## 2024-06-25 RX ORDER — METFORMIN HCL 500 MG
500 TABLET, EXTENDED RELEASE 24 HR ORAL 2 TIMES DAILY WITH MEALS
Qty: 180 TABLET | Refills: 0 | Status: SHIPPED | OUTPATIENT
Start: 2024-06-25 | End: 2024-09-27

## 2024-06-25 RX ORDER — DULAGLUTIDE 3 MG/.5ML
3 INJECTION, SOLUTION SUBCUTANEOUS WEEKLY
Qty: 2 ML | Refills: 2 | Status: SHIPPED | OUTPATIENT
Start: 2024-06-25 | End: 2024-07-10

## 2024-06-25 NOTE — PROGRESS NOTES
Preventive Care Visit  [facility] CECILY Gonzalez CNP, Family Medicine  Jun 25, 2024      Assessment & Plan     Type 2 diabetes mellitus with microalbuminuria, without long-term current use of insulin (H)  **  - Hemoglobin A1c  - Comprehensive metabolic panel (BMP + Alb, Alk Phos, ALT, AST, Total. Bili, TP)  - CBC with platelets  - dulaglutide (TRULICITY) 1.5 MG/0.5ML pen  Dispense: 2 mL; Refill: 0  - Dulaglutide (TRULICITY) 3 MG/0.5ML SOPN  Dispense: 2 mL; Refill: 2  - metFORMIN (GLUCOPHAGE XR) 500 MG 24 hr tablet  Dispense: 180 tablet; Refill: 0  - Hemoglobin A1c  - Comprehensive metabolic panel (BMP + Alb, Alk Phos, ALT, AST, Total. Bili, TP)  - CBC with platelets    Sleep apnea, unspecified type  **  - dulaglutide (TRULICITY) 1.5 MG/0.5ML pen  Dispense: 2 mL; Refill: 0  - Dulaglutide (TRULICITY) 3 MG/0.5ML SOPN  Dispense: 2 mL; Refill: 2    Essential hypertension  **  - Comprehensive metabolic panel (BMP + Alb, Alk Phos, ALT, AST, Total. Bili, TP)  - CBC with platelets  - dulaglutide (TRULICITY) 1.5 MG/0.5ML pen  Dispense: 2 mL; Refill: 0  - Dulaglutide (TRULICITY) 3 MG/0.5ML SOPN  Dispense: 2 mL; Refill: 2  - Adult Cardiology Eval  Referral  - CBC with platelets    History of atrial fibrillation  **  - Comprehensive metabolic panel (BMP + Alb, Alk Phos, ALT, AST, Total. Bili, TP)  - CBC with platelets  - Adult Cardiology Eval  Referral  - Comprehensive metabolic panel (BMP + Alb, Alk Phos, ALT, AST, Total. Bili, TP)  - CBC with platelets    Mixed hyperlipidemia  **  - Comprehensive metabolic panel (BMP + Alb, Alk Phos, ALT, AST, Total. Bili, TP)  - Lipid Profile (Chol, Trig, HDL, LDL calc)  - dulaglutide (TRULICITY) 1.5 MG/0.5ML pen  Dispense: 2 mL; Refill: 0  - Dulaglutide (TRULICITY) 3 MG/0.5ML SOPN  Dispense: 2 mL; Refill: 2  - Comprehensive metabolic panel (BMP + Alb, Alk Phos, ALT, AST, Total. Bili, TP)  - Lipid Profile (Chol, Trig, HDL, LDL  calc)    Obesity (BMI 35.0-39.9) with comorbidity (H)  **  - dulaglutide (TRULICITY) 1.5 MG/0.5ML pen  Dispense: 2 mL; Refill: 0  - Dulaglutide (TRULICITY) 3 MG/0.5ML SOPN  Dispense: 2 mL; Refill: 2    Chronic anticoagulation  **  - CBC with platelets  - Adult Cardiology Eval  Referral  - CBC with platelets    Fatty liver  **  - Comprehensive metabolic panel (BMP + Alb, Alk Phos, ALT, AST, Total. Bili, TP)  - dulaglutide (TRULICITY) 1.5 MG/0.5ML pen  Dispense: 2 mL; Refill: 0  - Dulaglutide (TRULICITY) 3 MG/0.5ML SOPN  Dispense: 2 mL; Refill: 2  - Comprehensive metabolic panel (BMP + Alb, Alk Phos, ALT, AST, Total. Bili, TP)    Splenomegaly  **  - US Abdomen Limited    Screening for thyroid disorder  **  - TSH  - TSH    Traumatic brain injury with loss of consciousness, sequela (H24)  **    Witness to suicide  **  - Adult Mental Health  Referral    Common wart  **  - DESTRUCT BENIGN LESION, UP TO 14    - VSS. Ideal goal for BP is 130/80 or below. Will continue to monitor for now since he has plans to decrease his weight. Can continue lisinopril.   - weight is stable but up. Will increase trulicity and work on lifestyle changes (more protein and fiber, reg exercise). Follow in 3-6 month for weight check.  - A1c is stable! Continue metformin at current dose for now. Can think about decreasing to 500 daily with next check.  Will increase Trulicity this will further aid in stabilizing his weight and hopefully lowering it.  He will continue to work on more protein and fiber in his diet and regular exercise.  I would like him to follow-up for weight check in the next 3 months.   -History of TBI, this is now resolved.   -  ordered.  No SI noted.  He has a supportive family.   - continue cpap. Sleep apnea stable  - US to follow up on enlarged spleen.   -With the consent of patient, cryotherapy, four short burst applied to wart.  Benefits and complications were discussed.  Warning signs were also  "discussed when to be seen if infection were to occur.  He can repeat this process in the next 3 weeks by staff if needed.     our labs overall look very stable, good news!  Your HDL is low, so working on more regular exercise will aide in elevating this value.     I would continue your medications with no changes at this time.       Patient has been advised of split billing requirements and indicates understanding: Yes        BMI  Estimated body mass index is 38.35 kg/m  as calculated from the following:    Height as of this encounter: 1.803 m (5' 11\").    Weight as of this encounter: 124.7 kg (275 lb).   Weight management plan: Discussed healthy diet and exercise guidelines    Counseling  Appropriate preventive services were discussed with this patient, including applicable screening as appropriate for fall prevention, nutrition, physical activity, Tobacco-use cessation, weight loss and cognition.  Checklist reviewing preventive services available has been given to the patient.  Reviewed patient's diet, addressing concerns and/or questions.   He is at risk for lack of exercise and has been provided with information to increase physical activity for the benefit of his well-being.       Susi Faust is a 58 year old, presenting for the following:  Physical and Recheck Medication (Need referral phycologist )    - he is doing overall okay. His weight is not ideal. Dealt with a stressful event this past year, which may have delayed some of his self-care with better lifestyle choices. He is wondering if he can increase Trulicity. Has seen MTM in past. Has a wart on his left thumb, saw Derm in past. HO afib, not noticing any heart palpations/fluttering, leg edema, dizziness, or sob. He is wondering about an ablation. No fevers or chills. Uses his CPAP every night. He witness a suicide in his garage, found a family member that hung themself (I believe brother in law). He would like to talk to someone to digest his " feelings. No SI or HI.         6/25/2024    11:00 AM   Additional Questions   Roomed by Memo        Health Care Directive  Patient does not have a Health Care Directive or Living Will: Patient states has Advance Directive and will bring in a copy to clinic.    Healthy Habits:     Getting at least 3 servings of Calcium per day:  Yes    Bi-annual eye exam:  Yes    Dental care twice a year:  Yes    Sleep apnea or symptoms of sleep apnea:  Sleep apnea    Diet:  Diabetic    Frequency of exercise:  2-3 days/week    Duration of exercise:  15-30 minutes    Taking medications regularly:  Yes    Barriers to taking medications:  None    Medication side effects:  None    Additional concerns today:  No          6/24/2024   General Health   How would you rate your overall physical health? Good   Feel stress (tense, anxious, or unable to sleep) Rather much      (!) STRESS CONCERN      6/24/2024   Nutrition   Three or more servings of calcium each day? Yes   Diet: Low salt    Low fat/cholesterol    Diabetic   How many servings of fruit and vegetables per day? (!) 0-1   How many sweetened beverages each day? 0-1       Multiple values from one day are sorted in reverse-chronological order         6/24/2024   Exercise   Days per week of moderate/strenous exercise 3 days   Average minutes spent exercising at this level 20 min            6/24/2024   Social Factors   Frequency of gathering with friends or relatives Twice a week   Worry food won't last until get money to buy more No   Food not last or not have enough money for food? No   Do you have housing? (Housing is defined as stable permanent housing and does not include staying ouside in a car, in a tent, in an abandoned building, in an overnight shelter, or couch-surfing.) Yes   Are you worried about losing your housing? No   Lack of transportation? No   Unable to get utilities (heat,electricity)? No            6/24/2024   Fall Risk   Fallen 2 or more times in the past year? No    Trouble with walking or balance? No             2024   Dental   Dentist two times every year? Yes            2024   TB Screening   Were you born outside of the [place]? No            Today's PHQ-2 Score:       2024    11:43 AM   PHQ-2 (  [employer])   Q1: Little interest or pleasure in doing things 1   Q2: Feeling down, depressed or hopeless 1   PHQ-2 Score 2   Q1: Little interest or pleasure in doing things Several days   Q2: Feeling down, depressed or hopeless Several days   PHQ-2 Score 2           2024   Substance Use   Alcohol more than 3/day or more than 7/wk No   Do you use any other substances recreationally? No        Social History     Tobacco Use    Smoking status: Former     Types: Dip, chew, snus or snuff     Passive exposure: Never    Smokeless tobacco: Former     Types: Chew     Quit date: 2004   Vaping Use    Vaping status: Never Used   Substance Use Topics    Alcohol use: Yes     Comment: Alcoholic Drinks/day: 1 every 2 weeks    Drug use: No           2024   STI Screening   New sexual partner(s) since last STI/HIV test? No      Last PSA:   Prostate Specific Antigen Screen   Date Value Ref Range Status   2022 2.45 0.00 - 3.50 ng/mL Final   2021 2.4 0.0 - 3.5 ng/mL Final     ASCVD Risk   The ASCVD Risk score (Pia MATOS, et al., 2019) failed to calculate for the following reasons:    The valid total cholesterol range is 130 to 320 mg/dL    Fracture Risk Assessment Tool  Link to Frax Calculator  Use the information below to complete the Frax calculator  : 1965  Sex: male  Weight (kg): 124.7 kg (actual weight)  Height (cm): 180.3 cm  Previous Fragility Fracture:  No  History of parent with fractured hip:  No  Current Smoking:  No  Patient has been on glucocorticoids for more than 3 months (5mg/day or more): No  Rheumatoid Arthritis on Problem List:  No  Secondary Osteoporosis on Problem List:  No  Consumes 3 or more units of alcohol per day:  "No  Femoral Neck BMD (g/cm2)           Reviewed and updated as needed this visit by Provider                    Past Medical History:   Diagnosis Date    Depression 12/24/2014    Essential hypertension     Infection due to 2019 novel coronavirus 01/11/2022    Multiple thyroid nodules 09/08/2016    Type 2 diabetes mellitus without complication (H) 11/07/2016     Past Surgical History:   Procedure Laterality Date    BIOPSY  9/18/16    CARPAL TUNNEL RELEASE RT/LT Right     COLONOSCOPY  6/15/18    COMBINED CYSTOSCOPY, RETROGRADES, URETEROSCOPY, LASER HOLMIUM LITHOTRIPSY URETER(S), INSERT STENT Bilateral 7/26/2023    Procedure: cystoscopy, bilateral ureteroscopy with holmium laser lithotripsy, bilateral ureteroscopy with stone basketing, bilateral retrograde pyelogram, bilateral ureteral stent placement;  Surgeon: Mark Orta MD;  Location: [facility] OR    HC REPAIR ROTATOR CUFF,ACUTE      Description: Rotator Cuff Repair Acute;  Recorded: 01/13/2010;  Comments: cortisone    PICC  11/30/2015         REMOVAL OF SPERM DUCT(S)      Description: Surgery Of Male Genitalia Vasectomy;  Recorded: 01/13/2010;    WISDOM TOOTH EXTRACTION       Labs reviewed in EPIC  BP Readings from Last 3 Encounters:   06/25/24 138/88   12/22/23 118/70   12/05/23 118/84    Wt Readings from Last 3 Encounters:   06/25/24 124.7 kg (275 lb)   12/22/23 116.6 kg (257 lb)   12/05/23 115.7 kg (255 lb)                      Review of Systems  Constitutional, HEENT, cardiovascular, pulmonary, gi and gu systems are negative, except as otherwise noted.     Objective    Exam  /88   Pulse 67   Temp 98.7  F (37.1  C) (Oral)   Resp 14   Ht 1.803 m (5' 11\")   Wt 124.7 kg (275 lb)   SpO2 97%   BMI 38.35 kg/m     Estimated body mass index is 38.35 kg/m  as calculated from the following:    Height as of this encounter: 1.803 m (5' 11\").    Weight as of this encounter: 124.7 kg (275 lb).    Physical Exam  GENERAL: alert and no distress  EYES: Eyes grossly " normal to inspection, PERRL and conjunctivae and sclerae normal  NECK: no adenopathy, no asymmetry, masses, or scars  RESP: lungs clear to auscultation - no rales, rhonchi or wheezes  CV: regular rate and rhythm, normal S1 S2, no S3 or S4, no murmur, click or rub, no peripheral edema  ABDOMEN: soft, nontender, no hepatosplenomegaly, no masses and bowel sounds normal  MS: no gross musculoskeletal defects noted, no edema  SKIN: no suspicious lesions or rashes. Flat wart without redness, on right thumb  NEURO: Normal strength and tone, mentation intact and speech normal  PSYCH: mentation appears normal, affect normal/bright  LYMPH: no cervical, supraclavicular, axillary, or inguinal adenopathy        Signed Electronically by: CECILY Lo CNP

## 2024-06-26 ENCOUNTER — TELEPHONE (OUTPATIENT)
Dept: FAMILY MEDICINE | Facility: CLINIC | Age: 59
End: 2024-06-26

## 2024-06-26 NOTE — TELEPHONE ENCOUNTER
6/25/24  dulaglutide (TRULICITY) 1.5 MG/0.5ML pen  1.5 mg, EVERY 7 DAYS            Dulaglutide (TRULICITY) 3 MG/0.5ML SOPN  3 mg, WEEKLY   Summary: Inject 3 mg Subcutaneous once a week Take at 1.5 mg dosages (next four weeks), Disp-2 mL, R-2, E-Prescribe  Dose, Route, Frequency: 3 mg, Subcutaneous, WEEKLYStart: 06/25/2024Ord/Sold: 06/25/2024 (O)Ordered On: 06/25/2024[facility] [address] Associated: Taking: Long-term: Med Note:             Change  Patient Sig: Inject 3 mg Subcutaneous once a week Take at 1.5 mg dosages (next four weeks)

## 2024-06-27 NOTE — RESULT ENCOUNTER NOTE
Brian Faust    It was good to see you again!    Your labs overall look very stable, good news!  Your HDL is low, so working on more regular exercise will aide in elevating this value.    I would continue your medications with no changes at this time.     If you have questions, please let me know.     Eufemia

## 2024-06-28 RX ORDER — ROSUVASTATIN CALCIUM 10 MG/1
TABLET, COATED ORAL
Qty: 90 TABLET | Refills: 3 | Status: SHIPPED | OUTPATIENT
Start: 2024-06-28

## 2024-06-28 RX ORDER — LISINOPRIL 10 MG/1
10 TABLET ORAL DAILY
Qty: 90 TABLET | Refills: 3 | Status: SHIPPED | OUTPATIENT
Start: 2024-06-28

## 2024-07-07 ENCOUNTER — MYC MEDICAL ADVICE (OUTPATIENT)
Dept: FAMILY MEDICINE | Facility: CLINIC | Age: 59
End: 2024-07-07
Payer: COMMERCIAL

## 2024-07-07 DIAGNOSIS — E66.01 MORBID OBESITY (H): ICD-10-CM

## 2024-07-07 DIAGNOSIS — R80.9 TYPE 2 DIABETES MELLITUS WITH MICROALBUMINURIA, WITHOUT LONG-TERM CURRENT USE OF INSULIN (H): ICD-10-CM

## 2024-07-07 DIAGNOSIS — E11.29 TYPE 2 DIABETES MELLITUS WITH MICROALBUMINURIA, WITHOUT LONG-TERM CURRENT USE OF INSULIN (H): ICD-10-CM

## 2024-07-07 DIAGNOSIS — I10 ESSENTIAL HYPERTENSION: ICD-10-CM

## 2024-07-07 DIAGNOSIS — G47.30 SLEEP APNEA, UNSPECIFIED TYPE: ICD-10-CM

## 2024-07-07 DIAGNOSIS — E78.2 MIXED HYPERLIPIDEMIA: ICD-10-CM

## 2024-07-07 DIAGNOSIS — K76.0 FATTY LIVER: ICD-10-CM

## 2024-07-10 RX ORDER — DULAGLUTIDE 3 MG/.5ML
3 INJECTION, SOLUTION SUBCUTANEOUS WEEKLY
Qty: 6 ML | Refills: 1 | Status: SHIPPED | OUTPATIENT
Start: 2024-07-10 | End: 2024-09-26

## 2024-07-10 NOTE — TELEPHONE ENCOUNTER
Medication Pending:     Patient requesting RX Trulicity 1.5 mg x 1 month supply to be sent local to [facility].    Patient is requesting RX for Trulicity 3mg x 3 month [facility] to be filled 1st week of August.

## 2024-07-12 ENCOUNTER — TELEPHONE (OUTPATIENT)
Dept: FAMILY MEDICINE | Facility: CLINIC | Age: 59
End: 2024-07-12

## 2024-07-12 DIAGNOSIS — E66.01 MORBID OBESITY (H): ICD-10-CM

## 2024-07-12 DIAGNOSIS — E78.2 MIXED HYPERLIPIDEMIA: ICD-10-CM

## 2024-07-12 DIAGNOSIS — G47.30 SLEEP APNEA, UNSPECIFIED TYPE: ICD-10-CM

## 2024-07-12 DIAGNOSIS — K76.0 FATTY LIVER: ICD-10-CM

## 2024-07-12 DIAGNOSIS — I10 ESSENTIAL HYPERTENSION: ICD-10-CM

## 2024-07-12 DIAGNOSIS — E11.29 TYPE 2 DIABETES MELLITUS WITH MICROALBUMINURIA, WITHOUT LONG-TERM CURRENT USE OF INSULIN (H): ICD-10-CM

## 2024-07-12 DIAGNOSIS — R80.9 TYPE 2 DIABETES MELLITUS WITH MICROALBUMINURIA, WITHOUT LONG-TERM CURRENT USE OF INSULIN (H): ICD-10-CM

## 2024-07-12 NOTE — TELEPHONE ENCOUNTER
Ion with [facility] calling stating [facility] is needing more info about Trulicity 1.5 mg.     States [facility] is needing a Patient Level Authorization (LUCILA). Needing someone from the clinic to call and verify [phone].     Writer called [facility]. Trulicity 1.5 mg was refilled on 7/10/24 and sent to the patient. They are not needing a LUCILA at this time.     Montserrat DENSON RN

## 2024-07-14 ENCOUNTER — HOSPITAL ENCOUNTER (OUTPATIENT)
Dept: ULTRASOUND IMAGING | Facility: HOSPITAL | Age: 59
Discharge: HOME | End: 2024-07-14
Attending: NURSE PRACTITIONER | Admitting: NURSE PRACTITIONER
Payer: COMMERCIAL

## 2024-07-14 DIAGNOSIS — R16.1 SPLENOMEGALY: ICD-10-CM

## 2024-07-14 PROCEDURE — 76705 ECHO EXAM OF ABDOMEN: CPT

## 2024-07-16 ENCOUNTER — DOCUMENTATION ONLY (OUTPATIENT)
Dept: CARDIOLOGY | Facility: CLINIC | Age: 59
End: 2024-07-16

## 2024-07-16 ENCOUNTER — PREP FOR PROCEDURE (OUTPATIENT)
Dept: CARDIOLOGY | Facility: CLINIC | Age: 59
End: 2024-07-16

## 2024-07-16 ENCOUNTER — OFFICE VISIT (OUTPATIENT)
Dept: CARDIOLOGY | Facility: CLINIC | Age: 59
End: 2024-07-16
Payer: COMMERCIAL

## 2024-07-16 VITALS
WEIGHT: 281 LBS | HEART RATE: 64 BPM | SYSTOLIC BLOOD PRESSURE: 125 MMHG | BODY MASS INDEX: 39.19 KG/M2 | RESPIRATION RATE: 16 BRPM | DIASTOLIC BLOOD PRESSURE: 80 MMHG

## 2024-07-16 DIAGNOSIS — I48.19 PERSISTENT ATRIAL FIBRILLATION (H): Primary | ICD-10-CM

## 2024-07-16 DIAGNOSIS — I10 ESSENTIAL HYPERTENSION: ICD-10-CM

## 2024-07-16 DIAGNOSIS — Z79.01 CHRONIC ANTICOAGULATION: Chronic | ICD-10-CM

## 2024-07-16 DIAGNOSIS — Z86.79 HISTORY OF ATRIAL FIBRILLATION: ICD-10-CM

## 2024-07-16 PROCEDURE — G2211 COMPLEX E/M VISIT ADD ON: HCPCS | Performed by: INTERNAL MEDICINE

## 2024-07-16 PROCEDURE — 99214 OFFICE O/P EST MOD 30 MIN: CPT | Performed by: INTERNAL MEDICINE

## 2024-07-16 RX ORDER — FENTANYL CITRATE 50 UG/ML
25 INJECTION, SOLUTION INTRAMUSCULAR; INTRAVENOUS
Status: CANCELLED | OUTPATIENT
Start: 2024-09-05

## 2024-07-16 RX ORDER — LIDOCAINE 40 MG/G
CREAM TOPICAL
Status: CANCELLED | OUTPATIENT
Start: 2024-07-16

## 2024-07-16 RX ORDER — SODIUM CHLORIDE 9 MG/ML
100 INJECTION, SOLUTION INTRAVENOUS CONTINUOUS
Status: CANCELLED | OUTPATIENT
Start: 2024-09-05

## 2024-07-16 NOTE — PROGRESS NOTES
HEART CARE ENCOUNTER CONSULTATON [facility]  [phone]      Assessment/Recommendations   Assessment/Plan:    Govind Alexandre is a very pleasant 58 year old male with PMH of atrial fibrillation, hypertension, hyperlipidemia, type 2 diabetes, kidney stones s/p bilateral ureteroscopy, stone removal and bilateral ureteral stents who presents today to the EP clinic.    Paroxysmal atrial fibrillation  - We reviewed the pathophysiology of atrial fibrillation and management considerations including stroke risk and anticoagulation, rate control, cardioversion, antiarrhythmic drug therapy, and catheter ablation. We discussed atrial fibrillation ablation procedures, anticipated success rates, the potential need for re-do ablation vs addition of anti-arrhythmic drugs, procedural risks (including groin bleeding, tamponade, phrenic or esophageal injury, stroke, pulmonary vein stenosis) and recovery expectations.  - On Diltiazem now but would like to avoid taking meds long term  - we will plan an ablation  - we discussed the ongoing importance of lifestyle modification (maintaining a healthy weight, sleep apnea diagnosis and management, alcohol avoidance) as part of a long term strategy for atrial fibrillation management    2. Anticoagulation  - CHADSVASC score 2  - continue Eliquis  - he has a history of a TBI with internal brain bleed and is interested in LAAO, we will explore it after an ablation    3. HTN  - well controlled    4. KORINA  - On CPAP    Time spent: 30 minutes spent on the date of the encounter doing chart review, history and exam, documentation and further activities as noted above.    The longitudinal plan of care for the condition(s) below were addressed during this visit. Due to the added complexity in care, I will continue support in the subsequent management of this condition(s) and with the ongoing continuity of care of this condition(s).            History of Present  Illness/Subjective    HPI: Govind Alexandre is a very pleasant 58 year old male with PMH of atrial fibrillation, hypertension, hyperlipidemia, type 2 diabetes, kidney stones s/p bilateral ureteroscopy, stone removal and bilateral ureteral stents who presents today to the EP clinic.    Govind was first diagnosed with AF about 6-7 years ago in the setting of a TBI. More recently he was told again that he was in AF when he was in the hospital for sepsis. Perception of AF has been tricky in him given his recent hospitalization. He has noted having episodes of AF on his apple watch.    He has been compliant with his meds since discharge. He does have orthostatic symptoms.    July 2024  He has not had any episodes of AF since we last met. He monitors his AF via his apple watch. He is stringly inclined to stop taking meds long term and so is interested in an ablation.    Alcohol use 2 times a month    Uses CPAP regularly.    Recent Echocardiogram Results (personally reviewed):    July 2023    Interpretation Summary     Normal left ventricular size. Moderate concentric left ventricular hypertrophy  with sigmoid shaped septum. Visually estimated ejection fraction of 55 to 60%  without observed wall motion abnormality.  Right ventricle is mildly dilated. Right ventricular systolic function appears  grossly normal.  Mild biatrial enlargement.  Mild mitral regurgitation.  Mild tricuspid regurgitation. Unable to estimate right ventricular systolic  function secondary to incomplete tricuspid regurgitation velocity envelope.  Mild enlargement of the proximal ascending aorta.  Underlying rhythm is atrial fibrillation.      Labs below reviewed personally     Physical Examination  Review of Systems   Vitals: /80 (BP Location: Right arm, Patient Position: Sitting, Cuff Size: Adult Large)   Pulse 64   Resp 16   Wt 127.5 kg (281 lb)   BMI 39.19 kg/m    BMI= Body mass index is 39.19 kg/m .  Wt Readings from Last 3 Encounters:    07/16/24 127.5 kg (281 lb)   06/25/24 124.7 kg (275 lb)   12/22/23 116.6 kg (257 lb)       General Appearance:   no distress, normal body habitus   ENT/Mouth: membranes moist, no oral lesions or bleeding gums.      EYES:  no scleral icterus, normal conjunctivae   Neck: no carotid bruits or thyromegaly   Chest/Lungs:   lungs are clear to auscultation, no rales or wheezing, no sternal scar, equal chest wall expansion    Cardiovascular:   Regular. Normal first and second heart sounds with no murmurs, rubs, or gallops; the carotid, radial and posterior tibial pulses are intact, no edema bilaterally    Abdomen:  no organomegaly, masses, bruits, or tenderness; bowel sounds are present   Extremities: no cyanosis or clubbing   Skin: no xanthelasma, warm.    Neurologic: normal  bilateral, no tremors     Psychiatric: alert and oriented x3, calm        Please refer above for cardiac ROS details.        Medical History  Surgical History Family History Social History   Past Medical History:   Diagnosis Date    Depression 12/24/2014    Essential hypertension     Infection due to 2019 novel coronavirus 01/11/2022    Multiple thyroid nodules 09/08/2016    Type 2 diabetes mellitus without complication (H) 11/07/2016     Past Surgical History:   Procedure Laterality Date    BIOPSY  9/18/16    CARPAL TUNNEL RELEASE RT/LT Right     COLONOSCOPY  6/15/18    COMBINED CYSTOSCOPY, RETROGRADES, URETEROSCOPY, LASER HOLMIUM LITHOTRIPSY URETER(S), INSERT STENT Bilateral 7/26/2023    Procedure: cystoscopy, bilateral ureteroscopy with holmium laser lithotripsy, bilateral ureteroscopy with stone basketing, bilateral retrograde pyelogram, bilateral ureteral stent placement;  Surgeon: Mark Orta MD;  Location:  OR     REPAIR ROTATOR CUFF,ACUTE      Description: Rotator Cuff Repair Acute;  Recorded: 01/13/2010;  Comments: cortisone    PICC  11/30/2015         REMOVAL OF SPERM DUCT(S)      Description: Surgery Of Male Genitalia  Vasectomy;  Recorded: 01/13/2010;    WISDOM TOOTH EXTRACTION       Family History   Problem Relation Age of Onset    Heart Disease Mother     Kidney Disease Mother     Thyroid Cancer Father     Kidney Disease Father     Heart Disease Father     Hypertension Father     Thyroid Disease Father         cancer    Cancer Father         myoleodysplansic syndrome    No Known Problems Sister     Hypertension Sister     Hypertension Brother     Heart Disease Paternal Grandmother     Depression Daughter     Anxiety Disorder Daughter     Autism Spectrum Disorder Son         Social History     Socioeconomic History    Marital status:      Spouse name: Not on file    Number of children: Not on file    Years of education: Not on file    Highest education level: Not on file   Occupational History    Not on file   Tobacco Use    Smoking status: Former     Types: Dip, chew, snus or snuff     Passive exposure: Never    Smokeless tobacco: Former     Types: Chew     Quit date: 12/24/2004   Vaping Use    Vaping status: Never Used   Substance and Sexual Activity    Alcohol use: Yes     Comment: Alcoholic Drinks/day: 1 every 2 weeks    Drug use: No    Sexual activity: Yes     Partners: Female     Birth control/protection: None   Other Topics Concern    Not on file   Social History Narrative    Not on file     Social Determinants of Health     Financial Resource Strain: Low Risk  (6/24/2024)    Financial Resource Strain     Within the past 12 months, have you or your family members you live with been unable to get utilities (heat, electricity) when it was really needed?: No   Food Insecurity: Low Risk  (6/24/2024)    Food Insecurity     Within the past 12 months, did you worry that your food would run out before you got money to buy more?: No     Within the past 12 months, did the food you bought just not last and you didn t have money to get more?: No   Transportation Needs: Low Risk  (6/24/2024)    Transportation Needs     Within  the past 12 months, has lack of transportation kept you from medical appointments, getting your medicines, non-medical meetings or appointments, work, or from getting things that you need?: No   Physical Activity: Insufficiently Active (6/24/2024)    Exercise Vital Sign     Days of Exercise per Week: 3 days     Minutes of Exercise per Session: 20 min   Stress: Stress Concern Present (6/24/2024)    [employer] - Occupational Stress Questionnaire     Feeling of Stress : Rather much   Social Connections: Unknown (6/24/2024)    Social Connection and Isolation Panel [NHANES]     Frequency of Communication with Friends and Family: Not on file     Frequency of Social Gatherings with Friends and Family: Twice a week     Attends Confucianist Services: Not on file     Active Member of Clubs or Organizations: Not on file     Attends Club or Organization Meetings: Not on file     Marital Status: Not on file   Interpersonal Safety: Low Risk  (6/25/2024)    Interpersonal Safety     Do you feel physically and emotionally safe where you currently live?: Yes     Within the past 12 months, have you been hit, slapped, kicked or otherwise physically hurt by someone?: No     Within the past 12 months, have you been humiliated or emotionally abused in other ways by your partner or ex-partner?: No   Housing Stability: Low Risk  (6/24/2024)    Housing Stability     Do you have housing? : Yes     Are you worried about losing your housing?: No           Medications  Allergies   Current Outpatient Medications   Medication Sig Dispense Refill    ACCU-CHEK FASTCLIX LANCET DRUM [ACCU-CHEK FASTCLIX LANCET DRUM] USE TO TEST BLOOD SUGARS 2 TO 3 TIMES A  each 4    apixaban ANTICOAGULANT (ELIQUIS) 5 MG tablet Take 1 tablet (5 mg) by mouth 2 times daily 180 tablet 3    blood glucose test (ACCU-CHEK NATHANIEL PLUS TEST STRP) strips [BLOOD GLUCOSE TEST (ACCU-CHEK NATHANIEL PLUS TEST STRP) STRIPS] Use 1 each As Directed 3 (three)  times a day. 300 each 3    diltiazem ER COATED BEADS (CARDIZEM CD/CARTIA XT) 180 MG 24 hr capsule Take 1 capsule (180 mg) by mouth daily 90 capsule 3    dulaglutide (TRULICITY) 0.75 MG/0.5ML pen Inject 0.75 mg Subcutaneous every 7 days 6 mL 1    dulaglutide (TRULICITY) 1.5 MG/0.5ML pen Inject 1.5 mg subcutaneously every 7 days 6 mL 0    Dulaglutide (TRULICITY) 3 MG/0.5ML SOPN Inject 3 mg subcutaneously once a week 6 mL 1    fluticasone (FLONASE) 50 MCG/ACT nasal spray Spray 2 sprays into both nostrils daily as needed for rhinitis or allergies      lisinopril (ZESTRIL) 10 MG tablet Take 1 tablet (10 mg) by mouth daily 90 tablet 3    metFORMIN (GLUCOPHAGE XR) 500 MG 24 hr tablet Take 1 tablet (500 mg) by mouth 2 times daily (with meals) for 90 days 180 tablet 0    rosuvastatin (CRESTOR) 10 MG tablet TAKE 1 TABLET(10 MG) BY MOUTH AT BEDTIME Strength: 10 mg 90 tablet 3    spironolactone (ALDACTONE) 25 MG tablet Take 0.5 tablets (12.5 mg) by mouth daily 45 tablet 3    tadalafil (CIALIS) 10 MG tablet Take 1 tablet (10 mg) by mouth daily as needed (erectile dsyfunction) 10 mg as a single dose 30 minutes prior to anticipated sexual activity; do not take more than once daily.  Adjust dose based on effectiveness and tolerability; may decrease to 5 mg or increase to 20 mg per dose. 30 tablet 3     No Known Allergies       Lab Results    Chemistry/lipid CBC Cardiac Enzymes/BNP/TSH/INR   Recent Labs   Lab Test 08/18/23  0857   CHOL 109   HDL 33*   LDL 52   TRIG 118     Recent Labs   Lab Test 08/18/23  0857 11/29/22  0800 12/24/21  0742   LDL 52 41 56     Recent Labs   Lab Test 08/18/23  0857      POTASSIUM 3.9   CHLORIDE 109*   CO2 23   *   BUN 7.9*   CR 0.87   GFRESTIMATED >90   LEV 9.4     Recent Labs   Lab Test 08/18/23  0857 08/15/23  1600 08/08/23  1500   CR 0.87 0.89 1.01     Recent Labs   Lab Test 08/18/23  0857 11/29/22  0800 12/24/21  0742   A1C 5.3 5.0 6.9*          Recent Labs   Lab Test 08/15/23  1600    WBC 6.7   HGB 10.9*   HCT 34.2*   MCV 95        Recent Labs   Lab Test 08/15/23  1600 08/08/23  1500 08/03/23  0417   HGB 10.9* 11.3* 9.4*    Recent Labs   Lab Test 07/30/23  0549 07/29/23 2014   TROPONINI 0.08 0.01     Recent Labs   Lab Test 07/30/23  0549   *     Recent Labs   Lab Test 12/24/21  0742   TSH 0.73     No results for input(s): INR in the last 57523 hours.     Kenny Chaves MD

## 2024-07-16 NOTE — PATIENT INSTRUCTIONS
[facility]  Cardiac Electrophysiology  [address]  [place] [address]   Office: [phone]  Fax: [phone]       Thank you for seeing us in clinic today - it is a pleasure to be a part of your care team.  Below is a summary of our plan from today's visit.       You have paroxysmal atrial fibrillation, presently being managed with Diltiazem  We reviewed physiology and management options including antiarrhythmic drug therapy, catheter ablation and lifestyle modification.  We will plan for the following:  - w benita will schedule you for an AF ablation  - continue Diltiazem for now  - continue Eliquis.     Please do not hesitate to be in touch with our office at [phone] with any questions that may arise.       Thank you for trusting us with your care,    Kenny Chaves MD  Clinical Cardiac Electrophysiology  [facility]  [address]  [place] [address]   Office: [phone]  Fax: [phone]                 ATRIAL FIBRILLATION: Patient Information     What is atrial fibrillation?  Atrial fibrillation (AF, A-fib) is a common heart rhythm problem (arrhythmia) occurring within the upper chambers of the heart (the atria).  In normal rhythm, the upper and lower chambers of the heart are electrically driven to contract in a coordinated sequence.  In atrial fibrillation, the atria lose their ability to contract because of rapid and chaotic electrical activity.  The lower chambers of the heart (the ventricles) continue to pump blood throughout the body, though with irregular and often faster rate due to the chaotic activity within the atria.          How do I know if I have atrial fibrillation?   Some people may feel their heart beating faster, harder, or irregularly while in atrial fibrillation.  Others may be lightheaded, fatigued, feel weak or tired or become more short of breath especially with activities.  Some patients have no  8 cuts-  1993. symptoms at all.  Atrial fibrillation may be found due to an irregular pulse or on an electrocardiogram (ECG). Atrial fibrillation can start and stop on its own, and episodes can last from seconds to several months.       How common is atrial fibrillation?   An estimated 3-6 million people in the [place] have atrial fibrillation.  Atrial fibrillation is a common heart rhythm problem for older persons, affecting as estimated 12-15% of people over the age of 65 years of age.     What causes atrial fibrillation?   Age is the most important risk factor for atrial fibrillation.  Atrial fibrillation is more common in people with other heart disease, high blood pressure, diabetes, obesity, sleep apnea and in people who regularly consume alcohol.  Surgery, lung disease, or thyroid problems can lead to atrial fibrillation.  Atrial fibrillation has multiple possible causes, and in most cases a single cause cannot be found.  Atrial fibrillation is a progressive condition, usually starting with at an early stage with short and infrequent episodes.  In later stages of disease, more frequent and longer lasting episodes of atrial fibrillation occur, ultimately culminating in episodes which do not spontaneously terminate.  Generally, more enlargement and scarring within the upper chambers of the heart is observed as atrial fibrillation progresses from early to late-stage disease.     How is atrial fibrillation diagnosed and evaluated?    Because of its start-stop nature, atrial fibrillation can be challenging to diagnose.  Atrial fibrillation is most commonly diagnosed via cardiac rhythm recordings - either an ECG or wearable cardiac rhythm monitor.  For patients with pacemakers, defibrillators or implantable loop recorders, atrial fibrillation may be recorded via these devices.  Recently, commercially available devices (eg. Apple Watch, Iotera device, certain FitBit devices, others) can allow patients to take 30 second  cardiac rhythm recordings which may document atrial fibrillation.  Once atrial fibrillation is diagnosed, additional tests include blood tests and an echocardiogram.  The echocardiogram uses ultrasound to look at your heart to assess your cardiac function and evaluate for other heart disease.  Additional evaluation may include CT or MRI studies.     Is atrial fibrillation dangerous?   Atrial fibrillation is not usually a life-threatening arrhythmia.  The most serious consequences of atrial fibrillation including stroke and worsening of overall cardiac function.  While in atrial fibrillation, the upper cardiac chambers do not contract normally, resulting in slower blood flow and increased risk of clot formation.  If this blood clot becomes detached from the heart a stroke can occur.  Unfortunately, stroke can be the first sign of atrial fibrillation for some people.  With a stroke, you may notice abnormal sensation, weakness on one side of the body or face, changes in your vision or speech.  If you have any of these signs, you should contact EMS and be evaluated in an emergency room as soon as possible.       How is atrial fibrillation treated?     Several treatment options exist for suppressing atrial fibrillation - however, it is not an easily curable arrhythmia.  The first goal in managing atrial fibrillation is to minimize stroke risk.  The second goal is to improve symptoms associated with atrial fibrillation.  Finally, in patients with reduced cardiac function, maintaining normal rhythm can help improve cardiac function.       Blood thinners are used to reduce the risk of stroke in patients with high estimated stroke risk related to atrial fibrillation.  For patients at higher risk of bleeding related to blood thinner use, implantable devices may be an option to reduce stroke risk without the need for long term blood thinner use.       Atrial fibrillation can be managed via two strategies: rate control and  rhythm control.  In a rate control strategy, continued atrial fibrillation is accepted and medications (eg. beta-blockers or calcium channel blockers) are used to control the lower chamber rate.  In a rhythm control strategy, anti-arrhythmic medications or catheter ablation are used to maintain normal cardiac rhythm and slow disease progression by suppressing atrial fibrillation.  A procedure called a cardioversion, in which an electric shock is delivered through patches placed on the chest wall while under deep sedation, can be performed to temporarily restore normal cardiac rhythm, though does not address the chance of atrial fibrillation recurrence.  Treatments are more effective for earlier rather than later stage atrial fibrillation.  Lifestyle modifications (maintaining a healthy weight, aerobic exercise, diagnosing and treating sleep apnea, and minimizing alcohol intake) are important elements of atrial fibrillation rhythm control.      What is catheter ablation for atrial fibrillation?  Cardiac catheter ablation is a commonly performed, minimally invasive procedure performed by a cardiac electrophysiologist to treat many different cardiac rhythm abnormalities.  During catheter ablation, long, thin, flexible tubes are advanced into the heart via small sheaths inserted into the femoral veins and thermal energy (either heating or cooling) is applied within the heart to disrupt abnormal electrical activity.  Atrial fibrillation ablation is performed under general anesthesia, with procedures generally taking approximately 2-3 hours.  Patients are typically observed for 3-5 hours after the ablation, and in most cases can be discharged home the same day.  Atrial fibrillation ablation is associated with better outcomes (mortality, cardiovascular hospitalizations, atrial arrhythmia recurrences) compared to antiarrhythmic drug therapy.  However, atrial fibrillation recurrences are not uncommon, and repeat catheter  ablation may be offered.  Your electrophysiology team can review atrial fibrillation ablation, anticipated success rates, risks, and recovery expectations with you.     What are anti-arrhythmic medications?  Anti-arrhythmic medications are specialized drugs which alter cardiac electrical functioning to suppress arrhythmias.  There are several anti-arrhythmic medications available, each with its own success rate and side effects.  Some anti-arrhythmic medications are less effective though safer to use, others are more effective though have serious potential toxicities.  Atrial fibrillation recurrences are common and may require dose adjustment or change in antiarrhythmic therapy.  Your electrophysiology team will carefully consider which medication would be the best and safest for your particular case.       Can I live a normal life?    The goal of atrial fibrillation management is for patients to live normal lives without being limited by symptoms related to atrial fibrillation.     Are any additional educational resources available?  There are a number of excellent atrial fibrillation education resources available to you online.  A few options you may wish to review include:  hrsonline.org/guide-atrial-fibrillation  afibmatters.org  getsmartaboutafib.com  stopaf.com     What comes next?    Consider your management options and let us know how we can help in your decision process.  Please take medications as they have been prescribed.  You should also get any tests that may have been ordered for you.       When to Call Your Doctor or seek emergency care:  Call your doctor or seek emergency care if you have any significant changes with the following:  Weakness  Dizziness  Fainting  Fatigue  Shortness of breath  Chest pain with increased activity  If you are concerned that your heart rate is too fast or too slow  Bleeding that does not stop in 10 minutes  Coughing or throwing up blood  Bloody diarrhea or bleeding  hemorrhoids  Dark-colored urine or black stool  Allergic reactions:  Rash  Itching  Swelling  Trouble breathing or swallowing        Please call the [facility] at [phone] if you have concerns about your symptoms, your medicines, or your follow-up appointments.     Cardiac Monitor/Defib/ACLS/Rescue Kit/O2/BVM

## 2024-07-16 NOTE — PROGRESS NOTES
H&P:  Card OV  Date:  EP LELAND [] PVI  Order Case Req Y  Order Set Y Lab/EKG   Orders [] Imaging Order None     AC Eliquis-CONTINUE   AAD Diltiazem-Hold 3 days prior   PPI/H2 Blocker Start Protonix 40mg Daily 3 days prior, 6wk post   Diuretics spironolactone   DM/GLP-1 DM Meds-  metformin  GLP-1- Dulaglutide (Trulicity)   Kenny Chaves MD  [facility] Ep Support [place] [facility] team, please schedule Govind for an AF ablation . He would prefer mid September 2024      General Anesthesia  CARTO mapping system  Hold Diltiazem 3 days prior to ablation  Continue anticoagulation  CBC, BMP  on day of procedure    Thanks  RAYMOND ALFONSO Bettie, 1965, [phone]  Home:[phone] (home) Cell:[phone] (mobile)  Emergency Contact: Kathleen Alexandre   PCP: Lachelle Summers, [phone]    Important patient information for CSC/Cath Lab staff : None    [facility] EP Cath Lab Procedure Order   Ablation Type:  PVI- Atrial Fibrillation  Ordering Provider: Dr Chaves  Date Ordered and Prepped: 7/16/2024 Lili Kamara RN    Scheduling Information:  Anticipated Case Duration:  Standard ( Case per day SA 2:1, DW 5:1, RAYMOND 3:1)   Scheduling Timeframe:  Next Available  Scheduling Restrictions: None  Scheduling Contact: Please contact pt to schedule, if you are unable to schedule date within the next 24 hours please contact pt to update on scheduling process  EP RN Follow Up Apt: Schedule EP RN PC visit 3-4 days s/p PVI  MD Preference: Scheduling with ordering provider  Current Device/Device Co Needed for Procedure: None NoneNone  Pre-Procedural Testing needed: None  Mapping System Required:  Carto (Dr Ayala and Dr Chaves)  ICE Needed:  Yes  Anesthesia:General Anesthesia    [facility] EP Cath Lab Prep   H&P:  Schedule H&P with EP LELAND, RN Teach, and Labs within 30 days of PVI  Pre-op Labs: CBC, BMP, Beta HcG if appropriate, and INR if on Warfarin will be ordered AM of procedure, if not completed at pre-op H&P within 7 days of procedure.  T&S  Pre-Procedure Review: Does not need for PVI procedures  Medical Records Pertinent for Procedure:   Echo 7/30/23 EF 55-60%  Iodinated Contrast Dye Allergies (Does not include Shellfish, Egg, and/or Iodine Allergy): NA  GLP-1 Protocol: If on Dulaglutide (Trulicity) (weekly)- Injection hold 7 days prior to procedure  , Exenatide extended release (Bydureon bcise) (weekly)- Injection hold 7 days prior to procedure, Exenatide (Byetta) (twice daily)- Oral Tablet hold day prior and morning of procedure and for Injection hold 7 days prior to procedure, Semaglutide (Ozempic) (weekly)- Injection and Oral hold 7 days prior to procedure, Liraglutide (Victoza, Saxenda) (daily)- Injection hold day prior and morning of procedure  Follow Up S/P: EP RN 3-4 PC Visit and EP LELAND 6wk (To be scheduled at time of case scheduling)    No Known Allergies    Current Outpatient Medications:     ACCU-CHEK FASTCLIX LANCET DRUM, [ACCU-CHEK FASTCLIX LANCET DRUM] USE TO TEST BLOOD SUGARS 2 TO 3 TIMES A DAY, Disp: 300 each, Rfl: 4    apixaban ANTICOAGULANT (ELIQUIS) 5 MG tablet, Take 1 tablet (5 mg) by mouth 2 times daily, Disp: 180 tablet, Rfl: 3    blood glucose test (ACCU-CHEK NATHANIEL PLUS TEST STRP) strips, [BLOOD GLUCOSE TEST (ACCU-CHEK NATHANIEL PLUS TEST STRP) STRIPS] Use 1 each As Directed 3 (three) times a day., Disp: 300 each, Rfl: 3    diltiazem ER COATED BEADS (CARDIZEM CD/CARTIA XT) 180 MG 24 hr capsule, Take 1 capsule (180 mg) by mouth daily, Disp: 90 capsule, Rfl: 3    dulaglutide (TRULICITY) 0.75 MG/0.5ML pen, Inject 0.75 mg Subcutaneous every 7 days, Disp: 6 mL, Rfl: 1    dulaglutide (TRULICITY) 1.5 MG/0.5ML pen, Inject 1.5 mg subcutaneously every 7 days, Disp: 6 mL, Rfl: 0    Dulaglutide (TRULICITY) 3 MG/0.5ML SOPN, Inject 3 mg subcutaneously once a week, Disp: 6 mL, Rfl: 1    fluticasone (FLONASE) 50 MCG/ACT nasal spray, Spray 2 sprays into both nostrils daily as needed for rhinitis or allergies, Disp: , Rfl:     lisinopril (ZESTRIL) 10  MG tablet, Take 1 tablet (10 mg) by mouth daily, Disp: 90 tablet, Rfl: 3    metFORMIN (GLUCOPHAGE XR) 500 MG 24 hr tablet, Take 1 tablet (500 mg) by mouth 2 times daily (with meals) for 90 days, Disp: 180 tablet, Rfl: 0    rosuvastatin (CRESTOR) 10 MG tablet, TAKE 1 TABLET(10 MG) BY MOUTH AT BEDTIME Strength: 10 mg, Disp: 90 tablet, Rfl: 3    spironolactone (ALDACTONE) 25 MG tablet, Take 0.5 tablets (12.5 mg) by mouth daily, Disp: 45 tablet, Rfl: 3    tadalafil (CIALIS) 10 MG tablet, Take 1 tablet (10 mg) by mouth daily as needed (erectile dsyfunction) 10 mg as a single dose 30 minutes prior to anticipated sexual activity; do not take more than once daily.  Adjust dose based on effectiveness and tolerability; may decrease to 5 mg or increase to 20 mg per dose., Disp: 30 tablet, Rfl: 3    Documentation Date:7/16/2024 10:54 AM  Lili Kamara RN

## 2024-07-16 NOTE — LETTER
7/16/2024    Lachelle Summers, APRN CNP  [address] [facility] [address]    RE: Govind Alexandre       Dear Colleague,     I had the pleasure of seeing Govind Alexandre in the [facility].    HEART CARE ENCOUNTER CONSULTATON NOTE      [facility]  [phone]      Assessment/Recommendations   Assessment/Plan:    Govind Alexandre is a very pleasant 58 year old male with PMH of atrial fibrillation, hypertension, hyperlipidemia, type 2 diabetes, kidney stones s/p bilateral ureteroscopy, stone removal and bilateral ureteral stents who presents today to the EP clinic.    Paroxysmal atrial fibrillation  - We reviewed the pathophysiology of atrial fibrillation and management considerations including stroke risk and anticoagulation, rate control, cardioversion, antiarrhythmic drug therapy, and catheter ablation. We discussed atrial fibrillation ablation procedures, anticipated success rates, the potential need for re-do ablation vs addition of anti-arrhythmic drugs, procedural risks (including groin bleeding, tamponade, phrenic or esophageal injury, stroke, pulmonary vein stenosis) and recovery expectations.  - On Diltiazem now but would like to avoid taking meds long term  - we will plan an ablation  - we discussed the ongoing importance of lifestyle modification (maintaining a healthy weight, sleep apnea diagnosis and management, alcohol avoidance) as part of a long term strategy for atrial fibrillation management    2. Anticoagulation  - CHADSVASC score 2  - continue Eliquis  - he has a history of a TBI with internal brain bleed and is interested in LAAO, we will explore it after an ablation    3. HTN  - well controlled    4. KORINA  - On CPAP    Time spent: 30 minutes spent on the date of the encounter doing chart review, history and exam, documentation and further activities as noted above.    The longitudinal plan of care for the condition(s) below were addressed during this visit.  Due to the added complexity in care, I will continue support in the subsequent management of this condition(s) and with the ongoing continuity of care of this condition(s).            History of Present Illness/Subjective    HPI: Govind Alexandre is a very pleasant 58 year old male with PMH of atrial fibrillation, hypertension, hyperlipidemia, type 2 diabetes, kidney stones s/p bilateral ureteroscopy, stone removal and bilateral ureteral stents who presents today to the EP clinic.    Govind was first diagnosed with AF about 6-7 years ago in the setting of a TBI. More recently he was told again that he was in AF when he was in the hospital for sepsis. Perception of AF has been tricky in him given his recent hospitalization. He has noted having episodes of AF on his apple watch.    He has been compliant with his meds since discharge. He does have orthostatic symptoms.    July 2024  He has not had any episodes of AF since we last met. He monitors his AF via his apple watch. He is stringly inclined to stop taking meds long term and so is interested in an ablation.    Alcohol use 2 times a month    Uses CPAP regularly.    Recent Echocardiogram Results (personally reviewed):    July 2023    Interpretation Summary     Normal left ventricular size. Moderate concentric left ventricular hypertrophy  with sigmoid shaped septum. Visually estimated ejection fraction of 55 to 60%  without observed wall motion abnormality.  Right ventricle is mildly dilated. Right ventricular systolic function appears  grossly normal.  Mild biatrial enlargement.  Mild mitral regurgitation.  Mild tricuspid regurgitation. Unable to estimate right ventricular systolic  function secondary to incomplete tricuspid regurgitation velocity envelope.  Mild enlargement of the proximal ascending aorta.  Underlying rhythm is atrial fibrillation.      Labs below reviewed personally     Physical Examination  Review of Systems   Vitals: /80 (BP Location:  Right arm, Patient Position: Sitting, Cuff Size: Adult Large)   Pulse 64   Resp 16   Wt 127.5 kg (281 lb)   BMI 39.19 kg/m    BMI= Body mass index is 39.19 kg/m .  Wt Readings from Last 3 Encounters:   07/16/24 127.5 kg (281 lb)   06/25/24 124.7 kg (275 lb)   12/22/23 116.6 kg (257 lb)       General Appearance:   no distress, normal body habitus   ENT/Mouth: membranes moist, no oral lesions or bleeding gums.      EYES:  no scleral icterus, normal conjunctivae   Neck: no carotid bruits or thyromegaly   Chest/Lungs:   lungs are clear to auscultation, no rales or wheezing, no sternal scar, equal chest wall expansion    Cardiovascular:   Regular. Normal first and second heart sounds with no murmurs, rubs, or gallops; the carotid, radial and posterior tibial pulses are intact, no edema bilaterally    Abdomen:  no organomegaly, masses, bruits, or tenderness; bowel sounds are present   Extremities: no cyanosis or clubbing   Skin: no xanthelasma, warm.    Neurologic: normal  bilateral, no tremors     Psychiatric: alert and oriented x3, calm        Please refer above for cardiac ROS details.        Medical History  Surgical History Family History Social History   Past Medical History:   Diagnosis Date    Depression 12/24/2014    Essential hypertension     Infection due to 2019 novel coronavirus 01/11/2022    Multiple thyroid nodules 09/08/2016    Type 2 diabetes mellitus without complication (H) 11/07/2016     Past Surgical History:   Procedure Laterality Date    BIOPSY  9/18/16    CARPAL TUNNEL RELEASE RT/LT Right     COLONOSCOPY  6/15/18    COMBINED CYSTOSCOPY, RETROGRADES, URETEROSCOPY, LASER HOLMIUM LITHOTRIPSY URETER(S), INSERT STENT Bilateral 7/26/2023    Procedure: cystoscopy, bilateral ureteroscopy with holmium laser lithotripsy, bilateral ureteroscopy with stone basketing, bilateral retrograde pyelogram, bilateral ureteral stent placement;  Surgeon: Mark Orta MD;  Location: [facility] OR    HC REPAIR ROTATOR  CUFF,ACUTE      Description: Rotator Cuff Repair Acute;  Recorded: 01/13/2010;  Comments: cortisone    [facility]  11/30/2015         REMOVAL OF SPERM DUCT(S)      Description: Surgery Of Male Genitalia Vasectomy;  Recorded: 01/13/2010;    WISDOM TOOTH EXTRACTION       Family History   Problem Relation Age of Onset    Heart Disease Mother     Kidney Disease Mother     Thyroid Cancer Father     Kidney Disease Father     Heart Disease Father     Hypertension Father     Thyroid Disease Father         cancer    Cancer Father         myoleodysplansic syndrome    No Known Problems Sister     Hypertension Sister     Hypertension Brother     Heart Disease Paternal Grandmother     Depression Daughter     Anxiety Disorder Daughter     Autism Spectrum Disorder Son         Social History     Socioeconomic History    Marital status:      Spouse name: Not on file    Number of children: Not on file    Years of education: Not on file    Highest education level: Not on file   Occupational History    Not on file   Tobacco Use    Smoking status: Former     Types: Dip, chew, snus or snuff     Passive exposure: Never    Smokeless tobacco: Former     Types: Chew     Quit date: 12/24/2004   Vaping Use    Vaping status: Never Used   Substance and Sexual Activity    Alcohol use: Yes     Comment: Alcoholic Drinks/day: 1 every 2 weeks    Drug use: No    Sexual activity: Yes     Partners: Female     Birth control/protection: None   Other Topics Concern    Not on file   Social History Narrative    Not on file     Social Determinants of Health     Financial Resource Strain: Low Risk  (6/24/2024)    Financial Resource Strain     Within the past 12 months, have you or your family members you live with been unable to get utilities (heat, electricity) when it was really needed?: No   Food Insecurity: Low Risk  (6/24/2024)    Food Insecurity     Within the past 12 months, did you worry that your food would run out before you got money to buy more?:  No     Within the past 12 months, did the food you bought just not last and you didn t have money to get more?: No   Transportation Needs: Low Risk  (6/24/2024)    Transportation Needs     Within the past 12 months, has lack of transportation kept you from medical appointments, getting your medicines, non-medical meetings or appointments, work, or from getting things that you need?: No   Physical Activity: Insufficiently Active (6/24/2024)    Exercise Vital Sign     Days of Exercise per Week: 3 days     Minutes of Exercise per Session: 20 min   Stress: Stress Concern Present (6/24/2024)    [employer] - Occupational Stress Questionnaire     Feeling of Stress : Rather much   Social Connections: Unknown (6/24/2024)    Social Connection and Isolation Panel [NHANES]     Frequency of Communication with Friends and Family: Not on file     Frequency of Social Gatherings with Friends and Family: Twice a week     Attends Mandaeism Services: Not on file     Active Member of Clubs or Organizations: Not on file     Attends Club or Organization Meetings: Not on file     Marital Status: Not on file   Interpersonal Safety: Low Risk  (6/25/2024)    Interpersonal Safety     Do you feel physically and emotionally safe where you currently live?: Yes     Within the past 12 months, have you been hit, slapped, kicked or otherwise physically hurt by someone?: No     Within the past 12 months, have you been humiliated or emotionally abused in other ways by your partner or ex-partner?: No   Housing Stability: Low Risk  (6/24/2024)    Housing Stability     Do you have housing? : Yes     Are you worried about losing your housing?: No           Medications  Allergies   Current Outpatient Medications   Medication Sig Dispense Refill    ACCU-CHEK FASTCLIX LANCET DRUM [ACCU-CHEK FASTCLIX LANCET DRUM] USE TO TEST BLOOD SUGARS 2 TO 3 TIMES A  each 4    apixaban ANTICOAGULANT (ELIQUIS) 5 MG tablet Take 1 tablet (5  mg) by mouth 2 times daily 180 tablet 3    blood glucose test (ACCU-CHEK NATHANIEL PLUS TEST STRP) strips [BLOOD GLUCOSE TEST (ACCU-CHEK NATHANIEL PLUS TEST STRP) STRIPS] Use 1 each As Directed 3 (three) times a day. 300 each 3    diltiazem ER COATED BEADS (CARDIZEM CD/CARTIA XT) 180 MG 24 hr capsule Take 1 capsule (180 mg) by mouth daily 90 capsule 3    dulaglutide (TRULICITY) 0.75 MG/0.5ML pen Inject 0.75 mg Subcutaneous every 7 days 6 mL 1    dulaglutide (TRULICITY) 1.5 MG/0.5ML pen Inject 1.5 mg subcutaneously every 7 days 6 mL 0    Dulaglutide (TRULICITY) 3 MG/0.5ML SOPN Inject 3 mg subcutaneously once a week 6 mL 1    fluticasone (FLONASE) 50 MCG/ACT nasal spray Spray 2 sprays into both nostrils daily as needed for rhinitis or allergies      lisinopril (ZESTRIL) 10 MG tablet Take 1 tablet (10 mg) by mouth daily 90 tablet 3    metFORMIN (GLUCOPHAGE XR) 500 MG 24 hr tablet Take 1 tablet (500 mg) by mouth 2 times daily (with meals) for 90 days 180 tablet 0    rosuvastatin (CRESTOR) 10 MG tablet TAKE 1 TABLET(10 MG) BY MOUTH AT BEDTIME Strength: 10 mg 90 tablet 3    spironolactone (ALDACTONE) 25 MG tablet Take 0.5 tablets (12.5 mg) by mouth daily 45 tablet 3    tadalafil (CIALIS) 10 MG tablet Take 1 tablet (10 mg) by mouth daily as needed (erectile dsyfunction) 10 mg as a single dose 30 minutes prior to anticipated sexual activity; do not take more than once daily.  Adjust dose based on effectiveness and tolerability; may decrease to 5 mg or increase to 20 mg per dose. 30 tablet 3     No Known Allergies       Lab Results    Chemistry/lipid CBC Cardiac Enzymes/BNP/TSH/INR   Recent Labs   Lab Test 08/18/23  0857   CHOL 109   HDL 33*   LDL 52   TRIG 118     Recent Labs   Lab Test 08/18/23  0857 11/29/22  0800 12/24/21  0742   LDL 52 41 56     Recent Labs   Lab Test 08/18/23  0857      POTASSIUM 3.9   CHLORIDE 109*   CO2 23   *   BUN 7.9*   CR 0.87   GFRESTIMATED >90   LEV 9.4     Recent Labs   Lab Test  08/18/23  0857 08/15/23  1600 08/08/23  1500   CR 0.87 0.89 1.01     Recent Labs   Lab Test 08/18/23  0857 11/29/22  0800 12/24/21  0742   A1C 5.3 5.0 6.9*          Recent Labs   Lab Test 08/15/23  1600   WBC 6.7   HGB 10.9*   HCT 34.2*   MCV 95        Recent Labs   Lab Test 08/15/23  1600 08/08/23  1500 08/03/23  0417   HGB 10.9* 11.3* 9.4*    Recent Labs   Lab Test 07/30/23  0549 07/29/23  2014   TROPONINI 0.08 0.01     Recent Labs   Lab Test 07/30/23  0549   *     Recent Labs   Lab Test 12/24/21  0742   TSH 0.73     No results for input(s): INR in the last 59521 hours.     Kenny Chaves MD              Thank you for allowing me to participate in the care of your patient.      Sincerely,     Kenny Chaves MD     [facility]  cc:   Lachelle Summers, APRN CNP  [address] [place] [address]

## 2024-07-17 ENCOUNTER — MYC MEDICAL ADVICE (OUTPATIENT)
Dept: FAMILY MEDICINE | Facility: CLINIC | Age: 59
End: 2024-07-17
Payer: COMMERCIAL

## 2024-07-17 DIAGNOSIS — E78.2 MIXED HYPERLIPIDEMIA: ICD-10-CM

## 2024-07-17 DIAGNOSIS — G47.30 SLEEP APNEA, UNSPECIFIED TYPE: ICD-10-CM

## 2024-07-17 DIAGNOSIS — E11.29 TYPE 2 DIABETES MELLITUS WITH MICROALBUMINURIA, WITHOUT LONG-TERM CURRENT USE OF INSULIN (H): ICD-10-CM

## 2024-07-17 DIAGNOSIS — R80.9 TYPE 2 DIABETES MELLITUS WITH MICROALBUMINURIA, WITHOUT LONG-TERM CURRENT USE OF INSULIN (H): ICD-10-CM

## 2024-07-17 DIAGNOSIS — I10 ESSENTIAL HYPERTENSION: ICD-10-CM

## 2024-07-17 DIAGNOSIS — K76.0 FATTY LIVER: ICD-10-CM

## 2024-07-17 DIAGNOSIS — E66.01 MORBID OBESITY (H): ICD-10-CM

## 2024-08-06 DIAGNOSIS — I48.19 PERSISTENT ATRIAL FIBRILLATION (H): Primary | ICD-10-CM

## 2024-08-17 ENCOUNTER — TRANSFERRED RECORDS (OUTPATIENT)
Dept: HEALTH INFORMATION MANAGEMENT | Facility: CLINIC | Age: 59
End: 2024-08-17
Payer: COMMERCIAL

## 2024-08-28 ENCOUNTER — VIRTUAL VISIT (OUTPATIENT)
Dept: UROLOGY | Facility: CLINIC | Age: 59
End: 2024-08-28
Payer: COMMERCIAL

## 2024-08-28 VITALS — WEIGHT: 260 LBS | HEIGHT: 72 IN | BODY MASS INDEX: 35.21 KG/M2

## 2024-08-28 DIAGNOSIS — R82.993 HYPERURICOSURIA: ICD-10-CM

## 2024-08-28 DIAGNOSIS — N20.0 KIDNEY STONE: Primary | ICD-10-CM

## 2024-08-28 PROCEDURE — 99213 OFFICE O/P EST LOW 20 MIN: CPT | Mod: 95 | Performed by: STUDENT IN AN ORGANIZED HEALTH CARE EDUCATION/TRAINING PROGRAM

## 2024-08-28 ASSESSMENT — PAIN SCALES - GENERAL: PAINLEVEL: NO PAIN (0)

## 2024-08-28 NOTE — NURSING NOTE
Current patient location: [address] [place] [id number]    Is the patient currently in the state of [place]? YES    Visit mode:VIDEO    If the visit is dropped, the patient can be reconnected by: VIDEO VISIT: Send to e-mail at: [email or web address]    Will anyone else be joining the visit? NO  (If patient encounters technical issues they should call [phone])    How would you like to obtain your AVS? MyChart    Are changes needed to the allergy or medication list? No    Are refills needed on medications prescribed by this physician? NO    Rooming Documentation:  Questionnaire(s) completed      Reason for visit: RECHECK    Latonia GARRISONF

## 2024-08-28 NOTE — PROGRESS NOTES
[facility]  Cardiac Electrophysiology  [address]  [place] [address]   Office: [phone]  Fax: [phone]     HEART CARE ELECTROPHYSIOLOGY NOTE     Primary Care: Lachelle Summers MD       Assessment/Recommendations     Paroxysmal atrial fibrillation/stage 3A: We discussed pulmonary vein isolation ablation procedure including <1-2% risk for major complication, anticipated success rates, recovery and follow-up.  Medical and surgical history reviewed and updated. Current medications and allergies reviewed and updated as appropriate. No personal or family history of adverse reactions to anesthesia or abnormal bleeding with surgery    [id number] 2 for HTN, diabetes; HAS BLED 1 for bleeding predisposition with history of TBI and SAH    HTN: Controlled    KORINA: consistent use of CPAP.  Advised to bring for use following procedure    Plan:  Continue Eliquis 5 mg twice a day for stroke prophylaxis  Hold diltiazem beginning 3 days prior to ablation  Start Protonix 40mg every day beginning 3 days prior to ablation and continuing for 6 weeks thereafter  EP follow-up 6 weeks post ablation     History of Present Illness/Subjective    Govind Alexandre is a 58 year old male with past medical history significant for persistent atrial fibrillation, HTN, type 2 diabetes, TBI with SAH following a fall 2016, KORINA, obesity, seen today for history and physical prior to AF ablation.  Atrial fibrillation was first documented during hospitalization with TBI and SAH in 2016, and recurred during hospitalization 1 year ago for kidney stone removal, ureteral stents and urinary sepsis.    Symptoms consist of racing heart sensation and irregular palpitations.  He has been feeling generally well recently and has not been aware of recurrent symptoms.  He wears a smart watch regularly. He denies chest discomfort, palpitations, shortness of breath, lightheadedness/dizziness, pedal edema, or syncope.      Data Review     Arrhythmia hx:  Sx: Racing heart  Dx/date: First documented during hospitalization with TBI and SAH 2016. Recurrence during hospitalization with urinary sepsis 2023  Rate control: Diltiazem  AAD: None  DCCV: None  Ablation: 9/5/2024 (Dr. Chaves)  [id number] 2 for HTN, diabetes, HAS BLED 1 for bleeding predisposition with history of TBI and SAH  OAC: Apixaban    EKG 8/29/2024: SR 74 bpm,  ms  9/6/2023: SR 78 bpm,  ms  7/29/2023:  bpm  Personally reviewed.     TTE 7/30/2023  Normal left ventricular size. Moderate concentric left ventricular hypertrophy  with sigmoid shaped septum. Visually estimated ejection fraction of 55 to 60%  without observed wall motion abnormality.  Right ventricle is mildly dilated. Right ventricular systolic function appears  grossly normal.  Mild biatrial enlargement.  Mild mitral regurgitation.  Mild tricuspid regurgitation. Unable to estimate right ventricular systolic  function secondary to incomplete tricuspid regurgitation velocity envelope.  Mild enlargement of the proximal ascending aorta.  Underlying rhythm is atrial fibrillation.    MPI 9/18/2023    The regadenoson nuclear stress test is negative for inducible myocardial ischemia or infarction.    The left ventricular ejection fraction at stress is 58%.    The patient is at a low risk of future cardiac ischemic events.    There is no prior study for comparison.    I have reviewed and updated the patient's past medical history, allergies and medication list.               Physical Examination Review of Systems   BMI= Body mass index is 35.94 kg/m .    Wt Readings from Last 3 Encounters:   08/29/24 120.2 kg (265 lb)   08/28/24 117.9 kg (260 lb)   07/16/24 127.5 kg (281 lb)       Vitals: /80 (BP Location: Right arm, Patient Position: Sitting, Cuff Size: Adult Regular)   Pulse 74   Resp 16   Ht 1.829 m (6')   Wt 120.2 kg (265 lb)   BMI 35.94 kg/m    General   Appearance:   Alert  and oriented, in no acute distress.    HEENT:  Normocephalic and atraumatic. Conjunctiva and sclera are clear. Moist oral mucosa.    Neck: No JVP, carotid bruit or obvious thyromegaly.   Lungs:   Respirations unlabored. Clear bilaterally with no rales, rhonchi, or wheezes.     Cardiovascular:   Rhythm is regular. S1 and S2 are normal. No significant murmur is present. Lower extremities demonstrate trace edema. Posterior tibial pulses are intact bilaterally.   Extremities: No cyanosis or clubbing   Skin: Skin is warm, dry, and otherwise intact.   Neurologic: Gait not asssessed. Mood and affect appropriate.                                                Medical History  Surgical History Family History Social History   Past Medical History:   Diagnosis Date    Depression 12/24/2014    Essential hypertension     Infection due to 2019 novel coronavirus 01/11/2022    Multiple thyroid nodules 09/08/2016    Type 2 diabetes mellitus without complication (H) 11/07/2016    Past Surgical History:   Procedure Laterality Date    BIOPSY  9/18/16    CARPAL TUNNEL RELEASE RT/LT Right     COLONOSCOPY  6/15/18    COMBINED CYSTOSCOPY, RETROGRADES, URETEROSCOPY, LASER HOLMIUM LITHOTRIPSY URETER(S), INSERT STENT Bilateral 7/26/2023    Procedure: cystoscopy, bilateral ureteroscopy with holmium laser lithotripsy, bilateral ureteroscopy with stone basketing, bilateral retrograde pyelogram, bilateral ureteral stent placement;  Surgeon: Mark Orta MD;  Location: [facility] OR    HC REPAIR ROTATOR CUFF,ACUTE      Description: Rotator Cuff Repair Acute;  Recorded: 01/13/2010;  Comments: cortisone    PICC  11/30/2015         REMOVAL OF SPERM DUCT(S)      Description: Surgery Of Male Genitalia Vasectomy;  Recorded: 01/13/2010;    WISDOM TOOTH EXTRACTION      Family History   Problem Relation Age of Onset    Heart Disease Mother     Kidney Disease Mother     Thyroid Cancer Father     Kidney Disease Father     Heart Disease Father     Hypertension  Father     Thyroid Disease Father         cancer    Cancer Father         myoleodysplansic syndrome    No Known Problems Sister     Hypertension Sister     Hypertension Brother     Heart Disease Paternal Grandmother     Depression Daughter     Anxiety Disorder Daughter     Autism Spectrum Disorder Son     Social History     Tobacco Use    Smoking status: Former     Types: Dip, chew, snus or snuff     Passive exposure: Never    Smokeless tobacco: Former     Types: Chew     Quit date: 12/24/2004   Vaping Use    Vaping status: Never Used   Substance Use Topics    Alcohol use: Yes     Comment: Alcoholic Drinks/day: 1 every 2 weeks    Drug use: No          Medications  Allergies   Current Outpatient Medications   Medication Sig Dispense Refill    ACCU-CHEK FASTCLIX LANCET DRUM [ACCU-CHEK FASTCLIX LANCET DRUM] USE TO TEST BLOOD SUGARS 2 TO 3 TIMES A  each 4    apixaban ANTICOAGULANT (ELIQUIS) 5 MG tablet Take 1 tablet (5 mg) by mouth 2 times daily 180 tablet 3    blood glucose test (ACCU-CHEK NATHANIEL PLUS TEST STRP) strips [BLOOD GLUCOSE TEST (ACCU-CHEK NATHANIEL PLUS TEST STRP) STRIPS] Use 1 each As Directed 3 (three) times a day. 300 each 3    diltiazem ER COATED BEADS (CARDIZEM CD/CARTIA XT) 180 MG 24 hr capsule Take 1 capsule (180 mg) by mouth daily 90 capsule 3    dulaglutide (TRULICITY) 0.75 MG/0.5ML pen Inject 0.75 mg Subcutaneous every 7 days 6 mL 1    dulaglutide (TRULICITY) 1.5 MG/0.5ML pen Inject 1.5 mg subcutaneously every 7 days 6 mL 1    Dulaglutide (TRULICITY) 3 MG/0.5ML SOPN Inject 3 mg subcutaneously once a week 6 mL 1    fluticasone (FLONASE) 50 MCG/ACT nasal spray Spray 2 sprays into both nostrils daily as needed for rhinitis or allergies      lisinopril (ZESTRIL) 10 MG tablet Take 1 tablet (10 mg) by mouth daily 90 tablet 3    metFORMIN (GLUCOPHAGE XR) 500 MG 24 hr tablet Take 1 tablet (500 mg) by mouth 2 times daily (with meals) for 90 days 180 tablet 0    rosuvastatin (CRESTOR) 10 MG tablet TAKE 1  "TABLET(10 MG) BY MOUTH AT BEDTIME Strength: 10 mg 90 tablet 3    spironolactone (ALDACTONE) 25 MG tablet Take 0.5 tablets (12.5 mg) by mouth daily 45 tablet 3    tadalafil (CIALIS) 10 MG tablet Take 1 tablet (10 mg) by mouth daily as needed (erectile dsyfunction) 10 mg as a single dose 30 minutes prior to anticipated sexual activity; do not take more than once daily.  Adjust dose based on effectiveness and tolerability; may decrease to 5 mg or increase to 20 mg per dose. 30 tablet 3    No Known Allergies      Lab Results    Chemistry/lipid CBC Cardiac Enzymes/BNP/TSH/INR   Lab Results   Component Value Date    BUN 12.7 2024     2024    CO2 23 2024     No results found for: \"CREATININE\"    Lab Results   Component Value Date    CHOL 114 2024    HDL 36 (L) 2024    LDL 54 2024      Lab Results   Component Value Date    WBC 7.7 2024    HGB 13.2 (L) 2024    HCT 37.2 (L) 2024    MCV 88 2024     (L) 2024    Lab Results   Component Value Date    TROPONINI 0.08 2023     (H) 2023    TSH 0.56 2024        30 minutes spent reviewing prior records (including documentation, laboratory studies, cardiac testing/imaging), history and physical exam, planning, and subsequent documentation.     The longitudinal plan of care for the diagnosis(es)/condition(s) as documented were addressed during this visit. Due to the added complexity in care, I will continue to support Govind in the subsequent management and with ongoing continuity of care.     This note has been dictated using voice recognition software. Any grammatical, typographical, or context distortions are unintentional and inherent to the software.    Angelica Washington, CNP  Clinical Cardiac Electrophysiology  [facility]  Clinic and [phone]  Fax: [phone]  Electrophysiology Nurses: [phone]                                 "

## 2024-08-28 NOTE — PROGRESS NOTES
CHIEF COMPLAINT   Govind Alexandre who is a 58 year old male returns today for follow-up of h/o nephrolithiasis most recently s/p bilateral URS 7/26/2023 (predominant caox stones)    HPI   Govind Alexandre is a 58 year old male returns today for follow-up of h/o nephrolithiasis most recently s/p bilateral URS 7/26/2023 (predominant caox stones)    He did a litholink a few weeks ago. He denies any symptoms at this time    PHYSICAL EXAM  Patient is a 58 year old  male   Vitals: Height 1.829 m (6'), weight 117.9 kg (260 lb).  Body mass index is 35.26 kg/m .  General Appearance Adult:   Alert, no acute distress, oriented  HENT: throat/mouth:normal, good dentition  Lungs: no respiratory distress, or pursed lip breathing  Heart: No obvious jugular venous distension present  Abdomen: nondistended  Musculoskeltal: extremities normal, no peripheral edema  Skin: no suspicious lesions or rashes  Neuro: Alert, oriented, speech and mentation normal  Psych: affect and mood normal  Gait: Normal  : deferred    All pertinent imaging reviewed:    KUB 12/4/2023    IMPRESSION: Marked amount of stool and air in the colon obscures some detail. Nothing by plain film for renal stones. Ureteral stents have been removed. The stones in the lower pole right kidney seen on prior CT are not appreciated by the plain film.     Renal ultrasound 12/4/2023      IMPRESSION:  No sonographic evidence of genitourinary calculi or hydronephrosis.    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 Recommend reducing this if able because will inevitably lead to further uric acid stones. He has not had a gout episode.  Reasonable to see nephrology for consideration of starting allopurinol, but first step should be dietary    Hyperoxaluria: present on one collection, discussed low oxalate diet. Also hypercalciuria and hypernatriuria, recommend low salt diet.    - recommend see nephrology in followup  - return ~9 months with CT abd/pelvis w/o contrast prior to follow stone burden      Mark Orta MD   [facility]  [facility] Phone: [phone]        Virtual Visit Details    Type of service:  Video Visit     Originating Location (pt. Location): Home    Distant Location (provider location):  On-site  Platform used for Video Visit: Yesica

## 2024-08-28 NOTE — LETTER
8/28/2024       RE: Govind Alexandre  [address] [address] [address]     Dear Colleague,    Thank you for referring your patient, Govind Alexandre, to the [facility] [place] [facility]. Please see a copy of my visit note below.    CHIEF COMPLAINT   Govind Alexandre who is a 58 year old male returns today for follow-up of h/o nephrolithiasis most recently s/p bilateral URS 7/26/2023 (predominant caox stones)    HPI   Govind Alexandre is a 58 year old male returns today for follow-up of h/o nephrolithiasis most recently s/p bilateral URS 7/26/2023 (predominant caox stones)    He did a litholink a few weeks ago. He denies any symptoms at this time    PHYSICAL EXAM  Patient is a 58 year old  male   Vitals: Height 1.829 m (6'), weight 117.9 kg (260 lb).  Body mass index is 35.26 kg/m .  General Appearance Adult:   Alert, no acute distress, oriented  HENT: throat/mouth:normal, good dentition  Lungs: no respiratory distress, or pursed lip breathing  Heart: No obvious jugular venous distension present  Abdomen: nondistended  Musculoskeltal: extremities normal, no peripheral edema  Skin: no suspicious lesions or rashes  Neuro: Alert, oriented, speech and mentation normal  Psych: affect and mood normal  Gait: Normal  : deferred    All pertinent imaging reviewed:    KUB 12/4/2023    IMPRESSION: Marked amount of stool and air in the colon obscures some detail. Nothing by plain film for renal stones. Ureteral stents have been removed. The stones in the lower pole right kidney seen on prior CT are not appreciated by the plain film.     Renal ultrasound 12/4/2023      IMPRESSION:  No sonographic evidence of genitourinary calculi or hydronephrosis.    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 Recommend reducing this if able because will inevitably lead to further uric acid stones. He has not had a gout episode. Reasonable to see nephrology for consideration of starting allopurinol, but first step should be dietary    Hyperoxaluria: present on one collection, discussed low oxalate diet. Also hypercalciuria and hypernatriuria, recommend low salt diet.    - recommend see nephrology in followup  - return ~9 months with CT abd/pelvis w/o contrast prior to follow stone burden      Mark Orta MD   [facility]  [facility] Phone: [phone]        Virtual Visit Details    Type of service:  Video Visit     Originating Location (pt. Location): Home    Distant Location (provider location):  On-site  Platform used for Video Visit: Well      Again, thank you for allowing me to participate in the care of your patient.      Sincerely,    Mark Orta MD

## 2024-08-28 NOTE — H&P (VIEW-ONLY)
[facility]  Cardiac Electrophysiology  [address]  [place] [address]   Office: [phone]  Fax: [phone]     HEART CARE ELECTROPHYSIOLOGY NOTE     Primary Care: Lachelle Summers MD       Assessment/Recommendations     Paroxysmal atrial fibrillation/stage 3A: We discussed pulmonary vein isolation ablation procedure including <1-2% risk for major complication, anticipated success rates, recovery and follow-up.  Medical and surgical history reviewed and updated. Current medications and allergies reviewed and updated as appropriate. No personal or family history of adverse reactions to anesthesia or abnormal bleeding with surgery    [id number] 2 for HTN, diabetes; HAS BLED 1 for bleeding predisposition with history of TBI and SAH    HTN: Controlled    KORINA: consistent use of CPAP.  Advised to bring for use following procedure    Plan:  Continue Eliquis 5 mg twice a day for stroke prophylaxis  Hold diltiazem beginning 3 days prior to ablation  Start Protonix 40mg every day beginning 3 days prior to ablation and continuing for 6 weeks thereafter  EP follow-up 6 weeks post ablation     History of Present Illness/Subjective    Govind Alexandre is a 58 year old male with past medical history significant for persistent atrial fibrillation, HTN, type 2 diabetes, TBI with SAH following a fall 2016, KORINA, obesity, seen today for history and physical prior to AF ablation.  Atrial fibrillation was first documented during hospitalization with TBI and SAH in 2016, and recurred during hospitalization 1 year ago for kidney stone removal, ureteral stents and urinary sepsis.    Symptoms consist of racing heart sensation and irregular palpitations.  He has been feeling generally well recently and has not been aware of recurrent symptoms.  He wears a smart watch regularly. He denies chest discomfort, palpitations, shortness of breath, lightheadedness/dizziness, pedal edema, or syncope.      Data Review     Arrhythmia hx:  Sx: Racing heart  Dx/date: First documented during hospitalization with TBI and SAH 2016. Recurrence during hospitalization with urinary sepsis 2023  Rate control: Diltiazem  AAD: None  DCCV: None  Ablation: 9/5/2024 (Dr. Chaves)  [id number] 2 for HTN, diabetes, HAS BLED 1 for bleeding predisposition with history of TBI and SAH  OAC: Apixaban    EKG 8/29/2024: SR 74 bpm,  ms  9/6/2023: SR 78 bpm,  ms  7/29/2023:  bpm  Personally reviewed.     TTE 7/30/2023  Normal left ventricular size. Moderate concentric left ventricular hypertrophy  with sigmoid shaped septum. Visually estimated ejection fraction of 55 to 60%  without observed wall motion abnormality.  Right ventricle is mildly dilated. Right ventricular systolic function appears  grossly normal.  Mild biatrial enlargement.  Mild mitral regurgitation.  Mild tricuspid regurgitation. Unable to estimate right ventricular systolic  function secondary to incomplete tricuspid regurgitation velocity envelope.  Mild enlargement of the proximal ascending aorta.  Underlying rhythm is atrial fibrillation.    MPI 9/18/2023    The regadenoson nuclear stress test is negative for inducible myocardial ischemia or infarction.    The left ventricular ejection fraction at stress is 58%.    The patient is at a low risk of future cardiac ischemic events.    There is no prior study for comparison.    I have reviewed and updated the patient's past medical history, allergies and medication list.               Physical Examination Review of Systems   BMI= Body mass index is 35.94 kg/m .    Wt Readings from Last 3 Encounters:   08/29/24 120.2 kg (265 lb)   08/28/24 117.9 kg (260 lb)   07/16/24 127.5 kg (281 lb)       Vitals: /80 (BP Location: Right arm, Patient Position: Sitting, Cuff Size: Adult Regular)   Pulse 74   Resp 16   Ht 1.829 m (6')   Wt 120.2 kg (265 lb)   BMI 35.94 kg/m    General   Appearance:   Alert  and oriented, in no acute distress.    HEENT:  Normocephalic and atraumatic. Conjunctiva and sclera are clear. Moist oral mucosa.    Neck: No JVP, carotid bruit or obvious thyromegaly.   Lungs:   Respirations unlabored. Clear bilaterally with no rales, rhonchi, or wheezes.     Cardiovascular:   Rhythm is regular. S1 and S2 are normal. No significant murmur is present. Lower extremities demonstrate trace edema. Posterior tibial pulses are intact bilaterally.   Extremities: No cyanosis or clubbing   Skin: Skin is warm, dry, and otherwise intact.   Neurologic: Gait not asssessed. Mood and affect appropriate.                                                Medical History  Surgical History Family History Social History   Past Medical History:   Diagnosis Date    Depression 12/24/2014    Essential hypertension     Infection due to 2019 novel coronavirus 01/11/2022    Multiple thyroid nodules 09/08/2016    Type 2 diabetes mellitus without complication (H) 11/07/2016    Past Surgical History:   Procedure Laterality Date    BIOPSY  9/18/16    CARPAL TUNNEL RELEASE RT/LT Right     COLONOSCOPY  6/15/18    COMBINED CYSTOSCOPY, RETROGRADES, URETEROSCOPY, LASER HOLMIUM LITHOTRIPSY URETER(S), INSERT STENT Bilateral 7/26/2023    Procedure: cystoscopy, bilateral ureteroscopy with holmium laser lithotripsy, bilateral ureteroscopy with stone basketing, bilateral retrograde pyelogram, bilateral ureteral stent placement;  Surgeon: Mark Orta MD;  Location: [facility] OR    HC REPAIR ROTATOR CUFF,ACUTE      Description: Rotator Cuff Repair Acute;  Recorded: 01/13/2010;  Comments: cortisone    PICC  11/30/2015         REMOVAL OF SPERM DUCT(S)      Description: Surgery Of Male Genitalia Vasectomy;  Recorded: 01/13/2010;    WISDOM TOOTH EXTRACTION      Family History   Problem Relation Age of Onset    Heart Disease Mother     Kidney Disease Mother     Thyroid Cancer Father     Kidney Disease Father     Heart Disease Father     Hypertension  Father     Thyroid Disease Father         cancer    Cancer Father         myoleodysplansic syndrome    No Known Problems Sister     Hypertension Sister     Hypertension Brother     Heart Disease Paternal Grandmother     Depression Daughter     Anxiety Disorder Daughter     Autism Spectrum Disorder Son     Social History     Tobacco Use    Smoking status: Former     Types: Dip, chew, snus or snuff     Passive exposure: Never    Smokeless tobacco: Former     Types: Chew     Quit date: 12/24/2004   Vaping Use    Vaping status: Never Used   Substance Use Topics    Alcohol use: Yes     Comment: Alcoholic Drinks/day: 1 every 2 weeks    Drug use: No          Medications  Allergies   Current Outpatient Medications   Medication Sig Dispense Refill    ACCU-CHEK FASTCLIX LANCET DRUM [ACCU-CHEK FASTCLIX LANCET DRUM] USE TO TEST BLOOD SUGARS 2 TO 3 TIMES A  each 4    apixaban ANTICOAGULANT (ELIQUIS) 5 MG tablet Take 1 tablet (5 mg) by mouth 2 times daily 180 tablet 3    blood glucose test (ACCU-CHEK NATHANIEL PLUS TEST STRP) strips [BLOOD GLUCOSE TEST (ACCU-CHEK NATHANIEL PLUS TEST STRP) STRIPS] Use 1 each As Directed 3 (three) times a day. 300 each 3    diltiazem ER COATED BEADS (CARDIZEM CD/CARTIA XT) 180 MG 24 hr capsule Take 1 capsule (180 mg) by mouth daily 90 capsule 3    dulaglutide (TRULICITY) 0.75 MG/0.5ML pen Inject 0.75 mg Subcutaneous every 7 days 6 mL 1    dulaglutide (TRULICITY) 1.5 MG/0.5ML pen Inject 1.5 mg subcutaneously every 7 days 6 mL 1    Dulaglutide (TRULICITY) 3 MG/0.5ML SOPN Inject 3 mg subcutaneously once a week 6 mL 1    fluticasone (FLONASE) 50 MCG/ACT nasal spray Spray 2 sprays into both nostrils daily as needed for rhinitis or allergies      lisinopril (ZESTRIL) 10 MG tablet Take 1 tablet (10 mg) by mouth daily 90 tablet 3    metFORMIN (GLUCOPHAGE XR) 500 MG 24 hr tablet Take 1 tablet (500 mg) by mouth 2 times daily (with meals) for 90 days 180 tablet 0    rosuvastatin (CRESTOR) 10 MG tablet TAKE 1  "TABLET(10 MG) BY MOUTH AT BEDTIME Strength: 10 mg 90 tablet 3    spironolactone (ALDACTONE) 25 MG tablet Take 0.5 tablets (12.5 mg) by mouth daily 45 tablet 3    tadalafil (CIALIS) 10 MG tablet Take 1 tablet (10 mg) by mouth daily as needed (erectile dsyfunction) 10 mg as a single dose 30 minutes prior to anticipated sexual activity; do not take more than once daily.  Adjust dose based on effectiveness and tolerability; may decrease to 5 mg or increase to 20 mg per dose. 30 tablet 3    No Known Allergies      Lab Results    Chemistry/lipid CBC Cardiac Enzymes/BNP/TSH/INR   Lab Results   Component Value Date    BUN 12.7 2024     2024    CO2 23 2024     No results found for: \"CREATININE\"    Lab Results   Component Value Date    CHOL 114 2024    HDL 36 (L) 2024    LDL 54 2024      Lab Results   Component Value Date    WBC 7.7 2024    HGB 13.2 (L) 2024    HCT 37.2 (L) 2024    MCV 88 2024     (L) 2024    Lab Results   Component Value Date    TROPONINI 0.08 2023     (H) 2023    TSH 0.56 2024        30 minutes spent reviewing prior records (including documentation, laboratory studies, cardiac testing/imaging), history and physical exam, planning, and subsequent documentation.     The longitudinal plan of care for the diagnosis(es)/condition(s) as documented were addressed during this visit. Due to the added complexity in care, I will continue to support Govind in the subsequent management and with ongoing continuity of care.     This note has been dictated using voice recognition software. Any grammatical, typographical, or context distortions are unintentional and inherent to the software.    Angelica Washington, CNP  Clinical Cardiac Electrophysiology  [facility]  Clinic and [phone]  Fax: [phone]  Electrophysiology Nurses: [phone]                                 "

## 2024-08-29 ENCOUNTER — ALLIED HEALTH/NURSE VISIT (OUTPATIENT)
Dept: CARDIOLOGY | Facility: CLINIC | Age: 59
End: 2024-08-29
Payer: COMMERCIAL

## 2024-08-29 ENCOUNTER — OFFICE VISIT (OUTPATIENT)
Dept: CARDIOLOGY | Facility: CLINIC | Age: 59
End: 2024-08-29
Payer: COMMERCIAL

## 2024-08-29 ENCOUNTER — LAB (OUTPATIENT)
Dept: CARDIOLOGY | Facility: CLINIC | Age: 59
End: 2024-08-29
Payer: COMMERCIAL

## 2024-08-29 VITALS
SYSTOLIC BLOOD PRESSURE: 138 MMHG | HEIGHT: 72 IN | WEIGHT: 265 LBS | DIASTOLIC BLOOD PRESSURE: 80 MMHG | HEART RATE: 74 BPM | BODY MASS INDEX: 35.89 KG/M2 | RESPIRATION RATE: 16 BRPM

## 2024-08-29 DIAGNOSIS — I48.0 PAROXYSMAL ATRIAL FIBRILLATION (H): Primary | ICD-10-CM

## 2024-08-29 DIAGNOSIS — I48.19 PERSISTENT ATRIAL FIBRILLATION (H): ICD-10-CM

## 2024-08-29 DIAGNOSIS — I10 ESSENTIAL HYPERTENSION: ICD-10-CM

## 2024-08-29 DIAGNOSIS — G47.33 OBSTRUCTIVE SLEEP APNEA SYNDROME: ICD-10-CM

## 2024-08-29 LAB
ANION GAP SERPL CALCULATED.3IONS-SCNC: 10 MMOL/L (ref 7–15)
BUN SERPL-MCNC: 17.1 MG/DL (ref 8–23)
CALCIUM SERPL-MCNC: 9.2 MG/DL (ref 8.8–10.4)
CHLORIDE SERPL-SCNC: 107 MMOL/L (ref 98–107)
CREAT SERPL-MCNC: 1.04 MG/DL (ref 0.67–1.17)
EGFRCR SERPLBLD CKD-EPI 2021: 82 ML/MIN/1.73M2
ERYTHROCYTE [DISTWIDTH] IN BLOOD BY AUTOMATED COUNT: 14.7 % (ref 10–15)
GLUCOSE SERPL-MCNC: 100 MG/DL (ref 70–99)
HCO3 SERPL-SCNC: 23 MMOL/L (ref 22–29)
HCT VFR BLD AUTO: 37.6 % (ref 40–53)
HGB BLD-MCNC: 13.2 G/DL (ref 13.3–17.7)
MCH RBC QN AUTO: 30.8 PG (ref 26.5–33)
MCHC RBC AUTO-ENTMCNC: 35.1 G/DL (ref 31.5–36.5)
MCV RBC AUTO: 88 FL (ref 78–100)
PLATELET # BLD AUTO: 123 10E3/UL (ref 150–450)
POTASSIUM SERPL-SCNC: 4.1 MMOL/L (ref 3.4–5.3)
RBC # BLD AUTO: 4.29 10E6/UL (ref 4.4–5.9)
SODIUM SERPL-SCNC: 140 MMOL/L (ref 135–145)
WBC # BLD AUTO: 7.1 10E3/UL (ref 4–11)

## 2024-08-29 PROCEDURE — G2211 COMPLEX E/M VISIT ADD ON: HCPCS | Performed by: NURSE PRACTITIONER

## 2024-08-29 PROCEDURE — 99207 PR NO CHARGE NURSE ONLY: CPT

## 2024-08-29 PROCEDURE — 36415 COLL VENOUS BLD VENIPUNCTURE: CPT

## 2024-08-29 PROCEDURE — 80048 BASIC METABOLIC PNL TOTAL CA: CPT

## 2024-08-29 PROCEDURE — 85027 COMPLETE CBC AUTOMATED: CPT

## 2024-08-29 PROCEDURE — 93000 ELECTROCARDIOGRAM COMPLETE: CPT | Performed by: STUDENT IN AN ORGANIZED HEALTH CARE EDUCATION/TRAINING PROGRAM

## 2024-08-29 PROCEDURE — 99214 OFFICE O/P EST MOD 30 MIN: CPT | Performed by: NURSE PRACTITIONER

## 2024-08-29 RX ORDER — PANTOPRAZOLE SODIUM 40 MG/1
40 TABLET, DELAYED RELEASE ORAL DAILY
Qty: 45 TABLET | Refills: 0 | Status: SHIPPED | OUTPATIENT
Start: 2024-08-29 | End: 2024-08-30 | Stop reason: ALTCHOICE

## 2024-08-29 NOTE — LETTER
8/29/2024    Lachelle Summers, APRN CNP  [address] [address]    RE: Govind Alexandre       Dear Colleague,     [facility] the [facility].       [facility]  Cardiac Electrophysiology  [address]  [place] [address]   Office: [phone]  Fax: [phone]     HEART CARE ELECTROPHYSIOLOGY NOTE     Primary Care: Lachelle Summers MD       Assessment/Recommendations     Paroxysmal atrial fibrillation/stage 3A: We discussed pulmonary vein isolation ablation procedure including <1-2% risk for major complication, anticipated success rates, recovery and follow-up.  Medical and surgical history reviewed and updated. Current medications and allergies reviewed and updated as appropriate. No personal or family history of adverse reactions to anesthesia or abnormal bleeding with surgery    [id number] 2 for HTN, diabetes; HAS BLED 1 for bleeding predisposition with history of TBI and SAH    HTN: Controlled    KORINA: consistent use of CPAP.  Advised to bring for use following procedure    Plan:  Continue Eliquis 5 mg twice a day for stroke prophylaxis  Hold diltiazem beginning 3 days prior to ablation  Start Protonix 40mg every day beginning 3 days prior to ablation and continuing for 6 weeks thereafter  EP follow-up 6 weeks post ablation     History of Present Illness/Subjective    Govind Alexandre is a 58 year old male with past medical history significant for persistent atrial fibrillation, HTN, type 2 diabetes, TBI with SAH following a fall 2016, KORINA, obesity, seen today for history and physical prior to AF ablation.  Atrial fibrillation was first documented during hospitalization with TBI and SAH in 2016, and recurred during hospitalization 1 year ago for kidney stone removal, ureteral stents and urinary sepsis.    Symptoms consist of racing heart sensation and irregular palpitations.  He has been feeling generally  well recently and has not been aware of recurrent symptoms.  He wears a smart watch regularly. He denies chest discomfort, palpitations, shortness of breath, lightheadedness/dizziness, pedal edema, or syncope.     Data Review     Arrhythmia hx:  Sx: Racing heart  Dx/date: First documented during hospitalization with TBI and SAH 2016. Recurrence during hospitalization with urinary sepsis 2023  Rate control: Diltiazem  AAD: None  DCCV: None  Ablation: 9/5/2024 (Dr. Chaves)  [id number] 2 for HTN, diabetes, HAS BLED 1 for bleeding predisposition with history of TBI and SAH  OAC: Apixaban    EKG 8/29/2024: SR 74 bpm,  ms  9/6/2023: SR 78 bpm,  ms  7/29/2023:  bpm  Personally reviewed.     TTE 7/30/2023  Normal left ventricular size. Moderate concentric left ventricular hypertrophy  with sigmoid shaped septum. Visually estimated ejection fraction of 55 to 60%  without observed wall motion abnormality.  Right ventricle is mildly dilated. Right ventricular systolic function appears  grossly normal.  Mild biatrial enlargement.  Mild mitral regurgitation.  Mild tricuspid regurgitation. Unable to estimate right ventricular systolic  function secondary to incomplete tricuspid regurgitation velocity envelope.  Mild enlargement of the proximal ascending aorta.  Underlying rhythm is atrial fibrillation.    MPI 9/18/2023     The regadenoson nuclear stress test is negative for inducible myocardial ischemia or infarction.     The left ventricular ejection fraction at stress is 58%.     The patient is at a low risk of future cardiac ischemic events.     There is no prior study for comparison.    I have reviewed and updated the patient's past medical history, allergies and medication list.               Physical Examination Review of Systems   BMI= Body mass index is 35.94 kg/m .    Wt Readings from Last 3 Encounters:   08/29/24 120.2 kg (265 lb)   08/28/24 117.9 kg (260 lb)   07/16/24 127.5 kg (281 lb)        Vitals: /80 (BP Location: Right arm, Patient Position: Sitting, Cuff Size: Adult Regular)   Pulse 74   Resp 16   Ht 1.829 m (6')   Wt 120.2 kg (265 lb)   BMI 35.94 kg/m    General   Appearance:   Alert and oriented, in no acute distress.    HEENT:  Normocephalic and atraumatic. Conjunctiva and sclera are clear. Moist oral mucosa.    Neck: No JVP, carotid bruit or obvious thyromegaly.   Lungs:   Respirations unlabored. Clear bilaterally with no rales, rhonchi, or wheezes.     Cardiovascular:   Rhythm is regular. S1 and S2 are normal. No significant murmur is present. Lower extremities demonstrate trace edema. Posterior tibial pulses are intact bilaterally.   Extremities: No cyanosis or clubbing   Skin: Skin is warm, dry, and otherwise intact.   Neurologic: Gait not asssessed. Mood and affect appropriate.                                                Medical History  Surgical History Family History Social History   Past Medical History:   Diagnosis Date     Depression 12/24/2014     Essential hypertension      Infection due to 2019 novel coronavirus 01/11/2022     Multiple thyroid nodules 09/08/2016     Type 2 diabetes mellitus without complication (H) 11/07/2016    Past Surgical History:   Procedure Laterality Date     BIOPSY  9/18/16     CARPAL TUNNEL RELEASE RT/LT Right      COLONOSCOPY  6/15/18     COMBINED CYSTOSCOPY, RETROGRADES, URETEROSCOPY, LASER HOLMIUM LITHOTRIPSY URETER(S), INSERT STENT Bilateral 7/26/2023    Procedure: cystoscopy, bilateral ureteroscopy with holmium laser lithotripsy, bilateral ureteroscopy with stone basketing, bilateral retrograde pyelogram, bilateral ureteral stent placement;  Surgeon: Mark Orta MD;  Location:  OR      REPAIR ROTATOR CUFF,ACUTE      Description: Rotator Cuff Repair Acute;  Recorded: 01/13/2010;  Comments: cortisone     PICC  11/30/2015          REMOVAL OF SPERM DUCT(S)      Description: Surgery Of Male Genitalia Vasectomy;  Recorded:  01/13/2010;     WISDOM TOOTH EXTRACTION      Family History   Problem Relation Age of Onset     Heart Disease Mother      Kidney Disease Mother      Thyroid Cancer Father      Kidney Disease Father      Heart Disease Father      Hypertension Father      Thyroid Disease Father         cancer     Cancer Father         myoleodysplansic syndrome     No Known Problems Sister      Hypertension Sister      Hypertension Brother      Heart Disease Paternal Grandmother      Depression Daughter      Anxiety Disorder Daughter      Autism Spectrum Disorder Son     Social History     Tobacco Use     Smoking status: Former     Types: Dip, chew, snus or snuff     Passive exposure: Never     Smokeless tobacco: Former     Types: Chew     Quit date: 12/24/2004   Vaping Use     Vaping status: Never Used   Substance Use Topics     Alcohol use: Yes     Comment: Alcoholic Drinks/day: 1 every 2 weeks     Drug use: No          Medications  Allergies   Current Outpatient Medications   Medication Sig Dispense Refill     ACCU-CHEK FASTCLIX LANCET DRUM [ACCU-CHEK FASTCLIX LANCET DRUM] USE TO TEST BLOOD SUGARS 2 TO 3 TIMES A  each 4     apixaban ANTICOAGULANT (ELIQUIS) 5 MG tablet Take 1 tablet (5 mg) by mouth 2 times daily 180 tablet 3     blood glucose test (ACCU-CHEK NATHANIEL PLUS TEST STRP) strips [BLOOD GLUCOSE TEST (ACCU-CHEK NATHANIEL PLUS TEST STRP) STRIPS] Use 1 each As Directed 3 (three) times a day. 300 each 3     diltiazem ER COATED BEADS (CARDIZEM CD/CARTIA XT) 180 MG 24 hr capsule Take 1 capsule (180 mg) by mouth daily 90 capsule 3     dulaglutide (TRULICITY) 0.75 MG/0.5ML pen Inject 0.75 mg Subcutaneous every 7 days 6 mL 1     dulaglutide (TRULICITY) 1.5 MG/0.5ML pen Inject 1.5 mg subcutaneously every 7 days 6 mL 1     Dulaglutide (TRULICITY) 3 MG/0.5ML SOPN Inject 3 mg subcutaneously once a week 6 mL 1     fluticasone (FLONASE) 50 MCG/ACT nasal spray Spray 2 sprays into both nostrils daily as needed for rhinitis or allergies    "    lisinopril (ZESTRIL) 10 MG tablet Take 1 tablet (10 mg) by mouth daily 90 tablet 3     metFORMIN (GLUCOPHAGE XR) 500 MG 24 hr tablet Take 1 tablet (500 mg) by mouth 2 times daily (with meals) for 90 days 180 tablet 0     rosuvastatin (CRESTOR) 10 MG tablet TAKE 1 TABLET(10 MG) BY MOUTH AT BEDTIME Strength: 10 mg 90 tablet 3     spironolactone (ALDACTONE) 25 MG tablet Take 0.5 tablets (12.5 mg) by mouth daily 45 tablet 3     tadalafil (CIALIS) 10 MG tablet Take 1 tablet (10 mg) by mouth daily as needed (erectile dsyfunction) 10 mg as a single dose 30 minutes prior to anticipated sexual activity; do not take more than once daily.  Adjust dose based on effectiveness and tolerability; may decrease to 5 mg or increase to 20 mg per dose. 30 tablet 3    No Known Allergies      Lab Results    Chemistry/lipid CBC Cardiac Enzymes/BNP/TSH/INR   Lab Results   Component Value Date    BUN 12.7 06/25/2024     06/25/2024    CO2 23 06/25/2024     No results found for: \"CREATININE\"    Lab Results   Component Value Date    CHOL 114 06/25/2024    HDL 36 (L) 06/25/2024    LDL 54 06/25/2024      Lab Results   Component Value Date    WBC 7.7 06/25/2024    HGB 13.2 (L) 06/25/2024    HCT 37.2 (L) 06/25/2024    MCV 88 06/25/2024     (L) 06/25/2024    Lab Results   Component Value Date    TROPONINI 0.08 07/30/2023     (H) 07/30/2023    TSH 0.56 06/25/2024        30 minutes spent reviewing prior records (including documentation, laboratory studies, cardiac testing/imaging), history and physical exam, planning, and subsequent documentation.     The longitudinal plan of care for the diagnosis(es)/condition(s) as documented were addressed during this visit. Due to the added complexity in care, I will continue to support Govind in the subsequent management and with ongoing continuity of care.     This note has been dictated using voice recognition software. Any grammatical, typographical, or context distortions are " unintentional and inherent to the software.    Angelica Washington CNP  Clinical Cardiac Electrophysiology  [facility]  Clinic and [phone]  Fax: [phone]  Electrophysiology Nurses: [phone]                                     Thank you for allowing me to participate in the care of your patient.      Sincerely,     CECILY BELTRAN CNP     [facility]  cc:   No referring provider defined for this encounter.

## 2024-08-29 NOTE — PROGRESS NOTES
Pre-Procedure Pulmonary Vein Ablation (AF) Education    Procedure: PVI with Dr Chaves on 9/5/24 with arrival time 5:30am    COVID: Pt denies COVID like symptoms, and is aware if he/she develops COVID like symptoms they would need to complete an at home with a rapid antigen COVID test 1-2 days prior to your procedure date. If COVID + pt is aware the procedure will need to be rescheduled, and to contact CV scheduling as soon as possible    Type & Screen: Is not required for PVI Ablation    Pre-Op H&P: Completed today with EP LELAND- See record in Epic    Education:   Reviewed with pt in Clinic today  Pre-Procedure Instruction: NPO after midnight pre procedure, Defined NPO, Remove all jewelry and leave all valuables at home, Shower prior to arrival, Anesthesia and intubation plan/orders, Intra-procedure PVI process, Post- PVI procedure expectations/recovery, Transportation requirements and arrangements post procedure, Post-procedure follow up process, Letter sent to pt via [email or web address] and mail with written instructions (Refer to letters tab), Lab results would be called to pt if abnormal  Risks:   Atrial Fibrillation Ablation/Left Atrial Ablation  Cardiac Ablation  <1% Hypotension, Hemorrhage, Thrombophlebitis, Systemic or pulmonic emboli, Cardiac perforation (tamponade), Infection, Pneumothorax, Arrhythmias, Proarrhythmic effects of drugs, Radiation exposure, Catheter entrapment  <1 % Vascular injury including perforation of vein, artery or heart  1-2% Tamponade and Aortic puncture with left sided transeptal approach  1% CVA   <1% MI  <0.1% death  If external defibrillation or CV is needed, 25% risk for superficial burn  Risks associated with general anesthesia will be addressed by the Anesthesiology Department  Radiofrequency Risks:  In addition to standard risks for Radiofrequency Ablation, there is:  <2% Significant pulmonary vein stenosis  <2% Embolic events  <1% Esophageal fistula  <1% Phrenic nerve paralysis    Cryoablation Risks:  In addition to standard risks for Cryoablation Ablation, there is:  <1% Phrenic nerve paralysis  <1% Pulmonary vein stenosis  <1% Esophageal fistula    Medication:   Instructions regarding anticoagulants: Eliquis- Continue anticoagulation uninterrupted through their procedure, do not miss any doses of AC prior to procedure, importance of taking AC for stroke prevention, taking AC as prescribed, to call prior to PVI if missed a dose of AC, and if upon arrival pt reports missing a dose of AC PVI will potentially be cnx/postponed  Instructions given to pt regarding antiarrhythmic medication:  diltiazem - Hold 3 days prior to procedure  Instructions given to pt regarding PPI medication: Start Protonix 40mg Daily 3 days prior, 6wk post  Instructions given to pt regarding diuretics medication: Hold oral diuretics AM of procedure  Instructions given to pt regarding DM/GLP-1 medication:   DM- Hold all oral diabetic medications and short acting insulin the morning of the procedure, and take 1/2 of pts scheduled doses of long acting insulin the PM prior and/or AM of procedure  GLP-1- Dulaglutide (Trulicity) (weekly)- Injection, hold 7 days prior to procedure  Instructions for medication, other than anticoagulants and antiarrhythmics listed above, given to pt: Take all medication AM of procedure with small sips of water     Important patient information for staff: None    8/29/2024 9:29 AM  Lili Kamara RN

## 2024-08-30 ENCOUNTER — TELEPHONE (OUTPATIENT)
Dept: CARDIOLOGY | Facility: CLINIC | Age: 59
End: 2024-08-30
Payer: COMMERCIAL

## 2024-08-30 DIAGNOSIS — I48.0 PAROXYSMAL ATRIAL FIBRILLATION (H): Primary | ICD-10-CM

## 2024-08-30 RX ORDER — OMEPRAZOLE 40 MG/1
40 CAPSULE, DELAYED RELEASE ORAL DAILY
Qty: 45 CAPSULE | Refills: 0 | Status: SHIPPED | OUTPATIENT
Start: 2024-08-30

## 2024-08-30 NOTE — TELEPHONE ENCOUNTER
Return call to patient, he states his insurance won't cover pantoprazole, he is scheduled for PVI on 9-5-24 with Dr. Chaves, was seen in clinic with Angelica Washington 8-30-24.   Rx for Omeprazole 40 mg sent to his pharmacy, also explained this can be purchased OTC as well, in 20 mg doses (take 2 20 mg tablets if OTC).  He states understanding.

## 2024-08-30 NOTE — TELEPHONE ENCOUNTER
[facility] Call [facility]    Phone Message    May a detailed message be left on voicemail: yes    Reason for Call: Medication Question or concern regarding medication   Prescription Clarification  Name of Medication: pantoprazole (PROTONIX) 40 MG EC tablet   Prescribing Provider: Angelica Washington APRN CNP    Pharmacy:   [facility] #[id number] [address] AT [facility] & [place] [address]      What on the order needs clarification? Pt stated prescription is being held up by insurance.  Please help assist pt to get Rx.       Action Taken: Other: cardio    Travel Screening: Not Applicable     Date of Service:

## 2024-08-31 LAB
ATRIAL RATE - MUSE: 74 BPM
DIASTOLIC BLOOD PRESSURE - MUSE: NORMAL MMHG
INTERPRETATION ECG - MUSE: NORMAL
P AXIS - MUSE: 12 DEGREES
PR INTERVAL - MUSE: 172 MS
QRS DURATION - MUSE: 102 MS
QT - MUSE: 384 MS
QTC - MUSE: 426 MS
R AXIS - MUSE: 11 DEGREES
SYSTOLIC BLOOD PRESSURE - MUSE: NORMAL MMHG
T AXIS - MUSE: 2 DEGREES
VENTRICULAR RATE- MUSE: 74 BPM

## 2024-09-04 ENCOUNTER — ANESTHESIA EVENT (OUTPATIENT)
Dept: CARDIOLOGY | Facility: HOSPITAL | Age: 59
End: 2024-09-04
Payer: COMMERCIAL

## 2024-09-05 ENCOUNTER — ANESTHESIA (OUTPATIENT)
Dept: CARDIOLOGY | Facility: HOSPITAL | Age: 59
End: 2024-09-05
Payer: COMMERCIAL

## 2024-09-05 ENCOUNTER — HOSPITAL ENCOUNTER (OUTPATIENT)
Facility: HOSPITAL | Age: 59
Discharge: HOME | End: 2024-09-05
Attending: INTERNAL MEDICINE | Admitting: INTERNAL MEDICINE
Payer: COMMERCIAL

## 2024-09-05 VITALS
HEIGHT: 72 IN | OXYGEN SATURATION: 96 % | SYSTOLIC BLOOD PRESSURE: 137 MMHG | BODY MASS INDEX: 37.25 KG/M2 | TEMPERATURE: 98.8 F | RESPIRATION RATE: 17 BRPM | DIASTOLIC BLOOD PRESSURE: 78 MMHG | WEIGHT: 275 LBS | HEART RATE: 69 BPM

## 2024-09-05 DIAGNOSIS — I48.19 PERSISTENT ATRIAL FIBRILLATION (H): ICD-10-CM

## 2024-09-05 LAB
ACT BLD: 325 SECONDS (ref 74–150)
ACT BLD: 358 SECONDS (ref 74–150)
ATRIAL RATE - MUSE: 65 BPM
DIASTOLIC BLOOD PRESSURE - MUSE: NORMAL MMHG
GLUCOSE BLDC GLUCOMTR-MCNC: 104 MG/DL (ref 70–99)
GLUCOSE BLDC GLUCOMTR-MCNC: 175 MG/DL (ref 70–99)
INTERPRETATION ECG - MUSE: NORMAL
P AXIS - MUSE: 34 DEGREES
PR INTERVAL - MUSE: 164 MS
QRS DURATION - MUSE: 110 MS
QT - MUSE: 414 MS
QTC - MUSE: 430 MS
R AXIS - MUSE: 38 DEGREES
SYSTOLIC BLOOD PRESSURE - MUSE: NORMAL MMHG
T AXIS - MUSE: 27 DEGREES
VENTRICULAR RATE- MUSE: 65 BPM

## 2024-09-05 PROCEDURE — 93005 ELECTROCARDIOGRAM TRACING: CPT

## 2024-09-05 PROCEDURE — C1730 CATH, EP, 19 OR FEW ELECT: HCPCS | Performed by: INTERNAL MEDICINE

## 2024-09-05 PROCEDURE — 258N000003 HC RX IP 258 OP 636: Performed by: NURSE ANESTHETIST, CERTIFIED REGISTERED

## 2024-09-05 PROCEDURE — 250N000009 HC RX 250: Performed by: NURSE ANESTHETIST, CERTIFIED REGISTERED

## 2024-09-05 PROCEDURE — 250N000011 HC RX IP 250 OP 636: Performed by: INTERNAL MEDICINE

## 2024-09-05 PROCEDURE — C1733 CATH, EP, OTHR THAN COOL-TIP: HCPCS | Performed by: INTERNAL MEDICINE

## 2024-09-05 PROCEDURE — 93656 COMPRE EP EVAL ABLTJ ATR FIB: CPT | Performed by: INTERNAL MEDICINE

## 2024-09-05 PROCEDURE — 82962 GLUCOSE BLOOD TEST: CPT

## 2024-09-05 PROCEDURE — 370N000017 HC ANESTHESIA TECHNICAL FEE, PER MIN: Performed by: INTERNAL MEDICINE

## 2024-09-05 PROCEDURE — C1732 CATH, EP, DIAG/ABL, 3D/VECT: HCPCS | Performed by: INTERNAL MEDICINE

## 2024-09-05 PROCEDURE — C1887 CATHETER, GUIDING: HCPCS | Performed by: INTERNAL MEDICINE

## 2024-09-05 PROCEDURE — 93615 ESOPHAGEAL RECORDING: CPT | Mod: 26 | Performed by: INTERNAL MEDICINE

## 2024-09-05 PROCEDURE — 250N000011 HC RX IP 250 OP 636: Performed by: NURSE ANESTHETIST, CERTIFIED REGISTERED

## 2024-09-05 PROCEDURE — 85347 COAGULATION TIME ACTIVATED: CPT

## 2024-09-05 PROCEDURE — 93656 COMPRE EP EVAL ABLTJ ATR FIB: CPT | Performed by: ANESTHESIOLOGY

## 2024-09-05 PROCEDURE — 710N000010 HC RECOVERY PHASE 1, LEVEL 2, PER MIN

## 2024-09-05 PROCEDURE — 93010 ELECTROCARDIOGRAM REPORT: CPT | Performed by: STUDENT IN AN ORGANIZED HEALTH CARE EDUCATION/TRAINING PROGRAM

## 2024-09-05 PROCEDURE — 93656 COMPRE EP EVAL ABLTJ ATR FIB: CPT | Performed by: NURSE ANESTHETIST, CERTIFIED REGISTERED

## 2024-09-05 PROCEDURE — 93615 ESOPHAGEAL RECORDING: CPT | Performed by: INTERNAL MEDICINE

## 2024-09-05 PROCEDURE — 258N000003 HC RX IP 258 OP 636: Performed by: INTERNAL MEDICINE

## 2024-09-05 PROCEDURE — 272N000001 HC OR GENERAL SUPPLY STERILE: Performed by: INTERNAL MEDICINE

## 2024-09-05 PROCEDURE — C1759 CATH, INTRA ECHOCARDIOGRAPHY: HCPCS | Performed by: INTERNAL MEDICINE

## 2024-09-05 PROCEDURE — 999N000054 HC STATISTIC EKG NON-CHARGEABLE

## 2024-09-05 PROCEDURE — C1766 INTRO/SHEATH,STRBLE,NON-PEEL: HCPCS | Performed by: INTERNAL MEDICINE

## 2024-09-05 PROCEDURE — C1894 INTRO/SHEATH, NON-LASER: HCPCS | Performed by: INTERNAL MEDICINE

## 2024-09-05 RX ORDER — DEXAMETHASONE SODIUM PHOSPHATE 4 MG/ML
4 INJECTION, SOLUTION INTRA-ARTICULAR; INTRALESIONAL; INTRAMUSCULAR; INTRAVENOUS; SOFT TISSUE
Status: DISCONTINUED | OUTPATIENT
Start: 2024-09-05 | End: 2024-09-05 | Stop reason: HOSPADM

## 2024-09-05 RX ORDER — NALOXONE HYDROCHLORIDE 0.4 MG/ML
0.2 INJECTION, SOLUTION INTRAMUSCULAR; INTRAVENOUS; SUBCUTANEOUS
Status: DISCONTINUED | OUTPATIENT
Start: 2024-09-05 | End: 2024-09-05 | Stop reason: HOSPADM

## 2024-09-05 RX ORDER — LIDOCAINE 40 MG/G
CREAM TOPICAL
Status: DISCONTINUED | OUTPATIENT
Start: 2024-09-05 | End: 2024-09-05 | Stop reason: HOSPADM

## 2024-09-05 RX ORDER — FENTANYL CITRATE 50 UG/ML
100 INJECTION, SOLUTION INTRAMUSCULAR; INTRAVENOUS ONCE
Status: DISCONTINUED | OUTPATIENT
Start: 2024-09-05 | End: 2024-09-05 | Stop reason: HOSPADM

## 2024-09-05 RX ORDER — OXYCODONE HYDROCHLORIDE 5 MG/1
5 TABLET ORAL EVERY 4 HOURS PRN
Status: DISCONTINUED | OUTPATIENT
Start: 2024-09-05 | End: 2024-09-05 | Stop reason: HOSPADM

## 2024-09-05 RX ORDER — ONDANSETRON 2 MG/ML
4 INJECTION INTRAMUSCULAR; INTRAVENOUS EVERY 30 MIN PRN
Status: DISCONTINUED | OUTPATIENT
Start: 2024-09-05 | End: 2024-09-05 | Stop reason: HOSPADM

## 2024-09-05 RX ORDER — NALOXONE HYDROCHLORIDE 0.4 MG/ML
0.4 INJECTION, SOLUTION INTRAMUSCULAR; INTRAVENOUS; SUBCUTANEOUS
Status: DISCONTINUED | OUTPATIENT
Start: 2024-09-05 | End: 2024-09-05 | Stop reason: HOSPADM

## 2024-09-05 RX ORDER — FENTANYL CITRATE 50 UG/ML
50 INJECTION, SOLUTION INTRAMUSCULAR; INTRAVENOUS EVERY 5 MIN PRN
Status: DISCONTINUED | OUTPATIENT
Start: 2024-09-05 | End: 2024-09-05 | Stop reason: HOSPADM

## 2024-09-05 RX ORDER — ONDANSETRON 4 MG/1
4 TABLET, ORALLY DISINTEGRATING ORAL EVERY 30 MIN PRN
Status: DISCONTINUED | OUTPATIENT
Start: 2024-09-05 | End: 2024-09-05 | Stop reason: HOSPADM

## 2024-09-05 RX ORDER — HYDROMORPHONE HCL IN WATER/PF 6 MG/30 ML
0.4 PATIENT CONTROLLED ANALGESIA SYRINGE INTRAVENOUS EVERY 5 MIN PRN
Status: DISCONTINUED | OUTPATIENT
Start: 2024-09-05 | End: 2024-09-05 | Stop reason: HOSPADM

## 2024-09-05 RX ORDER — ONDANSETRON 2 MG/ML
INJECTION INTRAMUSCULAR; INTRAVENOUS PRN
Status: DISCONTINUED | OUTPATIENT
Start: 2024-09-05 | End: 2024-09-05

## 2024-09-05 RX ORDER — FENTANYL CITRATE 50 UG/ML
25 INJECTION, SOLUTION INTRAMUSCULAR; INTRAVENOUS
Status: DISCONTINUED | OUTPATIENT
Start: 2024-09-05 | End: 2024-09-05 | Stop reason: HOSPADM

## 2024-09-05 RX ORDER — SODIUM CHLORIDE, SODIUM LACTATE, POTASSIUM CHLORIDE, CALCIUM CHLORIDE 600; 310; 30; 20 MG/100ML; MG/100ML; MG/100ML; MG/100ML
INJECTION, SOLUTION INTRAVENOUS CONTINUOUS
Status: DISCONTINUED | OUTPATIENT
Start: 2024-09-05 | End: 2024-09-05 | Stop reason: HOSPADM

## 2024-09-05 RX ORDER — IBUPROFEN 600 MG/1
600 TABLET, FILM COATED ORAL EVERY 6 HOURS PRN
Status: DISCONTINUED | OUTPATIENT
Start: 2024-09-05 | End: 2024-09-05 | Stop reason: HOSPADM

## 2024-09-05 RX ORDER — OXYCODONE HYDROCHLORIDE 5 MG/1
10 TABLET ORAL EVERY 4 HOURS PRN
Status: DISCONTINUED | OUTPATIENT
Start: 2024-09-05 | End: 2024-09-05 | Stop reason: HOSPADM

## 2024-09-05 RX ORDER — NALOXONE HYDROCHLORIDE 0.4 MG/ML
0.1 INJECTION, SOLUTION INTRAMUSCULAR; INTRAVENOUS; SUBCUTANEOUS
Status: DISCONTINUED | OUTPATIENT
Start: 2024-09-05 | End: 2024-09-05 | Stop reason: HOSPADM

## 2024-09-05 RX ORDER — FENTANYL CITRATE 50 UG/ML
100 INJECTION, SOLUTION INTRAMUSCULAR; INTRAVENOUS
Status: DISCONTINUED | OUTPATIENT
Start: 2024-09-05 | End: 2024-09-05

## 2024-09-05 RX ORDER — ONDANSETRON 2 MG/ML
4 INJECTION INTRAMUSCULAR; INTRAVENOUS EVERY 6 HOURS PRN
Status: DISCONTINUED | OUTPATIENT
Start: 2024-09-05 | End: 2024-09-05

## 2024-09-05 RX ORDER — ACETAMINOPHEN 325 MG/1
650 TABLET ORAL EVERY 4 HOURS PRN
Status: DISCONTINUED | OUTPATIENT
Start: 2024-09-05 | End: 2024-09-05

## 2024-09-05 RX ORDER — HEPARIN SODIUM 1000 [USP'U]/ML
INJECTION, SOLUTION INTRAVENOUS; SUBCUTANEOUS
Status: DISCONTINUED | OUTPATIENT
Start: 2024-09-05 | End: 2024-09-05 | Stop reason: HOSPADM

## 2024-09-05 RX ORDER — SODIUM CHLORIDE 9 MG/ML
100 INJECTION, SOLUTION INTRAVENOUS CONTINUOUS
Status: DISCONTINUED | OUTPATIENT
Start: 2024-09-05 | End: 2024-09-05 | Stop reason: HOSPADM

## 2024-09-05 RX ORDER — PROTAMINE SULFATE 10 MG/ML
INJECTION, SOLUTION INTRAVENOUS PRN
Status: DISCONTINUED | OUTPATIENT
Start: 2024-09-05 | End: 2024-09-05

## 2024-09-05 RX ORDER — FENTANYL CITRATE 50 UG/ML
INJECTION, SOLUTION INTRAMUSCULAR; INTRAVENOUS PRN
Status: DISCONTINUED | OUTPATIENT
Start: 2024-09-05 | End: 2024-09-05

## 2024-09-05 RX ORDER — HEPARIN SODIUM 10000 [USP'U]/100ML
INJECTION, SOLUTION INTRAVENOUS CONTINUOUS PRN
Status: DISCONTINUED | OUTPATIENT
Start: 2024-09-05 | End: 2024-09-05 | Stop reason: HOSPADM

## 2024-09-05 RX ORDER — ACETAMINOPHEN 325 MG/1
975 TABLET ORAL EVERY 4 HOURS PRN
Status: DISCONTINUED | OUTPATIENT
Start: 2024-09-05 | End: 2024-09-05 | Stop reason: HOSPADM

## 2024-09-05 RX ORDER — ONDANSETRON 4 MG/1
4 TABLET, ORALLY DISINTEGRATING ORAL EVERY 6 HOURS PRN
Status: DISCONTINUED | OUTPATIENT
Start: 2024-09-05 | End: 2024-09-05

## 2024-09-05 RX ORDER — SODIUM CHLORIDE, SODIUM LACTATE, POTASSIUM CHLORIDE, CALCIUM CHLORIDE 600; 310; 30; 20 MG/100ML; MG/100ML; MG/100ML; MG/100ML
INJECTION, SOLUTION INTRAVENOUS CONTINUOUS PRN
Status: DISCONTINUED | OUTPATIENT
Start: 2024-09-05 | End: 2024-09-05

## 2024-09-05 RX ORDER — FENTANYL CITRATE 50 UG/ML
25 INJECTION, SOLUTION INTRAMUSCULAR; INTRAVENOUS EVERY 5 MIN PRN
Status: DISCONTINUED | OUTPATIENT
Start: 2024-09-05 | End: 2024-09-05 | Stop reason: HOSPADM

## 2024-09-05 RX ORDER — PROPOFOL 10 MG/ML
INJECTION, EMULSION INTRAVENOUS PRN
Status: DISCONTINUED | OUTPATIENT
Start: 2024-09-05 | End: 2024-09-05

## 2024-09-05 RX ORDER — HYDROMORPHONE HCL IN WATER/PF 6 MG/30 ML
0.2 PATIENT CONTROLLED ANALGESIA SYRINGE INTRAVENOUS EVERY 5 MIN PRN
Status: DISCONTINUED | OUTPATIENT
Start: 2024-09-05 | End: 2024-09-05 | Stop reason: HOSPADM

## 2024-09-05 RX ORDER — DEXAMETHASONE SODIUM PHOSPHATE 10 MG/ML
INJECTION, SOLUTION INTRAMUSCULAR; INTRAVENOUS PRN
Status: DISCONTINUED | OUTPATIENT
Start: 2024-09-05 | End: 2024-09-05

## 2024-09-05 RX ADMIN — FENTANYL CITRATE 50 MCG: 50 INJECTION INTRAMUSCULAR; INTRAVENOUS at 07:04

## 2024-09-05 RX ADMIN — FENTANYL CITRATE 50 MCG: 50 INJECTION INTRAMUSCULAR; INTRAVENOUS at 06:57

## 2024-09-05 RX ADMIN — DEXAMETHASONE SODIUM PHOSPHATE 10 MG: 10 INJECTION, SOLUTION INTRAMUSCULAR; INTRAVENOUS at 07:18

## 2024-09-05 RX ADMIN — MIDAZOLAM 2 MG: 1 INJECTION INTRAMUSCULAR; INTRAVENOUS at 06:54

## 2024-09-05 RX ADMIN — ONDANSETRON 4 MG: 2 INJECTION INTRAMUSCULAR; INTRAVENOUS at 07:18

## 2024-09-05 RX ADMIN — SODIUM CHLORIDE, POTASSIUM CHLORIDE, SODIUM LACTATE AND CALCIUM CHLORIDE: 600; 310; 30; 20 INJECTION, SOLUTION INTRAVENOUS at 06:54

## 2024-09-05 RX ADMIN — PROTAMINE SULFATE 50 MG: 10 INJECTION, SOLUTION INTRAVENOUS at 08:21

## 2024-09-05 RX ADMIN — ROCURONIUM BROMIDE 30 MG: 50 INJECTION, SOLUTION INTRAVENOUS at 06:57

## 2024-09-05 RX ADMIN — SODIUM CHLORIDE 100 ML/HR: 9 INJECTION, SOLUTION INTRAVENOUS at 06:32

## 2024-09-05 RX ADMIN — PROPOFOL 170 MG: 10 INJECTION, EMULSION INTRAVENOUS at 06:57

## 2024-09-05 RX ADMIN — SUGAMMADEX 300 MG: 100 INJECTION, SOLUTION INTRAVENOUS at 08:22

## 2024-09-05 RX ADMIN — DEXAMETHASONE SODIUM PHOSPHATE 10 MG: 10 INJECTION, SOLUTION INTRAMUSCULAR; INTRAVENOUS at 07:05

## 2024-09-05 RX ADMIN — ROCURONIUM BROMIDE 20 MG: 50 INJECTION, SOLUTION INTRAVENOUS at 07:05

## 2024-09-05 RX ADMIN — ROCURONIUM BROMIDE 20 MG: 50 INJECTION, SOLUTION INTRAVENOUS at 07:20

## 2024-09-05 RX ADMIN — ROCURONIUM BROMIDE 30 MG: 50 INJECTION, SOLUTION INTRAVENOUS at 07:59

## 2024-09-05 ASSESSMENT — ACTIVITIES OF DAILY LIVING (ADL)
ADLS_ACUITY_SCORE: 37

## 2024-09-05 ASSESSMENT — ENCOUNTER SYMPTOMS
SEIZURES: 0
DYSRHYTHMIAS: 0

## 2024-09-05 ASSESSMENT — LIFESTYLE VARIABLES: TOBACCO_USE: 0

## 2024-09-05 ASSESSMENT — COPD QUESTIONNAIRES: COPD: 0

## 2024-09-05 NOTE — PROGRESS NOTES
Patient is kept comfortable during post-procedure stay. VSS. Denies pain. Right femoral access site remains dry & free from signs of bleeding. Tolerated food and fluids. Ambulated without issues. Appointments made & included in AVS. Dr. Chaves was able to speak with patient post procedure. Post-op instructions reviewed and packet given to patient & spouse. Able to ask questions. Verbalized no concerns. Belongings returned. Discharged in stable condition.

## 2024-09-05 NOTE — ANESTHESIA PREPROCEDURE EVALUATION
Anesthesia Pre-Procedure Evaluation    Patient: Govind Alexandre   MRN: [id number] : 1965        Procedure : Procedure(s):  cystoscopy, bilateral ureteroscopy with holmium laser lithotripsy, bilateral ureteroscopy with stone basketing, bilateral retrograde pyelogram, bilateral ureteral stent placement          Past Medical History:   Diagnosis Date    Depression 2014    Essential hypertension     Infection due to 2019 novel coronavirus 2022    Multiple thyroid nodules 2016    Type 2 diabetes mellitus without complication (H) 2016      Past Surgical History:   Procedure Laterality Date    BIOPSY  16    CARPAL TUNNEL RELEASE RT/LT Right     COLONOSCOPY  6/15/18    COMBINED CYSTOSCOPY, RETROGRADES, URETEROSCOPY, LASER HOLMIUM LITHOTRIPSY URETER(S), INSERT STENT Bilateral 2023    Procedure: cystoscopy, bilateral ureteroscopy with holmium laser lithotripsy, bilateral ureteroscopy with stone basketing, bilateral retrograde pyelogram, bilateral ureteral stent placement;  Surgeon: Mark Orta MD;  Location: [facility] OR    HC REPAIR ROTATOR CUFF,ACUTE      Description: Rotator Cuff Repair Acute;  Recorded: 2010;  Comments: cortisone    [facility]  2015         REMOVAL OF SPERM DUCT(S)      Description: Surgery Of Male Genitalia Vasectomy;  Recorded: 2010;    WISDOM TOOTH EXTRACTION        No Known Allergies   Social History     Tobacco Use    Smoking status: Former     Types: Dip, chew, snus or snuff     Passive exposure: Never    Smokeless tobacco: Former     Types: Chew     Quit date: 2004   Substance Use Topics    Alcohol use: Yes     Comment: Alcoholic Drinks/day: 1 every 2 weeks      Wt Readings from Last 1 Encounters:   24 124.7 kg (275 lb)        Anesthesia Evaluation   Pt has had prior anesthetic.     No history of anesthetic complications       ROS/MED HX  ENT/Pulmonary:     (+) sleep apnea, moderate, uses CPAP,   KORINA risk factors,  hypertension,  obese,                             (-) tobacco use, asthma, COPD and recent URI   Neurologic:    (-) no seizures, no CVA and migraines   Cardiovascular: Comment:           Assessment/Recommendations     Paroxysmal atrial fibrillation/stage 3A: We discussed pulmonary vein isolation ablation procedure including <1-2% risk for major complication, anticipated success rates, recovery and follow-up.  Medical and surgical history reviewed and updated. Current medications and allergies reviewed and updated as appropriate. No personal or family history of adverse reactions to anesthesia or abnormal bleeding with surgery     [id number] 2 for HTN, diabetes; HAS BLED 1 for bleeding predisposition with history of TBI and SAH     HTN: Controlled     KORINA: consistent use of CPAP.  Advised to bring for use following procedure     Plan:  1. Continue Eliquis 5 mg twice a day for stroke prophylaxis  2. Hold diltiazem beginning 3 days prior to ablation  3. Start Protonix 40mg every day beginning 3 days prior to ablation and continuing for 6 weeks thereafter  4. EP follow-up 6 weeks post ablation       History of Present Illness/Subjective   Govind Alexandre is a 58 year old male with past medical history significant for persistent atrial fibrillation, HTN, type 2 diabetes, TBI with SAH following a fall 2016, KORINA, obesity, seen today for history and physical prior to AF ablation.  Atrial fibrillation was first documented during hospitalization with TBI and SAH in 2016, and recurred during hospitalization 1 year ago for kidney stone removal, ureteral stents and urinary sepsis.    Symptoms consist of racing heart sensation and irregular palpitations.  He has been feeling generally well recently and has not been aware of recurrent symptoms.  He wears a smart watch regularly. He denies chest discomfort, palpitations, shortness of breath, lightheadedness/dizziness, pedal edema, or syncope.       Data Review     Arrhythmia hx:    Sx: Racing  heart    Dx/date: First documented during hospitalization with TBI and SAH 2016. Recurrence during hospitalization with urinary sepsis 2023    Rate control: Diltiazem    AAD: None    DCCV: None    Ablation: 9/5/2024 (Dr. Chaves)    [id number] 2 for HTN, diabetes, HAS BLED 1 for bleeding predisposition with history of TBI and SAH    OAC: Apixaban     EKG 8/29/2024: SR 74 bpm,  ms  9/6/2023: SR 78 bpm,  ms  7/29/2023:  bpm  Personally reviewed.      TTE 7/30/2023  Normal left ventricular size. Moderate concentric left ventricular hypertrophy  with sigmoid shaped septum. Visually estimated ejection fraction of 55 to 60%  without observed wall motion abnormality.  Right ventricle is mildly dilated. Right ventricular systolic function appears  grossly normal.  Mild biatrial enlargement.  Mild mitral regurgitation.  Mild tricuspid regurgitation. Unable to estimate right ventricular systolic  function secondary to incomplete tricuspid regurgitation velocity envelope.  Mild enlargement of the proximal ascending aorta.  Underlying rhythm is atrial fibrillation.          (+)  hypertension- -   -  - -                                   (-) CHF, arrhythmias, irregular heartbeat/palpitations and dyslipidemia   METS/Exercise Tolerance: >4 METS    Hematologic:    (-) history of blood clots   Musculoskeletal:    (-) arthritis   GI/Hepatic:     (+) GERD, Asymptomatic on medication,               (-) liver disease   Renal/Genitourinary:     (+) renal disease, type: CRI,            Endo:     (+)  type II DM,   Not using insulin,          Obesity,    (-) thyroid disease   Psychiatric/Substance Use:     (+) psychiatric history depression       Infectious Disease:       Malignancy:       Other:            Physical Exam    Airway        Mallampati: III   TM distance: > 3 FB   Neck ROM: full   Mouth opening: > 3 cm    Respiratory Devices and Support         Dental  no notable dental history   Comment: Crown fell off  "lower left molar    2 implants    (+) Modest Abnormalities - crowns, retainers, 1 or 2 missing teeth    B=Bridge, C=Chipped, L=Loose, M=Missing    Cardiovascular   cardiovascular exam normal          Pulmonary   pulmonary exam normal        breath sounds clear to auscultation           OUTSIDE LABS:  CBC:   Lab Results   Component Value Date    WBC 7.1 08/29/2024    WBC 7.7 06/25/2024    HGB 13.2 (L) 08/29/2024    HGB 13.2 (L) 06/25/2024    HCT 37.6 (L) 08/29/2024    HCT 37.2 (L) 06/25/2024     (L) 08/29/2024     (L) 06/25/2024     BMP:   Lab Results   Component Value Date     08/29/2024     06/25/2024    POTASSIUM 4.1 08/29/2024    POTASSIUM 4.0 06/25/2024    CHLORIDE 107 08/29/2024    CHLORIDE 110 (H) 06/25/2024    CO2 23 08/29/2024    CO2 23 06/25/2024    BUN 17.1 08/29/2024    BUN 12.7 06/25/2024    CR 1.04 08/29/2024    CR 0.96 06/25/2024     (H) 09/05/2024     (H) 08/29/2024     COAGS: No results found for: \"PTT\", \"INR\", \"FIBR\"  POC: No results found for: \"BGM\", \"HCG\", \"HCGS\"  HEPATIC:   Lab Results   Component Value Date    ALBUMIN 4.5 06/25/2024    PROTTOTAL 7.4 06/25/2024    ALT 16 06/25/2024    AST 24 06/25/2024    ALKPHOS 84 06/25/2024    BILITOTAL 1.1 06/25/2024     OTHER:   Lab Results   Component Value Date    LACT 1.1 07/29/2023    A1C 5.0 06/25/2024    LEV 9.2 08/29/2024    MAG 2.3 07/31/2023    TSH 0.56 06/25/2024    T4 0.97 12/24/2021    SED 37 (H) 08/15/2023       Anesthesia Plan    ASA Status:  3    NPO Status:  NPO Appropriate    Anesthesia Type: General.     - Airway: ETT   Induction: Intravenous, Propofol.   Maintenance: Balanced.   Techniques and Equipment:     - Airway: Video-Laryngoscope       Consents    Anesthesia Plan(s) and associated risks, benefits, and realistic alternatives discussed. Questions answered and patient/representative(s) expressed understanding.     - Discussed:     - Discussed with:  Patient      - Extended Intubation/Ventilatory " Support Discussed: No.      - Patient is DNR/DNI Status: No     Use of blood products discussed: No .     Postoperative Care    Pain management: IV analgesics, Oral pain medications, Multi-modal analgesia.   PONV prophylaxis: Ondansetron (or other 5HT-3), Dexamethasone or Solumedrol, Background Propofol Infusion     Comments:    Other Comments: Glidescope    Patient history and physical exam reviewed. NPO status appropriate. Focused physical and history performed, pre-op evaluation updated. RBA discussed re: anesthesia and patients questions answered.               Aditi Peterson MD

## 2024-09-05 NOTE — Clinical Note
[facility] system 12 lead EKG, hemodynamics 5 lead, pulse oximetery, NIBP, Physiocontrol hands off defibrillator/external pacer, with 3 monitoring leads to patient. Baseline assessment done.

## 2024-09-05 NOTE — PLAN OF CARE
Goal Outcome Evaluation:             Pt admitted for an ablation due to his hx of atrial fibrillation. Pt is sinus ander at this time. Pt prepped and ready for procedure. Pt s wife Kathleen is here for support.        Carline Forrester RN

## 2024-09-05 NOTE — INTERVAL H&P NOTE
I have reviewed the surgical (or preoperative) H&P that is linked to this encounter, and examined the patient. There are no significant changes    Clinical Conditions Present on Arrival:  Clinically Significant Risk Factors Present on Admission                # Drug Induced Coagulation Defect: home medication list includes an anticoagulant medication  # Thrombocytopenia: Lowest platelets = 123 in last 30 days, will monitor for bleeding      # Obesity: Estimated body mass index is 37.3 kg/m  as calculated from the following:    Height as of this encounter: 1.829 m (6').    Weight as of this encounter: 124.7 kg (275 lb).

## 2024-09-05 NOTE — DISCHARGE INSTRUCTIONS
[facility]  Cardiac Electrophysiology  [address]  [place] [address]   Office: [phone]  Fax: [phone]     Cardiac Electrophysiology - Post Ablation Discharge Instructions      PROCEDURE   Atrial fibrillation ablation         MEDICATION INSTRUCTIONS   Continue taking all home meds  Continue taking your blood thinner Eliquis.          DISCHARGE INSTRUCTIONS   General instructions  Have an adult stay with you until tomorrow.  You may resume your normal diet.    You may shower tomorrow.  Do NOT take a bath, or use a hot tub or pool for at least 1 week. Do not scrub the site. Do not use lotion or powder near the puncture site.    Groin care instructions  For the first 24 hrs - check the puncture site every 1-2 hours while awake.  You may keep a bandaid over the puncture sites for 1 or 2 days post-procedure and thereafter may keep these sites uncovered.  Change the bandaid daily.  If there is minor oozing, apply another bandaid and remove it after 12 hours.  For 2 days, when you cough, sneeze, laugh or move your bowels, hold your hand over the puncture site and press firmly.  Mild bruising at the access sites is normal.  If you notice increased swelling, external bleeding, or have other concerns regarding your access sites please consider emergency department evaluation and call your electrophysiology team's office    Activity recommendations  Do not drive for 3 days.  Avoid stooping or squatting more than 90 degrees at the hips for 7 days  Avoid repetitive motions such as loading , vacuuming, raking or shoveling  Avoid heavy lifting (greater than 25lbs) for 1 week    Post ablation instructions  You may have some irregular heartbeats following your ablation.  These may feel very strong and feel like atrial fibrillation re-initiation is imminent - these episodes should occur less frequently over time.  Recurrent atrial fibrillation can occur within the first 3  months post ablation while your heart recovers from the procedure.  Pleuritic chest discomfort (chest pain worse with taking deep breaths, worse with laying flat on your back) can occur after ablation, usually coming about within the first 24-48hrs post ablation.  If this occurs and is severe enough to be troublesome to you, please call us and consider starting a course of ibuprofen 400mg three times daily for 5 to 7 days    Things to watch for  As with any type of procedure, please be more attentive to unusual symptoms post ablation (eg. fever, neurologic changes, pain with swallowing, loss of consciousness, etc) - we recommend ER evaluation for any such symptoms in the first few weeks post procedure.    Consider ER evaluation for the following:  Severe chest pain not relieved by Tylenol or Ibuprofen  You have chills or a fever greater than 101 F (38 C)  Neurologic changes (eg. leg, arm or face weakness or numbness, difficulties with speech or word finding, problems walking or with your balance, vision changes)  Severe difficulty swallowing and/or you are coughing up blood  Shortness of breath  Increased groin pain or a large or growing hard lump around the site  Groin is red, swollen, hot or tender  Blood or fluid is draining from the groin site  Any numbness, coolness or changes in color in your extremities  Groin pain not relieved by Tylenol or Advil  Recurrent atrial fibrillation associated with sustained rapid heart rates or associated with additional concerning symptoms.    Our office will have a follow-up visit scheduled for you in approximately 6 weeks.  Please do not hesitate to call us before that time should issues arise.        [facility]    [phone]    If you are calling after hours, please listen to the entire voicemail,   a live  will answer at the end of the message.    [phone] to reach the EP nurses working with Dr Chaves

## 2024-09-05 NOTE — ANESTHESIA PROCEDURE NOTES
Airway       Patient location during procedure: OR       Procedure Start/Stop Times: 9/5/2024 6:58 AM  Staff -        CRNA: Mark Her APRN CRNA       Performed By: CRNAIndications and Patient Condition       Indications for airway management: brandi-procedural       Induction type:intravenous       Mask difficulty assessment: 1 - vent by mask    Final Airway Details       Final airway type: endotracheal airway       Successful airway: ETT - single  Endotracheal Airway Details        ETT size (mm): 8.0       Successful intubation technique: video laryngoscopy       VL Blade Size: Glidescope 4       Grade View of Cords: 1       Adjucts: stylet       Position: Right       Measured from: lips       Secured at (cm): 24       Bite block used: None    Post intubation assessment        Placement verified by: capnometry and equal breath sounds        Number of attempts at approach: 1       Number of other approaches attempted: 0       Secured with: tape       Ease of procedure: easy       Dentition: Intact and Unchanged    Medication(s) Administered   Medication Administration Time: 9/5/2024 6:58 AM

## 2024-09-05 NOTE — ANESTHESIA CARE TRANSFER NOTE
Patient: Govind Alexandre    Procedure: Procedure(s):  Ablation Atrial Fibrillation       Diagnosis: persistent atrial fibrillation  Diagnosis Additional Information: No value filed.    Anesthesia Type:   General     Note:    Oropharynx: oropharynx clear of all foreign objects  Level of Consciousness: drowsy  Oxygen Supplementation: face mask  Level of Supplemental Oxygen (L/min / FiO2): 8  Independent Airway: airway patency satisfactory and stable    Vital Signs Stable: post-procedure vital signs reviewed and stable  Report to RN Given: handoff report given  Patient transferred to: PACU    Handoff Report: Identifed the Patient, Identified the Reponsible Provider, Reviewed the pertinent medical history, Discussed the surgical course, Reviewed Intra-OP anesthesia mangement and issues during anesthesia, Set expectations for post-procedure period and Allowed opportunity for questions and acknowledgement of understanding      Vitals:  Vitals Value Taken Time   /79    Temp 97.1    Pulse 68    Resp     SpO2 96        Electronically Signed By: CECILY Mcguire CRNA  September 5, 2024  8:35 AM

## 2024-09-05 NOTE — ANESTHESIA POSTPROCEDURE EVALUATION
Patient: Govind Alexandre    Procedure: Procedure(s):  Ablation Atrial Fibrillation       Anesthesia Type:  General    Note:  Disposition: Outpatient   Postop Pain Control: Uneventful            Sign Out: Well controlled pain   PONV: No   Neuro/Psych: Uneventful            Sign Out: Acceptable/Baseline neuro status   Airway/Respiratory: Uneventful            Sign Out: Acceptable/Baseline resp. status   CV/Hemodynamics: Uneventful            Sign Out: Acceptable CV status; No obvious hypovolemia; No obvious fluid overload   Other NRE: NONE   DID A NON-ROUTINE EVENT OCCUR?            Last vitals:  Vitals Value Taken Time   /72 09/05/24 0930   Temp     Pulse 62 09/05/24 0930   Resp 12 09/05/24 0930   SpO2 99 % 09/05/24 0930       Electronically Signed By: Aditi Peterson MD  September 5, 2024  9:49 AM

## 2024-09-10 ENCOUNTER — VIRTUAL VISIT (OUTPATIENT)
Dept: CARDIOLOGY | Facility: CLINIC | Age: 59
End: 2024-09-10
Payer: COMMERCIAL

## 2024-09-10 DIAGNOSIS — Z98.890 STATUS POST ABLATION OF ATRIAL FIBRILLATION: Primary | ICD-10-CM

## 2024-09-10 DIAGNOSIS — I48.0 PAROXYSMAL ATRIAL FIBRILLATION (H): ICD-10-CM

## 2024-09-10 DIAGNOSIS — Z86.79 STATUS POST ABLATION OF ATRIAL FIBRILLATION: Primary | ICD-10-CM

## 2024-09-10 PROCEDURE — 99207 PR NO CHARGE NURSE ONLY: CPT | Mod: 93

## 2024-09-10 NOTE — PATIENT INSTRUCTIONS
Instructions Following your Ablation Procedure    Your anticoagulation medication Eliquis:  It is important to remain on your anticoagulation medication uninterrupted after your ablation to reduce your risk of a stroke or heart attack, do not stop this medication  Please contact me if you have any questions regarding your anticoagulation medication    Groin care instructions  Keep the site clean and dry, do not place a bandage over the site. If there is minor oozing, apply another bandaid and remove it after 12 hours.  Mild bruising at the access sites is normal. If you notice increased swelling, external bleeding, or have other concerns regarding your access sites please consider emergency department evaluation for significant changes and call your electrophysiology team's office.  You may experience mild discomfort at your groin sites, applying ice packs 20min 3-4 times a day can help alleviate this discomfort.    Activity recommendations  You can resume driving.  Avoid stooping or squatting more than 90 degrees at the hips, repetitive motions such as loading , vacuuming, raking or shoveling, and heavy lifting (greater than 25lbs) for 1 week  Increase your activity gradually over the next 5-10 days, working back to your normal daily activity/routine.    Post ablation instructions  Stay well hydrated, and increase your fluid intake during this recovery period  High protein foods aide in your bodies healing process  You may have some irregular heartbeats and/or atrial fibrillation following your ablation which is normal to recovery, these episodes should occur less frequently over time.   Recurrent atrial fibrillation can occur within the first 3 months post ablation while your heart recovers from the procedure. Please call the electrophysiology team's office if you have an episode lasting greater than 4 hours, or if you notice the episodes are increasing in frequency or duration  Pleuritic chest  discomfort (chest pain worse with taking deep breaths, worse with laying flat on your back) can occur after ablation, usually coming about within the first 24-48hrs post ablation. If this occurs and is severe enough to be troublesome to you, please call us and consider starting a course of ibuprofen 400mg three times daily for 5 to 7 days    Things to watch for  As with any type of procedure, please be more attentive to unusual symptoms post ablation (eg. fever, neurologic changes, pain with swallowing, loss of consciousness, etc) - we recommend ER evaluation for any such symptoms in the first few weeks post procedure.    Consider ER evaluation for the following:  Severe chest pain not relieved by Tylenol or Ibuprofen  You have chills or a fever greater than 101 F (38 C)  Neurologic changes (eg. leg, arm or face weakness or numbness, difficulties with speech or word finding, problems  walking or with your balance, vision changes)  Severe difficulty swallowing and/or you are coughing up blood  Shortness of breath  Increased groin pain or a large or growing hard lump around the site  Groin is red, swollen, hot or tender  Blood or fluid is draining from the groin site  Any numbness, coolness or changes in color in your extremities  Groin pain not relieved by Tylenol or Advil  Recurrent atrial fibrillation associated with sustained rapid heart rates or associated with additional concerning  symptoms.    Your follow up appointments are as follows:  You will be seen by the electrophysiologist nurse practitioner at 6 weeks after your ablation  At your 6 week appointment, a 3 month follow-up appointment will be arranged with either the Nurse Practitioner or the Electrophysiology provider     Sincerely,  Aisha Austin RN [phone]    After hours please contact the on call service at # [phone]

## 2024-09-10 NOTE — PROGRESS NOTES
Pt is s/p PVI PO day 5, today 9/10/2024 , PVI was completed on 9-5-24 with Dr. Chaves and pt was discharged the same day.  PC was placed to pt, spoke to pt    General Assessment:     Weight: Pt reports pt is unable to report weight, but denies s/s of fluid retention    Vital signs:  Pt reports normal heart rates and blood pressure and Pt has been afebrile.    Pain: Pt denies generalized or localized pain abnormal to healing s/p     /GI: Pt denies difficulty swallowing, denies heart burn, denies constipation, denies urinary retention/difficulty, reports normal appetite, and reports staying hydrated.    Respiratory: Pt  states some shortness of breath with activity,  .    Activity: Pt is tolerating advancement in activity while following physical restrictions.     Neurological:  Pt denies vision changes, changes in speech, changes in balance, and changes in strength or motor function.    Rhythm Assessment:   Pt denies palpitations and denies symptoms or sustained AF episodes.    Procedure Site Assessment:   Pt reports some bruising around sites without significant change from hospital discharge and normal to PVI recovery and small pea/dime size hardening under suture sites normal to PVI recovery    Anticoagulation/Medication:  Pt remain on Eliquis without interruption  Pt  confirms taking omeprazole and diltiazem as directed.    Education completed with pt at this visit:  Reviewed normal post-op PVI healing process, when to contact EP-RN/EP-MD, contact information was given to the pt for further concerns or questions, after hours contact was reviewed with patient, and pt verbalized understanding    Follow up:  AVS mailed to patient and is available through my chart.  Pt Does have a 6 week follow up scheduled with Angy Kaufman on 10-23-24 .    9/10/2024 9:45 AM  Aisha Austin RN

## 2024-09-25 ENCOUNTER — OFFICE VISIT (OUTPATIENT)
Dept: PHARMACY | Facility: CLINIC | Age: 59
End: 2024-09-25
Payer: COMMERCIAL

## 2024-09-25 VITALS
WEIGHT: 281.4 LBS | DIASTOLIC BLOOD PRESSURE: 84 MMHG | BODY MASS INDEX: 38.16 KG/M2 | HEART RATE: 79 BPM | SYSTOLIC BLOOD PRESSURE: 127 MMHG

## 2024-09-25 DIAGNOSIS — R80.9 TYPE 2 DIABETES MELLITUS WITH MICROALBUMINURIA, WITHOUT LONG-TERM CURRENT USE OF INSULIN (H): Primary | ICD-10-CM

## 2024-09-25 DIAGNOSIS — E11.29 TYPE 2 DIABETES MELLITUS WITH MICROALBUMINURIA, WITHOUT LONG-TERM CURRENT USE OF INSULIN (H): Primary | ICD-10-CM

## 2024-09-25 DIAGNOSIS — F52.8 OTHER SEXUAL DYSFUNCTION NOT DUE TO A SUBSTANCE OR KNOWN PHYSIOLOGICAL CONDITION: ICD-10-CM

## 2024-09-25 DIAGNOSIS — I10 ESSENTIAL HYPERTENSION: ICD-10-CM

## 2024-09-25 DIAGNOSIS — I48.91 ATRIAL FIBRILLATION WITH RVR (H): ICD-10-CM

## 2024-09-25 DIAGNOSIS — E78.2 MIXED HYPERLIPIDEMIA: ICD-10-CM

## 2024-09-25 PROCEDURE — 99207 PR NO CHARGE LOS: CPT

## 2024-09-25 NOTE — PROGRESS NOTES
Medication Therapy Management (MTM) Encounter    ASSESSMENT:                            Medication Adherence/Access: No issues identified    Type 2 Diabetes  Patient is meeting A1c goal of < 7%. Self monitoring of blood glucose is not at goal of fasting  mg/dL and post prandial < 180 mg/dL. Recommend increasing Trulicity as planned, ideally to get off metformin in the future.   Microalbumin is not at goal <30mg/g. On lisinopril.    Hypertension/Afib  Patient is close to blood pressure goal of < 130/80mmHg. Appropriately on anticoagulation with DOAC, Eliquis. Rate control with diltiazem. Follows with cardiology.     Hyperlipidemia   LDL at goal <100 on a moderate intensity statin given age and diabetes diagnosis per ACC/AHA guidelines.       Erectile dysfunction:   Stable.     PLAN:                            Call [facility] to fill Trulicity 3mg dose.     Follow-up: Return in about 1 year (around 9/25/2025) for Follow up, with me.    SUBJECTIVE/OBJECTIVE:                          Govind Alexandre is a 58 year old male seen for a follow-up visit from 12/22/23.       Reason for visit: comprehensive medication review     Allergies/ADRs: Reviewed in chart  Past Medical History: Reviewed in chart  Tobacco: He reports that he has quit smoking. His smoking use included dip, chew, snus or snuff. He has never been exposed to tobacco smoke. He quit smokeless tobacco use about 19 years ago.  His smokeless tobacco use included chew.  Alcohol: Less than 1 beverage / month    Medication Adherence/Access: Would like to decrease amount of medications he is on    Diabetes   Trulicity 1.5mg weekly - insurance has to fill for 90 days but he is ready to increase to Trulicity 3mg now  Metformin XR 500mg twice daily   Not taking aspirin due to anticoagulation with Eliquis    Does have diarrhea but manageable.    Previously on Ozempic with appetite suppression. Doesn't have appetite suppression with Trulicity    Current diabetes  symptoms: none     Blood sugar monitorin time(s) daily; Ranges: (patient reported) Fasting- 110-140  Post-Prandial- 210 last night   Eye exam is up to date  Foot exam: due    Hypertension /Afib  Lisinopril 10mg once daily  Diltiazem ER 180mg once daily    Spironolactone 12.5mg once daily   Eliquis 5mg twice daily   Omeprazole 40mg once daily x6 weeks post-ablation     Recent ablation . Is having discussion with cardiology about Watchman. Wears Apple Watch and can see when he's in Afib.     [id number] 2 for HTN, diabetes; HAS BLED 1 for bleeding predisposition with history of TBI and SAH    Patient reports no current medication side effects  Patient does not self-monitor blood pressure.       Hyperlipidemia   Rosuvastatin 10mg at bedtime   Patient reports no significant myalgias or other side effects.  The ASCVD Risk score (Pia MATOS, et al., 2019) failed to calculate for the following reasons:    The valid total cholesterol range is 130 to 320 mg/dL     Erectile dysfunction:   Tadalafil 10mg once daily as needed   Denies side effects  Feels it is effective.     Today's Vitals: /84   Pulse 79   Wt 281 lb 6.4 oz (127.6 kg)   BMI 38.16 kg/m    ----------------  Post Discharge Medication Reconciliation Status: discharge medications reconciled, continue medications without change.    I spent 25 minutes with this patient today. All changes were made via collaborative practice agreement with CECILY Lo CNP. A copy of the visit note was provided to the patient's provider(s).    A summary of these recommendations was declined by the patient.    Carline oGldberg, Pam  Medication Therapy Management (MTM) Pharmacist         Medication Therapy Recommendations  No medication therapy recommendations to display

## 2024-09-25 NOTE — PATIENT INSTRUCTIONS
"Recommendations from today's MTM visit:                                                         Call [facility] to fill Trulicity 3mg dose.     Follow-up: Return in about 1 year (around 9/25/2025) for Follow up, with me.    It was great speaking with you today.  I value your experience and would be very thankful for your time in providing feedback in our clinic survey. In the next few days, you may receive an email or text message from [email or web address] with a link to a survey related to your  clinical pharmacist.\"     To schedule another MTM appointment, please call the clinic directly or you may call the MTM scheduling line at [phone].    My Clinical Pharmacist's contact information:                                                      Please feel free to contact me with any questions or concerns you have.      Carline Goldberg, PharmD  Medication Therapy Management (MTM) Pharmacist   "

## 2024-09-26 ENCOUNTER — MYC MEDICAL ADVICE (OUTPATIENT)
Dept: PHARMACY | Facility: CLINIC | Age: 59
End: 2024-09-26
Payer: COMMERCIAL

## 2024-09-26 DIAGNOSIS — I10 ESSENTIAL HYPERTENSION: ICD-10-CM

## 2024-09-26 DIAGNOSIS — R80.9 TYPE 2 DIABETES MELLITUS WITH MICROALBUMINURIA, WITHOUT LONG-TERM CURRENT USE OF INSULIN (H): ICD-10-CM

## 2024-09-26 DIAGNOSIS — E78.2 MIXED HYPERLIPIDEMIA: ICD-10-CM

## 2024-09-26 DIAGNOSIS — K76.0 FATTY LIVER: ICD-10-CM

## 2024-09-26 DIAGNOSIS — G47.30 SLEEP APNEA, UNSPECIFIED TYPE: ICD-10-CM

## 2024-09-26 DIAGNOSIS — E11.29 TYPE 2 DIABETES MELLITUS WITH MICROALBUMINURIA, WITHOUT LONG-TERM CURRENT USE OF INSULIN (H): ICD-10-CM

## 2024-09-26 DIAGNOSIS — E66.01 MORBID OBESITY (H): ICD-10-CM

## 2024-09-26 RX ORDER — DULAGLUTIDE 3 MG/.5ML
3 INJECTION, SOLUTION SUBCUTANEOUS WEEKLY
Qty: 6 ML | Refills: 1 | Status: SHIPPED | OUTPATIENT
Start: 2024-09-26

## 2024-09-27 RX ORDER — METFORMIN HCL 500 MG
500 TABLET, EXTENDED RELEASE 24 HR ORAL 2 TIMES DAILY WITH MEALS
Qty: 180 TABLET | Refills: 1 | Status: SHIPPED | OUTPATIENT
Start: 2024-09-27

## 2024-10-18 ENCOUNTER — ALLIED HEALTH/NURSE VISIT (OUTPATIENT)
Dept: FAMILY MEDICINE | Facility: CLINIC | Age: 59
End: 2024-10-18
Payer: COMMERCIAL

## 2024-10-18 DIAGNOSIS — Z23 ENCOUNTER FOR IMMUNIZATION: Primary | ICD-10-CM

## 2024-10-18 PROCEDURE — 91320 SARSCV2 VAC 30MCG TRS-SUC IM: CPT

## 2024-10-18 PROCEDURE — 99207 PR NO CHARGE NURSE ONLY: CPT

## 2024-10-18 PROCEDURE — 90678 RSV VACC PREF BIVALENT IM: CPT

## 2024-10-18 PROCEDURE — 90480 ADMN SARSCOV2 VAC 1/ONLY CMP: CPT

## 2024-10-18 PROCEDURE — 90471 IMMUNIZATION ADMIN: CPT

## 2024-10-18 PROCEDURE — 90472 IMMUNIZATION ADMIN EACH ADD: CPT

## 2024-10-18 PROCEDURE — 90673 RIV3 VACCINE NO PRESERV IM: CPT

## 2024-10-18 RX ORDER — DULAGLUTIDE 3 MG/.5ML
INJECTION, SOLUTION SUBCUTANEOUS
COMMUNITY
Start: 2024-09-27 | End: 2024-10-23

## 2024-10-22 PROBLEM — Z98.890 STATUS POST ABLATION OF ATRIAL FIBRILLATION: Status: ACTIVE | Noted: 2020-05-07

## 2024-10-22 NOTE — PROGRESS NOTES
Thank you, Dr. Chaves, for asking the [facility] team to see . Govind Alexandre to evaluate 7 weeks post ablation.    Assessment/Recommendations     Assessment/Plan:    Diagnoses and all orders for this visit:  Paroxysmal atrial fibrillation (H)  Status post ablation of atrial fibrillation  --symptomatic with racing HR  --s/p PVI ablation 9/5/24 Dr Chaves   -- Per review of Apple Watch history today, he continues to have frequent episodes of atrial fibrillation that began 9/16  through 10/21 and have occurred every few days lasting anywhere from 30 minutes up to several hours, rates are between 50 to 120 bpm, symptomatic with shortness of breath on exertion and palpitations.  --No ER visits post ablation      [id number] 2 for HTN, diabetes; HAS BLED 1 for bleeding predisposition with history of TBI and SAH.  He reports no missed doses of Eliquis over the last 3 weeks and no bleeding episodes.    --Stop Diltiazem  --Start Sotalol today 80 mg BID due to frequent episodes of AF post ablation (LVEF 55-60%, GFR 82)  --EKG on Friday 10/25/24 to assess QTC interval, patient is leaving for [facility] on 10/28/24  --Continue Eliquis every 12 hours  --Follow up with Dr Chaves around 12/6/24, 3 months post ablation     Essential hypertension  --140/72, recheck, 130/70    Obstructive sleep apnea syndrome  --consistent use of CPAP         History of Present Illness/Subjective     Govind Alexandre is a very pleasant 59 year old male who comes in today for EP follow up 7 weeks post ablation    PMH: persistent atrial fibrillation, HTN, type 2 diabetes, TBI with SAH following a fall 2016, KORINA, obesity     Arrhythmia hx:  Sx: Racing heart  Dx/date: First documented during hospitalization with TBI and SAH 2016. Recurrence during hospitalization with urinary sepsis 2023  Rate control: Diltiazem  AAD: None  DCCV: None  Ablation: PVI ablation 9/5/24 Dr Chaves   [id number] 2 for HTN, diabetes, HAS BLED 1 for  bleeding predisposition with history of TBI and SAH  OAC: Apixaban    Govind presents with his wife for follow-up 7 weeks post ablation.  10-days post ablation, he began to experience episodes of atrial fibrillation that have continued to persist every few days, his last episode was 2 days ago, episodes last anywhere from 30 minutes up to several hours.  He is symptomatic during his AF episodes reporting shortness of breath on exertion and palpitations.  On occasion he will note a dull ache in the left chest wall.  Nonradiating, resolves after a few seconds.  Denies lightheadedness orthopnea, PND,  or near syncope.  He remains on diltiazem extended release 180 mg daily, he is currently in normal sinus rhythm today with controlled ventricular rate of 89 bpm.  He remains on Eliquis twice daily for stroke prevention, denies any missed doses over the last 3 weeks or bleeding episodes.  Denies any complications with groin site access following his ablation.  Denies any ER visits for any acute complications following his procedure.  His last TTE revealed preserved LV function, RV mildly dilated, RV function normal.  Mild biatrial enlargement noted.  Mild TR. he does report consistent use of CPAP for management of his KORINA.  He is heading to [facility] this Monday for vacation with his family.        Cardiographics (reviewed):  EKG  9/5/24 NSR 65 bpm QT/QTC: 414/430 ms  8/29/2024: SR 74 bpm,  ms  9/6/2023: SR 78 bpm,  ms  7/29/2023:  bpm    TTE 7/30/2023  Normal left ventricular size. Moderate concentric left ventricular hypertrophy  with sigmoid shaped septum. Visually estimated ejection fraction of 55 to 60%  without observed wall motion abnormality.  Right ventricle is mildly dilated. Right ventricular systolic function appears  grossly normal.  Mild biatrial enlargement.  Mild mitral regurgitation.  Mild tricuspid regurgitation. Unable to estimate right ventricular systolic  function secondary to  incomplete tricuspid regurgitation velocity envelope.  Mild enlargement of the proximal ascending aorta.  Underlying rhythm is atrial fibrillation.     MPI 9/18/2023    The regadenoson nuclear stress test is negative for inducible myocardial ischemia or infarction.    The left ventricular ejection fraction at stress is 58%.    The patient is at a low risk of future cardiac ischemic events.    There is no prior study for comparison.     Problem List:  Patient Active Problem List   Diagnosis    Male Erectile Disorder    Chronic fatigue    Depression    Essential hypertension    Hx of traumatic brain injury    Multiple thyroid nodules    Type 2 diabetes mellitus with microalbuminuria, without long-term current use of insulin (H)    Sleep apnea    Obesity (BMI 35.0-39.9) with comorbidity (H)    Status post ablation of atrial fibrillation    Mixed hyperlipidemia    Complicated UTI (urinary tract infection)    Enterococcal bacteremia    Chronic anticoagulation    S/P ureteral stent placement    Nephrolithiasis    Splenomegaly    Traumatic brain injury with loss of consciousness, sequela (H)    Paroxysmal atrial fibrillation (H)    Persistent atrial fibrillation (H)     Revi  e  Physical Examination Review of Systems   w fystems  There were no vitals taken for this visit.  There is no height or weight on file to calculate BMI.  Wt Readings from Last 3 Encounters:   09/25/24 127.6 kg (281 lb 6.4 oz)   09/05/24 124.7 kg (275 lb)   08/29/24 120.2 kg (265 lb)     General Appearance:   Alert, well-appearing and in no acute distress.   HEENT: Atraumatic, normocephalic.  No scleral icterus, normal conjunctivae; mucous membranes pink and moist.     Chest: Chest symmetric, spine straight.   Lungs:   Respirations unlabored: Lungs are clear to auscultation.   Cardiovascular:   Normal first and second heart sounds with no murmurs, rubs, or gallops.  Regular, regular.   Normal JVD, 1+ non pitting BLE edema.       Extremities: No  cyanosis or clubbing   Musculoskeletal: Moves all extremities   Skin: Warm, dry, intact.    Neurologic: Mood and affect are appropriate, alert and oriented to person, place, time, and situation     ROS: 10 point ROS neg other than the symptoms noted above in the HPI.     Medical History  Surgical History Family History Social History     Past Medical History:   Diagnosis Date    Depression 12/24/2014    Essential hypertension     Infection due to 2019 novel coronavirus 01/11/2022    Multiple thyroid nodules 09/08/2016    Type 2 diabetes mellitus without complication (H) 11/07/2016    Past Surgical History:   Procedure Laterality Date    BIOPSY  9/18/16    CARPAL TUNNEL RELEASE RT/LT Right     COLONOSCOPY  6/15/18    COMBINED CYSTOSCOPY, RETROGRADES, URETEROSCOPY, LASER HOLMIUM LITHOTRIPSY URETER(S), INSERT STENT Bilateral 7/26/2023    Procedure: cystoscopy, bilateral ureteroscopy with holmium laser lithotripsy, bilateral ureteroscopy with stone basketing, bilateral retrograde pyelogram, bilateral ureteral stent placement;  Surgeon: Mark Orta MD;  Location: [facility] OR    EP ABLATION PULMONARY VEIN ISOLATION N/A 9/5/2024    Procedure: Ablation Atrial Fibrillation;  Surgeon: Kenny Chaves MD;  Location: [facility] CATH LAB CV    HC REPAIR ROTATOR CUFF,ACUTE      Description: Rotator Cuff Repair Acute;  Recorded: 01/13/2010;  Comments: cortisone    PICC  11/30/2015         REMOVAL OF SPERM DUCT(S)      Description: Surgery Of Male Genitalia Vasectomy;  Recorded: 01/13/2010;    WISDOM TOOTH EXTRACTION      Family History   Problem Relation Age of Onset    Heart Disease Mother     Kidney Disease Mother     Thyroid Cancer Father     Kidney Disease Father     Heart Disease Father     Hypertension Father     Thyroid Disease Father         cancer    Cancer Father         myoleodysplansic syndrome    No Known Problems Sister     Hypertension Sister     Hypertension Brother     Heart Disease Paternal Grandmother      Depression Daughter     Anxiety Disorder Daughter     Autism Spectrum Disorder Son     History   Smoking Status    Former    Types: Dip, chew, snus or snuff   Smokeless Tobacco    Former    Types: Chew    Quit date: 12/24/2004     Social History    Substance and Sexual Activity      Alcohol use: Yes        Comment: Alcoholic Drinks/day: 1 every 2 weeks       Medications  Allergies     Current Outpatient Medications   Medication Sig Dispense Refill    ACCU-CHEK FASTCLIX LANCET DRUM [ACCU-CHEK FASTCLIX LANCET DRUM] USE TO TEST BLOOD SUGARS 2 TO 3 TIMES A  each 4    apixaban ANTICOAGULANT (ELIQUIS) 5 MG tablet Take 1 tablet (5 mg) by mouth 2 times daily 180 tablet 3    blood glucose test (ACCU-CHEK NATHANIEL PLUS TEST STRP) strips [BLOOD GLUCOSE TEST (ACCU-CHEK NATHANIEL PLUS TEST STRP) STRIPS] Use 1 each As Directed 3 (three) times a day. 300 each 3    diltiazem ER COATED BEADS (CARDIZEM CD/CARTIA XT) 180 MG 24 hr capsule Take 1 capsule (180 mg) by mouth daily 90 capsule 3    Dulaglutide (TRULICITY) 3 MG/0.5ML SOPN Inject 3 mg subcutaneously once a week. 6 mL 1    lisinopril (ZESTRIL) 10 MG tablet Take 1 tablet (10 mg) by mouth daily 90 tablet 3    metFORMIN (GLUCOPHAGE XR) 500 MG 24 hr tablet TAKE 1 TABLET TWICE A DAY WITH MEALS 180 tablet 1    omeprazole (PRILOSEC) 40 MG DR capsule Take 1 capsule (40 mg) by mouth daily. 45 capsule 0    rosuvastatin (CRESTOR) 10 MG tablet TAKE 1 TABLET(10 MG) BY MOUTH AT BEDTIME Strength: 10 mg 90 tablet 3    spironolactone (ALDACTONE) 25 MG tablet Take 0.5 tablets (12.5 mg) by mouth daily 45 tablet 3    tadalafil (CIALIS) 10 MG tablet Take 1 tablet (10 mg) by mouth daily as needed (erectile dsyfunction) 10 mg as a single dose 30 minutes prior to anticipated sexual activity; do not take more than once daily.  Adjust dose based on effectiveness and tolerability; may decrease to 5 mg or increase to 20 mg per dose. 30 tablet 3    TRULICITY 3 MG/0.5ML SOAJ INJECT 3MG UNDER THE SKIN ONCE A  "WEEK        No Known Allergies   Medical, surgical, family, social history, and medications were all reviewed and updated as necessary.   Lab Results    Chemistry/lipid CBC Cardiac Enzymes/BNP/TSH/INR   Recent Labs   Lab Test 06/25/24  1149   CHOL 114   HDL 36*   LDL 54   TRIG 119     Recent Labs   Lab Test 06/25/24  1149 08/18/23  0857 11/29/22  0800   LDL 54 52 41     Recent Labs   Lab Test 09/05/24  0928 09/05/24  0604 08/29/24  0920   NA  --   --  140   POTASSIUM  --   --  4.1   CHLORIDE  --   --  107   CO2  --   --  23   *   < > 100*   BUN  --   --  17.1   CR  --   --  1.04   GFRESTIMATED  --   --  82   LEV  --   --  9.2    < > = values in this interval not displayed.     Recent Labs   Lab Test 08/29/24  0920 06/25/24  1149 12/22/23  0901   CR 1.04 0.96 1.08     Recent Labs   Lab Test 06/25/24  1149 12/22/23  0901 08/18/23  0857   A1C 5.0 4.2 5.3          Recent Labs   Lab Test 08/29/24  0920   WBC 7.1   HGB 13.2*   HCT 37.6*   MCV 88   *     Recent Labs   Lab Test 08/29/24  0920 06/25/24  1149 08/15/23  1600   HGB 13.2* 13.2* 10.9*    Recent Labs   Lab Test 07/30/23  0549 07/29/23 2014   TROPONINI 0.08 0.01     Recent Labs   Lab Test 07/30/23  0549   *     Recent Labs   Lab Test 06/25/24  1149   TSH 0.56     No results for input(s): \"INR\" in the last 29898 hours.       Total Time- 32 minutes spent on date of encounter doing chart review, history and exam, documentation and further activities as noted above.  This note has been dictated using voice recognition software. Any grammatical, typographical, or context distortions are unintentional and inherent to the software.    Angy Kaufman [facility]  [phone]                       "

## 2024-10-23 ENCOUNTER — OFFICE VISIT (OUTPATIENT)
Dept: CARDIOLOGY | Facility: CLINIC | Age: 59
End: 2024-10-23
Payer: COMMERCIAL

## 2024-10-23 VITALS
SYSTOLIC BLOOD PRESSURE: 130 MMHG | BODY MASS INDEX: 37.87 KG/M2 | RESPIRATION RATE: 20 BRPM | DIASTOLIC BLOOD PRESSURE: 70 MMHG | HEART RATE: 89 BPM | OXYGEN SATURATION: 95 % | WEIGHT: 279.2 LBS

## 2024-10-23 DIAGNOSIS — Z98.890 STATUS POST ABLATION OF ATRIAL FIBRILLATION: ICD-10-CM

## 2024-10-23 DIAGNOSIS — I48.0 PAROXYSMAL ATRIAL FIBRILLATION (H): Primary | ICD-10-CM

## 2024-10-23 DIAGNOSIS — G47.33 OBSTRUCTIVE SLEEP APNEA SYNDROME: ICD-10-CM

## 2024-10-23 DIAGNOSIS — Z79.899 ENCOUNTER FOR MONITORING SOTALOL THERAPY: ICD-10-CM

## 2024-10-23 DIAGNOSIS — I48.19 PERSISTENT ATRIAL FIBRILLATION (H): ICD-10-CM

## 2024-10-23 DIAGNOSIS — I10 ESSENTIAL HYPERTENSION: ICD-10-CM

## 2024-10-23 DIAGNOSIS — Z51.81 ENCOUNTER FOR MONITORING SOTALOL THERAPY: ICD-10-CM

## 2024-10-23 DIAGNOSIS — Z86.79 STATUS POST ABLATION OF ATRIAL FIBRILLATION: ICD-10-CM

## 2024-10-23 PROCEDURE — 99214 OFFICE O/P EST MOD 30 MIN: CPT | Performed by: NURSE PRACTITIONER

## 2024-10-23 RX ORDER — SOTALOL HYDROCHLORIDE 80 MG/1
80 TABLET ORAL 2 TIMES DAILY
Qty: 180 TABLET | Refills: 3 | Status: SHIPPED | OUTPATIENT
Start: 2024-10-23

## 2024-10-23 NOTE — PATIENT INSTRUCTIONS
Govind Alexandre,    It was a pleasure to see you today at the [facility].     My recommendations after this visit include:    --Stop Diltiazem  --Start Sotalol today 80 mg every 12 hours due to frequent episodes of AF post ablation  --EKG on Friday 10/25/24 to assess QTC interval, patient is leaving for [facility] 10/28/24  --Continue Eliquis every 12 hours  --Follow up with Dr Chaves around 12/6/24, 3 months post ablation    Angy Kaufman [facility], Electrophysiology  [phone]  EP nurses [phone]

## 2024-10-23 NOTE — LETTER
10/23/2024    Lachelle Summers, APRN CNP  [address] [facility] [id number]    RE: Govind Alexandre       Dear Colleague,     I had the pleasure of seeing Govind Alexandre in the [facility].    Thank you, Dr. Chaves, for asking the [facility] team to see Mr. Govind Alexandre to evaluate 7 weeks post ablation.    Assessment/Recommendations     Assessment/Plan:    Diagnoses and all orders for this visit:  Paroxysmal atrial fibrillation (H)  Status post ablation of atrial fibrillation  --symptomatic with racing HR  --s/p PVI ablation 9/5/24 Dr Chaves   -- Per review of [employer] history today, he continues to have frequent episodes of atrial fibrillation that began 9/16  through 10/21 and have occurred every few days lasting anywhere from 30 minutes up to several hours, rates are between 50 to 120 bpm, symptomatic with shortness of breath on exertion and palpitations.  --No ER visits post ablation      [id number] 2 for HTN, diabetes; HAS BLED 1 for bleeding predisposition with history of TBI and SAH.  He reports no missed doses of Eliquis over the last 3 weeks and no bleeding episodes.    --Stop Diltiazem  --Start Sotalol today 80 mg BID due to frequent episodes of AF post ablation (LVEF 55-60%, GFR 82)  --EKG on Friday 10/25/24 to assess QTC interval, patient is leaving for [facility] on 10/28/24  --Continue Eliquis every 12 hours  --Follow up with Dr Chaves around 12/6/24, 3 months post ablation     Essential hypertension  --140/72, recheck, 130/70    Obstructive sleep apnea syndrome  --consistent use of CPAP         History of Present Illness/Subjective     Govind Alexandre is a very pleasant 59 year old male who comes in today for EP follow up 7 weeks post ablation    PMH: persistent atrial fibrillation, HTN, type 2 diabetes, TBI with SAH following a fall 2016, KORINA, obesity     Arrhythmia hx:  Sx: Racing heart  Dx/date: First documented during hospitalization with TBI and  SAH 2016. Recurrence during hospitalization with urinary sepsis 2023  Rate control: Diltiazem  AAD: None  DCCV: None  Ablation: PVI ablation 9/5/24 Dr Chaves   [id number] 2 for HTN, diabetes, HAS BLED 1 for bleeding predisposition with history of TBI and SAH  OAC: Apixaban    Govind presents with his wife for follow-up 7 weeks post ablation.  10-days post ablation, he began to experience episodes of atrial fibrillation that have continued to persist every few days, his last episode was 2 days ago, episodes last anywhere from 30 minutes up to several hours.  He is symptomatic during his AF episodes reporting shortness of breath on exertion and palpitations.  On occasion he will note a dull ache in the left chest wall.  Nonradiating, resolves after a few seconds.  Denies lightheadedness orthopnea, PND,  or near syncope.  He remains on diltiazem extended release 180 mg daily, he is currently in normal sinus rhythm today with controlled ventricular rate of 89 bpm.  He remains on Eliquis twice daily for stroke prevention, denies any missed doses over the last 3 weeks or bleeding episodes.  Denies any complications with groin site access following his ablation.  Denies any ER visits for any acute complications following his procedure.  His last TTE revealed preserved LV function, RV mildly dilated, RV function normal.  Mild biatrial enlargement noted.  Mild TR. he does report consistent use of CPAP for management of his KORINA.  He is heading to [facility] this Monday for vacation with his family.        Cardiographics (reviewed):  EKG  9/5/24 NSR 65 bpm QT/QTC: 414/430 ms  8/29/2024: SR 74 bpm,  ms  9/6/2023: SR 78 bpm,  ms  7/29/2023:  bpm    TTE 7/30/2023  Normal left ventricular size. Moderate concentric left ventricular hypertrophy  with sigmoid shaped septum. Visually estimated ejection fraction of 55 to 60%  without observed wall motion abnormality.  Right ventricle is mildly dilated. Right  ventricular systolic function appears  grossly normal.  Mild biatrial enlargement.  Mild mitral regurgitation.  Mild tricuspid regurgitation. Unable to estimate right ventricular systolic  function secondary to incomplete tricuspid regurgitation velocity envelope.  Mild enlargement of the proximal ascending aorta.  Underlying rhythm is atrial fibrillation.     MPI 9/18/2023     The regadenoson nuclear stress test is negative for inducible myocardial ischemia or infarction.     The left ventricular ejection fraction at stress is 58%.     The patient is at a low risk of future cardiac ischemic events.     There is no prior study for comparison.     Problem List:  Patient Active Problem List   Diagnosis     Male Erectile Disorder     Chronic fatigue     Depression     Essential hypertension     Hx of traumatic brain injury     Multiple thyroid nodules     Type 2 diabetes mellitus with microalbuminuria, without long-term current use of insulin (H)     Sleep apnea     Obesity (BMI 35.0-39.9) with comorbidity (H)     Status post ablation of atrial fibrillation     Mixed hyperlipidemia     Complicated UTI (urinary tract infection)     Enterococcal bacteremia     Chronic anticoagulation     S/P ureteral stent placement     Nephrolithiasis     Splenomegaly     Traumatic brain injury with loss of consciousness, sequela (H)     Paroxysmal atrial fibrillation (H)     Persistent atrial fibrillation (H)     Revi  e  Physical Examination Review of Systems   w [facility]  There were no vitals taken for this visit.  There is no height or weight on file to calculate BMI.  Wt Readings from Last 3 Encounters:   09/25/24 127.6 kg (281 lb 6.4 oz)   09/05/24 124.7 kg (275 lb)   08/29/24 120.2 kg (265 lb)     General Appearance:   Alert, well-appearing and in no acute distress.   HEENT: Atraumatic, normocephalic.  No scleral icterus, normal conjunctivae; mucous membranes pink and moist.     Chest: Chest symmetric, spine straight.   Lungs:    Respirations unlabored: Lungs are clear to auscultation.   Cardiovascular:   Normal first and second heart sounds with no murmurs, rubs, or gallops.  Regular, regular.   Normal JVD, 1+ non pitting BLE edema.       Extremities: No cyanosis or clubbing   Musculoskeletal: Moves all extremities   Skin: Warm, dry, intact.    Neurologic: Mood and affect are appropriate, alert and oriented to person, place, time, and situation     ROS: 10 point ROS neg other than the symptoms noted above in the HPI.     Medical History  Surgical History Family History Social History     Past Medical History:   Diagnosis Date     Depression 12/24/2014     Essential hypertension      Infection due to 2019 novel coronavirus 01/11/2022     Multiple thyroid nodules 09/08/2016     Type 2 diabetes mellitus without complication (H) 11/07/2016    Past Surgical History:   Procedure Laterality Date     BIOPSY  9/18/16     CARPAL TUNNEL RELEASE RT/LT Right      COLONOSCOPY  6/15/18     COMBINED CYSTOSCOPY, RETROGRADES, URETEROSCOPY, LASER HOLMIUM LITHOTRIPSY URETER(S), INSERT STENT Bilateral 7/26/2023    Procedure: cystoscopy, bilateral ureteroscopy with holmium laser lithotripsy, bilateral ureteroscopy with stone basketing, bilateral retrograde pyelogram, bilateral ureteral stent placement;  Surgeon: Mark Orta MD;  Location: [facility] OR     EP ABLATION PULMONARY VEIN ISOLATION N/A 9/5/2024    Procedure: Ablation Atrial Fibrillation;  Surgeon: Kenny Chaves MD;  [facility]: [facility]     HC REPAIR ROTATOR CUFF,ACUTE      Description: Rotator Cuff Repair Acute;  Recorded: 01/13/2010;  Comments: cortisone     PICC  11/30/2015          REMOVAL OF SPERM DUCT(S)      Description: Surgery Of Male Genitalia Vasectomy;  Recorded: 01/13/2010;     WISDOM TOOTH EXTRACTION      Family History   Problem Relation Age of Onset     Heart Disease Mother      Kidney Disease Mother      Thyroid Cancer Father      Kidney Disease Father      Heart Disease  Father      Hypertension Father      Thyroid Disease Father         cancer     Cancer Father         myoleodysplansic syndrome     No Known Problems Sister      Hypertension Sister      Hypertension Brother      Heart Disease Paternal Grandmother      Depression Daughter      Anxiety Disorder Daughter      Autism Spectrum Disorder Son     History   Smoking Status     Former     Types: Dip, chew, snus or snuff   Smokeless Tobacco     Former     Types: Chew     Quit date: 12/24/2004     Social History    Substance and Sexual Activity      Alcohol use: Yes        Comment: Alcoholic Drinks/day: 1 every 2 weeks       Medications  Allergies     Current Outpatient Medications   Medication Sig Dispense Refill     ACCU-CHEK FASTCLIX LANCET DRUM [ACCU-CHEK FASTCLIX LANCET DRUM] USE TO TEST BLOOD SUGARS 2 TO 3 TIMES A  each 4     apixaban ANTICOAGULANT (ELIQUIS) 5 MG tablet Take 1 tablet (5 mg) by mouth 2 times daily 180 tablet 3     blood glucose test (ACCU-CHEK NATHANIEL PLUS TEST STRP) strips [BLOOD GLUCOSE TEST (ACCU-CHEK NATHANIEL PLUS TEST STRP) STRIPS] Use 1 each As Directed 3 (three) times a day. 300 each 3     diltiazem ER COATED BEADS (CARDIZEM CD/CARTIA XT) 180 MG 24 hr capsule Take 1 capsule (180 mg) by mouth daily 90 capsule 3     Dulaglutide (TRULICITY) 3 MG/0.5ML SOPN Inject 3 mg subcutaneously once a week. 6 mL 1     lisinopril (ZESTRIL) 10 MG tablet Take 1 tablet (10 mg) by mouth daily 90 tablet 3     metFORMIN (GLUCOPHAGE XR) 500 MG 24 hr tablet TAKE 1 TABLET TWICE A DAY WITH MEALS 180 tablet 1     omeprazole (PRILOSEC) 40 MG DR capsule Take 1 capsule (40 mg) by mouth daily. 45 capsule 0     rosuvastatin (CRESTOR) 10 MG tablet TAKE 1 TABLET(10 MG) BY MOUTH AT BEDTIME Strength: 10 mg 90 tablet 3     spironolactone (ALDACTONE) 25 MG tablet Take 0.5 tablets (12.5 mg) by mouth daily 45 tablet 3     tadalafil (CIALIS) 10 MG tablet Take 1 tablet (10 mg) by mouth daily as needed (erectile dsyfunction) 10 mg as a  "single dose 30 minutes prior to anticipated sexual activity; do not take more than once daily.  Adjust dose based on effectiveness and tolerability; may decrease to 5 mg or increase to 20 mg per dose. 30 tablet 3     TRULICITY 3 MG/0.5ML SOAJ INJECT 3MG UNDER THE SKIN ONCE A WEEK        No Known Allergies   Medical, surgical, family, social history, and medications were all reviewed and updated as necessary.   Lab Results    Chemistry/lipid CBC Cardiac Enzymes/BNP/TSH/INR   Recent Labs   Lab Test 06/25/24  1149   CHOL 114   HDL 36*   LDL 54   TRIG 119     Recent Labs   Lab Test 06/25/24  1149 08/18/23  0857 11/29/22  0800   LDL 54 52 41     Recent Labs   Lab Test 09/05/24  0928 09/05/24  0604 08/29/24  0920   NA  --   --  140   POTASSIUM  --   --  4.1   CHLORIDE  --   --  107   CO2  --   --  23   *   < > 100*   BUN  --   --  17.1   CR  --   --  1.04   GFRESTIMATED  --   --  82   LEV  --   --  9.2    < > = values in this interval not displayed.     Recent Labs   Lab Test 08/29/24  0920 06/25/24  1149 12/22/23  0901   CR 1.04 0.96 1.08     Recent Labs   Lab Test 06/25/24  1149 12/22/23  0901 08/18/23  0857   A1C 5.0 4.2 5.3          Recent Labs   Lab Test 08/29/24  0920   WBC 7.1   HGB 13.2*   HCT 37.6*   MCV 88   *     Recent Labs   Lab Test 08/29/24  0920 06/25/24  1149 08/15/23  1600   HGB 13.2* 13.2* 10.9*    Recent Labs   Lab Test 07/30/23  0549 07/29/23 2014   TROPONINI 0.08 0.01     Recent Labs   Lab Test 07/30/23  0549   *     Recent Labs   Lab Test 06/25/24  1149   TSH 0.56     No results for input(s): \"INR\" in the last 39950 hours.       Total Time- 32 minutes spent on date of encounter doing chart review, history and exam, documentation and further activities as noted above.  This note has been dictated using voice recognition software. Any grammatical, typographical, or context distortions are unintentional and inherent to the software.    Angy Kaufman [facility] " [facility]  [phone]                         Thank you for allowing me to participate in the care of your patient.      Sincerely,     Angy Kaufman NP     [facility]  cc:   Kenny Chaves MD  [address] [place] [id number]

## 2024-10-25 ENCOUNTER — ALLIED HEALTH/NURSE VISIT (OUTPATIENT)
Dept: CARDIOLOGY | Facility: CLINIC | Age: 59
End: 2024-10-25
Payer: COMMERCIAL

## 2024-10-25 VITALS
SYSTOLIC BLOOD PRESSURE: 126 MMHG | BODY MASS INDEX: 38.54 KG/M2 | DIASTOLIC BLOOD PRESSURE: 72 MMHG | RESPIRATION RATE: 12 BRPM | HEART RATE: 76 BPM | WEIGHT: 284.2 LBS

## 2024-10-25 DIAGNOSIS — Z79.899 ENCOUNTER FOR MONITORING SOTALOL THERAPY: ICD-10-CM

## 2024-10-25 DIAGNOSIS — Z51.81 ENCOUNTER FOR MONITORING SOTALOL THERAPY: ICD-10-CM

## 2024-10-25 LAB
ATRIAL RATE - MUSE: 76 BPM
DIASTOLIC BLOOD PRESSURE - MUSE: NORMAL MMHG
INTERPRETATION ECG - MUSE: NORMAL
P AXIS - MUSE: 37 DEGREES
PR INTERVAL - MUSE: 170 MS
QRS DURATION - MUSE: 106 MS
QT - MUSE: 414 MS
QTC - MUSE: 465 MS
R AXIS - MUSE: 29 DEGREES
SYSTOLIC BLOOD PRESSURE - MUSE: NORMAL MMHG
T AXIS - MUSE: 30 DEGREES
VENTRICULAR RATE- MUSE: 76 BPM

## 2024-10-25 PROCEDURE — 93000 ELECTROCARDIOGRAM COMPLETE: CPT | Performed by: INTERNAL MEDICINE

## 2024-10-25 PROCEDURE — 99207 PR NO CHARGE NURSE ONLY: CPT

## 2024-10-25 NOTE — PROGRESS NOTES
Pt comes to clinic for EKG to evaluate QT/Qtc after starting Sotalol 80 mg bid as directed by Angy Kaufman on 10-23-24.      EKG shows SR @76;  ; .    Phone call to patient, he states he is doing well.  He denies any change in symptoms or side effects from Sotalol.  He has stopped taking Diltiazem.  He has not had any episodes of afib, though he states this is only happening about one time a week and it is still a bit early to tell.  Encouraged him to call our team with continued afib or any other concerns, he states understanding.

## 2024-10-25 NOTE — NURSING NOTE
Sophie LANGFORD reviewed EKG, ok for patient to go, will send EKG to Angy and her nurses to review.  Patient is aware and is ok with the plan.    MIRNA Salcido

## 2024-11-05 ENCOUNTER — PATIENT OUTREACH (OUTPATIENT)
Dept: CARE COORDINATION | Facility: CLINIC | Age: 59
End: 2024-11-05
Payer: COMMERCIAL

## 2024-12-18 ENCOUNTER — OFFICE VISIT (OUTPATIENT)
Dept: CARDIOLOGY | Facility: CLINIC | Age: 59
End: 2024-12-18
Payer: COMMERCIAL

## 2024-12-18 VITALS
BODY MASS INDEX: 38.38 KG/M2 | DIASTOLIC BLOOD PRESSURE: 84 MMHG | RESPIRATION RATE: 20 BRPM | WEIGHT: 283 LBS | HEART RATE: 78 BPM | SYSTOLIC BLOOD PRESSURE: 152 MMHG

## 2024-12-18 DIAGNOSIS — Z86.79 STATUS POST ABLATION OF ATRIAL FIBRILLATION: ICD-10-CM

## 2024-12-18 DIAGNOSIS — I48.0 PAROXYSMAL ATRIAL FIBRILLATION (H): ICD-10-CM

## 2024-12-18 DIAGNOSIS — Z98.890 STATUS POST ABLATION OF ATRIAL FIBRILLATION: ICD-10-CM

## 2024-12-18 PROCEDURE — G2211 COMPLEX E/M VISIT ADD ON: HCPCS | Performed by: INTERNAL MEDICINE

## 2024-12-18 PROCEDURE — 99214 OFFICE O/P EST MOD 30 MIN: CPT | Performed by: INTERNAL MEDICINE

## 2024-12-18 NOTE — PROGRESS NOTES
HEART CARE ENCOUNTER CONSULTATON [facility]  [phone]      Assessment/Recommendations   Assessment/Plan:    Govind Alexandre is a very pleasant 59 year old male with PMH of atrial fibrillation, hypertension, hyperlipidemia, type 2 diabetes, kidney stones s/p bilateral ureteroscopy, stone removal and bilateral ureteral stents who presents today to the EP clinic.    Paroxysmal atrial fibrillation  - s/p PVI on 9/5/2024 with early recurrence and started on Sotalol 80 mg BID  - will continue Sotalol till May 2025  - will reassess options at the time--> stop sotalol and reassess or consider redo ablation  - we discussed the ongoing importance of lifestyle modification (maintaining a healthy weight, sleep apnea diagnosis and management, alcohol avoidance) as part of a long term strategy for atrial fibrillation management    2. Anticoagulation  - CHADSVASC score 2  - continue Eliquis  - he has a history of a TBI with internal brain bleed and is interested in LAAO, we will explore it after an ablation  - will check with insurance if he qualifies    3. HTN  - well controlled    4. KORINA  - On CPAP    Time spent: 30 minutes spent on the date of the encounter doing chart review, history and exam, documentation and further activities as noted above.    The longitudinal plan of care for the condition(s) below were addressed during this visit. Due to the added complexity in care, I will continue support in the subsequent management of this condition(s) and with the ongoing continuity of care of this condition(s).            History of Present Illness/Subjective    HPI: Govind Alexandre is a very pleasant 58 year old male with PMH of atrial fibrillation, hypertension, hyperlipidemia, type 2 diabetes, kidney stones s/p bilateral ureteroscopy, stone removal and bilateral ureteral stents who presents today to the EP clinic.    Govind was first diagnosed with AF about 6-7 years ago in the setting of a  TBI. More recently he was told again that he was in AF when he was in the hospital for sepsis. Perception of AF has been tricky in him given his recent hospitalization. He has noted having episodes of AF on his apple watch.    He has been compliant with his meds since discharge. He does have orthostatic symptoms.    July 2024  He has not had any episodes of AF since we last met. He monitors his AF via his apple watch. He is stringly inclined to stop taking meds long term and so is interested in an ablation.    Alcohol use 2 times a month    Uses CPAP regularly.    December 2024    During our last clinic visit, an AF ablation was discussed, offered and agreed upon. He underwent a successful and uncomplicated pulmonary vein isolation on 9/5/2024. He was found to have AF on his Apple watch post ablation and was started on Sotalol 80 mg BID. He has had 3 notifications of AF from his Apple watch since starting the Sotalol but there no ECGs to corroborate his AF. The watch only reports an irregular pulse.    Recent Echocardiogram Results (personally reviewed):    July 2023    Interpretation Summary     Normal left ventricular size. Moderate concentric left ventricular hypertrophy  with sigmoid shaped septum. Visually estimated ejection fraction of 55 to 60%  without observed wall motion abnormality.  Right ventricle is mildly dilated. Right ventricular systolic function appears  grossly normal.  Mild biatrial enlargement.  Mild mitral regurgitation.  Mild tricuspid regurgitation. Unable to estimate right ventricular systolic  function secondary to incomplete tricuspid regurgitation velocity envelope.  Mild enlargement of the proximal ascending aorta.  Underlying rhythm is atrial fibrillation.      Labs below reviewed personally     Physical Examination  Review of Systems   Vitals: BP (!) 152/84 (BP Location: Right arm, Patient Position: Sitting, Cuff Size: Adult Large)   Pulse 78   Resp 20   Wt 128.4 kg (283 lb)   BMI  38.38 kg/m    BMI= Body mass index is 38.38 kg/m .  Wt Readings from Last 3 Encounters:   12/18/24 128.4 kg (283 lb)   10/25/24 128.9 kg (284 lb 3.2 oz)   10/23/24 126.6 kg (279 lb 3.2 oz)       General Appearance:   no distress, normal body habitus   ENT/Mouth: membranes moist, no oral lesions or bleeding gums.      EYES:  no scleral icterus, normal conjunctivae   Neck: no carotid bruits or thyromegaly   Chest/Lungs:   lungs are clear to auscultation, no rales or wheezing, no sternal scar, equal chest wall expansion    Cardiovascular:   Regular. Normal first and second heart sounds with no murmurs, rubs, or gallops; the carotid, radial and posterior tibial pulses are intact, no edema bilaterally    Abdomen:  no organomegaly, masses, bruits, or tenderness; bowel sounds are present   Extremities: no cyanosis or clubbing   Skin: no xanthelasma, warm.    Neurologic: normal  bilateral, no tremors     Psychiatric: alert and oriented x3, calm        Please refer above for cardiac ROS details.        Medical History  Surgical History Family History Social History   Past Medical History:   Diagnosis Date    Depression 12/24/2014    Essential hypertension     Infection due to 2019 novel coronavirus 01/11/2022    Multiple thyroid nodules 09/08/2016    Type 2 diabetes mellitus without complication (H) 11/07/2016     Past Surgical History:   Procedure Laterality Date    BIOPSY  9/18/16    CARPAL TUNNEL RELEASE RT/LT Right     COLONOSCOPY  6/15/18    COMBINED CYSTOSCOPY, RETROGRADES, URETEROSCOPY, LASER HOLMIUM LITHOTRIPSY URETER(S), INSERT STENT Bilateral 7/26/2023    Procedure: cystoscopy, bilateral ureteroscopy with holmium laser lithotripsy, bilateral ureteroscopy with stone basketing, bilateral retrograde pyelogram, bilateral ureteral stent placement;  Surgeon: Mark Orta MD;  Location: [facility] OR    EP ABLATION PULMONARY VEIN ISOLATION N/A 9/5/2024    Procedure: Ablation Atrial Fibrillation;  Surgeon: Andie  MD Kenny;  Location: [facility] LAB CV    HC REPAIR ROTATOR CUFF,ACUTE      Description: Rotator Cuff Repair Acute;  Recorded: 01/13/2010;  Comments: cortisone    [facility]  11/30/2015         REMOVAL OF SPERM DUCT(S)      Description: Surgery Of Male Genitalia Vasectomy;  Recorded: 01/13/2010;    WISDOM TOOTH EXTRACTION       Family History   Problem Relation Age of Onset    Heart Disease Mother     Kidney Disease Mother     Thyroid Cancer Father     Kidney Disease Father     Heart Disease Father     Hypertension Father     Thyroid Disease Father         cancer    Cancer Father         myoleodysplansic syndrome    No Known Problems Sister     Hypertension Sister     Hypertension Brother     Heart Disease Paternal Grandmother     Depression Daughter     Anxiety Disorder Daughter     Autism Spectrum Disorder Son         Social History     Socioeconomic History    Marital status:      Spouse name: Not on file    Number of children: Not on file    Years of education: Not on file    Highest education level: Not on file   Occupational History    Not on file   Tobacco Use    Smoking status: Former     Types: Dip, chew, snus or snuff     Passive exposure: Never    Smokeless tobacco: Former     Types: Chew     Quit date: 12/24/2004   Vaping Use    Vaping status: Never Used   Substance and Sexual Activity    Alcohol use: Yes     Comment: Alcoholic Drinks/day: 1 every 2 weeks    Drug use: No    Sexual activity: Yes     Partners: Female     Birth control/protection: None   Other Topics Concern    Not on file   Social History Narrative    Not on file     Social Drivers of Health     Financial Resource Strain: Low Risk  (6/24/2024)    Financial Resource Strain     Within the past 12 months, have you or your family members you live with been unable to get utilities (heat, electricity) when it was really needed?: No   Food Insecurity: Low Risk  (6/24/2024)    Food Insecurity     Within the past 12 months, did you worry  that your food would run out before you got money to buy more?: No     Within the past 12 months, did the food you bought just not last and you didn t have money to get more?: No   Transportation Needs: Low Risk  (6/24/2024)    Transportation Needs     Within the past 12 months, has lack of transportation kept you from medical appointments, getting your medicines, non-medical meetings or appointments, work, or from getting things that you need?: No   Physical Activity: Insufficiently Active (6/24/2024)    Exercise Vital Sign     Days of Exercise per Week: 3 days     Minutes of Exercise per Session: 20 min   Stress: Stress Concern Present (6/24/2024)    [employer] [place] [employer] - Occupational Stress Questionnaire     Feeling of Stress : Rather much   Social Connections: Unknown (6/24/2024)    Social Connection and Isolation Panel [NHANES]     Frequency of Communication with Friends and Family: Not on file     Frequency of Social Gatherings with Friends and Family: Twice a week     Attends [employer] Services: Not on file     Active Member of Clubs or Organizations: Not on file     Attends Club or Organization Meetings: Not on file     Marital Status: Not on file   Interpersonal Safety: High Risk (9/5/2024)    Interpersonal Safety     Do you feel physically and emotionally safe where you currently live?: No     Within the past 12 months, have you been hit, slapped, kicked or otherwise physically hurt by someone?: No     Within the past 12 months, have you been humiliated or emotionally abused in other ways by your partner or ex-partner?: No   Housing Stability: Low Risk  (6/24/2024)    Housing Stability     Do you have housing? : Yes     Are you worried about losing your housing?: No           Medications  Allergies   Current Outpatient Medications   Medication Sig Dispense Refill    ACCU-CHEK FASTCLIX LANCET DRUM [ACCU-CHEK FASTCLIX LANCET DRUM] USE TO TEST BLOOD SUGARS 2 TO 3 TIMES A  each 4     apixaban ANTICOAGULANT (ELIQUIS) 5 MG tablet Take 1 tablet (5 mg) by mouth 2 times daily 180 tablet 3    blood glucose test (ACCU-CHEK NATHANIEL PLUS TEST STRP) strips [BLOOD GLUCOSE TEST (ACCU-CHEK NATHANIEL PLUS TEST STRP) STRIPS] Use 1 each As Directed 3 (three) times a day. 300 each 3    Dulaglutide (TRULICITY) 3 MG/0.5ML SOPN Inject 3 mg subcutaneously once a week. 6 mL 1    lisinopril (ZESTRIL) 10 MG tablet Take 1 tablet (10 mg) by mouth daily 90 tablet 3    metFORMIN (GLUCOPHAGE XR) 500 MG 24 hr tablet TAKE 1 TABLET TWICE A DAY WITH MEALS 180 tablet 1    rosuvastatin (CRESTOR) 10 MG tablet TAKE 1 TABLET(10 MG) BY MOUTH AT BEDTIME Strength: 10 mg 90 tablet 3    sotalol (BETAPACE) 80 MG tablet Take 1 tablet (80 mg) by mouth 2 times daily. 180 tablet 3    spironolactone (ALDACTONE) 25 MG tablet Take 0.5 tablets (12.5 mg) by mouth daily 45 tablet 3    tadalafil (CIALIS) 10 MG tablet Take 1 tablet (10 mg) by mouth daily as needed (erectile dsyfunction) 10 mg as a single dose 30 minutes prior to anticipated sexual activity; do not take more than once daily.  Adjust dose based on effectiveness and tolerability; may decrease to 5 mg or increase to 20 mg per dose. 30 tablet 3     No Known Allergies       Lab Results    Chemistry/lipid CBC Cardiac Enzymes/BNP/TSH/INR   Recent Labs   Lab Test 08/18/23  0857   CHOL 109   HDL 33*   LDL 52   TRIG 118     Recent Labs   Lab Test 08/18/23  0857 11/29/22  0800 12/24/21  0742   LDL 52 41 56     Recent Labs   Lab Test 08/18/23  0857      POTASSIUM 3.9   CHLORIDE 109*   CO2 23   *   BUN 7.9*   CR 0.87   GFRESTIMATED >90   LEV 9.4     Recent Labs   Lab Test 08/18/23  0857 08/15/23  1600 08/08/23  1500   CR 0.87 0.89 1.01     Recent Labs   Lab Test 08/18/23  0857 11/29/22  0800 12/24/21  0742   A1C 5.3 5.0 6.9*          Recent Labs   Lab Test 08/15/23  1600   WBC 6.7   HGB 10.9*   HCT 34.2*   MCV 95        Recent Labs   Lab Test 08/15/23  1600 08/08/23  1500  08/03/23  0417   HGB 10.9* 11.3* 9.4*    Recent Labs   Lab Test 07/30/23  0549 07/29/23 2014   TROPONINI 0.08 0.01     Recent Labs   Lab Test 07/30/23  0549   *     Recent Labs   Lab Test 12/24/21  0742   TSH 0.73     No results for input(s): INR in the last [id number] hours.     Kenny Chaves MD

## 2024-12-18 NOTE — LETTER
12/18/2024    Lachelle Summers, APRN CNP  [address] [facility] [address]    RE: Govind Alexandre       Dear Colleague,     I had the pleasure of seeing Govind Alexandre in the [facility].    HEART CARE ENCOUNTER CONSULTATON [facility]  [phone]      Assessment/Recommendations   Assessment/Plan:    Govind Alexandre is a very pleasant 59 year old male with PMH of atrial fibrillation, hypertension, hyperlipidemia, type 2 diabetes, kidney stones s/p bilateral ureteroscopy, stone removal and bilateral ureteral stents who presents today to the EP clinic.    Paroxysmal atrial fibrillation  - s/p PVI on 9/5/2024 with early recurrence and started on Sotalol 80 mg BID  - will continue Sotalol till May 2025  - will reassess options at the time--> stop sotalol and reassess or consider redo ablation  - we discussed the ongoing importance of lifestyle modification (maintaining a healthy weight, sleep apnea diagnosis and management, alcohol avoidance) as part of a long term strategy for atrial fibrillation management    2. Anticoagulation  - CHADSVASC score 2  - continue Eliquis  - he has a history of a TBI with internal brain bleed and is interested in LAAO, we will explore it after an ablation  - will check with insurance if he qualifies    3. HTN  - well controlled    4. KORINA  - On CPAP    Time spent: 30 minutes spent on the date of the encounter doing chart review, history and exam, documentation and further activities as noted above.    The longitudinal plan of care for the condition(s) below were addressed during this visit. Due to the added complexity in care, I will continue support in the subsequent management of this condition(s) and with the ongoing continuity of care of this condition(s).            History of Present Illness/Subjective    HPI: Govind Alexandre is a very pleasant 58 year old male with PMH of atrial fibrillation, hypertension, hyperlipidemia, type 2  diabetes, kidney stones s/p bilateral ureteroscopy, stone removal and bilateral ureteral stents who presents today to the EP clinic.    Govind was first diagnosed with AF about 6-7 years ago in the setting of a TBI. More recently he was told again that he was in AF when he was in the hospital for sepsis. Perception of AF has been tricky in him given his recent hospitalization. He has noted having episodes of AF on his apple watch.    He has been compliant with his meds since discharge. He does have orthostatic symptoms.    July 2024  He has not had any episodes of AF since we last met. He monitors his AF via his apple watch. He is stringly inclined to stop taking meds long term and so is interested in an ablation.    Alcohol use 2 times a month    Uses CPAP regularly.    December 2024    During our last clinic visit, an AF ablation was discussed, offered and agreed upon. He underwent a successful and uncomplicated pulmonary vein isolation on 9/5/2024. He was found to have AF on his Apple watch post ablation and was started on Sotalol 80 mg BID. He has had 3 notifications of AF from his Apple watch since starting the Sotalol but there no ECGs to corroborate his AF. The watch only reports an irregular pulse.    Recent Echocardiogram Results (personally reviewed):    July 2023    Interpretation Summary     Normal left ventricular size. Moderate concentric left ventricular hypertrophy  with sigmoid shaped septum. Visually estimated ejection fraction of 55 to 60%  without observed wall motion abnormality.  Right ventricle is mildly dilated. Right ventricular systolic function appears  grossly normal.  Mild biatrial enlargement.  Mild mitral regurgitation.  Mild tricuspid regurgitation. Unable to estimate right ventricular systolic  function secondary to incomplete tricuspid regurgitation velocity envelope.  Mild enlargement of the proximal ascending aorta.  Underlying rhythm is atrial fibrillation.      Labs below  reviewed personally     Physical Examination  Review of Systems   Vitals: BP (!) 152/84 (BP Location: Right arm, Patient Position: Sitting, Cuff Size: Adult Large)   Pulse 78   Resp 20   Wt 128.4 kg (283 lb)   BMI 38.38 kg/m    BMI= Body mass index is 38.38 kg/m .  Wt Readings from Last 3 Encounters:   12/18/24 128.4 kg (283 lb)   10/25/24 128.9 kg (284 lb 3.2 oz)   10/23/24 126.6 kg (279 lb 3.2 oz)       General Appearance:   no distress, normal body habitus   ENT/Mouth: membranes moist, no oral lesions or bleeding gums.      EYES:  no scleral icterus, normal conjunctivae   Neck: no carotid bruits or thyromegaly   Chest/Lungs:   lungs are clear to auscultation, no rales or wheezing, no sternal scar, equal chest wall expansion    Cardiovascular:   Regular. Normal first and second heart sounds with no murmurs, rubs, or gallops; the carotid, radial and posterior tibial pulses are intact, no edema bilaterally    Abdomen:  no organomegaly, masses, bruits, or tenderness; bowel sounds are present   Extremities: no cyanosis or clubbing   Skin: no xanthelasma, warm.    Neurologic: normal  bilateral, no tremors     Psychiatric: alert and oriented x3, calm        Please refer above for cardiac ROS details.        Medical History  Surgical History Family History Social History   Past Medical History:   Diagnosis Date     Depression 12/24/2014     Essential hypertension      Infection due to 2019 novel coronavirus 01/11/2022     Multiple thyroid nodules 09/08/2016     Type 2 diabetes mellitus without complication (H) 11/07/2016     Past Surgical History:   Procedure Laterality Date     BIOPSY  9/18/16     CARPAL TUNNEL RELEASE RT/LT Right      COLONOSCOPY  6/15/18     COMBINED CYSTOSCOPY, RETROGRADES, URETEROSCOPY, LASER HOLMIUM LITHOTRIPSY URETER(S), INSERT STENT Bilateral 7/26/2023    Procedure: cystoscopy, bilateral ureteroscopy with holmium laser lithotripsy, bilateral ureteroscopy with stone basketing, bilateral  retrograde pyelogram, bilateral ureteral stent placement;  Surgeon: Mark Orta MD;  Location: [facility] OR     EP ABLATION PULMONARY VEIN ISOLATION N/A 9/5/2024    Procedure: Ablation Atrial Fibrillation;  Surgeon: Kenny Chaves MD;  Location: [facility] CV     HC REPAIR ROTATOR CUFF,ACUTE      Description: Rotator Cuff Repair Acute;  Recorded: 01/13/2010;  Comments: cortisone     PICC  11/30/2015          REMOVAL OF SPERM DUCT(S)      Description: Surgery Of Male Genitalia Vasectomy;  Recorded: 01/13/2010;     WISDOM TOOTH EXTRACTION       Family History   Problem Relation Age of Onset     Heart Disease Mother      Kidney Disease Mother      Thyroid Cancer Father      Kidney Disease Father      Heart Disease Father      Hypertension Father      Thyroid Disease Father         cancer     Cancer Father         myoleodysplansic syndrome     No Known Problems Sister      Hypertension Sister      Hypertension Brother      Heart Disease Paternal Grandmother      Depression Daughter      Anxiety Disorder Daughter      Autism Spectrum Disorder Son         Social History     Socioeconomic History     Marital status:      Spouse name: Not on file     Number of children: Not on file     Years of education: Not on file     Highest education level: Not on file   Occupational History     Not on file   Tobacco Use     Smoking status: Former     Types: Dip, chew, snus or snuff     Passive exposure: Never     Smokeless tobacco: Former     Types: Chew     Quit date: 12/24/2004   Vaping Use     Vaping status: Never Used   Substance and Sexual Activity     Alcohol use: Yes     Comment: Alcoholic Drinks/day: 1 every 2 weeks     Drug use: No     Sexual activity: Yes     Partners: Female     Birth control/protection: None   Other Topics Concern     Not on file   Social History Narrative     Not on file     Social Drivers of Health     Financial Resource Strain: Low Risk  (6/24/2024)    Financial Resource Strain       Within the past 12 months, have you or your family members you live with been unable to get utilities (heat, electricity) when it was really needed?: No   Food Insecurity: Low Risk  (6/24/2024)    Food Insecurity      Within the past 12 months, did you worry that your food would run out before you got money to buy more?: No      Within the past 12 months, did the food you bought just not last and you didn t have money to get more?: No   Transportation Needs: Low Risk  (6/24/2024)    Transportation Needs      Within the past 12 months, has lack of transportation kept you from medical appointments, getting your medicines, non-medical meetings or appointments, work, or from getting things that you need?: No   Physical Activity: Insufficiently Active (6/24/2024)    Exercise Vital Sign      Days of Exercise per Week: 3 days      Minutes of Exercise per Session: 20 min   Stress: Stress Concern Present (6/24/2024)    [place] [employer] - Occupational Stress Questionnaire      Feeling of Stress : Rather much   Social Connections: Unknown (6/24/2024)    Social Connection and Isolation Panel [NHANES]      Frequency of Communication with Friends and Family: Not on file      Frequency of Social Gatherings with Friends and Family: Twice a week      Attends Anabaptist Services: Not on file      Active Member of Clubs or Organizations: Not on file      Attends Club or Organization Meetings: Not on file      Marital Status: Not on file   Interpersonal Safety: High Risk (9/5/2024)    Interpersonal Safety      Do you feel physically and emotionally safe where you currently live?: No      Within the past 12 months, have you been hit, slapped, kicked or otherwise physically hurt by someone?: No      Within the past 12 months, have you been humiliated or emotionally abused in other ways by your partner or ex-partner?: No   Housing Stability: Low Risk  (6/24/2024)    Housing Stability      Do you have housing? : Yes       Are you worried about losing your housing?: No           Medications  Allergies   Current Outpatient Medications   Medication Sig Dispense Refill     ACCU-CHEK FASTCLIX LANCET DRUM [ACCU-CHEK FASTCLIX LANCET DRUM] USE TO TEST BLOOD SUGARS 2 TO 3 TIMES A  each 4     apixaban ANTICOAGULANT (ELIQUIS) 5 MG tablet Take 1 tablet (5 mg) by mouth 2 times daily 180 tablet 3     blood glucose test (ACCU-CHEK NATHANIEL PLUS TEST STRP) strips [BLOOD GLUCOSE TEST (ACCU-CHEK NATHANIEL PLUS TEST STRP) STRIPS] Use 1 each As Directed 3 (three) times a day. 300 each 3     Dulaglutide (TRULICITY) 3 MG/0.5ML SOPN Inject 3 mg subcutaneously once a week. 6 mL 1     lisinopril (ZESTRIL) 10 MG tablet Take 1 tablet (10 mg) by mouth daily 90 tablet 3     metFORMIN (GLUCOPHAGE XR) 500 MG 24 hr tablet TAKE 1 TABLET TWICE A DAY WITH MEALS 180 tablet 1     rosuvastatin (CRESTOR) 10 MG tablet TAKE 1 TABLET(10 MG) BY MOUTH AT BEDTIME Strength: 10 mg 90 tablet 3     sotalol (BETAPACE) 80 MG tablet Take 1 tablet (80 mg) by mouth 2 times daily. 180 tablet 3     spironolactone (ALDACTONE) 25 MG tablet Take 0.5 tablets (12.5 mg) by mouth daily 45 tablet 3     tadalafil (CIALIS) 10 MG tablet Take 1 tablet (10 mg) by mouth daily as needed (erectile dsyfunction) 10 mg as a single dose 30 minutes prior to anticipated sexual activity; do not take more than once daily.  Adjust dose based on effectiveness and tolerability; may decrease to 5 mg or increase to 20 mg per dose. 30 tablet 3     No Known Allergies       Lab Results    Chemistry/lipid CBC Cardiac Enzymes/BNP/TSH/INR   Recent Labs   Lab Test 08/18/23  0857   CHOL 109   HDL 33*   LDL 52   TRIG 118     Recent Labs   Lab Test 08/18/23  0857 11/29/22  0800 12/24/21  0742   LDL 52 41 56     Recent Labs   Lab Test 08/18/23  0857      POTASSIUM 3.9   CHLORIDE 109*   CO2 23   *   BUN 7.9*   CR 0.87   GFRESTIMATED >90   LEV 9.4     Recent Labs   Lab Test 08/18/23  0857 08/15/23  1600  08/08/23  1500   CR 0.87 0.89 1.01     Recent Labs   Lab Test 08/18/23  0857 11/29/22  0800 12/24/21  0742   A1C 5.3 5.0 6.9*          Recent Labs   Lab Test 08/15/23  1600   WBC 6.7   HGB 10.9*   HCT 34.2*   MCV 95        Recent Labs   Lab Test 08/15/23  1600 08/08/23  1500 08/03/23  0417   HGB 10.9* 11.3* 9.4*    Recent Labs   Lab Test 07/30/23  0549 07/29/23  2014   TROPONINI 0.08 0.01     Recent Labs   Lab Test 07/30/23  0549   *     Recent Labs   Lab Test 12/24/21  0742   TSH 0.73     No results for input(s): INR in the last 99859 hours.     Kenny Chaves MD                                        Thank you for allowing me to participate in the care of your patient.      Sincerely,     Kenny Chaves MD     [facility]  cc:   Angy Kaufman, NP  [address] [facility]  [address]  [place] [address]

## 2024-12-18 NOTE — PATIENT INSTRUCTIONS
[facility]  Cardiac Electrophysiology  [address]  [place] [address]   Office: [phone]  Fax: [phone]       Thank you for seeing us in clinic today - it is a pleasure to be a part of your care team.  Below is a summary of our plan from today's visit.      Continue Sotalol and Eliquis for now  We will let you know if your qualify for a watchman device  Follow up with me in May 2025    Please do not hesitate to be in touch with our office at [phone] with any questions that may arise.      Thank you for trusting us with your care,    Kenny Chaves MD  Clinical Cardiac Electrophysiology  [facility]  [address]  [place] [address]   Office: [phone]  Fax: [phone]

## 2024-12-24 DIAGNOSIS — I10 ESSENTIAL HYPERTENSION: ICD-10-CM

## 2024-12-26 RX ORDER — SPIRONOLACTONE 25 MG/1
12.5 TABLET ORAL DAILY
Qty: 45 TABLET | Refills: 3 | Status: SHIPPED | OUTPATIENT
Start: 2024-12-26

## 2024-12-26 NOTE — TELEPHONE ENCOUNTER
Routing to PCP: RX refill request failed protocol.    1/23/2024 ( 1 year supply).  Last OV: 6/25/2024      BP Readings from Last 3 Encounters:   12/18/24 (!) 152/84   10/25/24 126/72   10/23/24 130/70

## 2024-12-28 ENCOUNTER — HEALTH MAINTENANCE LETTER (OUTPATIENT)
Age: 59
End: 2024-12-28

## 2025-01-06 ENCOUNTER — OFFICE VISIT (OUTPATIENT)
Dept: CARDIOLOGY | Facility: CLINIC | Age: 60
End: 2025-01-06
Payer: COMMERCIAL

## 2025-01-06 ENCOUNTER — TELEPHONE (OUTPATIENT)
Dept: CARDIOLOGY | Facility: CLINIC | Age: 60
End: 2025-01-06

## 2025-01-06 VITALS
RESPIRATION RATE: 14 BRPM | WEIGHT: 287 LBS | BODY MASS INDEX: 38.92 KG/M2 | DIASTOLIC BLOOD PRESSURE: 88 MMHG | HEART RATE: 73 BPM | SYSTOLIC BLOOD PRESSURE: 158 MMHG

## 2025-01-06 DIAGNOSIS — I48.0 PAROXYSMAL ATRIAL FIBRILLATION (H): ICD-10-CM

## 2025-01-06 DIAGNOSIS — I48.0 PAROXYSMAL ATRIAL FIBRILLATION (H): Primary | ICD-10-CM

## 2025-01-06 DIAGNOSIS — I10 ESSENTIAL HYPERTENSION: Primary | ICD-10-CM

## 2025-01-06 PROCEDURE — 99215 OFFICE O/P EST HI 40 MIN: CPT | Performed by: PHYSICIAN ASSISTANT

## 2025-01-06 NOTE — PROGRESS NOTES
HEART CARE FOLLOW UP [facility]  [phone]      Assessment/Recommendations   Assessment: 59 year old male with atrial fibrillation, paroxysmal, HTN, history of SAH     Plan:  Atrial fibrillation, paroxysmal  Follows with EP, NSR on Sotalol after PVI      -Continue Sotalol 80mg BID through 5/2025 per EP      -Currently Eliquis 5mg BID, but bleed risk as outlined below and considering LAAC      -Follow-up with EP as planned    HTN  Quite high       -Continue Spironolactone 12.5mg      -Increase Lisinopril to 20mg and move to evening     Hyperlipidemia   LDL = 54      -Continue Crestor 10mg    Shared decision making for LAAC  Govind NATY Bettie has documented nonvalvular atrial fibrillation (NVAF) and is currently on oral anticoagulant therapy Eliquis   [id number] -VASc = 2 (diabetes, HTN)   HAS-BLED risk score: 3 ( HTN, prior bleed - SAH, predisposition for bleed - TBI)  Indication(s) to consider non-oral anticoagulation therapy: history of SAH and TBI, fall risk  Patient has no contra-indication to come off oral anti-coagulant therapy if LAAC device is successfully placed.     I have personally reviewed the patients chart and discussed the following with the patient/family; 1) The choices available for reducing stroke risk from atrial fibrillation, 2) Treatment options available including respective risk/benefits, and 3) What factors are most important for the patient in making their decision.  The [facility] shared decision making tool https://www.cardiosmart.org/SDM/Decision-Aids/Find-Decision-Aids/Atrial-Fibrillation  was used to guide this conversation.   The patient was counseled that their decision could be made at this time or in the future if more time was needed to consider their decision.       The patient is an appropriate candidate to proceed with left atrial appendage screening and implant.                 History of Present Illness/Subjective    Indication for visit:  Govind Alexandre  returns for follow up of SDM pre-LAAC and was last seen by Swapna ADAME on 1/6/2025.      HPI: Govind Alexandre is a 59 year old male with a history of diabetes, HTN, hyperlipidemia, atrial fibrillation s/p PVI, history of SAH and TBI, KORINA who returns for shared-decision making regarding Watchman.    He reports SAH after fall on ice with resultant TBI about 6 years.  Feels more emotional since that incident, does get dizzy from time to time.  He is concerned about increased bleed risk due to this history.  No chest pain, dyspnea, orthopnea, PND, palpitations.    I reviewed notes from EP, PCP prior to this visit.         Physical Examination  Past Cardiac History   Vitals: BP (!) 160/95 (BP Location: Left arm, Patient Position: Sitting, Cuff Size: Adult Large)   Pulse 69   Ht 1.829 m (6')   Wt 131.5 kg (290 lb)   SpO2 96%   BMI 39.33 kg/m    BMI= Body mass index is 39.33 kg/m .  Wt Readings from Last 3 Encounters:   01/07/25 131.5 kg (290 lb)   01/06/25 130.2 kg (287 lb)   12/18/24 128.4 kg (283 lb)       General Appearance:   no distress, normal body habitus   ENT/Mouth: membranes moist, no oral lesions or bleeding gums.      EYES:  no scleral icterus, normal conjunctivae   Neck: no carotid bruits or thyromegaly   Chest/Lungs:   lungs are clear to auscultation, no rales or wheezing,  sternal scar, equal chest wall expansion    Cardiovascular:   Regular. Normal first and second heart sounds with no murmurs, rubs, or gallops; the carotid, radial and posterior tibial pulses are intact, Jugular venous pressure normal, no edema bilaterally    Abdomen:  no organomegaly, masses, bruits, or tenderness; bowel sounds are present   Extremities: no cyanosis or clubbing   Skin: no xanthelasma, warm.    Neurologic: normal  bilateral, no tremors           Atrial fibrillation, paroxysmal: s/p PVI 9/5/2024    Most Recent Echocardiogram: 7/30/2023  Normal left ventricular size. Moderate concentric left ventricular  hypertrophy  with sigmoid shaped septum. Visually estimated ejection fraction of 55 to 60%  without observed wall motion abnormality.  Right ventricle is mildly dilated. Right ventricular systolic function appears  grossly normal.  Mild biatrial enlargement.  Mild mitral regurgitation.  Mild tricuspid regurgitation. Unable to estimate right ventricular systolic  function secondary to incomplete tricuspid regurgitation velocity envelope.  Mild enlargement of the proximal ascending aorta.  Underlying rhythm is atrial fibrillation.    Most Recent Stress Test: 9/18/2023    The regadenoson nuclear stress test is negative for inducible myocardial ischemia or infarction.    The left ventricular ejection fraction at stress is 58%.    The patient is at a low risk of future cardiac ischemic events.    There is no prior study for comparison.       Most Recent Angiogram: None    ECG (reviewed by myself): 10/25/2024 NSR 75bpm,  LVH           Medical History  Family History Social History   Past Medical History:   Diagnosis Date    Depression 12/24/2014    Essential hypertension     Infection due to 2019 novel coronavirus 01/11/2022    Multiple thyroid nodules 09/08/2016    Type 2 diabetes mellitus without complication (H) 11/07/2016     Family History   Problem Relation Age of Onset    Heart Disease Mother     Kidney Disease Mother     Thyroid Cancer Father     Kidney Disease Father     Heart Disease Father     Hypertension Father     Thyroid Disease Father         cancer    Cancer Father         myoleodysplansic syndrome    No Known Problems Sister     Hypertension Sister     Hypertension Brother     Heart Disease Paternal Grandmother     Depression Daughter     Anxiety Disorder Daughter     Autism Spectrum Disorder Son         Social History     Socioeconomic History    Marital status:      Spouse name: Not on file    Number of children: Not on file    Years of education: Not on file    Highest education level: Not on  file   Occupational History    Not on file   Tobacco Use    Smoking status: Former     Types: Dip, chew, snus or snuff     Passive exposure: Never    Smokeless tobacco: Former     Types: Chew     Quit date: 12/24/2004   Vaping Use    Vaping status: Never Used   Substance and Sexual Activity    Alcohol use: Yes     Comment: Alcoholic Drinks/day: 1 every 2 weeks    Drug use: No    Sexual activity: Yes     Partners: Female     Birth control/protection: None   Other Topics Concern    Not on file   Social History Narrative    Not on file     Social Drivers of Health     Financial Resource Strain: Low Risk  (6/24/2024)    Financial Resource Strain     Within the past 12 months, have you or your family members you live with been unable to get utilities (heat, electricity) when it was really needed?: No   Food Insecurity: Low Risk  (6/24/2024)    Food Insecurity     Within the past 12 months, did you worry that your food would run out before you got money to buy more?: No     Within the past 12 months, did the food you bought just not last and you didn t have money to get more?: No   Transportation Needs: Low Risk  (6/24/2024)    Transportation Needs     Within the past 12 months, has lack of transportation kept you from medical appointments, getting your medicines, non-medical meetings or appointments, work, or from getting things that you need?: No   Physical Activity: Insufficiently Active (6/24/2024)    Exercise Vital Sign     Days of Exercise per Week: 3 days     Minutes of Exercise per Session: 20 min   Stress: Stress Concern Present (6/24/2024)    [employer] - Occupational Stress Questionnaire     Feeling of Stress : Rather much   Social Connections: Unknown (6/24/2024)    Social Connection and Isolation Panel [NHANES]     Frequency of Communication with Friends and Family: Not on file     Frequency of Social Gatherings with Friends and Family: Twice a week     Attends [employer]  Services: Not on file     Active Member of Clubs or Organizations: Not on file     Attends Club or Organization Meetings: Not on file     Marital Status: Not on file   Interpersonal Safety: High Risk (9/5/2024)    Interpersonal Safety     Do you feel physically and emotionally safe where you currently live?: No     Within the past 12 months, have you been hit, slapped, kicked or otherwise physically hurt by someone?: No     Within the past 12 months, have you been humiliated or emotionally abused in other ways by your partner or ex-partner?: No   Housing Stability: Low Risk  (6/24/2024)    Housing Stability     Do you have housing? : Yes     Are you worried about losing your housing?: No           Medications  Allergies   Current Outpatient Medications   Medication Sig Dispense Refill    ACCU-CHEK FASTCLIX LANCET DRUM [ACCU-CHEK FASTCLIX LANCET DRUM] USE TO TEST BLOOD SUGARS 2 TO 3 TIMES A  each 4    blood glucose test (ACCU-CHEK NATHANIEL PLUS TEST STRP) strips [BLOOD GLUCOSE TEST (ACCU-CHEK NATHANIEL PLUS TEST STRP) STRIPS] Use 1 each As Directed 3 (three) times a day. 300 each 3    Dulaglutide (TRULICITY) 3 MG/0.5ML SOPN Inject 3 mg subcutaneously once a week. 6 mL 1    lisinopril (ZESTRIL) 10 MG tablet Take 1 tablet (10 mg) by mouth daily 90 tablet 3    metFORMIN (GLUCOPHAGE XR) 500 MG 24 hr tablet TAKE 1 TABLET TWICE A DAY WITH MEALS 180 tablet 1    rosuvastatin (CRESTOR) 10 MG tablet TAKE 1 TABLET(10 MG) BY MOUTH AT BEDTIME Strength: 10 mg 90 tablet 3    sotalol (BETAPACE) 80 MG tablet Take 1 tablet (80 mg) by mouth 2 times daily. 180 tablet 3    spironolactone (ALDACTONE) 25 MG tablet TAKE ONE-HALF (1/2) TABLET DAILY 45 tablet 3    tadalafil (CIALIS) 10 MG tablet Take 1 tablet (10 mg) by mouth daily as needed (erectile dsyfunction) 10 mg as a single dose 30 minutes prior to anticipated sexual activity; do not take more than once daily.  Adjust dose based on effectiveness and tolerability; may decrease to 5 mg  "or increase to 20 mg per dose. 30 tablet 3    apixaban ANTICOAGULANT (ELIQUIS) 5 MG tablet Take 1 tablet (5 mg) by mouth 2 times daily. 180 tablet 3     No Known Allergies       Lab Results    Chemistry/lipid CBC Cardiac Enzymes/BNP/TSH/INR   Recent Labs   Lab Test 06/25/24  1149   CHOL 114   HDL 36*   LDL 54   TRIG 119     Recent Labs   Lab Test 06/25/24  1149 08/18/23  0857 11/29/22  0800   LDL 54 52 41     Recent Labs   Lab Test 09/05/24  0928 09/05/24  0604 08/29/24  0920   NA  --   --  140   POTASSIUM  --   --  4.1   CHLORIDE  --   --  107   CO2  --   --  23   *   < > 100*   BUN  --   --  17.1   CR  --   --  1.04   GFRESTIMATED  --   --  82   LEV  --   --  9.2    < > = values in this interval not displayed.     Recent Labs   Lab Test 08/29/24  0920 06/25/24  1149 12/22/23  0901   CR 1.04 0.96 1.08     Recent Labs   Lab Test 06/25/24  1149 12/22/23  0901 08/18/23  0857   A1C 5.0 4.2 5.3          Recent Labs   Lab Test 08/29/24  0920   WBC 7.1   HGB 13.2*   HCT 37.6*   MCV 88   *     Recent Labs   Lab Test 08/29/24  0920 06/25/24  1149 08/15/23  1600   HGB 13.2* 13.2* 10.9*    Recent Labs   Lab Test 07/30/23  0549 07/29/23 2014   TROPONINI 0.08 0.01     Recent Labs   Lab Test 07/30/23  0549   *     Recent Labs   Lab Test 06/25/24  1149   TSH 0.56     No results for input(s): \"INR\" in the last 19140 hours.     Gifty Ann MD  Noninvasive Cardiologist   [facility]                                    "

## 2025-01-06 NOTE — TELEPHONE ENCOUNTER
Incoming call from patient, discussed need for SDM and echocardiogram. He would like to complete SDM prior to committing to echocardiogram. Discussed usual insurance verification procedure and ability to contact insurance on his own to determine coverage. Will send information via [employer] at patient request. Call transferred to scheduling to arrange SDM next avail gen card and will follow-up on completion. He is appreciative, no further questions at this time. [facility]

## 2025-01-06 NOTE — PATIENT INSTRUCTIONS
Govind Alexandre,    It was a pleasure to see you today in the clinic regarding your Watchman Consult.     My recommendations after this visit include:     - no medication changes today   - monitor your blood pressure at home and follow-up if the systolic (top number) is persistently greater than 140   - next step for Watchman is checking if insurance will cover      If you have questions or concerns, please call using the numbers below:    After Hours/Scheduling  [phone]    Otherwise you can dial the nurse directly at:                ANASTASIYA Littlejohn RN  [phone]    Swapna Castro PA-C  Structural Heart Program  [facility]

## 2025-01-06 NOTE — LETTER
1/6/2025    Lachelle Summers, APRN CNP  [address] [facility] [id number]    RE: Govind Alexandre       Dear Colleague,     I had the pleasure of seeing Govind Alexandre in the [facility].  HEART CARE ENCOUNTER NOTE       [facility]  [phone]      Assessment/Recommendations   1.  Paroxysmal atrial fibrillation: s/p ablation September 2024. He is managed on sotalol 80 mg twice daily    I have personally reviewed this patient's chart and have spoken with the patient about the treatment options, including DARIEL device.  He has a [id number] score of 2 for hypertension, diabetes.  He has a HAS-BLED score of 1 for bleeding predisposition.   He is not a good candidate for long-term anticoagulation due to history of TBI and SAH    Once scheduled, the patient will stay on Eliquis up until implant. 2 weeks prior to the procedure He will also start aspirin 81 mg daily. After implant, the patient will remain on aspirin 81 mg daily indefinitely and Eliquis for 6 weeks.  The patient will then stop Eliquis and start Plavix 75 mg daily for approximately 4 months. He will then stop Plavix and remain on aspirin 81 mg daily indefinitely.  He will have a RA or CTA approximately 3 months and 1 year post-implant.  He understands that the risks of the procedure are <2% and include, but are not limited to device embolization, air embolism, myocardial perforation, device thrombosis, ASD, stroke, or death.  We discussed expected recovery and follow-up.       The patient is a good candidate for proceeding with left atrial appendage implant.  His questions were answered to his satisfaction.      2. Hypertension - elevated today.  Has been better controlled in the past.  He should continue lisinopril 10 mg daily and spironolactone 12.5 mg daily for now.  He should monitor his blood pressure at home and follow-up with heart clinic or his PCP if his systolic blood pressure is persistently greater than  140    3. KORINA -wears CPAP       History of Present Illness/Subjective    Govind Alexandre is a 59 year old male who comes in today for Watchman Consult.  He is accompanied by his wife for today's visit.    Govind Alexandre has a past history of paroxysmal AF HTN, HLD, KORINA, DM2, h/o TBI and SAH 2016, obesity    He has a history of TBI and SAH 2016.  He was diagnosed with atrial fibrillation during this hospitalization.  He underwent ablation September 2024.  Denies other bleeding issues besides his SAH.  He denies easy bruising.  He has not had any recent falls or issues with his gait/balance.  He denies a previous history of stroke, DVT, PE.  He does not use NSAIDs or aspirin he rarely consumes alcohol.  He denies an immediate family of prolonged  bleeding or frequent nosebleeds.   He denies chest discomfort, shortness of breath, dizziness, lightheadedness, presyncope, syncope, leg swelling.  He denies orthopnea PND but wears CPAP nightly.  He reports rare palpitations. He monitors for A-fib recurrence with his Apple Watch.    Medical, surgical, family, social history, and medications were reviewed and updated as necessary.    ECHO results (from 7/30/2024):  Interpretation Summary     Normal left ventricular size. Moderate concentric left ventricular hypertrophy  with sigmoid shaped septum. Visually estimated ejection fraction of 55 to 60%  without observed wall motion abnormality.  Right ventricle is mildly dilated. Right ventricular systolic function appears  grossly normal.  Mild biatrial enlargement.  Mild mitral regurgitation.  Mild tricuspid regurgitation. Unable to estimate right ventricular systolic  function secondary to incomplete tricuspid regurgitation velocity envelope.  Mild enlargement of the proximal ascending aorta.  Underlying rhythm is atrial fibrillation.         Physical Examination Review of Systems   Vitals: BP (!) 170/101 (BP Location: Right arm, Patient Position: Sitting, Cuff Size: Adult Large)    Pulse 73   Resp 14   Wt 130.2 kg (287 lb)   BMI 38.92 kg/m    BMI= Body mass index is 38.92 kg/m .  Wt Readings from Last 3 Encounters:   01/06/25 130.2 kg (287 lb)   12/18/24 128.4 kg (283 lb)   10/25/24 128.9 kg (284 lb 3.2 oz)       General Appearance:   Alert, cooperative and in no acute distress   ENT/Mouth: membranes moist, no oral lesions or bleeding gums.      EYES:  no scleral icterus, normal conjunctivae   Neck: Thyroid not visualized   Chest/Lungs:   lungs are clear to auscultation, no rales or wheezing   Cardiovascular:   Regular . Normal first and second heart sounds with no murmurs, rubs or gallops;  no edema bilaterally    Abdomen:  Soft and nontender.    Extremities: no cyanosis or clubbing   Skin: no xanthelasma, warm.    Neurologic: normal gait, normal  bilateral, no tremors   Psychiatric: Normal mood and affect       Please refer above for cardiac ROS details.      Medical History  Surgical History Family History Social History   Past Medical History:   Diagnosis Date     Depression 12/24/2014     Essential hypertension      Infection due to 2019 novel coronavirus 01/11/2022     Multiple thyroid nodules 09/08/2016     Type 2 diabetes mellitus without complication (H) 11/07/2016     Past Surgical History:   Procedure Laterality Date     BIOPSY  9/18/16     CARPAL TUNNEL RELEASE RT/LT Right      COLONOSCOPY  6/15/18     COMBINED CYSTOSCOPY, RETROGRADES, URETEROSCOPY, LASER HOLMIUM LITHOTRIPSY URETER(S), INSERT STENT Bilateral 7/26/2023    Procedure: cystoscopy, bilateral ureteroscopy with holmium laser lithotripsy, bilateral ureteroscopy with stone basketing, bilateral retrograde pyelogram, bilateral ureteral stent placement;  Surgeon: Mark Orta MD;  Location: [facility] OR     EP ABLATION PULMONARY VEIN ISOLATION N/A 9/5/2024    Procedure: Ablation Atrial Fibrillation;  Surgeon: Kenny Chaves MD;  [facility]: [facility]     HC REPAIR ROTATOR CUFF,ACUTE      Description:  Rotator Cuff Repair Acute;  Recorded: 01/13/2010;  Comments: cortisone     [facility]  11/30/2015          REMOVAL OF SPERM DUCT(S)      Description: Surgery Of Male Genitalia Vasectomy;  Recorded: 01/13/2010;     WISDOM TOOTH EXTRACTION       Family History   Problem Relation Age of Onset     Heart Disease Mother      Kidney Disease Mother      Thyroid Cancer Father      Kidney Disease Father      Heart Disease Father      Hypertension Father      Thyroid Disease Father         cancer     Cancer Father         myoleodysplansic syndrome     No Known Problems Sister      Hypertension Sister      Hypertension Brother      Heart Disease Paternal Grandmother      Depression Daughter      Anxiety Disorder Daughter      Autism Spectrum Disorder Son     Social History     Socioeconomic History     Marital status:      Spouse name: Not on file     Number of children: Not on file     Years of education: Not on file     Highest education level: Not on file   Occupational History     Not on file   Tobacco Use     Smoking status: Former     Types: Dip, chew, snus or snuff     Passive exposure: Never     Smokeless tobacco: Former     Types: Chew     Quit date: 12/24/2004   Vaping Use     Vaping status: Never Used   Substance and Sexual Activity     Alcohol use: Yes     Comment: Alcoholic Drinks/day: 1 every 2 weeks     Drug use: No     Sexual activity: Yes     Partners: Female     Birth control/protection: None   Other Topics Concern     Not on file   Social History Narrative     Not on file     Social Drivers of Health     Financial Resource Strain: Low Risk  (6/24/2024)    Financial Resource Strain      Within the past 12 months, have you or your family members you live with been unable to get utilities (heat, electricity) when it was really needed?: No   Food Insecurity: Low Risk  (6/24/2024)    Food Insecurity      Within the past 12 months, did you worry that your food would run out before you got money to buy more?: No       Within the past 12 months, did the food you bought just not last and you didn t have money to get more?: No   Transportation Needs: Low Risk  (6/24/2024)    Transportation Needs      Within the past 12 months, has lack of transportation kept you from medical appointments, getting your medicines, non-medical meetings or appointments, work, or from getting things that you need?: No   Physical Activity: Insufficiently Active (6/24/2024)    Exercise Vital Sign      Days of Exercise per Week: 3 days      Minutes of Exercise per Session: 20 min   Stress: Stress Concern Present (6/24/2024)    Citizen of [place] [employer] - Occupational Stress Questionnaire      Feeling of Stress : Rather much   Social Connections: Unknown (6/24/2024)    Social Connection and Isolation Panel [NHANES]      Frequency of Communication with Friends and Family: Not on file      Frequency of Social Gatherings with Friends and Family: Twice a week      Attends [employer] Services: Not on file      Active Member of Clubs or Organizations: Not on file      Attends Club or Organization Meetings: Not on file      Marital Status: Not on file   Interpersonal Safety: High Risk (9/5/2024)    Interpersonal Safety      Do you feel physically and emotionally safe where you currently live?: No      Within the past 12 months, have you been hit, slapped, kicked or otherwise physically hurt by someone?: No      Within the past 12 months, have you been humiliated or emotionally abused in other ways by your partner or ex-partner?: No   Housing Stability: Low Risk  (6/24/2024)    Housing Stability      Do you have housing? : Yes      Are you worried about losing your housing?: No          Medications  Allergies   Current Outpatient Medications   Medication Sig Dispense Refill     ACCU-CHEK FASTCLIX LANCET DRUM [ACCU-CHEK FASTCLIX LANCET DRUM] USE TO TEST BLOOD SUGARS 2 TO 3 TIMES A  each 4     apixaban ANTICOAGULANT (ELIQUIS) 5 MG tablet Take 1  tablet (5 mg) by mouth 2 times daily 180 tablet 3     blood glucose test (ACCU-CHEK NATHANIEL PLUS TEST STRP) strips [BLOOD GLUCOSE TEST (ACCU-CHEK NATHANIEL PLUS TEST STRP) STRIPS] Use 1 each As Directed 3 (three) times a day. 300 each 3     Dulaglutide (TRULICITY) 3 MG/0.5ML SOPN Inject 3 mg subcutaneously once a week. 6 mL 1     lisinopril (ZESTRIL) 10 MG tablet Take 1 tablet (10 mg) by mouth daily 90 tablet 3     metFORMIN (GLUCOPHAGE XR) 500 MG 24 hr tablet TAKE 1 TABLET TWICE A DAY WITH MEALS 180 tablet 1     rosuvastatin (CRESTOR) 10 MG tablet TAKE 1 TABLET(10 MG) BY MOUTH AT BEDTIME Strength: 10 mg 90 tablet 3     sotalol (BETAPACE) 80 MG tablet Take 1 tablet (80 mg) by mouth 2 times daily. 180 tablet 3     spironolactone (ALDACTONE) 25 MG tablet TAKE ONE-HALF (1/2) TABLET DAILY 45 tablet 3     tadalafil (CIALIS) 10 MG tablet Take 1 tablet (10 mg) by mouth daily as needed (erectile dsyfunction) 10 mg as a single dose 30 minutes prior to anticipated sexual activity; do not take more than once daily.  Adjust dose based on effectiveness and tolerability; may decrease to 5 mg or increase to 20 mg per dose. 30 tablet 3    Not on File      Lab Results    Chemistry/lipid CBC Cardiac Enzymes/BNP/TSH/INR   Recent Labs   Lab Test 06/25/24  1149   CHOL 114   HDL 36*   LDL 54   TRIG 119     Recent Labs   Lab Test 06/25/24  1149 08/18/23  0857 11/29/22  0800   LDL 54 52 41     Recent Labs   Lab Test 09/05/24  0928 09/05/24  0604 08/29/24  0920   NA  --   --  140   POTASSIUM  --   --  4.1   CHLORIDE  --   --  107   CO2  --   --  23   *   < > 100*   BUN  --   --  17.1   CR  --   --  1.04   GFRESTIMATED  --   --  82   LEV  --   --  9.2    < > = values in this interval not displayed.     Recent Labs   Lab Test 08/29/24  0920 06/25/24  1149 12/22/23  0901   CR 1.04 0.96 1.08     Recent Labs   Lab Test 06/25/24  1149 12/22/23  0901 08/18/23  0857   A1C 5.0 4.2 5.3    Recent Labs   Lab Test 08/29/24  0920   WBC 7.1   HGB 13.2*  "  HCT 37.6*   MCV 88   *     Recent Labs   Lab Test 08/29/24  0920 06/25/24  1149 08/15/23  1600   HGB 13.2* 13.2* 10.9*    Recent Labs   Lab Test 07/30/23  0549 07/29/23 2014   TROPONINI 0.08 0.01     Recent Labs   Lab Test 07/30/23  0549   *     Recent Labs   Lab Test 06/25/24  1149   TSH 0.56     No results for input(s): \"INR\" in the last 05151 hours.     40 minutes spent on the date of encounter doing education, chart prep/review, review of outside records, review of test results, interpretation with above tests, patient visit, documentation, and discussion with family.      This note has been dictated using voice recognition software. Any grammatical or context distortions are unintentional and inherent to the software.    Swapna Castro PA-C  Structural Heart Program  [facility]       Thank you for allowing me to participate in the care of your patient.      Sincerely,     Swapna Castro PA-C     [facility]  cc:   Kenny Chaves MD  [address] [place] [id number]      "

## 2025-01-06 NOTE — TELEPHONE ENCOUNTER
In need of echo prior to scheduling, as well as SDM for LAAC. Insurance will not be able to provide prior auth or procedure verification without either performed.  for return call. [facility]    ----- Message from Swapna Castro sent at 1/6/2025  8:29 AM CST -----  HiGovind is a candidate for Watchman. As noted previously he has a [id number] score of 2 - if insurance approves, would  get limited TTE prior to the procedure to assess LV function. He should be ok for CT scan for post follow-up imaging as long as his kidney function remains stable.    Thank you,  Swapna

## 2025-01-06 NOTE — PROGRESS NOTES
HEART CARE ENCOUNTER NOTE       [facility]  [phone]      Assessment/Recommendations   1.  Paroxysmal atrial fibrillation: s/p ablation September 2024. He is managed on sotalol 80 mg twice daily    I have personally reviewed this patient's chart and have spoken with the patient about the treatment options, including DARIEL device.  He has a [id number] score of 2 for hypertension, diabetes.  He has a HAS-BLED score of 1 for bleeding predisposition.   He is not a good candidate for long-term anticoagulation due to history of TBI and SAH    Once scheduled, the patient will stay on Eliquis up until implant. 2 weeks prior to the procedure He will also start aspirin 81 mg daily. After implant, the patient will remain on aspirin 81 mg daily indefinitely and Eliquis for 6 weeks.  The patient will then stop Eliquis and start Plavix 75 mg daily for approximately 4 months. He will then stop Plavix and remain on aspirin 81 mg daily indefinitely.  He will have a RA or CTA approximately 3 months and 1 year post-implant.  He understands that the risks of the procedure are <2% and include, but are not limited to device embolization, air embolism, myocardial perforation, device thrombosis, ASD, stroke, or death.  We discussed expected recovery and follow-up.       The patient is a good candidate for proceeding with left atrial appendage implant.  His questions were answered to his satisfaction.      2. Hypertension - elevated today.  Has been better controlled in the past.  He should continue lisinopril 10 mg daily and spironolactone 12.5 mg daily for now.  He should monitor his blood pressure at home and follow-up with heart clinic or his PCP if his systolic blood pressure is persistently greater than 140    3. KORINA -wears CPAP       History of Present Illness/Subjective    Govind Alexandre is a 59 year old male who comes in today for Watchman Consult.  He is accompanied by his wife for today's visit.    Govind  NATY Alexandre has a past history of paroxysmal AF HTN, HLD, KORINA, DM2, h/o TBI and SAH 2016, obesity    He has a history of TBI and SAH 2016.  He was diagnosed with atrial fibrillation during this hospitalization.  He underwent ablation September 2024.  Denies other bleeding issues besides his SAH.  He denies easy bruising.  He has not had any recent falls or issues with his gait/balance.  He denies a previous history of stroke, DVT, PE.  He does not use NSAIDs or aspirin he rarely consumes alcohol.  He denies an immediate family of prolonged  bleeding or frequent nosebleeds.   He denies chest discomfort, shortness of breath, dizziness, lightheadedness, presyncope, syncope, leg swelling.  He denies orthopnea PND but wears CPAP nightly.  He reports rare palpitations. He monitors for A-fib recurrence with his Apple Watch.    Medical, surgical, family, social history, and medications were reviewed and updated as necessary.    ECHO results (from 7/30/2024):  Interpretation Summary     Normal left ventricular size. Moderate concentric left ventricular hypertrophy  with sigmoid shaped septum. Visually estimated ejection fraction of 55 to 60%  without observed wall motion abnormality.  Right ventricle is mildly dilated. Right ventricular systolic function appears  grossly normal.  Mild biatrial enlargement.  Mild mitral regurgitation.  Mild tricuspid regurgitation. Unable to estimate right ventricular systolic  function secondary to incomplete tricuspid regurgitation velocity envelope.  Mild enlargement of the proximal ascending aorta.  Underlying rhythm is atrial fibrillation.         Physical Examination Review of Systems   Vitals: BP (!) 170/101 (BP Location: Right arm, Patient Position: Sitting, Cuff Size: Adult Large)   Pulse 73   Resp 14   Wt 130.2 kg (287 lb)   BMI 38.92 kg/m    BMI= Body mass index is 38.92 kg/m .  Wt Readings from Last 3 Encounters:   01/06/25 130.2 kg (287 lb)   12/18/24 128.4 kg (283 lb)   10/25/24  128.9 kg (284 lb 3.2 oz)       General Appearance:   Alert, cooperative and in no acute distress   ENT/Mouth: membranes moist, no oral lesions or bleeding gums.      EYES:  no scleral icterus, normal conjunctivae   Neck: Thyroid not visualized   Chest/Lungs:   lungs are clear to auscultation, no rales or wheezing   Cardiovascular:   Regular . Normal first and second heart sounds with no murmurs, rubs or gallops;  no edema bilaterally    Abdomen:  Soft and nontender.    Extremities: no cyanosis or clubbing   Skin: no xanthelasma, warm.    Neurologic: normal gait, normal  bilateral, no tremors   Psychiatric: Normal mood and affect       Please refer above for cardiac ROS details.      Medical History  Surgical History Family History Social History   Past Medical History:   Diagnosis Date    Depression 12/24/2014    Essential hypertension     Infection due to 2019 novel coronavirus 01/11/2022    Multiple thyroid nodules 09/08/2016    Type 2 diabetes mellitus without complication (H) 11/07/2016     Past Surgical History:   Procedure Laterality Date    BIOPSY  9/18/16    CARPAL TUNNEL RELEASE RT/LT Right     COLONOSCOPY  6/15/18    COMBINED CYSTOSCOPY, RETROGRADES, URETEROSCOPY, LASER HOLMIUM LITHOTRIPSY URETER(S), INSERT STENT Bilateral 7/26/2023    Procedure: cystoscopy, bilateral ureteroscopy with holmium laser lithotripsy, bilateral ureteroscopy with stone basketing, bilateral retrograde pyelogram, bilateral ureteral stent placement;  Surgeon: Mark Orta MD;  Location: [facility] OR    EP ABLATION PULMONARY VEIN ISOLATION N/A 9/5/2024    Procedure: Ablation Atrial Fibrillation;  Surgeon: Kenny Chaves MD;  Location: [facility]    HC REPAIR ROTATOR CUFF,ACUTE      Description: Rotator Cuff Repair Acute;  Recorded: 01/13/2010;  Comments: cortisone    PICC  11/30/2015         REMOVAL OF SPERM DUCT(S)      Description: Surgery Of Male Genitalia Vasectomy;  Recorded: 01/13/2010;    WISDOM TOOTH  EXTRACTION       Family History   Problem Relation Age of Onset    Heart Disease Mother     Kidney Disease Mother     Thyroid Cancer Father     Kidney Disease Father     Heart Disease Father     Hypertension Father     Thyroid Disease Father         cancer    Cancer Father         myoleodysplansic syndrome    No Known Problems Sister     Hypertension Sister     Hypertension Brother     Heart Disease Paternal Grandmother     Depression Daughter     Anxiety Disorder Daughter     Autism Spectrum Disorder Son     Social History     Socioeconomic History    Marital status:      Spouse name: Not on file    Number of children: Not on file    Years of education: Not on file    Highest education level: Not on file   Occupational History    Not on file   Tobacco Use    Smoking status: Former     Types: Dip, chew, snus or snuff     Passive exposure: Never    Smokeless tobacco: Former     Types: Chew     Quit date: 12/24/2004   Vaping Use    Vaping status: Never Used   Substance and Sexual Activity    Alcohol use: Yes     Comment: Alcoholic Drinks/day: 1 every 2 weeks    Drug use: No    Sexual activity: Yes     Partners: Female     Birth control/protection: None   Other Topics Concern    Not on file   Social History Narrative    Not on file     Social Drivers of Health     Financial Resource Strain: Low Risk  (6/24/2024)    Financial Resource Strain     Within the past 12 months, have you or your family members you live with been unable to get utilities (heat, electricity) when it was really needed?: No   Food Insecurity: Low Risk  (6/24/2024)    Food Insecurity     Within the past 12 months, did you worry that your food would run out before you got money to buy more?: No     Within the past 12 months, did the food you bought just not last and you didn t have money to get more?: No   Transportation Needs: Low Risk  (6/24/2024)    Transportation Needs     Within the past 12 months, has lack of transportation kept you from  medical appointments, getting your medicines, non-medical meetings or appointments, work, or from getting things that you need?: No   Physical Activity: Insufficiently Active (6/24/2024)    Exercise Vital Sign     Days of Exercise per Week: 3 days     Minutes of Exercise per Session: 20 min   Stress: Stress Concern Present (6/24/2024)    [employer] - Occupational Stress Questionnaire     Feeling of Stress : Rather much   Social Connections: Unknown (6/24/2024)    Social Connection and Isolation Panel [NHANES]     Frequency of Communication with Friends and Family: Not on file     Frequency of Social Gatherings with Friends and Family: Twice a week     Attends Orthodoxy Services: Not on file     Active Member of Clubs or Organizations: Not on file     Attends Club or Organization Meetings: Not on file     Marital Status: Not on file   Interpersonal Safety: High Risk (9/5/2024)    Interpersonal Safety     Do you feel physically and emotionally safe where you currently live?: No     Within the past 12 months, have you been hit, slapped, kicked or otherwise physically hurt by someone?: No     Within the past 12 months, have you been humiliated or emotionally abused in other ways by your partner or ex-partner?: No   Housing Stability: Low Risk  (6/24/2024)    Housing Stability     Do you have housing? : Yes     Are you worried about losing your housing?: No          Medications  Allergies   Current Outpatient Medications   Medication Sig Dispense Refill    ACCU-CHEK FASTCLIX LANCET DRUM [ACCU-CHEK FASTCLIX LANCET DRUM] USE TO TEST BLOOD SUGARS 2 TO 3 TIMES A  each 4    apixaban ANTICOAGULANT (ELIQUIS) 5 MG tablet Take 1 tablet (5 mg) by mouth 2 times daily 180 tablet 3    blood glucose test (ACCU-CHEK NATHANIEL PLUS TEST STRP) strips [BLOOD GLUCOSE TEST (ACCU-CHEK NATHANIEL PLUS TEST STRP) STRIPS] Use 1 each As Directed 3 (three) times a day. 300 each 3    Dulaglutide (TRULICITY) 3 MG/0.5ML  SOPN Inject 3 mg subcutaneously once a week. 6 mL 1    lisinopril (ZESTRIL) 10 MG tablet Take 1 tablet (10 mg) by mouth daily 90 tablet 3    metFORMIN (GLUCOPHAGE XR) 500 MG 24 hr tablet TAKE 1 TABLET TWICE A DAY WITH MEALS 180 tablet 1    rosuvastatin (CRESTOR) 10 MG tablet TAKE 1 TABLET(10 MG) BY MOUTH AT BEDTIME Strength: 10 mg 90 tablet 3    sotalol (BETAPACE) 80 MG tablet Take 1 tablet (80 mg) by mouth 2 times daily. 180 tablet 3    spironolactone (ALDACTONE) 25 MG tablet TAKE ONE-HALF (1/2) TABLET DAILY 45 tablet 3    tadalafil (CIALIS) 10 MG tablet Take 1 tablet (10 mg) by mouth daily as needed (erectile dsyfunction) 10 mg as a single dose 30 minutes prior to anticipated sexual activity; do not take more than once daily.  Adjust dose based on effectiveness and tolerability; may decrease to 5 mg or increase to 20 mg per dose. 30 tablet 3    Not on File      Lab Results    Chemistry/lipid CBC Cardiac Enzymes/BNP/TSH/INR   Recent Labs   Lab Test 06/25/24  1149   CHOL 114   HDL 36*   LDL 54   TRIG 119     Recent Labs   Lab Test 06/25/24  1149 08/18/23  0857 11/29/22  0800   LDL 54 52 41     Recent Labs   Lab Test 09/05/24  0928 09/05/24  0604 08/29/24  0920   NA  --   --  140   POTASSIUM  --   --  4.1   CHLORIDE  --   --  107   CO2  --   --  23   *   < > 100*   BUN  --   --  17.1   CR  --   --  1.04   GFRESTIMATED  --   --  82   LEV  --   --  9.2    < > = values in this interval not displayed.     Recent Labs   Lab Test 08/29/24  0920 06/25/24  1149 12/22/23  0901   CR 1.04 0.96 1.08     Recent Labs   Lab Test 06/25/24  1149 12/22/23  0901 08/18/23  0857   A1C 5.0 4.2 5.3    Recent Labs   Lab Test 08/29/24  0920   WBC 7.1   HGB 13.2*   HCT 37.6*   MCV 88   *     Recent Labs   Lab Test 08/29/24  0920 06/25/24  1149 08/15/23  1600   HGB 13.2* 13.2* 10.9*    Recent Labs   Lab Test 07/30/23  0549 07/29/23 2014   TROPONINI 0.08 0.01     Recent Labs   Lab Test 07/30/23  0549   *     Recent Labs  "  Lab Test 06/25/24  1149   TSH 0.56     No results for input(s): \"INR\" in the last [id number] hours.     40 minutes spent on the date of encounter doing education, chart prep/review, review of outside records, review of test results, interpretation with above tests, patient visit, documentation, and discussion with family.      This note has been dictated using voice recognition software. Any grammatical or context distortions are unintentional and inherent to the software.    Swapna Castro PA-C  [facility]  [facility]   "

## 2025-01-07 ENCOUNTER — OFFICE VISIT (OUTPATIENT)
Dept: CARDIOLOGY | Facility: CLINIC | Age: 60
End: 2025-01-07
Payer: COMMERCIAL

## 2025-01-07 VITALS
HEIGHT: 72 IN | BODY MASS INDEX: 39.28 KG/M2 | SYSTOLIC BLOOD PRESSURE: 160 MMHG | HEART RATE: 69 BPM | WEIGHT: 290 LBS | OXYGEN SATURATION: 96 % | DIASTOLIC BLOOD PRESSURE: 95 MMHG

## 2025-01-07 DIAGNOSIS — I10 PRIMARY HYPERTENSION: Primary | ICD-10-CM

## 2025-01-07 DIAGNOSIS — I48.0 PAROXYSMAL ATRIAL FIBRILLATION (H): ICD-10-CM

## 2025-01-07 DIAGNOSIS — E78.5 HYPERLIPIDEMIA LDL GOAL <100: ICD-10-CM

## 2025-01-07 DIAGNOSIS — Z79.01 CHRONIC ANTICOAGULATION: Chronic | ICD-10-CM

## 2025-01-07 PROCEDURE — 99214 OFFICE O/P EST MOD 30 MIN: CPT | Performed by: INTERNAL MEDICINE

## 2025-01-07 RX ORDER — LISINOPRIL 20 MG/1
20 TABLET ORAL DAILY
Qty: 90 TABLET | Refills: 3 | Status: SHIPPED | OUTPATIENT
Start: 2025-01-07

## 2025-01-07 NOTE — PATIENT INSTRUCTIONS
Your blood pressure is high, would increase the Lisinopril to 20mg.    I agree the Watchman is a good idea.  I will let Swapna know and she can work with you to get that schedule as long as insurance covers

## 2025-01-07 NOTE — LETTER
1/7/2025    Lachelle Summers, APRN CNP  [address] [facility] [address]    RE: Govind ALFONSO Bettie       Dear Colleague,     I had the pleasure of seeing Govind Alexandre in the [facility].    HEART CARE FOLLOW UP NOTE      [facility]  [phone]      Assessment/Recommendations   Assessment: 59 year old male with atrial fibrillation, paroxysmal, HTN, history of SAH     Plan:  Atrial fibrillation, paroxysmal  Follows with EP, NSR on Sotalol after PVI      -Continue Sotalol 80mg BID through 5/2025 per EP      -Currently Eliquis 5mg BID, but bleed risk as outlined below and considering LAAC      -Follow-up with EP as planned    HTN  Quite high       -Continue Spironolactone 12.5mg      -Increase Lisinopril to 20mg and move to evening     Hyperlipidemia   LDL = 54      -Continue Crestor 10mg    Shared decision making for LAAC  Govind Alexandre has documented nonvalvular atrial fibrillation (NVAF) and is currently on oral anticoagulant therapy Eliquis   [id number] -VASc = 2 (diabetes, HTN)   HAS-BLED risk score: 3 ( HTN, prior bleed - SAH, predisposition for bleed - TBI)  Indication(s) to consider non-oral anticoagulation therapy: history of SAH and TBI, fall risk  Patient has no contra-indication to come off oral anti-coagulant therapy if LAAC device is successfully placed.     I have personally reviewed the patients chart and discussed the following with the patient/family; 1) The choices available for reducing stroke risk from atrial fibrillation, 2) Treatment options available including respective risk/benefits, and 3) What factors are most important for the patient in making their decision.  The ACC shared decision making tool https://www.cardiosmart.org/SDM/Decision-Aids/Find-Decision-Aids/Atrial-Fibrillation  was used to guide this conversation.   The patient was counseled that their decision could be made at this time or in the future if more time was needed to consider their  decision.       The patient is an appropriate candidate to proceed with left atrial appendage screening and implant.                 History of Present Illness/Subjective    Indication for visit:  Govind Alexandre returns for follow up of SDM pre-LAAC and was last seen by Swapna ADAME on 1/6/2025.      HPI: Govind Alexandre is a 59 year old male with a history of diabetes, HTN, hyperlipidemia, atrial fibrillation s/p PVI, history of SAH and TBI, KORINA who returns for shared-decision making regarding Watchman.    He reports SAH after fall on ice with resultant TBI about 6 years.  Feels more emotional since that incident, does get dizzy from time to time.  He is concerned about increased bleed risk due to this history.  No chest pain, dyspnea, orthopnea, PND, palpitations.    I reviewed notes from EP, PCP prior to this visit.         Physical Examination  Past Cardiac History   Vitals: BP (!) 160/95 (BP Location: Left arm, Patient Position: Sitting, Cuff Size: Adult Large)   Pulse 69   Ht 1.829 m (6')   Wt 131.5 kg (290 lb)   SpO2 96%   BMI 39.33 kg/m    BMI= Body mass index is 39.33 kg/m .  Wt Readings from Last 3 Encounters:   01/07/25 131.5 kg (290 lb)   01/06/25 130.2 kg (287 lb)   12/18/24 128.4 kg (283 lb)       General Appearance:   no distress, normal body habitus   ENT/Mouth: membranes moist, no oral lesions or bleeding gums.      EYES:  no scleral icterus, normal conjunctivae   Neck: no carotid bruits or thyromegaly   Chest/Lungs:   lungs are clear to auscultation, no rales or wheezing,  sternal scar, equal chest wall expansion    Cardiovascular:   Regular. Normal first and second heart sounds with no murmurs, rubs, or gallops; the carotid, radial and posterior tibial pulses are intact, Jugular venous pressure normal, no edema bilaterally    Abdomen:  no organomegaly, masses, bruits, or tenderness; bowel sounds are present   Extremities: no cyanosis or clubbing   Skin: no xanthelasma, warm.     Neurologic: normal  bilateral, no tremors           Atrial fibrillation, paroxysmal: s/p PVI 9/5/2024    Most Recent Echocardiogram: 7/30/2023  Normal left ventricular size. Moderate concentric left ventricular hypertrophy  with sigmoid shaped septum. Visually estimated ejection fraction of 55 to 60%  without observed wall motion abnormality.  Right ventricle is mildly dilated. Right ventricular systolic function appears  grossly normal.  Mild biatrial enlargement.  Mild mitral regurgitation.  Mild tricuspid regurgitation. Unable to estimate right ventricular systolic  function secondary to incomplete tricuspid regurgitation velocity envelope.  Mild enlargement of the proximal ascending aorta.  Underlying rhythm is atrial fibrillation.    Most Recent Stress Test: 9/18/2023     The regadenoson nuclear stress test is negative for inducible myocardial ischemia or infarction.     The left ventricular ejection fraction at stress is 58%.     The patient is at a low risk of future cardiac ischemic events.     There is no prior study for comparison.       Most Recent Angiogram: None    ECG (reviewed by myself): 10/25/2024 NSR 75bpm,  LVH           Medical History  Family History Social History   Past Medical History:   Diagnosis Date     Depression 12/24/2014     Essential hypertension      Infection due to 2019 novel coronavirus 01/11/2022     Multiple thyroid nodules 09/08/2016     Type 2 diabetes mellitus without complication (H) 11/07/2016     Family History   Problem Relation Age of Onset     Heart Disease Mother      Kidney Disease Mother      Thyroid Cancer Father      Kidney Disease Father      Heart Disease Father      Hypertension Father      Thyroid Disease Father         cancer     Cancer Father         myoleodysplansic syndrome     No Known Problems Sister      Hypertension Sister      Hypertension Brother      Heart Disease Paternal Grandmother      Depression Daughter      Anxiety Disorder Daughter       Autism Spectrum Disorder Son         Social History     Socioeconomic History     Marital status:      Spouse name: Not on file     Number of children: Not on file     Years of education: Not on file     Highest education level: Not on file   Occupational History     Not on file   Tobacco Use     Smoking status: Former     Types: Dip, chew, snus or snuff     Passive exposure: Never     Smokeless tobacco: Former     Types: Chew     Quit date: 12/24/2004   Vaping Use     Vaping status: Never Used   Substance and Sexual Activity     Alcohol use: Yes     Comment: Alcoholic Drinks/day: 1 every 2 weeks     Drug use: No     Sexual activity: Yes     Partners: Female     Birth control/protection: None   Other Topics Concern     Not on file   Social History Narrative     Not on file     Social Drivers of Health     Financial Resource Strain: Low Risk  (6/24/2024)    Financial Resource Strain      Within the past 12 months, have you or your family members you live with been unable to get utilities (heat, electricity) when it was really needed?: No   Food Insecurity: Low Risk  (6/24/2024)    Food Insecurity      Within the past 12 months, did you worry that your food would run out before you got money to buy more?: No      Within the past 12 months, did the food you bought just not last and you didn t have money to get more?: No   Transportation Needs: Low Risk  (6/24/2024)    Transportation Needs      Within the past 12 months, has lack of transportation kept you from medical appointments, getting your medicines, non-medical meetings or appointments, work, or from getting things that you need?: No   Physical Activity: Insufficiently Active (6/24/2024)    Exercise Vital Sign      Days of Exercise per Week: 3 days      Minutes of Exercise per Session: 20 min   Stress: Stress Concern Present (6/24/2024)    [employer] [place] [employer] - Occupational Stress Questionnaire      Feeling of Stress : Rather much    Social Connections: Unknown (6/24/2024)    Social Connection and Isolation Panel [NHANES]      Frequency of Communication with Friends and Family: Not on file      Frequency of Social Gatherings with Friends and Family: Twice a week      Attends Scientologist Services: Not on file      Active Member of Clubs or Organizations: Not on file      Attends Club or Organization Meetings: Not on file      Marital Status: Not on file   Interpersonal Safety: High Risk (9/5/2024)    Interpersonal Safety      Do you feel physically and emotionally safe where you currently live?: No      Within the past 12 months, have you been hit, slapped, kicked or otherwise physically hurt by someone?: No      Within the past 12 months, have you been humiliated or emotionally abused in other ways by your partner or ex-partner?: No   Housing Stability: Low Risk  (6/24/2024)    Housing Stability      Do you have housing? : Yes      Are you worried about losing your housing?: No           Medications  Allergies   Current Outpatient Medications   Medication Sig Dispense Refill     ACCU-CHEK FASTCLIX LANCET DRUM [ACCU-CHEK FASTCLIX LANCET DRUM] USE TO TEST BLOOD SUGARS 2 TO 3 TIMES A  each 4     blood glucose test (ACCU-CHEK NATHANIEL PLUS TEST STRP) strips [BLOOD GLUCOSE TEST (ACCU-CHEK NATHANIEL PLUS TEST STRP) STRIPS] Use 1 each As Directed 3 (three) times a day. 300 each 3     Dulaglutide (TRULICITY) 3 MG/0.5ML SOPN Inject 3 mg subcutaneously once a week. 6 mL 1     lisinopril (ZESTRIL) 10 MG tablet Take 1 tablet (10 mg) by mouth daily 90 tablet 3     metFORMIN (GLUCOPHAGE XR) 500 MG 24 hr tablet TAKE 1 TABLET TWICE A DAY WITH MEALS 180 tablet 1     rosuvastatin (CRESTOR) 10 MG tablet TAKE 1 TABLET(10 MG) BY MOUTH AT BEDTIME Strength: 10 mg 90 tablet 3     sotalol (BETAPACE) 80 MG tablet Take 1 tablet (80 mg) by mouth 2 times daily. 180 tablet 3     spironolactone (ALDACTONE) 25 MG tablet TAKE ONE-HALF (1/2) TABLET DAILY 45 tablet 3      "tadalafil (CIALIS) 10 MG tablet Take 1 tablet (10 mg) by mouth daily as needed (erectile dsyfunction) 10 mg as a single dose 30 minutes prior to anticipated sexual activity; do not take more than once daily.  Adjust dose based on effectiveness and tolerability; may decrease to 5 mg or increase to 20 mg per dose. 30 tablet 3     apixaban ANTICOAGULANT (ELIQUIS) 5 MG tablet Take 1 tablet (5 mg) by mouth 2 times daily. 180 tablet 3     No Known Allergies       Lab Results    Chemistry/lipid CBC Cardiac Enzymes/BNP/TSH/INR   Recent Labs   Lab Test 06/25/24  1149   CHOL 114   HDL 36*   LDL 54   TRIG 119     Recent Labs   Lab Test 06/25/24  1149 08/18/23  0857 11/29/22  0800   LDL 54 52 41     Recent Labs   Lab Test 09/05/24  0928 09/05/24  0604 08/29/24  0920   NA  --   --  140   POTASSIUM  --   --  4.1   CHLORIDE  --   --  107   CO2  --   --  23   *   < > 100*   BUN  --   --  17.1   CR  --   --  1.04   GFRESTIMATED  --   --  82   LEV  --   --  9.2    < > = values in this interval not displayed.     Recent Labs   Lab Test 08/29/24  0920 06/25/24  1149 12/22/23  0901   CR 1.04 0.96 1.08     Recent Labs   Lab Test 06/25/24  1149 12/22/23  0901 08/18/23  0857   A1C 5.0 4.2 5.3          Recent Labs   Lab Test 08/29/24  0920   WBC 7.1   HGB 13.2*   HCT 37.6*   MCV 88   *     Recent Labs   Lab Test 08/29/24  0920 06/25/24  1149 08/15/23  1600   HGB 13.2* 13.2* 10.9*    Recent Labs   Lab Test 07/30/23  0549 07/29/23 2014   TROPONINI 0.08 0.01     Recent Labs   Lab Test 07/30/23  0549   *     Recent Labs   Lab Test 06/25/24  1149   TSH 0.56     No results for input(s): \"INR\" in the last 52172 hours.     Gifty Ann MD  Noninvasive Cardiologist   [facility]                                        Thank you for allowing me to participate in the care of your patient.      Sincerely,     Gifty Ann MD     [facility]  cc:   Deborah" MD Felipe  [address] [facility] [address]  [place] [address]

## 2025-01-08 ENCOUNTER — TELEPHONE (OUTPATIENT)
Dept: FAMILY MEDICINE | Facility: CLINIC | Age: 60
End: 2025-01-08
Payer: COMMERCIAL

## 2025-01-08 NOTE — TELEPHONE ENCOUNTER
Prior Authorization  Please initiate PA, med/dosage not covered by insurance.    Medication: Dulaglutide (TRULICITY) 3 MG/0.5ML SOPN     Insurance Name: MEDICA IFB BOLD  Insurance ID: [id number]  Cover My Meds Key: [id number]    Pharmacy:    [facility] [id number] [address] [place] [address]

## 2025-01-09 NOTE — TELEPHONE ENCOUNTER
Retail Pharmacy Prior Authorization Team   Phone: [phone]    PA Initiation    Medication: TRULICITY 3 MG/0.5ML SC SOAJ  Insurance Company: [employer] Non-Specialty PA's - Phone [phone] Fax [phone]  Pharmacy Filling the Rx: [facility] [address] [place] [address] [address] AT [place] [address]  Filling Pharmacy Phone: [phone]  Filling Pharmacy Fax:    Start Date: 1/9/2025    [id number]

## 2025-01-13 ENCOUNTER — TELEPHONE (OUTPATIENT)
Dept: CARDIOLOGY | Facility: CLINIC | Age: 60
End: 2025-01-13

## 2025-01-13 DIAGNOSIS — I48.0 PAROXYSMAL ATRIAL FIBRILLATION (H): Primary | ICD-10-CM

## 2025-01-13 NOTE — TELEPHONE ENCOUNTER
H&P:  Card OV  Date:  Structural LELAND [x] LAAC  Order Case Req Y  Order Set: to be placed on completion of pre-op Intra-Op RA Order? Y 3 month post-LAAC imaging order? Y     AC Eliquis   Antiplatelet ASA 81-Start two weeks prior to procedure   DM/GLP-1 DM Meds- HOLD METFORMIN AM of procedure  GLP-1- Dulaglutide (Trulicity) SEVEN full days prior to procedure   ED Meds Cialis/Tadalafil - Hold for 5 full days prior to procedure     Govind Alexandre, 1965, [id number]  Home:[phone] (home) Cell:[phone] (mobile)  Emergency Contact: Kathleen Alexandre   PCP: Lachelle Summers, [phone]    Important patient information for CSC/Cath Lab staff : None    LAAC Cath Lab Procedure Order   LAAC Type:  BSCI-WM  Implanting Provider: Prefers Dr Chaves  Date Ordered and Prepped: 1/13/2025 Bruna Valladares RN    Scheduling Information:  Anticipated Case Per Day:  Standard WM (4 cases per day)   Scheduling Timeframe:  Next Available  Scheduling Restrictions: Will need to hold GLP-1 Trulicity days prior to procedure, please schedule accordingly to allow time for this. , Pt to start antiplatelet 81 mg ASA two weeks prior to procedure, please schedule accordingly to allow time for this. , Patient is diabetic. , and Pt to hold ED medication CIALIS 5 full days prior to procedure, please schedule accordingly to allow time for this  Shared Decision Making Completed?: Done 1/7/2025 by Dr. Ann  Current Device/Device Co Needed for Procedure:  None - None  Intra-Op RA needed?: Yes, order placed  Anesthesia: General Anesthesia  Overnight stay required?:BSCI - WM case, no overnight stay anticipated      [facility] EP Cath Lab Prep   H&P:  Completed by cardiology on 1/7/2025 if scheduled within 30 days, pt will need RN education and Labs appt within 3 days of procedure. If pts LAAC scheduled outside of 30 days, pt to schedule H&P with Structural LELAND, RN Teach, and Labs within 30 days of LAAC  Pre-op Labs: CBC, BMP, Lab 276 (T&S), and INR  if on Warfarin, if not completed at pre-op H&P within 7 days of procedure.  Medical Records Pertinent for Procedure:  NONE  Iodinated Contrast Dye Allergies (Does not include Shellfish, Egg, and/or Iodine Allergy): No known allergy to contrast  GLP-1 Protocol: If on Dulaglutide (Trulicity) (weekly)- Injection hold 7 days prior to procedure  , Exenatide extended release (Bydureon bcise) (weekly)- Injection hold 7 days prior to procedure, Exenatide (Byetta) (twice daily)- Oral Tablet hold day prior and morning of procedure and for Injection hold 7 days prior to procedure, Semaglutide (Ozempic) (weekly)- Injection and Oral hold 7 days prior to procedure, Liraglutide (Victoza, Saxenda) (daily)- Injection hold day prior and morning of procedure  Post-LAAC RN Phone Call: ANDRAE - WM: please place on LAAC RN schedule for the day following LAAC, time based on case order  Post-LAAC Follow Up Plan: All patients, regardless of vendor, to be seen at 45 days post-LAAC with Structural LELAND in clinic  Post-LAAC Imaging?: CT Pulm Vein at 3 months, and again at one year post-implant.      No Known Allergies    Current Outpatient Medications:     ACCU-CHEK FASTCLIX LANCET DRUM, [ACCU-CHEK FASTCLIX LANCET DRUM] USE TO TEST BLOOD SUGARS 2 TO 3 TIMES A DAY, Disp: 300 each, Rfl: 4    apixaban ANTICOAGULANT (ELIQUIS) 5 MG tablet, Take 1 tablet (5 mg) by mouth 2 times daily., Disp: 180 tablet, Rfl: 3    blood glucose test (ACCU-CHEK NATHANIEL PLUS TEST STRP) strips, [BLOOD GLUCOSE TEST (ACCU-CHEK NATHANIEL PLUS TEST STRP) STRIPS] Use 1 each As Directed 3 (three) times a day., Disp: 300 each, Rfl: 3    Dulaglutide (TRULICITY) 3 MG/0.5ML SOPN, Inject 3 mg subcutaneously once a week., Disp: 6 mL, Rfl: 1    lisinopril (ZESTRIL) 10 MG tablet, Take 1 tablet (10 mg) by mouth daily, Disp: 90 tablet, Rfl: 3    lisinopril (ZESTRIL) 20 MG tablet, Take 1 tablet (20 mg) by mouth daily., Disp: 90 tablet, Rfl: 3    metFORMIN (GLUCOPHAGE XR) 500 MG 24 hr tablet,  TAKE 1 TABLET TWICE A DAY WITH MEALS, Disp: 180 tablet, Rfl: 1    rosuvastatin (CRESTOR) 10 MG tablet, TAKE 1 TABLET(10 MG) BY MOUTH AT BEDTIME Strength: 10 mg, Disp: 90 tablet, Rfl: 3    sotalol (BETAPACE) 80 MG tablet, Take 1 tablet (80 mg) by mouth 2 times daily., Disp: 180 tablet, Rfl: 3    spironolactone (ALDACTONE) 25 MG tablet, TAKE ONE-HALF (1/2) TABLET DAILY, Disp: 45 tablet, Rfl: 3    tadalafil (CIALIS) 10 MG tablet, Take 1 tablet (10 mg) by mouth daily as needed (erectile dsyfunction) 10 mg as a single dose 30 minutes prior to anticipated sexual activity; do not take more than once daily.  Adjust dose based on effectiveness and tolerability; may decrease to 5 mg or increase to 20 mg per dose., Disp: 30 tablet, Rfl: 3    Documentation Date:1/13/2025 11:26 AM  Bruna Valladares RN      ----- Message from Bruna COTO sent at 1/8/2025  9:28 AM CST -----  Regarding: LAAC Scheduling  Prep for LAAC and route to [facility] to schedule for April 2025, as patient requested :)

## 2025-01-14 NOTE — TELEPHONE ENCOUNTER
Justyle message sent with echo results showing adequate EF for LAAC. Awaiting release of May schedule. [facility]    ----- Message -----  From: Kathy Marino  Sent: 1/14/2025   9:21 AM CST  To: Bruna Valladares RN    Echo scheduled 1/15  Patient would now like May.  Will call when May schedule is released.  ----- Message -----  From: Bruna Valladares, RN  Sent: 1/13/2025  11:34 AM CST  To: Kathy Marino    ----- Message from Bruna Valladares RN sent at 1/13/2025 11:34 AM CST -----  Wants April. Needs echo completed still, which I reminded him multiple times. Order is in, he just needs to complete it. Ok to schedule in April as long as he completes echo prior

## 2025-01-15 ENCOUNTER — HOSPITAL ENCOUNTER (OUTPATIENT)
Dept: CARDIOLOGY | Facility: HOSPITAL | Age: 60
Discharge: HOME | End: 2025-01-15
Attending: PHYSICIAN ASSISTANT
Payer: COMMERCIAL

## 2025-01-15 DIAGNOSIS — I48.0 PAROXYSMAL ATRIAL FIBRILLATION (H): ICD-10-CM

## 2025-01-15 PROCEDURE — 93306 TTE W/DOPPLER COMPLETE: CPT | Mod: 26 | Performed by: INTERNAL MEDICINE

## 2025-01-15 PROCEDURE — 93306 TTE W/DOPPLER COMPLETE: CPT

## 2025-01-15 NOTE — TELEPHONE ENCOUNTER
Prior Authorization Approval    Medication: TRULICITY 3 MG/0.5ML SC SOAJ  Authorization Effective Date: 12/10/2024  Authorization Expiration Date: 1/9/2026  Approved Dose/Quantity:   Reference #:     Insurance Company: [employer] Non-Specialty PA's - Phone [phone] Fax [phone]  Expected CoPay: $    CoPay Card Available:      Financial Assistance Needed:   Which [id number] [address] AT [place] [address]  Pharmacy Notified: Yes  Patient Notified: **Instructed pharmacy to notify patient when script is ready to /ship.**

## 2025-02-23 DIAGNOSIS — R80.9 TYPE 2 DIABETES MELLITUS WITH MICROALBUMINURIA, WITHOUT LONG-TERM CURRENT USE OF INSULIN (H): ICD-10-CM

## 2025-02-23 DIAGNOSIS — E66.01 MORBID OBESITY (H): ICD-10-CM

## 2025-02-23 DIAGNOSIS — E11.29 TYPE 2 DIABETES MELLITUS WITH MICROALBUMINURIA, WITHOUT LONG-TERM CURRENT USE OF INSULIN (H): ICD-10-CM

## 2025-02-23 DIAGNOSIS — G47.30 SLEEP APNEA, UNSPECIFIED TYPE: ICD-10-CM

## 2025-02-23 DIAGNOSIS — I10 ESSENTIAL HYPERTENSION: ICD-10-CM

## 2025-02-23 DIAGNOSIS — E78.2 MIXED HYPERLIPIDEMIA: ICD-10-CM

## 2025-02-23 DIAGNOSIS — K76.0 FATTY LIVER: ICD-10-CM

## 2025-02-24 RX ORDER — DULAGLUTIDE 3 MG/.5ML
INJECTION, SOLUTION SUBCUTANEOUS
Qty: 6 ML | Refills: 1 | Status: SHIPPED | OUTPATIENT
Start: 2025-02-24

## 2025-05-18 ENCOUNTER — DOCUMENTATION ONLY (OUTPATIENT)
Dept: CARDIOLOGY | Facility: CLINIC | Age: 60
End: 2025-05-18
Payer: COMMERCIAL

## 2025-05-18 DIAGNOSIS — I48.0 PAROXYSMAL ATRIAL FIBRILLATION (H): Primary | ICD-10-CM

## 2025-05-18 LAB
ABO + RH BLD: NORMAL
BLD GP AB SCN SERPL QL: NEGATIVE
SPECIMEN EXP DATE BLD: NORMAL

## 2025-05-18 NOTE — PROGRESS NOTES
Govind Alexandre  [address] [facility] [address]  [phone] (home)     Patient in to see RN for Pre-LAAC visit on 5/19/25    All pre-procedure labs drawn: 5/19/25   EKG obtained: 5/19/25   Labs reviewed: 5/19/25  Renal Issues: No  Diabetic?: Yes Where is the patient located?  Device?: No       Pre-procedure instructions  Patient instructed to be NPO after midnight the evening prior to procedure.  Patient instructed to shower the evening before or the morning of the procedure.  Leave all valuables at home (jewlery, rings, watches, large amounts of money).  Patient understands there are two visitors allowed during patients stay.    Patient instructed to arrange for transportation home following procedure from a responsible family member of friend. No driving for at least 72 hours post-procedure.  Patient instructed to have a responsible adult with them for 24 hours post-procedure.  Post-procedure follow up process.    Patient instructed on medications:   Take Eliquis, lisinopril, and sotalol    Aspirin 81mg morning of procedure: To be given in pre-procedure area    Education was given to patient regarding what to expect pre-procedure.     Patient was informed procedure will be done at [facility], [address] [place] [address]. They have been instructed to check in at the  at the Hospital and their arrival time is at 6:30 AM    RA consent signed at the time of the appt: 5/19/25    All questions were answered to family and patient by RN.    Wife Kathleen present at the time of appointment. Wife Kathleen will be present day of procedure.    Ele Gómez RN  Structural Heart Coordinator   [facility]  [phone]    Patient Active Problem List   Diagnosis    Male Erectile Disorder    Chronic fatigue    Depression    Essential hypertension    Hx of traumatic brain injury    Multiple thyroid nodules    Type 2 diabetes mellitus with microalbuminuria,  without long-term current use of insulin (H)    Sleep apnea    Obesity (BMI 35.0-39.9) with comorbidity (H)    Status post ablation of atrial fibrillation    Mixed hyperlipidemia    Complicated UTI (urinary tract infection)    Enterococcal bacteremia    Chronic anticoagulation    S/P ureteral stent placement    Nephrolithiasis    Splenomegaly    Traumatic brain injury with loss of consciousness, sequela    Paroxysmal atrial fibrillation (H)    Persistent atrial fibrillation (H)     Current Outpatient Medications   Medication Sig Dispense Refill    ACCU-CHEK FASTCLIX LANCET DRUM [ACCU-CHEK FASTCLIX LANCET DRUM] USE TO TEST BLOOD SUGARS 2 TO 3 TIMES A  each 4    apixaban ANTICOAGULANT (ELIQUIS) 5 MG tablet Take 1 tablet (5 mg) by mouth 2 times daily. 180 tablet 3    blood glucose test (ACCU-CHEK NATHANIEL PLUS TEST STRP) strips [BLOOD GLUCOSE TEST (ACCU-CHEK NATHANIEL PLUS TEST STRP) STRIPS] Use 1 each As Directed 3 (three) times a day. 300 each 3    Dulaglutide (TRULICITY) 3 MG/0.5ML SOAJ INJECT 3MG UNDER THE SKIN ONCE A WEEK 6 mL 1    lisinopril (ZESTRIL) 10 MG tablet Take 1 tablet (10 mg) by mouth daily 90 tablet 3    lisinopril (ZESTRIL) 20 MG tablet Take 1 tablet (20 mg) by mouth daily. 90 tablet 3    metFORMIN (GLUCOPHAGE XR) 500 MG 24 hr tablet TAKE 1 TABLET TWICE A DAY WITH MEALS 180 tablet 1    rosuvastatin (CRESTOR) 10 MG tablet TAKE 1 TABLET(10 MG) BY MOUTH AT BEDTIME Strength: 10 mg 90 tablet 3    sotalol (BETAPACE) 80 MG tablet Take 1 tablet (80 mg) by mouth 2 times daily. 180 tablet 3    spironolactone (ALDACTONE) 25 MG tablet TAKE ONE-HALF (1/2) TABLET DAILY 45 tablet 3    tadalafil (CIALIS) 10 MG tablet Take 1 tablet (10 mg) by mouth daily as needed (erectile dsyfunction) 10 mg as a single dose 30 minutes prior to anticipated sexual activity; do not take more than once daily.  Adjust dose based on effectiveness and tolerability; may decrease to 5 mg or increase to 20 mg per dose. 30 tablet 3     No  current facility-administered medications for this visit.      No Known Allergies

## 2025-05-18 NOTE — PROGRESS NOTES
MODIFIED AMY SCALE   Timepoint: Pre-LAAC    Previous score: initial AMY    Score Description   0 No symptoms at all   1 No significant disability despite symptoms; able to carry out all usual duties and activities   2 Slight disability; unable to carry out all previous activities, but able to look after own affairs without assistance   3 Moderate disability; requiring some help, but able to walk without assistance   4 Moderately severe disability; unable to walk without assistance and unable to attend to own bodily needs without assistance   5 Severe disability; bedridden, incontinent and requiring constant nursing care and attention   6 Dead    Total score (0 - 6):  0    Change in score if s/p LAAC? N/A  If yes, notify implanting cardiologist.    Ele Gómez RN  Structural Heart Coordinator   [facility]  [phone]

## 2025-05-19 ENCOUNTER — PREP FOR PROCEDURE (OUTPATIENT)
Dept: CARDIOLOGY | Facility: CLINIC | Age: 60
End: 2025-05-19

## 2025-05-19 ENCOUNTER — LAB (OUTPATIENT)
Dept: CARDIOLOGY | Facility: CLINIC | Age: 60
End: 2025-05-19
Payer: COMMERCIAL

## 2025-05-19 ENCOUNTER — ALLIED HEALTH/NURSE VISIT (OUTPATIENT)
Dept: CARDIOLOGY | Facility: CLINIC | Age: 60
End: 2025-05-19
Payer: COMMERCIAL

## 2025-05-19 ENCOUNTER — OFFICE VISIT (OUTPATIENT)
Dept: CARDIOLOGY | Facility: CLINIC | Age: 60
End: 2025-05-19
Payer: COMMERCIAL

## 2025-05-19 VITALS
OXYGEN SATURATION: 96 % | HEART RATE: 73 BPM | BODY MASS INDEX: 39.06 KG/M2 | DIASTOLIC BLOOD PRESSURE: 80 MMHG | SYSTOLIC BLOOD PRESSURE: 138 MMHG | RESPIRATION RATE: 16 BRPM | HEIGHT: 72 IN | WEIGHT: 288.4 LBS

## 2025-05-19 DIAGNOSIS — I10 ESSENTIAL HYPERTENSION: ICD-10-CM

## 2025-05-19 DIAGNOSIS — E11.29 TYPE 2 DIABETES MELLITUS WITH MICROALBUMINURIA, WITHOUT LONG-TERM CURRENT USE OF INSULIN (H): ICD-10-CM

## 2025-05-19 DIAGNOSIS — I48.0 PAROXYSMAL ATRIAL FIBRILLATION (H): ICD-10-CM

## 2025-05-19 DIAGNOSIS — G47.33 OBSTRUCTIVE SLEEP APNEA SYNDROME: ICD-10-CM

## 2025-05-19 DIAGNOSIS — Z87.820 HX OF TRAUMATIC BRAIN INJURY: ICD-10-CM

## 2025-05-19 DIAGNOSIS — Z79.01 CHRONIC ANTICOAGULATION: Chronic | ICD-10-CM

## 2025-05-19 DIAGNOSIS — E66.01 MORBID OBESITY (H): ICD-10-CM

## 2025-05-19 DIAGNOSIS — R80.9 TYPE 2 DIABETES MELLITUS WITH MICROALBUMINURIA, WITHOUT LONG-TERM CURRENT USE OF INSULIN (H): ICD-10-CM

## 2025-05-19 DIAGNOSIS — Z98.890 STATUS POST ABLATION OF ATRIAL FIBRILLATION: Primary | ICD-10-CM

## 2025-05-19 DIAGNOSIS — Z86.79 STATUS POST ABLATION OF ATRIAL FIBRILLATION: Primary | ICD-10-CM

## 2025-05-19 DIAGNOSIS — I48.0 PAROXYSMAL ATRIAL FIBRILLATION (H): Primary | ICD-10-CM

## 2025-05-19 LAB
ANION GAP SERPL CALCULATED.3IONS-SCNC: 7 MMOL/L (ref 7–15)
ATRIAL RATE - MUSE: 74 BPM
BLOOD BANK CHART COMMENT: NORMAL
BUN SERPL-MCNC: 15.2 MG/DL (ref 8–23)
CALCIUM SERPL-MCNC: 9 MG/DL (ref 8.8–10.4)
CHLORIDE SERPL-SCNC: 109 MMOL/L (ref 98–107)
CREAT SERPL-MCNC: 0.92 MG/DL (ref 0.67–1.17)
DIASTOLIC BLOOD PRESSURE - MUSE: NORMAL MMHG
EGFRCR SERPLBLD CKD-EPI 2021: >90 ML/MIN/1.73M2
ERYTHROCYTE [DISTWIDTH] IN BLOOD BY AUTOMATED COUNT: 14.9 % (ref 10–15)
GLUCOSE SERPL-MCNC: 135 MG/DL (ref 70–99)
HCO3 SERPL-SCNC: 28 MMOL/L (ref 22–29)
HCT VFR BLD AUTO: 36.9 % (ref 40–53)
HGB BLD-MCNC: 12.3 G/DL (ref 13.3–17.7)
INTERPRETATION ECG - MUSE: NORMAL
MCH RBC QN AUTO: 29.6 PG (ref 26.5–33)
MCHC RBC AUTO-ENTMCNC: 33.3 G/DL (ref 31.5–36.5)
MCV RBC AUTO: 89 FL (ref 78–100)
P AXIS - MUSE: 12 DEGREES
PLATELET # BLD AUTO: 125 10E3/UL (ref 150–450)
POTASSIUM SERPL-SCNC: 4.5 MMOL/L (ref 3.4–5.3)
PR INTERVAL - MUSE: 186 MS
QRS DURATION - MUSE: 104 MS
QT - MUSE: 412 MS
QTC - MUSE: 457 MS
R AXIS - MUSE: 8 DEGREES
RBC # BLD AUTO: 4.15 10E6/UL (ref 4.4–5.9)
SODIUM SERPL-SCNC: 144 MMOL/L (ref 135–145)
SPECIMEN EXP DATE BLD: NORMAL
SYSTOLIC BLOOD PRESSURE - MUSE: NORMAL MMHG
T AXIS - MUSE: 35 DEGREES
VENTRICULAR RATE- MUSE: 74 BPM
WBC # BLD AUTO: 7 10E3/UL (ref 4–11)

## 2025-05-19 PROCEDURE — 80048 BASIC METABOLIC PNL TOTAL CA: CPT

## 2025-05-19 PROCEDURE — 3075F SYST BP GE 130 - 139MM HG: CPT | Performed by: INTERNAL MEDICINE

## 2025-05-19 PROCEDURE — 36415 COLL VENOUS BLD VENIPUNCTURE: CPT

## 2025-05-19 PROCEDURE — 3079F DIAST BP 80-89 MM HG: CPT | Performed by: INTERNAL MEDICINE

## 2025-05-19 PROCEDURE — 99207 PR NO CHARGE NURSE ONLY: CPT

## 2025-05-19 PROCEDURE — G2211 COMPLEX E/M VISIT ADD ON: HCPCS | Performed by: INTERNAL MEDICINE

## 2025-05-19 PROCEDURE — 85027 COMPLETE CBC AUTOMATED: CPT

## 2025-05-19 PROCEDURE — 93000 ELECTROCARDIOGRAM COMPLETE: CPT | Performed by: INTERNAL MEDICINE

## 2025-05-19 PROCEDURE — 86850 RBC ANTIBODY SCREEN: CPT

## 2025-05-19 PROCEDURE — 86900 BLOOD TYPING SEROLOGIC ABO: CPT

## 2025-05-19 PROCEDURE — 99214 OFFICE O/P EST MOD 30 MIN: CPT | Performed by: INTERNAL MEDICINE

## 2025-05-19 PROCEDURE — 86901 BLOOD TYPING SEROLOGIC RH(D): CPT

## 2025-05-19 RX ORDER — DEXTROSE MONOHYDRATE 25 G/50ML
25-50 INJECTION, SOLUTION INTRAVENOUS
Status: CANCELLED | OUTPATIENT
Start: 2025-05-19

## 2025-05-19 RX ORDER — ASPIRIN 81 MG/1
81 TABLET ORAL ONCE
Status: CANCELLED | OUTPATIENT
Start: 2025-05-19 | End: 2025-05-19

## 2025-05-19 RX ORDER — NICOTINE POLACRILEX 4 MG
15-30 LOZENGE BUCCAL
Status: CANCELLED | OUTPATIENT
Start: 2025-05-19

## 2025-05-19 RX ORDER — LIDOCAINE 40 MG/G
CREAM TOPICAL
Status: CANCELLED | OUTPATIENT
Start: 2025-05-19

## 2025-05-19 NOTE — LETTER
5/19/2025    Lachelle Summers, APRN CNP  [address] [facility] [id number]    RE: Govind Alexandre       Dear Colleague,     I had the pleasure of seeing Govind NATY Alexandre in the [facility].  HEART CARE ENCOUNTER NOTE       NATY [facility]  [phone]      Assessment/Recommendations   1.  Paroxysmal atrial fibrillation - s/p ablation September 2024 with early recurrence and put back on Sotalol, with plans to come off this month.     I have personally reviewed this patient's chart and have spoken with the patient about the treatment options, including DARIEL device.  He has a [id number] score of 2 for hypertension, diabetes.  He has a HAS-BLED score of 1 for bleeding predisposition.   He is not a good candidate for long-term anticoagulation due to history of TBI and SAH.     He is scheduled for implant on Thursday and will stay on eliquis up until that time. After implant, the patient will remain on aspirin 81 mg daily indefinitely and Eliquis for 6 weeks.  The patient will then stop Eliquis and start Plavix 75 mg daily for approximately 4 months. He will then stop Plavix and remain on aspirin 81 mg daily indefinitely.  He will have a RA or CTA approximately 3 months and 1 year post-implant.  He understands that the risks of the procedure are <2% and include, but are not limited to device embolization, air embolism, myocardial perforation, device thrombosis, ASD, stroke, or death.  We discussed expected recovery and follow-up.       The patient is a good candidate for proceeding with left atrial appendage implant.  His questions were answered to his satisfaction.  His RA consent is signed.      CBC and BMP are pending    2. Hypertension -blood pressure today is controlled.  He should continue taking spironolactone 12.5 mg daily and lisinopril 20 mg daily     3. KORINA -compliant with CPAP use    The longitudinal plan of care for atrial fibrillation and HTN was addressed during this  visit.  Due to added complexity of care, we will continue to support Govind and the subsequent management of this condition(s) and with the ongoing continuity of care of this condition(s).         History of Present Illness/Subjective    Govind Alexandre is a 59 year old male who comes in today for history and physical prior to watchman implant.  His wife accompanies him to the visit today.    Govind Alexandre has a past history of paroxysmal AF HTN, HLD, KORINA, DM2, h/o TBI and SAH 2016 and obesity    He has a history of TBI and SAH 2016 and was diagnosed with atrial fibrillation during this hospitalization.  He underwent ablation September 2024 and had early recurrence and was put back on Sotalol.  He hasn't had an episode since December.    He denies chest discomfort, shortness of breath, dizziness, lightheadedness, presyncope, syncope, leg swelling.  He denies orthopnea PND but wears CPAP nightly.  He reports rare palpitations. He monitors for A-fib recurrence with his Apple Watch.    Medical, surgical, family, social history, and medications were reviewed and updated as necessary.    ECHO results (from 1/15/25):  Interpretation Summary     1. Normal left ventricular size and systolic performance with a visually  estimated ejection fraction of 55-60%.  2. There is mild concentric increase in left ventricular wall thickness.  3. There is trace aortic insufficiency.  4. Normal right ventricular size and systolic performance.  5. There is mild biatrial enlargement.  6. There is mild enlargement of the proximal ascending aorta.     When compared to the prior real-time echocardiogram dated 30 July 2023, there  has been no major interval change.    EKG (personally reviewed and interpreted):  Sinus rhythm       Physical Examination Review of Systems   Vitals: /80 (BP Location: Right arm, Patient Position: Sitting, Cuff Size: Adult Large)   Pulse 73   Resp 16   Ht 1.829 m (6')   Wt 130.8 kg (288 lb 6.4 oz)   SpO2  96%   BMI 39.11 kg/m    BMI= Body mass index is 39.11 kg/m .  Wt Readings from Last 3 Encounters:   05/19/25 130.8 kg (288 lb 6.4 oz)   01/07/25 131.5 kg (290 lb)   01/06/25 130.2 kg (287 lb)       General Appearance:   Alert, cooperative and in no acute distress   ENT/Mouth: membranes moist, no oral lesions or bleeding gums.      EYES:  no scleral icterus, normal conjunctivae   Neck: Thyroid not visualized   Chest/Lungs:   lungs are clear to auscultation, no rales or wheezing   Cardiovascular:   Regular . Normal first and second heart sounds with no murmurs, rubs or gallops;  no edema bilaterally    Abdomen:  Soft and nontender.    Extremities: no cyanosis or clubbing   Skin: no xanthelasma, warm.    Neurologic: normal gait, normal  bilateral, no tremors   Psychiatric: Normal mood and affect       Please refer above for cardiac ROS details.      Medical History  Surgical History Family History Social History   Past Medical History:   Diagnosis Date     Depression 12/24/2014     Essential hypertension      Infection due to 2019 novel coronavirus 01/11/2022     Multiple thyroid nodules 09/08/2016     Type 2 diabetes mellitus without complication (H) 11/07/2016     Past Surgical History:   Procedure Laterality Date     BIOPSY  9/18/16     CARPAL TUNNEL RELEASE RT/LT Right      COLONOSCOPY  6/15/18     COMBINED CYSTOSCOPY, RETROGRADES, URETEROSCOPY, LASER HOLMIUM LITHOTRIPSY URETER(S), INSERT STENT Bilateral 7/26/2023    Procedure: cystoscopy, bilateral ureteroscopy with holmium laser lithotripsy, bilateral ureteroscopy with stone basketing, bilateral retrograde pyelogram, bilateral ureteral stent placement;  Surgeon: Mark Orta MD;  Location: [facility] OR     EP ABLATION PULMONARY VEIN ISOLATION N/A 9/5/2024    Procedure: Ablation Atrial Fibrillation;  Surgeon: Kenny Chaves MD;  Location: [facility]     HC REPAIR ROTATOR CUFF,ACUTE      Description: Rotator Cuff Repair Acute;  Recorded:  01/13/2010;  Comments: cortisone     [facility]  11/30/2015          REMOVAL OF SPERM DUCT(S)      Description: Surgery Of Male Genitalia Vasectomy;  Recorded: 01/13/2010;     WISDOM TOOTH EXTRACTION       Family History   Problem Relation Age of Onset     Heart Disease Mother      Kidney Disease Mother      Thyroid Cancer Father      Kidney Disease Father      Heart Disease Father      Hypertension Father      Thyroid Disease Father         cancer     Cancer Father         myoleodysplansic syndrome     No Known Problems Sister      Hypertension Sister      Hypertension Brother      Heart Disease Paternal Grandmother      Depression Daughter      Anxiety Disorder Daughter      Autism Spectrum Disorder Son     Social History     Socioeconomic History     Marital status:      Spouse name: Not on file     Number of children: Not on file     Years of education: Not on file     Highest education level: Not on file   Occupational History     Not on file   Tobacco Use     Smoking status: Former     Types: Dip, chew, snus or snuff     Passive exposure: Never     Smokeless tobacco: Former     Types: Chew     Quit date: 12/24/2004   Vaping Use     Vaping status: Never Used   Substance and Sexual Activity     Alcohol use: Yes     Comment: Alcoholic Drinks/day: 1 every 2 weeks     Drug use: No     Sexual activity: Yes     Partners: Female     Birth control/protection: None   Other Topics Concern     Not on file   Social History Narrative     Not on file     Social Drivers of Health     Financial Resource Strain: Low Risk  (6/24/2024)    Financial Resource Strain      Within the past 12 months, have you or your family members you live with been unable to get utilities (heat, electricity) when it was really needed?: No   Food Insecurity: Low Risk  (6/24/2024)    Food Insecurity      Within the past 12 months, did you worry that your food would run out before you got money to buy more?: No      Within the past 12 months, did the  food you bought just not last and you didn t have money to get more?: No   Transportation Needs: Low Risk  (6/24/2024)    Transportation Needs      Within the past 12 months, has lack of transportation kept you from medical appointments, getting your medicines, non-medical meetings or appointments, work, or from getting things that you need?: No   Physical Activity: Insufficiently Active (6/24/2024)    Exercise Vital Sign      Days of Exercise per Week: 3 days      Minutes of Exercise per Session: 20 min   Stress: Stress Concern Present (6/24/2024)    [employer] - Occupational Stress Questionnaire      Feeling of Stress : Rather much   Social Connections: Unknown (6/24/2024)    Social Connection and Isolation Panel [NHANES]      Frequency of Communication with Friends and Family: Not on file      Frequency of Social Gatherings with Friends and Family: Twice a week      Attends Oriental orthodox Services: Not on file      Active Member of Clubs or Organizations: Not on file      Attends Club or Organization Meetings: Not on file      Marital Status: Not on file   Interpersonal Safety: High Risk (9/5/2024)    Interpersonal Safety      Do you feel physically and emotionally safe where you currently live?: No      Within the past 12 months, have you been hit, slapped, kicked or otherwise physically hurt by someone?: No      Within the past 12 months, have you been humiliated or emotionally abused in other ways by your partner or ex-partner?: No   Housing Stability: Low Risk  (6/24/2024)    Housing Stability      Do you have housing? : Yes      Are you worried about losing your housing?: No          Medications  Allergies   Current Outpatient Medications   Medication Sig Dispense Refill     ACCU-CHEK FASTCLIX LANCET DRUM [ACCU-CHEK FASTCLIX LANCET DRUM] USE TO TEST BLOOD SUGARS 2 TO 3 TIMES A  each 4     apixaban ANTICOAGULANT (ELIQUIS) 5 MG tablet Take 1 tablet (5 mg) by mouth 2 times daily.  180 tablet 3     blood glucose test (ACCU-CHEK NATHANIEL PLUS TEST STRP) strips [BLOOD GLUCOSE TEST (ACCU-CHEK NATHANIEL PLUS TEST STRP) STRIPS] Use 1 each As Directed 3 (three) times a day. 300 each 3     Dulaglutide (TRULICITY) 3 MG/0.5ML SOAJ INJECT 3MG UNDER THE SKIN ONCE A WEEK 6 mL 1     lisinopril (ZESTRIL) 20 MG tablet Take 1 tablet (20 mg) by mouth daily. 90 tablet 3     metFORMIN (GLUCOPHAGE XR) 500 MG 24 hr tablet TAKE 1 TABLET TWICE A DAY WITH MEALS 180 tablet 1     rosuvastatin (CRESTOR) 10 MG tablet TAKE 1 TABLET(10 MG) BY MOUTH AT BEDTIME Strength: 10 mg 90 tablet 3     sotalol (BETAPACE) 80 MG tablet Take 1 tablet (80 mg) by mouth 2 times daily. 180 tablet 3     spironolactone (ALDACTONE) 25 MG tablet TAKE ONE-HALF (1/2) TABLET DAILY 45 tablet 3     tadalafil (CIALIS) 10 MG tablet Take 1 tablet (10 mg) by mouth daily as needed (erectile dsyfunction) 10 mg as a single dose 30 minutes prior to anticipated sexual activity; do not take more than once daily.  Adjust dose based on effectiveness and tolerability; may decrease to 5 mg or increase to 20 mg per dose. 30 tablet 3    No Known Allergies      Lab Results    Chemistry/lipid CBC Cardiac Enzymes/BNP/TSH/INR   Recent Labs   Lab Test 06/25/24  1149   CHOL 114   HDL 36*   LDL 54   TRIG 119     Recent Labs   Lab Test 06/25/24  1149 08/18/23  0857 11/29/22  0800   LDL 54 52 41     Recent Labs   Lab Test 09/05/24  0928 09/05/24  0604 08/29/24  0920   NA  --   --  140   POTASSIUM  --   --  4.1   CHLORIDE  --   --  107   CO2  --   --  23   *   < > 100*   BUN  --   --  17.1   CR  --   --  1.04   GFRESTIMATED  --   --  82   LEV  --   --  9.2    < > = values in this interval not displayed.     Recent Labs   Lab Test 08/29/24  0920 06/25/24  1149 12/22/23  0901   CR 1.04 0.96 1.08     Recent Labs   Lab Test 06/25/24  1149 12/22/23  0901 08/18/23  0857   A1C 5.0 4.2 5.3    CBC RESULTS:   Recent Labs   Lab Test 08/29/24  0920   WBC 7.1   RBC 4.29*   HGB 13.2*   HCT  "37.6*   MCV 88   MCH 30.8   MCHC 35.1   RDW 14.7   *        Recent Labs   Lab Test 07/30/23  0549 07/29/23 2014   TROPONINI 0.08 0.01     Recent Labs   Lab Test 07/30/23  0549   *     Recent Labs   Lab Test 06/25/24  1149   TSH 0.56     No results for input(s): \"INR\" in the last [id number] hours.         This note has been dictated using voice recognition software. Any grammatical or context distortions are unintentional and inherent to the software.          Thank you for allowing me to participate in the care of your patient.      Sincerely,     CAPRI BRIAN PA-C     [facility]  cc:   No referring provider defined for this encounter.      "

## 2025-05-19 NOTE — PROGRESS NOTES
HEART CARE ENCOUNTER NOTE       [facility]  [phone]      Assessment/Recommendations   1.  Paroxysmal atrial fibrillation - s/p ablation September 2024 with early recurrence and put back on Sotalol, with plans to come off this month.     I have personally reviewed this patient's chart and have spoken with the patient about the treatment options, including DARIEL device.  He has a [id number] score of 2 for hypertension, diabetes.  He has a HAS-BLED score of 1 for bleeding predisposition.   He is not a good candidate for long-term anticoagulation due to history of TBI and SAH.     He is scheduled for implant on Thursday and will stay on eliquis up until that time. After implant, the patient will remain on aspirin 81 mg daily indefinitely and Eliquis for 6 weeks.  The patient will then stop Eliquis and start Plavix 75 mg daily for approximately 4 months. He will then stop Plavix and remain on aspirin 81 mg daily indefinitely.  He will have a RA or CTA approximately 3 months and 1 year post-implant.  He understands that the risks of the procedure are <2% and include, but are not limited to device embolization, air embolism, myocardial perforation, device thrombosis, ASD, stroke, or death.  We discussed expected recovery and follow-up.       The patient is a good candidate for proceeding with left atrial appendage implant.  His questions were answered to his satisfaction.  His RA consent is signed.      CBC and BMP are pending    2. Hypertension -blood pressure today is controlled.  He should continue taking spironolactone 12.5 mg daily and lisinopril 20 mg daily     3. KORINA -compliant with CPAP use    The longitudinal plan of care for atrial fibrillation and HTN was addressed during this visit.  Due to added complexity of care, we will continue to support Govind and the subsequent management of this condition(s) and with the ongoing continuity of care of this condition(s).         History of  Present Illness/Subjective    Govind Alexandre is a 59 year old male who comes in today for history and physical prior to watchman implant.  His wife accompanies him to the visit today.    Govind Alexandre has a past history of paroxysmal AF HTN, HLD, KORINA, DM2, h/o TBI and SAH 2016 and obesity    He has a history of TBI and SAH 2016 and was diagnosed with atrial fibrillation during this hospitalization.  He underwent ablation September 2024 and had early recurrence and was put back on Sotalol.  He hasn't had an episode since December.    He denies chest discomfort, shortness of breath, dizziness, lightheadedness, presyncope, syncope, leg swelling.  He denies orthopnea PND but wears CPAP nightly.  He reports rare palpitations. He monitors for A-fib recurrence with his Apple Watch.    Medical, surgical, family, social history, and medications were reviewed and updated as necessary.    ECHO results (from 1/15/25):  Interpretation Summary     1. Normal left ventricular size and systolic performance with a visually  estimated ejection fraction of 55-60%.  2. There is mild concentric increase in left ventricular wall thickness.  3. There is trace aortic insufficiency.  4. Normal right ventricular size and systolic performance.  5. There is mild biatrial enlargement.  6. There is mild enlargement of the proximal ascending aorta.     When compared to the prior real-time echocardiogram dated 30 July 2023, there  has been no major interval change.    EKG (personally reviewed and interpreted):  Sinus rhythm       Physical Examination Review of Systems   Vitals: /80 (BP Location: Right arm, Patient Position: Sitting, Cuff Size: Adult Large)   Pulse 73   Resp 16   Ht 1.829 m (6')   Wt 130.8 kg (288 lb 6.4 oz)   SpO2 96%   BMI 39.11 kg/m    BMI= Body mass index is 39.11 kg/m .  Wt Readings from Last 3 Encounters:   05/19/25 130.8 kg (288 lb 6.4 oz)   01/07/25 131.5 kg (290 lb)   01/06/25 130.2 kg (287 lb)       General  Appearance:   Alert, cooperative and in no acute distress   ENT/Mouth: membranes moist, no oral lesions or bleeding gums.      EYES:  no scleral icterus, normal conjunctivae   Neck: Thyroid not visualized   Chest/Lungs:   lungs are clear to auscultation, no rales or wheezing   Cardiovascular:   Regular . Normal first and second heart sounds with no murmurs, rubs or gallops;  no edema bilaterally    Abdomen:  Soft and nontender.    Extremities: no cyanosis or clubbing   Skin: no xanthelasma, warm.    Neurologic: normal gait, normal  bilateral, no tremors   Psychiatric: Normal mood and affect       Please refer above for cardiac ROS details.      Medical History  Surgical History Family History Social History   Past Medical History:   Diagnosis Date    Depression 12/24/2014    Essential hypertension     Infection due to 2019 novel coronavirus 01/11/2022    Multiple thyroid nodules 09/08/2016    Type 2 diabetes mellitus without complication (H) 11/07/2016     Past Surgical History:   Procedure Laterality Date    BIOPSY  9/18/16    CARPAL TUNNEL RELEASE RT/LT Right     COLONOSCOPY  6/15/18    COMBINED CYSTOSCOPY, RETROGRADES, URETEROSCOPY, LASER HOLMIUM LITHOTRIPSY URETER(S), INSERT STENT Bilateral 7/26/2023    Procedure: cystoscopy, bilateral ureteroscopy with holmium laser lithotripsy, bilateral ureteroscopy with stone basketing, bilateral retrograde pyelogram, bilateral ureteral stent placement;  Surgeon: Mark Orta MD;  Location: [facility] OR    EP ABLATION PULMONARY VEIN ISOLATION N/A 9/5/2024    Procedure: Ablation Atrial Fibrillation;  Surgeon: Kenny Chaves MD;  Location: [facility]    HC REPAIR ROTATOR CUFF,ACUTE      Description: Rotator Cuff Repair Acute;  Recorded: 01/13/2010;  Comments: cortisone    PICC  11/30/2015         REMOVAL OF SPERM DUCT(S)      Description: Surgery Of Male Genitalia Vasectomy;  Recorded: 01/13/2010;    WISDOM TOOTH EXTRACTION       Family History   Problem  Relation Age of Onset    Heart Disease Mother     Kidney Disease Mother     Thyroid Cancer Father     Kidney Disease Father     Heart Disease Father     Hypertension Father     Thyroid Disease Father         cancer    Cancer Father         myoleodysplansic syndrome    No Known Problems Sister     Hypertension Sister     Hypertension Brother     Heart Disease Paternal Grandmother     Depression Daughter     Anxiety Disorder Daughter     Autism Spectrum Disorder Son     Social History     Socioeconomic History    Marital status:      Spouse name: Not on file    Number of children: Not on file    Years of education: Not on file    Highest education level: Not on file   Occupational History    Not on file   Tobacco Use    Smoking status: Former     Types: Dip, chew, snus or snuff     Passive exposure: Never    Smokeless tobacco: Former     Types: Chew     Quit date: 12/24/2004   Vaping Use    Vaping status: Never Used   Substance and Sexual Activity    Alcohol use: Yes     Comment: Alcoholic Drinks/day: 1 every 2 weeks    Drug use: No    Sexual activity: Yes     Partners: Female     Birth control/protection: None   Other Topics Concern    Not on file   Social History Narrative    Not on file     Social Drivers of Health     Financial Resource Strain: Low Risk  (6/24/2024)    Financial Resource Strain     Within the past 12 months, have you or your family members you live with been unable to get utilities (heat, electricity) when it was really needed?: No   Food Insecurity: Low Risk  (6/24/2024)    Food Insecurity     Within the past 12 months, did you worry that your food would run out before you got money to buy more?: No     Within the past 12 months, did the food you bought just not last and you didn t have money to get more?: No   Transportation Needs: Low Risk  (6/24/2024)    Transportation Needs     Within the past 12 months, has lack of transportation kept you from medical appointments, getting your  medicines, non-medical meetings or appointments, work, or from getting things that you need?: No   Physical Activity: Insufficiently Active (6/24/2024)    Exercise Vital Sign     Days of Exercise per Week: 3 days     Minutes of Exercise per Session: 20 min   Stress: Stress Concern Present (6/24/2024)    [employer] [place] [employer] - Occupational Stress Questionnaire     Feeling of Stress : Rather much   Social Connections: Unknown (6/24/2024)    Social Connection and Isolation Panel [NHANES]     Frequency of Communication with Friends and Family: Not on file     Frequency of Social Gatherings with Friends and Family: Twice a week     Attends Taoism Services: Not on file     Active Member of Clubs or Organizations: Not on file     Attends Club or Organization Meetings: Not on file     Marital Status: Not on file   Interpersonal Safety: High Risk (9/5/2024)    Interpersonal Safety     Do you feel physically and emotionally safe where you currently live?: No     Within the past 12 months, have you been hit, slapped, kicked or otherwise physically hurt by someone?: No     Within the past 12 months, have you been humiliated or emotionally abused in other ways by your partner or ex-partner?: No   Housing Stability: Low Risk  (6/24/2024)    Housing Stability     Do you have housing? : Yes     Are you worried about losing your housing?: No          Medications  Allergies   Current Outpatient Medications   Medication Sig Dispense Refill    ACCU-CHEK FASTCLIX LANCET DRUM [ACCU-CHEK FASTCLIX LANCET DRUM] USE TO TEST BLOOD SUGARS 2 TO 3 TIMES A  each 4    apixaban ANTICOAGULANT (ELIQUIS) 5 MG tablet Take 1 tablet (5 mg) by mouth 2 times daily. 180 tablet 3    blood glucose test (ACCU-CHEK NATHANIEL PLUS TEST STRP) strips [BLOOD GLUCOSE TEST (ACCU-CHEK NATHANIEL PLUS TEST STRP) STRIPS] Use 1 each As Directed 3 (three) times a day. 300 each 3    Dulaglutide (TRULICITY) 3 MG/0.5ML SOAJ INJECT 3MG UNDER THE SKIN ONCE  A WEEK 6 mL 1    lisinopril (ZESTRIL) 20 MG tablet Take 1 tablet (20 mg) by mouth daily. 90 tablet 3    metFORMIN (GLUCOPHAGE XR) 500 MG 24 hr tablet TAKE 1 TABLET TWICE A DAY WITH MEALS 180 tablet 1    rosuvastatin (CRESTOR) 10 MG tablet TAKE 1 TABLET(10 MG) BY MOUTH AT BEDTIME Strength: 10 mg 90 tablet 3    sotalol (BETAPACE) 80 MG tablet Take 1 tablet (80 mg) by mouth 2 times daily. 180 tablet 3    spironolactone (ALDACTONE) 25 MG tablet TAKE ONE-HALF (1/2) TABLET DAILY 45 tablet 3    tadalafil (CIALIS) 10 MG tablet Take 1 tablet (10 mg) by mouth daily as needed (erectile dsyfunction) 10 mg as a single dose 30 minutes prior to anticipated sexual activity; do not take more than once daily.  Adjust dose based on effectiveness and tolerability; may decrease to 5 mg or increase to 20 mg per dose. 30 tablet 3    No Known Allergies      Lab Results    Chemistry/lipid CBC Cardiac Enzymes/BNP/TSH/INR   Recent Labs   Lab Test 06/25/24  1149   CHOL 114   HDL 36*   LDL 54   TRIG 119     Recent Labs   Lab Test 06/25/24  1149 08/18/23  0857 11/29/22  0800   LDL 54 52 41     Recent Labs   Lab Test 09/05/24  0928 09/05/24  0604 08/29/24  0920   NA  --   --  140   POTASSIUM  --   --  4.1   CHLORIDE  --   --  107   CO2  --   --  23   *   < > 100*   BUN  --   --  17.1   CR  --   --  1.04   GFRESTIMATED  --   --  82   LEV  --   --  9.2    < > = values in this interval not displayed.     Recent Labs   Lab Test 08/29/24  0920 06/25/24  1149 12/22/23  0901   CR 1.04 0.96 1.08     Recent Labs   Lab Test 06/25/24  1149 12/22/23  0901 08/18/23  0857   A1C 5.0 4.2 5.3    CBC RESULTS:   Recent Labs   Lab Test 08/29/24  0920   WBC 7.1   RBC 4.29*   HGB 13.2*   HCT 37.6*   MCV 88   MCH 30.8   MCHC 35.1   RDW 14.7   *        Recent Labs   Lab Test 07/30/23  0549 07/29/23 2014   TROPONINI 0.08 0.01     Recent Labs   Lab Test 07/30/23  0549   *     Recent Labs   Lab Test 06/25/24  1149   TSH 0.56     No results for  "input(s): \"INR\" in the last 02199 hours.         This note has been dictated using voice recognition software. Any grammatical or context distortions are unintentional and inherent to the software.        "

## 2025-05-19 NOTE — PATIENT INSTRUCTIONS
Govind Alexandre,    It was a pleasure to see you today in the clinic regarding your upcoming Watchman implant.     My recommendations after this visit include:     - report to [facility] on Thursday morning at the instructed time        If you have questions or concerns, please call using the numbers below:    DARIEL (Watchman/Amulet) clinic phone   [phone]       After Hours/Scheduling  [phone]    Otherwise you can dial the nurse directly at:      Ele Gómez RN  [phone]

## 2025-05-19 NOTE — H&P (VIEW-ONLY)
HEART CARE ENCOUNTER NOTE       [facility]  [phone]      Assessment/Recommendations   1.  Paroxysmal atrial fibrillation - s/p ablation September 2024 with early recurrence and put back on Sotalol, with plans to come off this month.     I have personally reviewed this patient's chart and have spoken with the patient about the treatment options, including DARIEL device.  He has a [id number] score of 2 for hypertension, diabetes.  He has a HAS-BLED score of 1 for bleeding predisposition.   He is not a good candidate for long-term anticoagulation due to history of TBI and SAH.     He is scheduled for implant on Thursday and will stay on eliquis up until that time. After implant, the patient will remain on aspirin 81 mg daily indefinitely and Eliquis for 6 weeks.  The patient will then stop Eliquis and start Plavix 75 mg daily for approximately 4 months. He will then stop Plavix and remain on aspirin 81 mg daily indefinitely.  He will have a RA or CTA approximately 3 months and 1 year post-implant.  He understands that the risks of the procedure are <2% and include, but are not limited to device embolization, air embolism, myocardial perforation, device thrombosis, ASD, stroke, or death.  We discussed expected recovery and follow-up.       The patient is a good candidate for proceeding with left atrial appendage implant.  His questions were answered to his satisfaction.  His RA consent is signed.      CBC and BMP are pending    2. Hypertension -blood pressure today is controlled.  He should continue taking spironolactone 12.5 mg daily and lisinopril 20 mg daily     3. KORINA -compliant with CPAP use    The longitudinal plan of care for atrial fibrillation and HTN was addressed during this visit.  Due to added complexity of care, we will continue to support Govind and the subsequent management of this condition(s) and with the ongoing continuity of care of this condition(s).         History of  Present Illness/Subjective    Govind Alexandre is a 59 year old male who comes in today for history and physical prior to watchman implant.  His wife accompanies him to the visit today.    Govind Alexandre has a past history of paroxysmal AF HTN, HLD, KORINA, DM2, h/o TBI and SAH 2016 and obesity    He has a history of TBI and SAH 2016 and was diagnosed with atrial fibrillation during this hospitalization.  He underwent ablation September 2024 and had early recurrence and was put back on Sotalol.  He hasn't had an episode since December.    He denies chest discomfort, shortness of breath, dizziness, lightheadedness, presyncope, syncope, leg swelling.  He denies orthopnea PND but wears CPAP nightly.  He reports rare palpitations. He monitors for A-fib recurrence with his Apple Watch.    Medical, surgical, family, social history, and medications were reviewed and updated as necessary.    ECHO results (from 1/15/25):  Interpretation Summary     1. Normal left ventricular size and systolic performance with a visually  estimated ejection fraction of 55-60%.  2. There is mild concentric increase in left ventricular wall thickness.  3. There is trace aortic insufficiency.  4. Normal right ventricular size and systolic performance.  5. There is mild biatrial enlargement.  6. There is mild enlargement of the proximal ascending aorta.     When compared to the prior real-time echocardiogram dated 30 July 2023, there  has been no major interval change.    EKG (personally reviewed and interpreted):  Sinus rhythm       Physical Examination Review of Systems   Vitals: /80 (BP Location: Right arm, Patient Position: Sitting, Cuff Size: Adult Large)   Pulse 73   Resp 16   Ht 1.829 m (6')   Wt 130.8 kg (288 lb 6.4 oz)   SpO2 96%   BMI 39.11 kg/m    BMI= Body mass index is 39.11 kg/m .  Wt Readings from Last 3 Encounters:   05/19/25 130.8 kg (288 lb 6.4 oz)   01/07/25 131.5 kg (290 lb)   01/06/25 130.2 kg (287 lb)       General  Appearance:   Alert, cooperative and in no acute distress   ENT/Mouth: membranes moist, no oral lesions or bleeding gums.      EYES:  no scleral icterus, normal conjunctivae   Neck: Thyroid not visualized   Chest/Lungs:   lungs are clear to auscultation, no rales or wheezing   Cardiovascular:   Regular . Normal first and second heart sounds with no murmurs, rubs or gallops;  no edema bilaterally    Abdomen:  Soft and nontender.    Extremities: no cyanosis or clubbing   Skin: no xanthelasma, warm.    Neurologic: normal gait, normal  bilateral, no tremors   Psychiatric: Normal mood and affect       Please refer above for cardiac ROS details.      Medical History  Surgical History Family History Social History   Past Medical History:   Diagnosis Date    Depression 12/24/2014    Essential hypertension     Infection due to 2019 novel coronavirus 01/11/2022    Multiple thyroid nodules 09/08/2016    Type 2 diabetes mellitus without complication (H) 11/07/2016     Past Surgical History:   Procedure Laterality Date    BIOPSY  9/18/16    CARPAL TUNNEL RELEASE RT/LT Right     COLONOSCOPY  6/15/18    COMBINED CYSTOSCOPY, RETROGRADES, URETEROSCOPY, LASER HOLMIUM LITHOTRIPSY URETER(S), INSERT STENT Bilateral 7/26/2023    Procedure: cystoscopy, bilateral ureteroscopy with holmium laser lithotripsy, bilateral ureteroscopy with stone basketing, bilateral retrograde pyelogram, bilateral ureteral stent placement;  Surgeon: Mark Orta MD;  Location: [facility] OR    EP ABLATION PULMONARY VEIN ISOLATION N/A 9/5/2024    Procedure: Ablation Atrial Fibrillation;  Surgeon: Kenny Chaves MD;  Location: [facility]    HC REPAIR ROTATOR CUFF,ACUTE      Description: Rotator Cuff Repair Acute;  Recorded: 01/13/2010;  Comments: cortisone    PICC  11/30/2015         REMOVAL OF SPERM DUCT(S)      Description: Surgery Of Male Genitalia Vasectomy;  Recorded: 01/13/2010;    WISDOM TOOTH EXTRACTION       Family History   Problem  Relation Age of Onset    Heart Disease Mother     Kidney Disease Mother     Thyroid Cancer Father     Kidney Disease Father     Heart Disease Father     Hypertension Father     Thyroid Disease Father         cancer    Cancer Father         myoleodysplansic syndrome    No Known Problems Sister     Hypertension Sister     Hypertension Brother     Heart Disease Paternal Grandmother     Depression Daughter     Anxiety Disorder Daughter     Autism Spectrum Disorder Son     Social History     Socioeconomic History    Marital status:      Spouse name: Not on file    Number of children: Not on file    Years of education: Not on file    Highest education level: Not on file   Occupational History    Not on file   Tobacco Use    Smoking status: Former     Types: Dip, chew, snus or snuff     Passive exposure: Never    Smokeless tobacco: Former     Types: Chew     Quit date: 12/24/2004   Vaping Use    Vaping status: Never Used   Substance and Sexual Activity    Alcohol use: Yes     Comment: Alcoholic Drinks/day: 1 every 2 weeks    Drug use: No    Sexual activity: Yes     Partners: Female     Birth control/protection: None   Other Topics Concern    Not on file   Social History Narrative    Not on file     Social Drivers of Health     Financial Resource Strain: Low Risk  (6/24/2024)    Financial Resource Strain     Within the past 12 months, have you or your family members you live with been unable to get utilities (heat, electricity) when it was really needed?: No   Food Insecurity: Low Risk  (6/24/2024)    Food Insecurity     Within the past 12 months, did you worry that your food would run out before you got money to buy more?: No     Within the past 12 months, did the food you bought just not last and you didn t have money to get more?: No   Transportation Needs: Low Risk  (6/24/2024)    Transportation Needs     Within the past 12 months, has lack of transportation kept you from medical appointments, getting your  medicines, non-medical meetings or appointments, work, or from getting things that you need?: No   Physical Activity: Insufficiently Active (6/24/2024)    Exercise Vital Sign     Days of Exercise per Week: 3 days     Minutes of Exercise per Session: 20 min   Stress: Stress Concern Present (6/24/2024)    [place] [employer] - Occupational Stress Questionnaire     Feeling of Stress : Rather much   Social Connections: Unknown (6/24/2024)    Social Connection and Isolation Panel [NHANES]     Frequency of Communication with Friends and Family: Not on file     Frequency of Social Gatherings with Friends and Family: Twice a week     Attends [employer] Services: Not on file     Active Member of Clubs or Organizations: Not on file     Attends Club or Organization Meetings: Not on file     Marital Status: Not on file   Interpersonal Safety: High Risk (9/5/2024)    Interpersonal Safety     Do you feel physically and emotionally safe where you currently live?: No     Within the past 12 months, have you been hit, slapped, kicked or otherwise physically hurt by someone?: No     Within the past 12 months, have you been humiliated or emotionally abused in other ways by your partner or ex-partner?: No   Housing Stability: Low Risk  (6/24/2024)    Housing Stability     Do you have housing? : Yes     Are you worried about losing your housing?: No          Medications  Allergies   Current Outpatient Medications   Medication Sig Dispense Refill    ACCU-CHEK FASTCLIX LANCET DRUM [ACCU-CHEK FASTCLIX LANCET DRUM] USE TO TEST BLOOD SUGARS 2 TO 3 TIMES A  each 4    apixaban ANTICOAGULANT (ELIQUIS) 5 MG tablet Take 1 tablet (5 mg) by mouth 2 times daily. 180 tablet 3    blood glucose test (ACCU-CHEK NATHANIEL PLUS TEST STRP) strips [BLOOD GLUCOSE TEST (ACCU-CHEK NATHANIEL PLUS TEST STRP) STRIPS] Use 1 each As Directed 3 (three) times a day. 300 each 3    Dulaglutide (TRULICITY) 3 MG/0.5ML SOAJ INJECT 3MG UNDER THE SKIN ONCE  A WEEK 6 mL 1    lisinopril (ZESTRIL) 20 MG tablet Take 1 tablet (20 mg) by mouth daily. 90 tablet 3    metFORMIN (GLUCOPHAGE XR) 500 MG 24 hr tablet TAKE 1 TABLET TWICE A DAY WITH MEALS 180 tablet 1    rosuvastatin (CRESTOR) 10 MG tablet TAKE 1 TABLET(10 MG) BY MOUTH AT BEDTIME Strength: 10 mg 90 tablet 3    sotalol (BETAPACE) 80 MG tablet Take 1 tablet (80 mg) by mouth 2 times daily. 180 tablet 3    spironolactone (ALDACTONE) 25 MG tablet TAKE ONE-HALF (1/2) TABLET DAILY 45 tablet 3    tadalafil (CIALIS) 10 MG tablet Take 1 tablet (10 mg) by mouth daily as needed (erectile dsyfunction) 10 mg as a single dose 30 minutes prior to anticipated sexual activity; do not take more than once daily.  Adjust dose based on effectiveness and tolerability; may decrease to 5 mg or increase to 20 mg per dose. 30 tablet 3    No Known Allergies      Lab Results    Chemistry/lipid CBC Cardiac Enzymes/BNP/TSH/INR   Recent Labs   Lab Test 06/25/24  1149   CHOL 114   HDL 36*   LDL 54   TRIG 119     Recent Labs   Lab Test 06/25/24  1149 08/18/23  0857 11/29/22  0800   LDL 54 52 41     Recent Labs   Lab Test 09/05/24  0928 09/05/24  0604 08/29/24  0920   NA  --   --  140   POTASSIUM  --   --  4.1   CHLORIDE  --   --  107   CO2  --   --  23   *   < > 100*   BUN  --   --  17.1   CR  --   --  1.04   GFRESTIMATED  --   --  82   LEV  --   --  9.2    < > = values in this interval not displayed.     Recent Labs   Lab Test 08/29/24  0920 06/25/24  1149 12/22/23  0901   CR 1.04 0.96 1.08     Recent Labs   Lab Test 06/25/24  1149 12/22/23  0901 08/18/23  0857   A1C 5.0 4.2 5.3    CBC RESULTS:   Recent Labs   Lab Test 08/29/24  0920   WBC 7.1   RBC 4.29*   HGB 13.2*   HCT 37.6*   MCV 88   MCH 30.8   MCHC 35.1   RDW 14.7   *        Recent Labs   Lab Test 07/30/23  0549 07/29/23 2014   TROPONINI 0.08 0.01     Recent Labs   Lab Test 07/30/23  0549   *     Recent Labs   Lab Test 06/25/24  1149   TSH 0.56     No results for  "input(s): \"INR\" in the last 86255 hours.         This note has been dictated using voice recognition software. Any grammatical or context distortions are unintentional and inherent to the software.        "

## 2025-05-20 ENCOUNTER — RESULTS FOLLOW-UP (OUTPATIENT)
Dept: CARDIOLOGY | Facility: CLINIC | Age: 60
End: 2025-05-20

## 2025-05-20 NOTE — DISCHARGE INSTRUCTIONS
Going home after Left Atrial Appendage Occlusion Device Implant    Once Home:  You should arrange for someone to stay with you for the first 24 hours after discharge  Make sure you are taking all of your medications as directed in your discharge instructions.  Do not stop taking these medications without speaking to your provider.   Increase fluid intake for two days    No lifting more than 10 pounds for 5 days  No aggressive exercise for 5 days  No driving x 3 day  You may shower tomorrow; no bath x 5 days  Return to work in 3 days if you have a sedentary job or 5 days if you do manual labor      Care of groin site  It is normal to have a small bruise or lump at the site.  Do not scrub the site.  For the first 2 days: Do not stoop or squat. When you cough, sneeze or move your bowels, hold your hand over the puncture site and press gently.  Do not use lotion or powder near the puncture site for 3 days.  Ok to use ice packs at groin sites 20 minutes at a time for groin discomfort    If you start bleeding from the site in your groin, lie down flat and press firmly on the site. Call your doctor as soon as you can.    Call your doctor if:  You have a large or growing hard lump around the site.  The site is red, swollen, hot or tender.  Blood or fluid is draining from the site.  You have chills or a fever greater than 101 F (38 C).  Your leg or arm feels numb or cool.  You have hives, a rash or unusual itching.    To reduce the risk of infection, avoid dental procedures (including cleanings) for the first 6 weeks.  Contact your cardiology clinic for an antibiotic SHOULD YOU need to see the dentist prior to that 6 week waiting period.    Dial 911 if you have bleeding that is heavy or does not stop OR for any NEW signs/symptoms of a stroke:  Visual disturbance  Problems with talking  Smile only occurs on half your face  Numbness on one side of your face or body  Sudden headache  Confusion  Problems with walking or  balance    Follow up appointments:   6 Week Post Implant Visit Date: June 26 at 2:10 with Swapna Castro PA-C  At [facility]  [address], [address]  [place] [address]    Post-procedure Transesophageal Echocardiogram (RA): Please arrange for a responsible adult to drive you to and from this appointment. There will be nothing to eat or drink for at least 6 hours prior to your arrival time for the RA.  Post-Procedure CTA:  Date: August 28 at 12:40 pm -  Location: [facility]      Your Procedural Physician was: Dr. Chaves     To reach the [facility] nurse coordinators please call:   [phone]        If you have issues tonight when you get home:      Call [phone] and tell them you had a Watchman.  Edith, the PA will call you back.      If after 9 pm, call the [facility] at [phone] and you will be connected to the on call doctor                                                                    If you are calling after hours, please listen to the entire voicemail, a live  will answer at the end of the message

## 2025-05-21 ENCOUNTER — ANESTHESIA EVENT (OUTPATIENT)
Dept: CARDIOLOGY | Facility: HOSPITAL | Age: 60
End: 2025-05-21
Payer: COMMERCIAL

## 2025-05-22 ENCOUNTER — ANESTHESIA (OUTPATIENT)
Dept: CARDIOLOGY | Facility: HOSPITAL | Age: 60
End: 2025-05-22
Payer: COMMERCIAL

## 2025-05-22 ENCOUNTER — HOSPITAL ENCOUNTER (INPATIENT)
Facility: HOSPITAL | Age: 60
LOS: 1 days | Discharge: HOME | DRG: 274 | End: 2025-05-22
Attending: INTERNAL MEDICINE | Admitting: INTERNAL MEDICINE
Payer: COMMERCIAL

## 2025-05-22 VITALS
HEART RATE: 81 BPM | TEMPERATURE: 98.8 F | SYSTOLIC BLOOD PRESSURE: 144 MMHG | DIASTOLIC BLOOD PRESSURE: 78 MMHG | RESPIRATION RATE: 22 BRPM | OXYGEN SATURATION: 95 %

## 2025-05-22 DIAGNOSIS — Z95.818 PRESENCE OF WATCHMAN LEFT ATRIAL APPENDAGE CLOSURE DEVICE: Primary | ICD-10-CM

## 2025-05-22 DIAGNOSIS — I48.0 PAROXYSMAL ATRIAL FIBRILLATION (H): ICD-10-CM

## 2025-05-22 PROBLEM — E78.2 MIXED HYPERLIPIDEMIA: Status: ACTIVE | Noted: 2020-12-10

## 2025-05-22 PROBLEM — Z98.890 STATUS POST ABLATION OF ATRIAL FIBRILLATION: Status: ACTIVE | Noted: 2020-05-07

## 2025-05-22 PROBLEM — Z79.01 CHRONIC ANTICOAGULATION: Chronic | Status: ACTIVE | Noted: 2023-08-04

## 2025-05-22 PROBLEM — Z86.79 STATUS POST ABLATION OF ATRIAL FIBRILLATION: Status: ACTIVE | Noted: 2020-05-07

## 2025-05-22 LAB
ACT BLD: 292 SECONDS (ref 74–150)
ACT BLD: 316 SECONDS (ref 74–150)
BLD PROD TYP BPU: NORMAL
BLD PROD TYP BPU: NORMAL
BLOOD COMPONENT TYPE: NORMAL
BLOOD COMPONENT TYPE: NORMAL
CODING SYSTEM: NORMAL
CODING SYSTEM: NORMAL
CREAT SERPL-MCNC: 1.03 MG/DL (ref 0.67–1.17)
CROSSMATCH: NORMAL
CROSSMATCH: NORMAL
EGFRCR SERPLBLD CKD-EPI 2021: 83 ML/MIN/1.73M2
GLUCOSE BLDC GLUCOMTR-MCNC: 127 MG/DL (ref 70–99)
HGB BLD-MCNC: 11.5 G/DL (ref 13.3–17.7)
MCV RBC AUTO: 89 FL (ref 78–100)
UNIT ABO/RH: NORMAL
UNIT ABO/RH: NORMAL
UNIT NUMBER: NORMAL
UNIT NUMBER: NORMAL
UNIT STATUS: NORMAL
UNIT STATUS: NORMAL
UNIT TYPE ISBT: 5100
UNIT TYPE ISBT: 5100

## 2025-05-22 PROCEDURE — 272N000001 HC OR GENERAL SUPPLY STERILE: Performed by: INTERNAL MEDICINE

## 2025-05-22 PROCEDURE — 258N000003 HC RX IP 258 OP 636: Performed by: INTERNAL MEDICINE

## 2025-05-22 PROCEDURE — 250N000011 HC RX IP 250 OP 636: Performed by: INTERNAL MEDICINE

## 2025-05-22 PROCEDURE — C1889 IMPLANT/INSERT DEVICE, NOC: HCPCS | Performed by: INTERNAL MEDICINE

## 2025-05-22 PROCEDURE — 82565 ASSAY OF CREATININE: CPT | Performed by: INTERNAL MEDICINE

## 2025-05-22 PROCEDURE — 255N000002 HC RX 255 OP 636: Performed by: INTERNAL MEDICINE

## 2025-05-22 PROCEDURE — 258N000003 HC RX IP 258 OP 636: Performed by: NURSE ANESTHETIST, CERTIFIED REGISTERED

## 2025-05-22 PROCEDURE — 120N000001 HC R&B MED SURG/OB: Mod: 6MC

## 2025-05-22 PROCEDURE — 36415 COLL VENOUS BLD VENIPUNCTURE: CPT | Performed by: INTERNAL MEDICINE

## 2025-05-22 PROCEDURE — C1887 CATHETER, GUIDING: HCPCS | Performed by: INTERNAL MEDICINE

## 2025-05-22 PROCEDURE — 250N000009 HC RX 250: Performed by: NURSE ANESTHETIST, CERTIFIED REGISTERED

## 2025-05-22 PROCEDURE — 33340 PERQ CLSR TCAT L ATR APNDGE: CPT | Performed by: INTERNAL MEDICINE

## 2025-05-22 PROCEDURE — 02L73DK OCCLUSION OF LEFT ATRIAL APPENDAGE WITH INTRALUMINAL DEVICE, PERCUTANEOUS APPROACH: ICD-10-PCS | Performed by: INTERNAL MEDICINE

## 2025-05-22 PROCEDURE — 85347 COAGULATION TIME ACTIVATED: CPT

## 2025-05-22 PROCEDURE — 370N000017 HC ANESTHESIA TECHNICAL FEE, PER MIN: Performed by: INTERNAL MEDICINE

## 2025-05-22 PROCEDURE — 93662 INTRACARDIAC ECG (ICE): CPT | Mod: 26 | Performed by: INTERNAL MEDICINE

## 2025-05-22 PROCEDURE — 85018 HEMOGLOBIN: CPT | Performed by: INTERNAL MEDICINE

## 2025-05-22 PROCEDURE — 93662 INTRACARDIAC ECG (ICE): CPT

## 2025-05-22 PROCEDURE — 710N000010 HC RECOVERY PHASE 1, LEVEL 2, PER MIN

## 2025-05-22 PROCEDURE — C1894 INTRO/SHEATH, NON-LASER: HCPCS | Performed by: INTERNAL MEDICINE

## 2025-05-22 PROCEDURE — 250N000013 HC RX MED GY IP 250 OP 250 PS 637: Performed by: INTERNAL MEDICINE

## 2025-05-22 PROCEDURE — 250N000011 HC RX IP 250 OP 636: Performed by: NURSE ANESTHETIST, CERTIFIED REGISTERED

## 2025-05-22 PROCEDURE — C1759 CATH, INTRA ECHOCARDIOGRAPHY: HCPCS | Performed by: INTERNAL MEDICINE

## 2025-05-22 PROCEDURE — 86923 COMPATIBILITY TEST ELECTRIC: CPT | Performed by: INTERNAL MEDICINE

## 2025-05-22 DEVICE — OCCLUDER CV WATCHMAN FLX PRO 27MM LT ATR APPEND M635WU60270: Type: IMPLANTABLE DEVICE | Site: HEART | Status: FUNCTIONAL

## 2025-05-22 RX ORDER — OXYCODONE HYDROCHLORIDE 5 MG/1
10 TABLET ORAL EVERY 4 HOURS PRN
Status: DISCONTINUED | OUTPATIENT
Start: 2025-05-22 | End: 2025-05-22 | Stop reason: HOSPADM

## 2025-05-22 RX ORDER — LIDOCAINE HYDROCHLORIDE AND EPINEPHRINE 10; 10 MG/ML; UG/ML
INJECTION, SOLUTION INFILTRATION; PERINEURAL
Status: DISCONTINUED | OUTPATIENT
Start: 2025-05-22 | End: 2025-05-22 | Stop reason: HOSPADM

## 2025-05-22 RX ORDER — SODIUM CHLORIDE 9 MG/ML
INJECTION, SOLUTION INTRAVENOUS CONTINUOUS PRN
Status: DISCONTINUED | OUTPATIENT
Start: 2025-05-22 | End: 2025-05-22

## 2025-05-22 RX ORDER — ACETAMINOPHEN 325 MG/1
650 TABLET ORAL EVERY 4 HOURS PRN
Status: DISCONTINUED | OUTPATIENT
Start: 2025-05-22 | End: 2025-05-22 | Stop reason: HOSPADM

## 2025-05-22 RX ORDER — LIDOCAINE 40 MG/G
CREAM TOPICAL
Status: DISCONTINUED | OUTPATIENT
Start: 2025-05-22 | End: 2025-05-22 | Stop reason: HOSPADM

## 2025-05-22 RX ORDER — NALOXONE HYDROCHLORIDE 0.4 MG/ML
0.2 INJECTION, SOLUTION INTRAMUSCULAR; INTRAVENOUS; SUBCUTANEOUS
Status: DISCONTINUED | OUTPATIENT
Start: 2025-05-22 | End: 2025-05-22 | Stop reason: HOSPADM

## 2025-05-22 RX ORDER — ASPIRIN 81 MG/1
81 TABLET ORAL DAILY
COMMUNITY
Start: 2025-05-22

## 2025-05-22 RX ORDER — HEPARIN SODIUM 1000 [USP'U]/ML
INJECTION, SOLUTION INTRAVENOUS; SUBCUTANEOUS
Status: DISCONTINUED | OUTPATIENT
Start: 2025-05-22 | End: 2025-05-22 | Stop reason: HOSPADM

## 2025-05-22 RX ORDER — ONDANSETRON 4 MG/1
4 TABLET, ORALLY DISINTEGRATING ORAL EVERY 6 HOURS PRN
Status: DISCONTINUED | OUTPATIENT
Start: 2025-05-22 | End: 2025-05-22 | Stop reason: HOSPADM

## 2025-05-22 RX ORDER — PROTAMINE SULFATE 10 MG/ML
INJECTION, SOLUTION INTRAVENOUS PRN
Status: DISCONTINUED | OUTPATIENT
Start: 2025-05-22 | End: 2025-05-22

## 2025-05-22 RX ORDER — NALOXONE HYDROCHLORIDE 0.4 MG/ML
0.4 INJECTION, SOLUTION INTRAMUSCULAR; INTRAVENOUS; SUBCUTANEOUS
Status: DISCONTINUED | OUTPATIENT
Start: 2025-05-22 | End: 2025-05-22 | Stop reason: HOSPADM

## 2025-05-22 RX ORDER — CEFAZOLIN SODIUM/WATER 3 G/30 ML
SYRINGE (ML) INTRAVENOUS PRN
Status: DISCONTINUED | OUTPATIENT
Start: 2025-05-22 | End: 2025-05-22

## 2025-05-22 RX ORDER — PROPOFOL 10 MG/ML
INJECTION, EMULSION INTRAVENOUS PRN
Status: DISCONTINUED | OUTPATIENT
Start: 2025-05-22 | End: 2025-05-22

## 2025-05-22 RX ORDER — DEXTROSE MONOHYDRATE 25 G/50ML
25-50 INJECTION, SOLUTION INTRAVENOUS
Status: DISCONTINUED | OUTPATIENT
Start: 2025-05-22 | End: 2025-05-22 | Stop reason: HOSPADM

## 2025-05-22 RX ORDER — CEFAZOLIN SODIUM/WATER 3 G/30 ML
3 SYRINGE (ML) INTRAVENOUS
Status: DISCONTINUED | OUTPATIENT
Start: 2025-05-22 | End: 2025-05-22 | Stop reason: HOSPADM

## 2025-05-22 RX ORDER — IODIXANOL 320 MG/ML
INJECTION, SOLUTION INTRAVASCULAR
Status: DISCONTINUED | OUTPATIENT
Start: 2025-05-22 | End: 2025-05-22 | Stop reason: HOSPADM

## 2025-05-22 RX ORDER — FENTANYL CITRATE 50 UG/ML
INJECTION, SOLUTION INTRAMUSCULAR; INTRAVENOUS PRN
Status: DISCONTINUED | OUTPATIENT
Start: 2025-05-22 | End: 2025-05-22

## 2025-05-22 RX ORDER — NICOTINE POLACRILEX 4 MG
15-30 LOZENGE BUCCAL
Status: DISCONTINUED | OUTPATIENT
Start: 2025-05-22 | End: 2025-05-22 | Stop reason: HOSPADM

## 2025-05-22 RX ORDER — SODIUM CHLORIDE 9 MG/ML
INJECTION, SOLUTION INTRAVENOUS CONTINUOUS
Status: ACTIVE | OUTPATIENT
Start: 2025-05-22 | End: 2025-05-22

## 2025-05-22 RX ORDER — GLYCOPYRROLATE 0.2 MG/ML
INJECTION, SOLUTION INTRAMUSCULAR; INTRAVENOUS PRN
Status: DISCONTINUED | OUTPATIENT
Start: 2025-05-22 | End: 2025-05-22

## 2025-05-22 RX ORDER — ONDANSETRON 2 MG/ML
4 INJECTION INTRAMUSCULAR; INTRAVENOUS EVERY 6 HOURS PRN
Status: DISCONTINUED | OUTPATIENT
Start: 2025-05-22 | End: 2025-05-22 | Stop reason: HOSPADM

## 2025-05-22 RX ORDER — ASPIRIN 81 MG/1
81 TABLET ORAL ONCE
Status: COMPLETED | OUTPATIENT
Start: 2025-05-22 | End: 2025-05-22

## 2025-05-22 RX ORDER — LIDOCAINE HYDROCHLORIDE 10 MG/ML
INJECTION, SOLUTION INFILTRATION; PERINEURAL PRN
Status: DISCONTINUED | OUTPATIENT
Start: 2025-05-22 | End: 2025-05-22

## 2025-05-22 RX ORDER — DEXAMETHASONE SODIUM PHOSPHATE 10 MG/ML
INJECTION, SOLUTION INTRAMUSCULAR; INTRAVENOUS PRN
Status: DISCONTINUED | OUTPATIENT
Start: 2025-05-22 | End: 2025-05-22

## 2025-05-22 RX ORDER — ONDANSETRON 2 MG/ML
INJECTION INTRAMUSCULAR; INTRAVENOUS PRN
Status: DISCONTINUED | OUTPATIENT
Start: 2025-05-22 | End: 2025-05-22

## 2025-05-22 RX ORDER — OXYCODONE HYDROCHLORIDE 5 MG/1
5 TABLET ORAL EVERY 4 HOURS PRN
Status: DISCONTINUED | OUTPATIENT
Start: 2025-05-22 | End: 2025-05-22 | Stop reason: HOSPADM

## 2025-05-22 RX ADMIN — SODIUM CHLORIDE 500 ML: 0.9 INJECTION, SOLUTION INTRAVENOUS at 07:31

## 2025-05-22 RX ADMIN — PHENYLEPHRINE HYDROCHLORIDE 0.3 MCG/KG/MIN: 10 INJECTION INTRAVENOUS at 10:35

## 2025-05-22 RX ADMIN — PHENYLEPHRINE HYDROCHLORIDE 150 MCG: 10 INJECTION INTRAVENOUS at 10:09

## 2025-05-22 RX ADMIN — GLYCOPYRROLATE 0.2 MG: 0.2 INJECTION, SOLUTION INTRAMUSCULAR; INTRAVENOUS at 11:20

## 2025-05-22 RX ADMIN — MIDAZOLAM HYDROCHLORIDE 2 MG: 1 INJECTION, SOLUTION INTRAMUSCULAR; INTRAVENOUS at 10:05

## 2025-05-22 RX ADMIN — SUGAMMADEX 200 MG: 100 INJECTION, SOLUTION INTRAVENOUS at 11:21

## 2025-05-22 RX ADMIN — PROPOFOL 200 MG: 10 INJECTION, EMULSION INTRAVENOUS at 10:06

## 2025-05-22 RX ADMIN — FENTANYL CITRATE 100 MCG: 50 INJECTION, SOLUTION INTRAMUSCULAR; INTRAVENOUS at 10:06

## 2025-05-22 RX ADMIN — ROCURONIUM 50 MG: 50 INJECTION, SOLUTION INTRAVENOUS at 10:09

## 2025-05-22 RX ADMIN — SODIUM CHLORIDE: 9 INJECTION, SOLUTION INTRAVENOUS at 10:06

## 2025-05-22 RX ADMIN — CEFAZOLIN 3 G: 10 INJECTION, POWDER, FOR SOLUTION INTRAVENOUS at 10:08

## 2025-05-22 RX ADMIN — ONDANSETRON 4 MG: 2 INJECTION INTRAMUSCULAR; INTRAVENOUS at 11:18

## 2025-05-22 RX ADMIN — LIDOCAINE HYDROCHLORIDE 5 ML: 10 INJECTION, SOLUTION INFILTRATION; PERINEURAL at 10:06

## 2025-05-22 RX ADMIN — ASPIRIN 81 MG CHEWABLE TABLET 81 MG: 81 TABLET CHEWABLE at 07:30

## 2025-05-22 RX ADMIN — PROTAMINE SULFATE 60 MG: 10 INJECTION, SOLUTION INTRAVENOUS at 11:16

## 2025-05-22 RX ADMIN — DEXAMETHASONE SODIUM PHOSPHATE 5 MG: 10 INJECTION, SOLUTION INTRAMUSCULAR; INTRAVENOUS at 10:20

## 2025-05-22 ASSESSMENT — ACTIVITIES OF DAILY LIVING (ADL)
ADLS_ACUITY_SCORE: 58

## 2025-05-22 ASSESSMENT — ENCOUNTER SYMPTOMS: DYSRHYTHMIAS: 1

## 2025-05-22 NOTE — ANESTHESIA PREPROCEDURE EVALUATION
Anesthesia Pre-Procedure Evaluation    Patient: Govind Alexandre   MRN: [id number] : 1965          Procedure : Procedure(s):  Left Atrial Appendage Closure - WM         Past Medical History:   Diagnosis Date    Depression 2014    Essential hypertension     Infection due to 2019 novel coronavirus 2022    Multiple thyroid nodules 2016    Type 2 diabetes mellitus without complication (H) 2016      Past Surgical History:   Procedure Laterality Date    BIOPSY  16    CARPAL TUNNEL RELEASE RT/LT Right     COLONOSCOPY  6/15/18    COMBINED CYSTOSCOPY, RETROGRADES, URETEROSCOPY, LASER HOLMIUM LITHOTRIPSY URETER(S), INSERT STENT Bilateral 2023    Procedure: cystoscopy, bilateral ureteroscopy with holmium laser lithotripsy, bilateral ureteroscopy with stone basketing, bilateral retrograde pyelogram, bilateral ureteral stent placement;  Surgeon: Mark Orta MD;  Location: [facility] OR    EP ABLATION PULMONARY VEIN ISOLATION N/A 2024    Procedure: Ablation Atrial Fibrillation;  Surgeon: Kenny Chaves MD;  Location: [facility] LAB     HC REPAIR ROTATOR CUFF,ACUTE      Description: Rotator Cuff Repair Acute;  Recorded: 2010;  Comments: cortisone    PICC  2015         REMOVAL OF SPERM DUCT(S)      Description: Surgery Of Male Genitalia Vasectomy;  Recorded: 2010;    WISDOM TOOTH EXTRACTION        No Known Allergies   Social History     Tobacco Use    Smoking status: Former     Types: Dip, chew, snus or snuff     Passive exposure: Never    Smokeless tobacco: Former     Types: Chew     Quit date: 2004   Substance Use Topics    Alcohol use: Yes     Comment: Alcoholic Drinks/day: 1 every 2 weeks      Wt Readings from Last 1 Encounters:   25 130.8 kg (288 lb 6.4 oz)        Anesthesia Evaluation            ROS/MED HX  ENT/Pulmonary:     (+) sleep apnea,                                       Neurologic:       Cardiovascular:     (+)  hypertension- -   -  -  "-                        dysrhythmias, a-fib,             METS/Exercise Tolerance:     Hematologic:       Musculoskeletal:       GI/Hepatic:       Renal/Genitourinary:     (+) renal disease,      Nephrolithiasis ,       Endo:     (+)  type II DM,        thyroid problem, hypothyroidism,    Obesity,       Psychiatric/Substance Use:     (+) psychiatric history depression       Infectious Disease:       Malignancy:       Other:              Physical Exam  Airway  Mallampati: III  TM distance: <3 FB  Neck ROM: limited  Mouth opening: < 4 cm    Cardiovascular   Rhythm: irregular  Rate: normal rate     Dental   (+) Modest Abnormalities - crowns, retainers, 1 or 2 missing teeth      Pulmonary - normal exam      Neurological   He appears awake.    Other Findings       OUTSIDE LABS:  CBC:   Lab Results   Component Value Date    WBC 7.0 05/19/2025    WBC 7.1 08/29/2024    HGB 12.3 (L) 05/19/2025    HGB 13.2 (L) 08/29/2024    HCT 36.9 (L) 05/19/2025    HCT 37.6 (L) 08/29/2024     (L) 05/19/2025     (L) 08/29/2024     BMP:   Lab Results   Component Value Date     05/19/2025     08/29/2024    POTASSIUM 4.5 05/19/2025    POTASSIUM 4.1 08/29/2024    CHLORIDE 109 (H) 05/19/2025    CHLORIDE 107 08/29/2024    CO2 28 05/19/2025    CO2 23 08/29/2024    BUN 15.2 05/19/2025    BUN 17.1 08/29/2024    CR 0.92 05/19/2025    CR 1.04 08/29/2024     (H) 05/19/2025     (H) 09/05/2024     COAGS: No results found for: \"PTT\", \"INR\", \"FIBR\"  POC: No results found for: \"BGM\", \"HCG\", \"HCGS\"  HEPATIC:   Lab Results   Component Value Date    ALBUMIN 4.5 06/25/2024    PROTTOTAL 7.4 06/25/2024    ALT 16 06/25/2024    AST 24 06/25/2024    ALKPHOS 84 06/25/2024    BILITOTAL 1.1 06/25/2024     OTHER:   Lab Results   Component Value Date    LACT 1.1 07/29/2023    A1C 5.0 06/25/2024    LEV 9.0 05/19/2025    MAG 2.3 07/31/2023    TSH 0.56 06/25/2024    T4 0.97 12/24/2021    SED 37 (H) 08/15/2023       Anesthesia " Plan    ASA Status:  3       Anesthesia Type: General.  Airway: oral.  Induction: intravenous.  Maintenance: Balanced.   Techniques and Equipment:     - Airway:  Planned airway equipment includes video laryngoscope.     - Monitoring Plan: standard ASA monitoring     Consents    Anesthesia Plan(s) and associated risks, benefits, and realistic alternatives discussed. Questions answered and patient/representative(s) expressed understanding.     - Discussed: CRNA     - Discussed with:         - Pt is DNR/DNI Status: no DNR          Postoperative Care    Pain management: multimodal analgesia.     Comments:                   Rik Phillips MD    I have reviewed the pertinent notes and labs in the chart from the past 30 days and (re)examined the patient.  Any updates or changes from those notes are reflected in this note.    Clinically Significant Risk Factors Present on Admission                # Drug Induced Coagulation Defect: home medication list includes an anticoagulant medication    # Hypertension: Noted on problem list           # Obesity: Estimated body mass index is 39.11 kg/m  as calculated from the following:    Height as of 5/19/25: 1.829 m (6').    Weight as of 5/19/25: 130.8 kg (288 lb 6.4 oz).

## 2025-05-22 NOTE — ANESTHESIA POSTPROCEDURE EVALUATION
Patient: Govind Alexandre    Procedure: Procedure(s):  Left Atrial Appendage Closure - WM       Anesthesia Type:  General    Note:  Disposition: Outpatient   Postop Pain Control: Uneventful            Sign Out: Well controlled pain   PONV: No   Neuro/Psych: Uneventful            Sign Out: Acceptable/Baseline neuro status   Airway/Respiratory: Uneventful            Sign Out: Acceptable/Baseline resp. status   CV/Hemodynamics: Uneventful            Sign Out: Acceptable CV status; No obvious hypovolemia; No obvious fluid overload   Other NRE: NONE   DID A NON-ROUTINE EVENT OCCUR? No           Last vitals:  Vitals Value Taken Time   /74 05/22/25 12:00   Temp     Pulse 75 05/22/25 12:08   Resp 3 05/22/25 12:08   SpO2 97 % 05/22/25 12:08   Vitals shown include unfiled device data.    Electronically Signed By: Rik Phillips MD  May 22, 2025  12:11 PM

## 2025-05-22 NOTE — CARE PLAN
Pt admitted to [facility] for a Watchman, denies any pain, is prepped and ready for procedure.

## 2025-05-22 NOTE — Clinical Note
0 : 16. 45 : 15. 90 : 17. 135 : 17. 0 : 21. 45 : 34. 90 : 23. 135 : 25. 0 : 20.4 . 45 : 19.4 . 90 : 18.9 . 135 : 18.9 . DARIEL type used: Wind SockPosition: Passed. [place]: Passed. Size: Accepted. Seal: Complete. Jet: 0.Drippler FLX PRO  27mm  REF: [id number]  LOT: [id number]  EXP: 03/18/2028  GTIN: [id number]  .

## 2025-05-22 NOTE — ANESTHESIA PROCEDURE NOTES
Airway       Patient location during procedure: OR       Procedure Start/Stop Times: 5/22/2025 10:06 AM  Staff -        Anesthesiologist:  Rik Phillips MD       Resident/Fellow: Libia Garcia       CRNA: Angelica Flores APRN CRNA       Performed By: SRNA  Consent for Airway        Urgency: elective  Indications and Patient Condition       Indications for airway management: brandi-procedural       Induction type:RSI       Mask difficulty assessment: 0 - not attempted    Final Airway Details       Final airway type: endotracheal airway       Successful airway: ETT - single  Endotracheal Airway Details        ETT size (mm): 8.0       Cuffed: yes       Cuff volume (mL): 6       Successful intubation technique: video laryngoscopy       VL Blade Size: Glidescope 4       Grade View of Cords: 1       Adjucts: stylet       Position: Right       Measured from: gums/teeth       Secured at (cm): 24       Bite block used: None    Post intubation assessment        Placement verified by: capnometry, equal breath sounds and chest rise        Number of attempts at approach: 1       Number of other approaches attempted: 0       Secured with: tape       Ease of procedure: easy       Dentition: Intact and Unchanged    Medication(s) Administered   Medication Administration Time: 5/22/2025 10:06 AM         From: Elsi Law  To: Kenyon Wheeler  Sent: 10/4/2023 8:24 PM CDT  Subject: Elsi Wheeler,   Would you give me a referral for Dr. Richa WARD or Dr. Celina WARD opthalmology?  Please, would you give a referral for Dr Brissa Tao. To have my eyes checked and my ears and hearing checked.   I will be discussing my eczema with Dr. Cuevas, I use ketoconazole prescription and bag balm moisturizer sold local at [employer] and [employer]. Bag balm my mother and I used it keeps vows udders and farmers hands from freezing and cracking. It has a sulfate in. It is very nice. I looked it up on internet line because it sells fast local. Head to foot bag balm user. Thank you.   Elsi Law

## 2025-05-22 NOTE — INTERVAL H&P NOTE
I have reviewed the surgical (or preoperative) H&P that is linked to this encounter, and examined the patient. There are no significant changes    Labs ok    Clinical Conditions Present on Arrival:  Clinically Significant Risk Factors Present on Admission          # Hyperchloremia: Highest Cl = 109 mmol/L in last 30 days, will monitor as appropriate            # Drug Induced Coagulation Defect: home medication list includes an anticoagulant medication  # Thrombocytopenia: Lowest platelets = 125 in last 30 days, will monitor for bleeding      # Obesity: Estimated body mass index is 39.11 kg/m  as calculated from the following:    Height as of 5/19/25: 1.829 m (6').    Weight as of 5/19/25: 130.8 kg (288 lb 6.4 oz).

## 2025-05-22 NOTE — ANESTHESIA CARE TRANSFER NOTE
Patient: Govind Alexandre    Procedure: Procedure(s):  Left Atrial Appendage Closure - WM       Diagnosis: other  Diagnosis Additional Information: No value filed.    Anesthesia Type:   General     Note:    Oropharynx: oropharynx clear of all foreign objects  Level of Consciousness: awake  Oxygen Supplementation: face mask  Level of Supplemental Oxygen (L/min / FiO2): 6  Independent Airway: airway patency satisfactory and stable  Dentition: dentition unchanged  Vital Signs Stable: post-procedure vital signs reviewed and stable  Report to RN Given: handoff report given  Patient transferred to: Card[facility]          Vitals:  Vitals Value Taken Time   /72    Temp     Pulse 84    Resp 14    SpO2 94        Electronically Signed By: CECILY Randle CRNA  May 22, 2025  11:31 AM

## 2025-05-22 NOTE — DISCHARGE SUMMARY
HEART CARE INPATIENT ENCOUNTER NOTE      Structural Discharge Summary    Primary Care Physician:  Lachelle Summers    Discharge Provider: CAPRI BRIAN PA-C     Admission Date: 5/22/2025. Admission Diagnoses: Paroxysmal atrial fibrillation (H) [I48.0]   Discharge Date: May 22, 2025   Disposition: Home   Condition at Discharge: Stable  Code Status: Full Code     Principal Diagnosis:  Paroxysmal atrial fibrillation    Discharge Diagnoses:  Active Problems:    Essential hypertension    Hx of traumatic brain injury    Type 2 diabetes mellitus with microalbuminuria, without long-term current use of insulin (H)    Sleep apnea    Status post ablation of atrial fibrillation    Mixed hyperlipidemia    Chronic anticoagulation    Paroxysmal atrial fibrillation (H)    Presence of Watchman left atrial appendage closure device      Consult/s: none  Significant Diagnostic Studies:   Procedural RA - Interpretation Summary     Left ventricular size, wall motion and function are normal. The ejection  fraction is 60-65%.  Normal right ventricle size and systolic function.  Watchman device noted in left atrial appendage. The device is well seated with  no leakage by color flow Doppler imaging.  The left atrium is mildly dilated.  There is no pericardial effusion.       Treatments: procedures: LAAO implant (Watchman Pro FLX)     Discharge Medications:   Current Discharge Medication List        START taking these medications    Details   aspirin 81 MG EC tablet Take 1 tablet (81 mg) by mouth daily.    Associated Diagnoses: Presence of Watchman left atrial appendage closure device           CONTINUE these medications which have NOT CHANGED    Details   apixaban ANTICOAGULANT (ELIQUIS) 5 MG tablet Take 1 tablet (5 mg) by mouth 2 times daily.  Qty: 180 tablet, Refills: 3    Associated Diagnoses: Chronic anticoagulation      Dulaglutide (TRULICITY) 3 MG/0.5ML SOAJ INJECT 3MG UNDER THE SKIN ONCE A WEEK  Qty: 6 mL, Refills: 1     Associated Diagnoses: Type 2 diabetes mellitus with microalbuminuria, without long-term current use of insulin (H); Sleep apnea, unspecified type; Essential hypertension; Mixed hyperlipidemia; Morbid obesity (H); Fatty liver      lisinopril (ZESTRIL) 20 MG tablet Take 1 tablet (20 mg) by mouth daily.  Qty: 90 tablet, Refills: 3    Associated Diagnoses: Primary hypertension      metFORMIN (GLUCOPHAGE XR) 500 MG 24 hr tablet TAKE 1 TABLET TWICE A DAY WITH MEALS  Qty: 180 tablet, Refills: 1    Associated Diagnoses: Type 2 diabetes mellitus with microalbuminuria, without long-term current use of insulin (H)      rosuvastatin (CRESTOR) 10 MG tablet TAKE 1 TABLET(10 MG) BY MOUTH AT BEDTIME Strength: 10 mg  Qty: 90 tablet, Refills: 3    Associated Diagnoses: Pure hypercholesterolemia      sotalol (BETAPACE) 80 MG tablet Take 1 tablet (80 mg) by mouth 2 times daily.  Qty: 180 tablet, Refills: 3    Associated Diagnoses: Paroxysmal atrial fibrillation (H); Status post ablation of atrial fibrillation      spironolactone (ALDACTONE) 25 MG tablet TAKE ONE-HALF (1/2) TABLET DAILY  Qty: 45 tablet, Refills: 3    Associated Diagnoses: Essential hypertension      ACCU-CHEK FASTCLIX LANCET DRUM [ACCU-CHEK FASTCLIX LANCET DRUM] USE TO TEST BLOOD SUGARS 2 TO 3 TIMES A DAY  Qty: 300 each, Refills: 4    Associated Diagnoses: Type 2 diabetes mellitus without complication, without long-term current use of insulin (H)      blood glucose test (ACCU-CHEK NATHANIEL PLUS TEST STRP) strips [BLOOD GLUCOSE TEST (ACCU-CHEK NATHANIEL PLUS TEST STRP) STRIPS] Use 1 each As Directed 3 (three) times a day.  Qty: 300 each, Refills: 3    Associated Diagnoses: Type 2 diabetes mellitus without complication, without long-term current use of insulin (H)      tadalafil (CIALIS) 10 MG tablet Take 1 tablet (10 mg) by mouth daily as needed (erectile dsyfunction) 10 mg as a single dose 30 minutes prior to anticipated sexual activity; do not take more than once daily.   Adjust dose based on effectiveness and tolerability; may decrease to 5 mg or increase to 20 mg per dose.  Qty: 30 tablet, Refills: 3    Associated Diagnoses: Other sexual dysfunction not due to a substance or known physiological condition             Discharge Instructions:  Follow up appointment with Swapna Castro PA-C in 45 days (June 26)    Follow up CTA in ~3 months (August 28)    Diet: Regular diet. Increase fluids over the next 2 days.   Activity: Activity as tolerated   Restrictions: No lifting >10 lbs or vigorous exercise for 5 days. No driving for 3 days. May return to work in 5 days  Wound / drain care: OK to shower next day. Keep wound clean and dry. Ok to use Ice packs PRN for discomfort. No baths, hot tubs or swimming pools for 5 days.    Hospital Summary:   Mr. Govind Alexandre is a 59 year old male who underwent successful LAAO implant with a 27 mm Watchman FLX Pro device. The patient was recovered in [facility] [place], on bedrest for 3 hours.  The patient then dangled at the edge of bed and ambulated; he voided without difficulty.  The vascular access site remained stable prior to discharge.  Post procedure the patient denied chest pain/pressure, palpitations, or shortness of breath.      Repeat labs were reviewed and were stable.  Activity restrictions and reportable signs and symptoms were discussed with the patient who verbalizes understanding.  He will continue taking Eliquis and ASA daily for the next 6 weeks.  After 6 weeks, he will come off Eliquis and take DAPT (ASA/Plavix) through November 22. At that time he will be on single antiplatelet therapy indefinitely.     Follow up is arranged as above.   Plan is to continue Sotalol until the 6-week visit - if still in NSR, then can stop       Physical Examination   BP (!) 141/75   Pulse 76   Temp 98.8  F (37.1  C) (Oral)   Resp 21   SpO2 94%   There is no height or weight on file to calculate BMI.  Wt Readings from Last 3 Encounters:   05/19/25 130.8 kg  (288 lb 6.4 oz)   01/07/25 131.5 kg (290 lb)   01/06/25 130.2 kg (287 lb)     No intake or output data in the 24 hours ending 05/22/25 0821  General Appearance:   no distress, normal body habitus   Chest/Lungs:   Normal respiratory effort. Respirations are even and unlabored. Lungs are clear to auscultation, no rales or wheezing. No chest wall tenderness.    Cardiovascular:   Regular. Normal S1, S2 with no murmurs, rubs, or gallops; the carotid, radial, dorsalis pedis and posterior tibial pulses are intact   Abdomen:  obese, soft, non-tender to palpation, non-distended. + bowel sounds.   Extremities: No edema    Skin: Right groin site WNL   Neurologic: Alert and oriented x3, calm and able to move all 4 extremities appropriately            Lab Results    Chemistry/lipid CBC    Creat 5/22/2025 - 1.03 Hgb 5/22/2025 - 11.5

## 2025-05-23 ENCOUNTER — VIRTUAL VISIT (OUTPATIENT)
Dept: CARDIOLOGY | Facility: CLINIC | Age: 60
End: 2025-05-23
Payer: COMMERCIAL

## 2025-05-23 DIAGNOSIS — Z95.818 PRESENCE OF WATCHMAN LEFT ATRIAL APPENDAGE CLOSURE DEVICE: Primary | ICD-10-CM

## 2025-05-23 PROCEDURE — 99207 PR NO CHARGE NURSE ONLY: CPT | Mod: 93

## 2025-05-23 NOTE — PROGRESS NOTES
Phone call to patient for post op 27 mm Watchman FLX Pro device placement with Dr Chaves on 5/22/25.    Pt denies CP/SOB.    No peripheral edema noted, no abdominal bloating or discomfort.     Pt denies any neurological changes at this time.    Pt denies generalized or localized pain at this time.   Patient is having bowel movements and voiding without difficulty.    Patient has removed bandage. Reports everything looks really good - minimal bruising, no swelling or hardness, no drainage, no warmth or tenderness, no pain. Patient was given explicit instructions on what to monitor for once bandage removed and who to call if there are concerns. Patient has both writer's direct number and number for on call Cardiology service.    Pt continues anticoagulation medications: Eliquis 5 mg BID and daily 81 mg Aspirin, per post-LAAC Watchman protocol.    Reviewed post op LAAC healing process, f/u appts, physical restrictions, nutritional requirements, when to contact the heart clinic, and contact information was given to the pt for further concerns or questions.  Pt verbalized understanding.     Ele Gómez RN  Structural Heart Coordinator   [facility]  [phone]    The following information was relayed to the patient over the phone / sent via [employer]:    Brian Faust,     Here is some information regarding aftercare following your Watchman implant, including what to monitor for, when to give us a call, and our contact information at the bottom.     Your anticoagulation medication (Eliquis 5 mg twice daily):     It is important to remain on your anticoagulation medication uninterrupted after your Watchman device implant to reduce your risk of a stroke or heart attack, DO NOT STOP THIS MEDICATION.  You will remain on once daily 81 mg Aspirin for the remainder of your life  You will continue your anticoagulation medication until you see the Structural [facility] in clinic to discuss  further medication changes.     Healing from your left atrial closure device implant:     Stay well hydrated, it is important to increase your fluid intake during your recovery period.  Eat foods high in protein to help the healing process.  Gradually increase your activity over the several days, back to baseline;  No aggressive exercise for 5 days post-procedure.  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  No lifting more that 10 lb for 5 days after procedure.  No driving for 3 days post-procedure.   Keep your incision site clean, dry and open to air.  Ok to use ice packs at groin sites for 20 minutes at a time for groin discomfort.   Please delay any dental procedures until 6 weeks post implant. You will require anti-biotic prophylaxis if dental work or routine cleanings are needed in the six months following your Watchman Device placement.  If you need urgent dental care prior to the 6 week post-procedure restriction, please contact your cardiologist for prophylactic antibiotic.      Please call me if any of the following occur:     Shortness of breath, dizziness, or chest pain.  Changes in your groin sites including:  Swelling, hardening, drainage, increase in bruising or an increase in pain.           Dial 911 for signs/symptoms of a stroke:     New onset visual disturbance.  New problems with talking/speech.  Smile only occurs on half your face.  Numbness on one side of your body or face.  Sudden headache.  New onset of confusion.  New problems with walking or balance.      Important information regarding your future follow up appointments:     45 Day post-implant office visit with our Structural Advance Practice Practitioner ([facility])  3 month post-implant CT to assess your Watchman Device.  You will be contacted after your CT is complete to discuss results within 7-10 days days by a Structural RN Coordinator.  6 month in clinic office visit with our Structural  [facility]  1 year post-implant imaging study to assess your Watchman Device  1 year post-implant office visit with our Structural [facility]  2 year post-implant office visit with our Structural [facility]     Thank you,     Ele Gómez RN  Structural Heart Coordinator -[phone]     After hours please contact the on call service at [phone]

## 2025-05-26 ENCOUNTER — PATIENT OUTREACH (OUTPATIENT)
Dept: CARE COORDINATION | Facility: CLINIC | Age: 60
End: 2025-05-26
Payer: COMMERCIAL

## 2025-06-09 ENCOUNTER — PATIENT OUTREACH (OUTPATIENT)
Dept: CARE COORDINATION | Facility: CLINIC | Age: 60
End: 2025-06-09
Payer: COMMERCIAL

## 2025-06-24 NOTE — PROGRESS NOTES
HEART CARE ENCOUNTER NOTE       [facility]  [phone]      Assessment/Recommendations   1.  Paroxysmal atrial fibrillation: s/p ablation September 2024.  He is s/p LAAO with 27 mm Watchman FLX Pro device on 5/22/2024. He should continue aspirin 81 mg daily indefinitely. He will remain on Eliquis until July 3 at which point he will transition to Plavix 75 mg daily until 6 months post-procedure. He has follow-up imaging scheduled for August 28.  - Will taper sotalol to 40 mg twice daily for 2 weeks and then stop per Dr. Chaves    - He reports will need dental work in the near future.  Instructed patient to avoid dental work for the first 6 weeks following the procedure then he will need prophylactic antibiotics until 6 months postprocedure prior to any dental work.  He was given a prescription for a dose of prophylactic antibiotics.    MODIFIED AMY SCALE   Timepoint: 4-6 wk Post-LAAC    Previous score: 0    Score Description   0 No symptoms at all   1 No significant disability despite symptoms; able to carry out all usual duties and activities   2 Slight disability; unable to carry out all previous activities, but able to look after own affairs without assistance   3 Moderate disability; requiring some help, but able to walk without assistance   4 Moderately severe disability; unable to walk without assistance and unable to attend to own bodily needs without assistance   5 Severe disability; bedridden, incontinent and requiring constant nursing care and attention   6 Dead    Total score (0 - 6):  0    Change in score if s/p LAAC? No     2. Hypertension -BP elevated today, reports is better controlled at home mostly 130s/80-90.  Continue lisinopril 20 mg daily and spironolactone 12.5 mg daily.  He should continue to monitor blood pressure and follow-up with our clinic if persistently >140/80    The longitudinal plan of care for the diagnosis(es)/condition(s) as documented were addressed  during this visit. Due to the added complexity in care, I will continue to support Govind in the subsequent management and with ongoing continuity of care.        History of Present Illness/Subjective    Govind Alexandre is a 59 year old male who comes in today for 6 week follow-up after Watchman implant    Govind Alexandre has a past history of paroxysmal AF HTN, HLD, KORINA, DM2, h/o TBI and SAH 2016, obesity     He is currently taking Eliquis and aspirin.  He denies significant bleeding issues.  He denies stroke or strokelike symptoms since watchman implant.  He is monitoring his blood pressure at home almost once daily and it typically runs 130s/80-90.  He reports that he will have some upcoming dental work for a crown.  He denies chest discomfort, shortness of breath, leg swelling, dizziness, lightheadedness, presyncope, syncope.    Medical, surgical, family, social history, and medications were reviewed and updated as necessary.    ECHO results (from 5/22/2025):  Interpretation Summary     Left ventricular size, wall motion and function are normal. The ejection  fraction is 60-65%.  Normal right ventricle size and systolic function.  Watchman device noted in left atrial appendage. The device is well seated with  no leakage by color flow Doppler imaging.  The left atrium is mildly dilated.  There is no pericardial effusion.       Physical Examination Review of Systems   Vitals: BP (!) 150/100 (BP Location: Right arm, Patient Position: Sitting, Cuff Size: Adult Large)   Pulse 64   Resp 16   Wt 132 kg (291 lb)   BMI 39.47 kg/m    BMI= Body mass index is 39.47 kg/m .  Wt Readings from Last 3 Encounters:   06/26/25 132 kg (291 lb)   05/19/25 130.8 kg (288 lb 6.4 oz)   01/07/25 131.5 kg (290 lb)       General Appearance:   Alert, cooperative and in no acute distress   ENT/Mouth: membranes moist, no oral lesions or bleeding gums.      EYES:  no scleral icterus, normal conjunctivae   Neck: Thyroid not visualized    Chest/Lungs:   lungs are clear to auscultation, no rales or wheezing   Cardiovascular:   Regular. Normal first and second heart sounds with no murmurs, rubs or gallops;  no edema bilaterally    Abdomen:  Soft and nontender   Extremities: no cyanosis or clubbing   Skin: no xanthelasma, warm.    Neurologic: normal gait, no tremors   Psychiatric: Normal mood and affect       Please refer above for cardiac ROS details.      Medical History  Surgical History Family History Social History   Past Medical History:   Diagnosis Date    Depression 12/24/2014    Essential hypertension     Infection due to 2019 novel coronavirus 01/11/2022    Multiple thyroid nodules 09/08/2016    Type 2 diabetes mellitus without complication (H) 11/07/2016     Past Surgical History:   Procedure Laterality Date    BIOPSY  9/18/16    CARPAL TUNNEL RELEASE RT/LT Right     COLONOSCOPY  6/15/18    COMBINED CYSTOSCOPY, RETROGRADES, URETEROSCOPY, LASER HOLMIUM LITHOTRIPSY URETER(S), INSERT STENT Bilateral 7/26/2023    Procedure: cystoscopy, bilateral ureteroscopy with holmium laser lithotripsy, bilateral ureteroscopy with stone basketing, bilateral retrograde pyelogram, bilateral ureteral stent placement;  Surgeon: Mark Orta MD;  Location:  OR     LEFT ATRIAL APPENDAGE CLOSURE N/A 5/22/2025    Procedure: Left Atrial Appendage Closure - WM;  Surgeon: Kenny Chaves MD;  Location: [facility]    EP ABLATION PULMONARY VEIN ISOLATION N/A 9/5/2024    Procedure: Ablation Atrial Fibrillation;  Surgeon: Kenny Chaves MD;  Location: [facility]    HC REPAIR ROTATOR CUFF,ACUTE      Description: Rotator Cuff Repair Acute;  Recorded: 01/13/2010;  Comments: cortisone    PICC  11/30/2015         REMOVAL OF SPERM DUCT(S)      Description: Surgery Of Male Genitalia Vasectomy;  Recorded: 01/13/2010;    WISDOM TOOTH EXTRACTION       Family History   Problem Relation Age of Onset    Heart Disease Mother     Kidney Disease  Mother     Thyroid Cancer Father     Kidney Disease Father     Heart Disease Father     Hypertension Father     Thyroid Disease Father         cancer    Cancer Father         myoleodysplansic syndrome    No Known Problems Sister     Hypertension Sister     Hypertension Brother     Heart Disease Paternal Grandmother     Depression Daughter     Anxiety Disorder Daughter     Autism Spectrum Disorder Son     Social History     Socioeconomic History    Marital status:      Spouse name: Not on file    Number of children: Not on file    Years of education: Not on file    Highest education level: Not on file   Occupational History    Not on file   Tobacco Use    Smoking status: Former     Types: Dip, chew, snus or snuff     Passive exposure: Never    Smokeless tobacco: Former     Types: Chew     Quit date: 12/24/2004   Vaping Use    Vaping status: Never Used   Substance and Sexual Activity    Alcohol use: Yes     Comment: Alcoholic Drinks/day: 1 every 2 weeks    Drug use: No    Sexual activity: Yes     Partners: Female     Birth control/protection: None   Other Topics Concern    Not on file   Social History Narrative    Not on file     Social Drivers of Health     Financial Resource Strain: Low Risk  (6/24/2024)    Financial Resource Strain     Within the past 12 months, have you or your family members you live with been unable to get utilities (heat, electricity) when it was really needed?: No   Food Insecurity: Low Risk  (6/24/2024)    Food Insecurity     Within the past 12 months, did you worry that your food would run out before you got money to buy more?: No     Within the past 12 months, did the food you bought just not last and you didn t have money to get more?: No   Transportation Needs: Low Risk  (6/24/2024)    Transportation Needs     Within the past 12 months, has lack of transportation kept you from medical appointments, getting your medicines, non-medical meetings or appointments, work, or from getting  things that you need?: No   Physical Activity: Insufficiently Active (6/24/2024)    Exercise Vital Sign     Days of Exercise per Week: 3 days     Minutes of Exercise per Session: 20 min   Stress: Stress Concern Present (6/24/2024)    [employer] - Occupational Stress Questionnaire     Feeling of Stress : Rather much   Social Connections: Unknown (6/24/2024)    Social Connection and Isolation Panel [NHANES]     Frequency of Communication with Friends and Family: Not on file     Frequency of Social Gatherings with Friends and Family: Twice a week     Attends [employer] Services: Not on file     Active Member of Clubs or Organizations: Not on file     Attends Club or Organization Meetings: Not on file     Marital Status: Not on file   Interpersonal Safety: Low Risk  (5/22/2025)    Interpersonal Safety     Do you feel physically and emotionally safe where you currently live?: Yes     Within the past 12 months, have you been hit, slapped, kicked or otherwise physically hurt by someone?: No     Within the past 12 months, have you been humiliated or emotionally abused in other ways by your partner or ex-partner?: No   Housing Stability: Low Risk  (6/24/2024)    Housing Stability     Do you have housing? : Yes     Are you worried about losing your housing?: No          Medications  Allergies   Current Outpatient Medications   Medication Sig Dispense Refill    ACCU-CHEK FASTCLIX LANCET DRUM [ACCU-CHEK FASTCLIX LANCET DRUM] USE TO TEST BLOOD SUGARS 2 TO 3 TIMES A  each 4    amoxicillin (AMOXIL) 500 MG capsule Take 4 capsules (2,000 mg) by mouth once as needed (Take 2 g (4 capsules) 30 to 60 minutes before dental procedure). 4 capsule 0    apixaban ANTICOAGULANT (ELIQUIS) 5 MG tablet Take 1 tablet (5 mg) by mouth 2 times daily. 180 tablet 3    aspirin 81 MG EC tablet Take 1 tablet (81 mg) by mouth daily.      blood glucose test (ACCU-CHEK NATHANIEL PLUS TEST STRP) strips [BLOOD GLUCOSE TEST  (ACCU-CHEK NATHANIEL PLUS TEST STRP) STRIPS] Use 1 each As Directed 3 (three) times a day. 300 each 3    [START ON 7/4/2025] clopidogrel (PLAVIX) 75 MG tablet Take 1 tablet (75 mg) by mouth daily. 72 tablet 1    Dulaglutide (TRULICITY) 3 MG/0.5ML SOAJ INJECT 3MG UNDER THE SKIN ONCE A WEEK 6 mL 1    lisinopril (ZESTRIL) 20 MG tablet Take 1 tablet (20 mg) by mouth daily. 90 tablet 3    rosuvastatin (CRESTOR) 10 MG tablet TAKE 1 TABLET(10 MG) BY MOUTH AT BEDTIME Strength: 10 mg 90 tablet 3    sotalol (BETAPACE) 80 MG tablet Take 0.5 tablets (40 mg) by mouth 2 times daily.      spironolactone (ALDACTONE) 25 MG tablet TAKE ONE-HALF (1/2) TABLET DAILY 45 tablet 3    tadalafil (CIALIS) 10 MG tablet Take 1 tablet (10 mg) by mouth daily as needed (erectile dsyfunction) 10 mg as a single dose 30 minutes prior to anticipated sexual activity; do not take more than once daily.  Adjust dose based on effectiveness and tolerability; may decrease to 5 mg or increase to 20 mg per dose. 30 tablet 3    metFORMIN (GLUCOPHAGE XR) 500 MG 24 hr tablet TAKE 1 TABLET TWICE A DAY WITH MEALS 180 tablet 0    No Known Allergies      Lab Results    Chemistry/lipid CBC Cardiac Enzymes/BNP/TSH/INR   Recent Labs   Lab Test 06/25/24  1149   CHOL 114   HDL 36*   LDL 54   TRIG 119     Recent Labs   Lab Test 06/25/24  1149 08/18/23  0857 11/29/22  0800   LDL 54 52 41     Recent Labs   Lab Test 05/22/25  1141 05/22/25  0733 05/19/25  1205   NA  --   --  144   POTASSIUM  --   --  4.5   CHLORIDE  --   --  109*   CO2  --   --  28   GLC  --  127* 135*   BUN  --   --  15.2   CR 1.03  --  0.92   GFRESTIMATED 83  --  >90   LEV  --   --  9.0     Recent Labs   Lab Test 05/22/25  1141 05/19/25  1205 08/29/24  0920   CR 1.03 0.92 1.04     Recent Labs   Lab Test 06/25/24  1149 12/22/23  0901 08/18/23  0857   A1C 5.0 4.2 5.3    Recent Labs   Lab Test 05/22/25  1141 05/19/25  1205   WBC  --  7.0   HGB 11.5* 12.3*   HCT  --  36.9*   MCV 89 89   PLT  --  125*     Recent  "Labs   Lab Test 05/22/25  1141 05/19/25  1205 08/29/24  0920   HGB 11.5* 12.3* 13.2*    Recent Labs   Lab Test 07/30/23  0549 07/29/23 2014   TROPONINI 0.08 0.01     Recent Labs   Lab Test 07/30/23  0549   *     Recent Labs   Lab Test 06/25/24  1149   TSH 0.56     No results for input(s): \"INR\" in the last 53224 hours.     28 minutes spent on the date of encounter doing education, chart prep/review, review of test results, interpretation with above tests, patient visit, and documentation.      This note has been dictated using voice recognition software. Any grammatical or context distortions are unintentional and inherent to the software.    Swapna Castro PA-C  Structural Heart Program  [facility]   "

## 2025-06-26 ENCOUNTER — OFFICE VISIT (OUTPATIENT)
Dept: CARDIOLOGY | Facility: CLINIC | Age: 60
End: 2025-06-26
Payer: COMMERCIAL

## 2025-06-26 VITALS
BODY MASS INDEX: 39.47 KG/M2 | SYSTOLIC BLOOD PRESSURE: 160 MMHG | DIASTOLIC BLOOD PRESSURE: 88 MMHG | HEART RATE: 64 BPM | WEIGHT: 291 LBS | RESPIRATION RATE: 16 BRPM

## 2025-06-26 DIAGNOSIS — E11.29 TYPE 2 DIABETES MELLITUS WITH MICROALBUMINURIA, WITHOUT LONG-TERM CURRENT USE OF INSULIN (H): ICD-10-CM

## 2025-06-26 DIAGNOSIS — Z86.79 STATUS POST ABLATION OF ATRIAL FIBRILLATION: ICD-10-CM

## 2025-06-26 DIAGNOSIS — Z98.890 STATUS POST ABLATION OF ATRIAL FIBRILLATION: ICD-10-CM

## 2025-06-26 DIAGNOSIS — I48.0 PAROXYSMAL ATRIAL FIBRILLATION (H): ICD-10-CM

## 2025-06-26 DIAGNOSIS — R80.9 TYPE 2 DIABETES MELLITUS WITH MICROALBUMINURIA, WITHOUT LONG-TERM CURRENT USE OF INSULIN (H): ICD-10-CM

## 2025-06-26 DIAGNOSIS — Z95.818 PRESENCE OF WATCHMAN LEFT ATRIAL APPENDAGE CLOSURE DEVICE: Primary | ICD-10-CM

## 2025-06-26 DIAGNOSIS — Z79.01 CHRONIC ANTICOAGULATION: Chronic | ICD-10-CM

## 2025-06-26 RX ORDER — CLOPIDOGREL BISULFATE 75 MG/1
75 TABLET ORAL DAILY
Qty: 72 TABLET | Refills: 1 | Status: SHIPPED | OUTPATIENT
Start: 2025-07-04 | End: 2025-11-22

## 2025-06-26 RX ORDER — AMOXICILLIN 500 MG/1
2000 CAPSULE ORAL
Qty: 4 CAPSULE | Refills: 0 | Status: SHIPPED | OUTPATIENT
Start: 2025-06-26

## 2025-06-26 RX ORDER — SOTALOL HYDROCHLORIDE 80 MG/1
40 TABLET ORAL 2 TIMES DAILY
Status: SHIPPED
Start: 2025-06-26 | End: 2026-07-09

## 2025-06-26 RX ORDER — METFORMIN HYDROCHLORIDE 500 MG/1
500 TABLET, EXTENDED RELEASE ORAL 2 TIMES DAILY WITH MEALS
Qty: 180 TABLET | Refills: 0 | Status: SHIPPED | OUTPATIENT
Start: 2025-06-26

## 2025-06-26 NOTE — PATIENT INSTRUCTIONS
Govind Alexandre,    It was a pleasure to see you today in the clinic regarding your Watchman follow-up.     My recommendations after this visit include:     - continue taking aspirin 81 mg indefinitely   - take your last dose of Eliquis evening of July 3,  then the following day start taking Plavix 75 mg daily until November 22   - reduce your sotalol to 40 mg twice daily (half tab) until July 9 - then stop this medication   - avoid dental work the first 6 weeks, after 6 weeks you will need antibiotics prior to all dental work - take amoxicillin 2 g (4 capsules) 30-60 minutes prior to dental work.   - continue to monitor your BP at home and follow-up if persistently >140/80        If you have questions or concerns, please call using the numbers below:      After Hours/Scheduling  [phone]    Otherwise you can dial the nurse directly at:    Ele Gómez RN  [phone]    Swapna aCstro PA-C  Structural Heart Program  [facility]

## 2025-06-26 NOTE — TELEPHONE ENCOUNTER
COVERING FOR TREJO  Refilled 1 time only, please call patient to schedule for Preventive with PCP.

## 2025-06-26 NOTE — LETTER
6/26/2025    Lachelle Summers, APRN CNP  [address] [facility] [address]    RE: Govind Alexandre       Dear Colleague,     I had the pleasure of seeing Govind Alexandre in the [facility].  HEART CARE ENCOUNTER NOTE       NATY [facility]  [phone]      Assessment/Recommendations   1.  Paroxysmal atrial fibrillation: s/p ablation September 2024.  He is s/p LAAO with 27 mm Watchman FLX Pro device on 5/22/2024. He should continue aspirin 81 mg daily indefinitely. He will remain on Eliquis until July 3 at which point he will transition to Plavix 75 mg daily until 6 months post-procedure. He has follow-up imaging scheduled for August 28.  - Will taper sotalol to 40 mg twice daily for 2 weeks and then stop per Dr. Chaves    - He reports will need dental work in the near future.  Instructed patient to avoid dental work for the first 6 weeks following the procedure then he will need prophylactic antibiotics until 6 months postprocedure prior to any dental work.  He was given a prescription for a dose of prophylactic antibiotics.    MODIFIED AMY SCALE   Timepoint: 4-6 wk Post-LAAC    Previous score: 0    Score Description   0 No symptoms at all   1 No significant disability despite symptoms; able to carry out all usual duties and activities   2 Slight disability; unable to carry out all previous activities, but able to look after own affairs without assistance   3 Moderate disability; requiring some help, but able to walk without assistance   4 Moderately severe disability; unable to walk without assistance and unable to attend to own bodily needs without assistance   5 Severe disability; bedridden, incontinent and requiring constant nursing care and attention   6 Dead    Total score (0 - 6):  0    Change in score if s/p LAAC? No     2. Hypertension -BP elevated today, reports is better controlled at home mostly 130s/80-90.  Continue lisinopril 20 mg daily and spironolactone 12.5  mg daily.  He should continue to monitor blood pressure and follow-up with our clinic if persistently >140/80    The longitudinal plan of care for the diagnosis(es)/condition(s) as documented were addressed during this visit. Due to the added complexity in care, I will continue to support Govind in the subsequent management and with ongoing continuity of care.        History of Present Illness/Subjective    Govind Alexandre is a 59 year old male who comes in today for 6 week follow-up after Watchman implant    Govind Alexandre has a past history of paroxysmal AF HTN, HLD, KORINA, DM2, h/o TBI and SAH 2016, obesity     He is currently taking Eliquis and aspirin.  He denies significant bleeding issues.  He denies stroke or strokelike symptoms since watchman implant.  He is monitoring his blood pressure at home almost once daily and it typically runs 130s/80-90.  He reports that he will have some upcoming dental work for a crown.  He denies chest discomfort, shortness of breath, leg swelling, dizziness, lightheadedness, presyncope, syncope.    Medical, surgical, family, social history, and medications were reviewed and updated as necessary.    ECHO results (from 5/22/2025):  Interpretation Summary     Left ventricular size, wall motion and function are normal. The ejection  fraction is 60-65%.  Normal right ventricle size and systolic function.  Watchman device noted in left atrial appendage. The device is well seated with  no leakage by color flow Doppler imaging.  The left atrium is mildly dilated.  There is no pericardial effusion.       Physical Examination Review of Systems   Vitals: BP (!) 150/100 (BP Location: Right arm, Patient Position: Sitting, Cuff Size: Adult Large)   Pulse 64   Resp 16   Wt 132 kg (291 lb)   BMI 39.47 kg/m    BMI= Body mass index is 39.47 kg/m .  Wt Readings from Last 3 Encounters:   06/26/25 132 kg (291 lb)   05/19/25 130.8 kg (288 lb 6.4 oz)   01/07/25 131.5 kg (290 lb)       General  Appearance:   Alert, cooperative and in no acute distress   ENT/Mouth: membranes moist, no oral lesions or bleeding gums.      EYES:  no scleral icterus, normal conjunctivae   Neck: Thyroid not visualized   Chest/Lungs:   lungs are clear to auscultation, no rales or wheezing   Cardiovascular:   Regular. Normal first and second heart sounds with no murmurs, rubs or gallops;  no edema bilaterally    Abdomen:  Soft and nontender   Extremities: no cyanosis or clubbing   Skin: no xanthelasma, warm.    Neurologic: normal gait, no tremors   Psychiatric: Normal mood and affect       Please refer above for cardiac ROS details.      Medical History  Surgical History Family History Social History   Past Medical History:   Diagnosis Date     Depression 12/24/2014     Essential hypertension      Infection due to 2019 novel coronavirus 01/11/2022     Multiple thyroid nodules 09/08/2016     Type 2 diabetes mellitus without complication (H) 11/07/2016     Past Surgical History:   Procedure Laterality Date     BIOPSY  9/18/16     CARPAL TUNNEL RELEASE RT/LT Right      COLONOSCOPY  6/15/18     COMBINED CYSTOSCOPY, RETROGRADES, URETEROSCOPY, LASER HOLMIUM LITHOTRIPSY URETER(S), INSERT STENT Bilateral 7/26/2023    Procedure: cystoscopy, bilateral ureteroscopy with holmium laser lithotripsy, bilateral ureteroscopy with stone basketing, bilateral retrograde pyelogram, bilateral ureteral stent placement;  Surgeon: Mark Orta MD;  Location:  OR      LEFT ATRIAL APPENDAGE CLOSURE N/A 5/22/2025    Procedure: Left Atrial Appendage Closure - WM;  Surgeon: Kenny Chaves MD;  Location: [facility]     EP ABLATION PULMONARY VEIN ISOLATION N/A 9/5/2024    Procedure: Ablation Atrial Fibrillation;  Surgeon: Kenny Chaves MD;  Location: [facility]     HC REPAIR ROTATOR CUFF,ACUTE      Description: Rotator Cuff Repair Acute;  Recorded: 01/13/2010;  Comments: cortisone     PICC  11/30/2015          REMOVAL OF  SPERM DUCT(S)      Description: Surgery Of Male Genitalia Vasectomy;  Recorded: 01/13/2010;     WISDOM TOOTH EXTRACTION       Family History   Problem Relation Age of Onset     Heart Disease Mother      Kidney Disease Mother      Thyroid Cancer Father      Kidney Disease Father      Heart Disease Father      Hypertension Father      Thyroid Disease Father         cancer     Cancer Father         myoleodysplansic syndrome     No Known Problems Sister      Hypertension Sister      Hypertension Brother      Heart Disease Paternal Grandmother      Depression Daughter      Anxiety Disorder Daughter      Autism Spectrum Disorder Son     Social History     Socioeconomic History     Marital status:      Spouse name: Not on file     Number of children: Not on file     Years of education: Not on file     Highest education level: Not on file   Occupational History     Not on file   Tobacco Use     Smoking status: Former     Types: Dip, chew, snus or snuff     Passive exposure: Never     Smokeless tobacco: Former     Types: Chew     Quit date: 12/24/2004   Vaping Use     Vaping status: Never Used   Substance and Sexual Activity     Alcohol use: Yes     Comment: Alcoholic Drinks/day: 1 every 2 weeks     Drug use: No     Sexual activity: Yes     Partners: Female     Birth control/protection: None   Other Topics Concern     Not on file   Social History Narrative     Not on file     Social Drivers of Health     Financial Resource Strain: Low Risk  (6/24/2024)    Financial Resource Strain      Within the past 12 months, have you or your family members you live with been unable to get utilities (heat, electricity) when it was really needed?: No   Food Insecurity: Low Risk  (6/24/2024)    Food Insecurity      Within the past 12 months, did you worry that your food would run out before you got money to buy more?: No      Within the past 12 months, did the food you bought just not last and you didn t have money to get more?: No    Transportation Needs: Low Risk  (6/24/2024)    Transportation Needs      Within the past 12 months, has lack of transportation kept you from medical appointments, getting your medicines, non-medical meetings or appointments, work, or from getting things that you need?: No   Physical Activity: Insufficiently Active (6/24/2024)    Exercise Vital Sign      Days of Exercise per Week: 3 days      Minutes of Exercise per Session: 20 min   Stress: Stress Concern Present (6/24/2024)    [employer] [place] [employer] - Occupational Stress Questionnaire      Feeling of Stress : Rather much   Social Connections: Unknown (6/24/2024)    Social Connection and Isolation Panel [NHANES]      Frequency of Communication with Friends and Family: Not on file      Frequency of Social Gatherings with Friends and Family: Twice a week      Attends Latter day Services: Not on file      Active Member of Clubs or Organizations: Not on file      Attends Club or Organization Meetings: Not on file      Marital Status: Not on file   Interpersonal Safety: Low Risk  (5/22/2025)    Interpersonal Safety      Do you feel physically and emotionally safe where you currently live?: Yes      Within the past 12 months, have you been hit, slapped, kicked or otherwise physically hurt by someone?: No      Within the past 12 months, have you been humiliated or emotionally abused in other ways by your partner or ex-partner?: No   Housing Stability: Low Risk  (6/24/2024)    Housing Stability      Do you have housing? : Yes      Are you worried about losing your housing?: No          Medications  Allergies   Current Outpatient Medications   Medication Sig Dispense Refill     ACCU-CHEK FASTCLIX LANCET DRUM [ACCU-CHEK FASTCLIX LANCET DRUM] USE TO TEST BLOOD SUGARS 2 TO 3 TIMES A  each 4     amoxicillin (AMOXIL) 500 MG capsule Take 4 capsules (2,000 mg) by mouth once as needed (Take 2 g (4 capsules) 30 to 60 minutes before dental procedure). 4  capsule 0     apixaban ANTICOAGULANT (ELIQUIS) 5 MG tablet Take 1 tablet (5 mg) by mouth 2 times daily. 180 tablet 3     aspirin 81 MG EC tablet Take 1 tablet (81 mg) by mouth daily.       blood glucose test (ACCU-CHEK NATHANIEL PLUS TEST STRP) strips [BLOOD GLUCOSE TEST (ACCU-CHEK NATHANIEL PLUS TEST STRP) STRIPS] Use 1 each As Directed 3 (three) times a day. 300 each 3     [START ON 7/4/2025] clopidogrel (PLAVIX) 75 MG tablet Take 1 tablet (75 mg) by mouth daily. 72 tablet 1     Dulaglutide (TRULICITY) 3 MG/0.5ML SOAJ INJECT 3MG UNDER THE SKIN ONCE A WEEK 6 mL 1     lisinopril (ZESTRIL) 20 MG tablet Take 1 tablet (20 mg) by mouth daily. 90 tablet 3     rosuvastatin (CRESTOR) 10 MG tablet TAKE 1 TABLET(10 MG) BY MOUTH AT BEDTIME Strength: 10 mg 90 tablet 3     sotalol (BETAPACE) 80 MG tablet Take 0.5 tablets (40 mg) by mouth 2 times daily.       spironolactone (ALDACTONE) 25 MG tablet TAKE ONE-HALF (1/2) TABLET DAILY 45 tablet 3     tadalafil (CIALIS) 10 MG tablet Take 1 tablet (10 mg) by mouth daily as needed (erectile dsyfunction) 10 mg as a single dose 30 minutes prior to anticipated sexual activity; do not take more than once daily.  Adjust dose based on effectiveness and tolerability; may decrease to 5 mg or increase to 20 mg per dose. 30 tablet 3     metFORMIN (GLUCOPHAGE XR) 500 MG 24 hr tablet TAKE 1 TABLET TWICE A DAY WITH MEALS 180 tablet 0    No Known Allergies      Lab Results    Chemistry/lipid CBC Cardiac Enzymes/BNP/TSH/INR   Recent Labs   Lab Test 06/25/24  1149   CHOL 114   HDL 36*   LDL 54   TRIG 119     Recent Labs   Lab Test 06/25/24  1149 08/18/23  0857 11/29/22  0800   LDL 54 52 41     Recent Labs   Lab Test 05/22/25  1141 05/22/25  0733 05/19/25  1205   NA  --   --  144   POTASSIUM  --   --  4.5   CHLORIDE  --   --  109*   CO2  --   --  28   GLC  --  127* 135*   BUN  --   --  15.2   CR 1.03  --  0.92   GFRESTIMATED 83  --  >90   LEV  --   --  9.0     Recent Labs   Lab Test 05/22/25  3368  "05/19/25  1205 08/29/24  0920   CR 1.03 0.92 1.04     Recent Labs   Lab Test 06/25/24  1149 12/22/23  0901 08/18/23  0857   A1C 5.0 4.2 5.3    Recent Labs   Lab Test 05/22/25  1141 05/19/25  1205   WBC  --  7.0   HGB 11.5* 12.3*   HCT  --  36.9*   MCV 89 89   PLT  --  125*     Recent Labs   Lab Test 05/22/25  1141 05/19/25  1205 08/29/24  0920   HGB 11.5* 12.3* 13.2*    Recent Labs   Lab Test 07/30/23  0549 07/29/23  2014   TROPONINI 0.08 0.01     Recent Labs   Lab Test 07/30/23  0549   *     Recent Labs   Lab Test 06/25/24  1149   TSH 0.56     No results for input(s): \"INR\" in the last 19488 hours.     28 minutes spent on the date of encounter doing education, chart prep/review, review of test results, interpretation with above tests, patient visit, and documentation.      This note has been dictated using voice recognition software. Any grammatical or context distortions are unintentional and inherent to the software.    Swapna Castro PA-C  Structural Heart Program  [facility]     Thank you for allowing me to participate in the care of your patient.      Sincerely,     Swapna Castro PA-C     [facility]  cc:   No referring provider defined for this encounter.      "

## 2025-06-26 NOTE — TELEPHONE ENCOUNTER
Patient Quality Outreach    Patient is due for the following:   Physical Preventive Adult Physical    Action(s) Taken:   If patient calls back, schedule a Adult Preventative    Type of outreach:    Phone, left message for patient/parent to call back.    Questions for provider review:    None         Janice Gonzalez  Chart routed to None.

## 2025-07-06 ENCOUNTER — HEALTH MAINTENANCE LETTER (OUTPATIENT)
Age: 60
End: 2025-07-06

## 2025-07-29 ENCOUNTER — OFFICE VISIT (OUTPATIENT)
Dept: FAMILY MEDICINE | Facility: CLINIC | Age: 60
End: 2025-07-29
Payer: COMMERCIAL

## 2025-07-29 VITALS
DIASTOLIC BLOOD PRESSURE: 86 MMHG | TEMPERATURE: 97.5 F | WEIGHT: 289 LBS | HEIGHT: 71 IN | RESPIRATION RATE: 18 BRPM | SYSTOLIC BLOOD PRESSURE: 138 MMHG | HEART RATE: 76 BPM | BODY MASS INDEX: 40.46 KG/M2 | OXYGEN SATURATION: 96 %

## 2025-07-29 DIAGNOSIS — F33.1 MODERATE EPISODE OF RECURRENT MAJOR DEPRESSIVE DISORDER (H): ICD-10-CM

## 2025-07-29 DIAGNOSIS — F41.9 ANXIETY: ICD-10-CM

## 2025-07-29 DIAGNOSIS — D64.9 ANEMIA, UNSPECIFIED TYPE: ICD-10-CM

## 2025-07-29 DIAGNOSIS — Z86.79 HISTORY OF ATRIAL FIBRILLATION: ICD-10-CM

## 2025-07-29 DIAGNOSIS — Z12.5 SCREENING FOR PROSTATE CANCER: ICD-10-CM

## 2025-07-29 DIAGNOSIS — K76.0 FATTY LIVER: ICD-10-CM

## 2025-07-29 DIAGNOSIS — D69.6 THROMBOCYTOPENIA: ICD-10-CM

## 2025-07-29 DIAGNOSIS — E78.2 MIXED HYPERLIPIDEMIA: ICD-10-CM

## 2025-07-29 DIAGNOSIS — I10 ESSENTIAL HYPERTENSION: ICD-10-CM

## 2025-07-29 DIAGNOSIS — Z12.11 SCREEN FOR COLON CANCER: ICD-10-CM

## 2025-07-29 DIAGNOSIS — E11.29 TYPE 2 DIABETES MELLITUS WITH MICROALBUMINURIA, WITHOUT LONG-TERM CURRENT USE OF INSULIN (H): Primary | ICD-10-CM

## 2025-07-29 DIAGNOSIS — R16.1 SPLENOMEGALY: ICD-10-CM

## 2025-07-29 DIAGNOSIS — R80.9 TYPE 2 DIABETES MELLITUS WITH MICROALBUMINURIA, WITHOUT LONG-TERM CURRENT USE OF INSULIN (H): Primary | ICD-10-CM

## 2025-07-29 DIAGNOSIS — G47.30 SLEEP APNEA, UNSPECIFIED TYPE: ICD-10-CM

## 2025-07-29 DIAGNOSIS — Z79.01 CHRONIC ANTICOAGULATION: ICD-10-CM

## 2025-07-29 DIAGNOSIS — E78.00 PURE HYPERCHOLESTEROLEMIA: ICD-10-CM

## 2025-07-29 LAB
BASOPHILS # BLD AUTO: 0 10E3/UL (ref 0–0.2)
BASOPHILS NFR BLD AUTO: 0 %
EOSINOPHIL # BLD AUTO: 0.3 10E3/UL (ref 0–0.7)
EOSINOPHIL NFR BLD AUTO: 4 %
ERYTHROCYTE [DISTWIDTH] IN BLOOD BY AUTOMATED COUNT: 15.2 % (ref 10–15)
EST. AVERAGE GLUCOSE BLD GHB EST-MCNC: 120 MG/DL
HBA1C MFR BLD: 5.8 % (ref 0–5.6)
HCT VFR BLD AUTO: 37.4 % (ref 40–53)
HGB BLD-MCNC: 12.9 G/DL (ref 13.3–17.7)
IMM GRANULOCYTES # BLD: 0 10E3/UL
IMM GRANULOCYTES NFR BLD: 0 %
LYMPHOCYTES # BLD AUTO: 1.3 10E3/UL (ref 0.8–5.3)
LYMPHOCYTES NFR BLD AUTO: 18 %
MCH RBC QN AUTO: 30.6 PG (ref 26.5–33)
MCHC RBC AUTO-ENTMCNC: 34.5 G/DL (ref 31.5–36.5)
MCV RBC AUTO: 89 FL (ref 78–100)
MONOCYTES # BLD AUTO: 0.5 10E3/UL (ref 0–1.3)
MONOCYTES NFR BLD AUTO: 7 %
NEUTROPHILS # BLD AUTO: 5.1 10E3/UL (ref 1.6–8.3)
NEUTROPHILS NFR BLD AUTO: 71 %
PLATELET # BLD AUTO: 115 10E3/UL (ref 150–450)
RBC # BLD AUTO: 4.22 10E6/UL (ref 4.4–5.9)
WBC # BLD AUTO: 7.2 10E3/UL (ref 4–11)

## 2025-07-29 PROCEDURE — 99214 OFFICE O/P EST MOD 30 MIN: CPT | Mod: 25 | Performed by: NURSE PRACTITIONER

## 2025-07-29 PROCEDURE — 82728 ASSAY OF FERRITIN: CPT | Performed by: NURSE PRACTITIONER

## 2025-07-29 PROCEDURE — 3048F LDL-C <100 MG/DL: CPT | Performed by: NURSE PRACTITIONER

## 2025-07-29 PROCEDURE — 85025 COMPLETE CBC W/AUTO DIFF WBC: CPT | Performed by: NURSE PRACTITIONER

## 2025-07-29 PROCEDURE — 99396 PREV VISIT EST AGE 40-64: CPT | Performed by: NURSE PRACTITIONER

## 2025-07-29 PROCEDURE — 83540 ASSAY OF IRON: CPT | Performed by: NURSE PRACTITIONER

## 2025-07-29 PROCEDURE — 80061 LIPID PANEL: CPT | Performed by: NURSE PRACTITIONER

## 2025-07-29 PROCEDURE — 36415 COLL VENOUS BLD VENIPUNCTURE: CPT | Performed by: NURSE PRACTITIONER

## 2025-07-29 PROCEDURE — 80053 COMPREHEN METABOLIC PANEL: CPT | Performed by: NURSE PRACTITIONER

## 2025-07-29 PROCEDURE — 83550 IRON BINDING TEST: CPT | Performed by: NURSE PRACTITIONER

## 2025-07-29 PROCEDURE — 82043 UR ALBUMIN QUANTITATIVE: CPT | Performed by: NURSE PRACTITIONER

## 2025-07-29 PROCEDURE — 3044F HG A1C LEVEL LT 7.0%: CPT | Performed by: NURSE PRACTITIONER

## 2025-07-29 PROCEDURE — 3075F SYST BP GE 130 - 139MM HG: CPT | Performed by: NURSE PRACTITIONER

## 2025-07-29 PROCEDURE — 3079F DIAST BP 80-89 MM HG: CPT | Performed by: NURSE PRACTITIONER

## 2025-07-29 PROCEDURE — G0103 PSA SCREENING: HCPCS | Performed by: NURSE PRACTITIONER

## 2025-07-29 PROCEDURE — 83036 HEMOGLOBIN GLYCOSYLATED A1C: CPT | Performed by: NURSE PRACTITIONER

## 2025-07-29 PROCEDURE — 82570 ASSAY OF URINE CREATININE: CPT | Performed by: NURSE PRACTITIONER

## 2025-07-29 RX ORDER — ROSUVASTATIN CALCIUM 10 MG/1
TABLET, COATED ORAL
Qty: 90 TABLET | Refills: 3 | Status: SHIPPED | OUTPATIENT
Start: 2025-07-29

## 2025-07-29 SDOH — HEALTH STABILITY: PHYSICAL HEALTH: ON AVERAGE, HOW MANY DAYS PER WEEK DO YOU ENGAGE IN MODERATE TO STRENUOUS EXERCISE (LIKE A BRISK WALK)?: 3 DAYS

## 2025-07-29 SDOH — HEALTH STABILITY: PHYSICAL HEALTH: ON AVERAGE, HOW MANY MINUTES DO YOU ENGAGE IN EXERCISE AT THIS LEVEL?: 30 MIN

## 2025-07-29 ASSESSMENT — SOCIAL DETERMINANTS OF HEALTH (SDOH): HOW OFTEN DO YOU GET TOGETHER WITH FRIENDS OR RELATIVES?: TWICE A WEEK

## 2025-07-29 NOTE — PROGRESS NOTES
Preventive Care Visit  [facility] CECILY Gonzalez CNP, Family Medicine  Jul 29, 2025  {Provider  Link to [facility] :648228}    {PROVIDER CHARTING PREFERENCE:997510}    Susi Faust is a 59 year old, presenting for the following:  Physical (Bruise on right upper arm, not sure what happened/Blood work /Med discuss Trulicity )        7/29/2025    12:55 PM   Additional Questions   Roomed by EVERETTE Wang   Accompanied by self          HPI  ***   {MA/LPN/RN Pre-Provider Visit Orders- hCG/UA/Strep (Optional):433818}  {SUPERLIST (Optional):635500}  {additonal problems for provider to add (Optional):317039}  Advance Care Planning  {The storyboard will display whether the patient has ACP docs on file. Hover over the Code section in the storyboard to access the ACP documents. :903782}  {(AWV REQUIRED) Advance Care Planning Reviewed:545280}        7/29/2025   General Health   How would you rate your overall physical health? Good   Feel stress (tense, anxious, or unable to sleep) To some extent   (!) STRESS CONCERN      7/29/2025   Nutrition   Three or more servings of calcium each day? Yes   Diet: Low salt    Diabetic   How many servings of fruit and vegetables per day? (!) 2-3   How many sweetened beverages each day? 0-1       Multiple values from one day are sorted in reverse-chronological order         7/29/2025   Exercise   Days per week of moderate/strenous exercise 3 days   Average minutes spent exercising at this level 30 min         7/29/2025   Social Factors   Frequency of gathering with friends or relatives Twice a week   Worry food won't last until get money to buy more No   Food not last or not have enough money for food? No   Do you have housing? (Housing is defined as stable permanent housing and does not include staying outside in a car, in a tent, in an abandoned building, in an overnight shelter, or couch-surfing.) Yes   Are you worried about losing your housing? No   Lack  of transportation? No   Unable to get utilities (heat,electricity)? No         7/29/2025   Fall Risk   Fallen 2 or more times in the past year? No   Trouble with walking or balance? No          7/29/2025   Dental   Dentist two times every year? (!) NO       {Rooming Staff Patient needs a PHQ as part of the AWV.  Use this link to complete and then refresh the note to pull results Link to PHQ2 Assessment :302436}  {USE TO PULL IN PHQ RESULTS FOR TODAY:888006}      7/29/2025   Substance Use   Alcohol more than 3/day or more than 7/wk No   Do you use any other substances recreationally? No     Social History     Tobacco Use    Smoking status: Former     Types: Dip, chew, snus or snuff     Passive exposure: Never    Smokeless tobacco: Former     Types: Chew     Quit date: 12/24/2004   Vaping Use    Vaping status: Never Used   Substance Use Topics    Alcohol use: Yes     Comment: Alcoholic Drinks/day: 1 every 2 weeks    Drug use: No     {Provider  If there are gaps in the social history shown above, please follow the link to update and then refresh the note Link to Social and Substance History :053592}      7/29/2025   STI Screening   New sexual partner(s) since last STI/HIV test? No   Last PSA:   Prostate Specific Antigen Screen   Date Value Ref Range Status   11/29/2022 2.45 0.00 - 3.50 ng/mL Final   01/18/2021 2.4 0.0 - 3.5 ng/mL Final     ASCVD Risk   The ASCVD Risk score (Pia MATOS, et al., 2019) failed to calculate for the following reasons:    The valid total cholesterol range is 130 to 320 mg/dL    {Link to Fracture Risk Assessment Tool (Optional):399195}    {Provider  REQUIRED FOR AWV Use the storyboard to review patient history, after sections have been marked as reviewed, refresh note to capture documentation:624453}   Reviewed and updated as needed this visit by Provider                    {HISTORY OPTIONS (Optional):951059}    {ROS Picklists (Optional):657912}     Objective    Exam  /86    "Pulse 76   Temp 97.5  F (36.4  C)   Resp 18   Ht 1.803 m (5' 11\")   Wt 131.1 kg (289 lb)   SpO2 96%   BMI 40.31 kg/m     Estimated body mass index is 40.31 kg/m  as calculated from the following:    Height as of this encounter: 1.803 m (5' 11\").    Weight as of this encounter: 131.1 kg (289 lb).    Physical Exam  {Exam Choices (Optional):409771}        Signed Electronically by: CECILY Lo CNP  {Email feedback regarding this note to [email or web address]   :598491}  " to eat and feels he could work on his diet more.   - no blood in stools, no black stools, no abdomen pains. No fevers. Will get colonoscopy. No gerd.   - working with Cardiology for A-fib. Not noticing heart fluttering, palpations, chest pains, sob.   - uses his CPAP nightly.            Advance Care Planning    Discussed advance care planning with patient; however, patient declined at this time.        7/29/2025   General Health   How would you rate your overall physical health? Good   Feel stress (tense, anxious, or unable to sleep) To some extent   (!) STRESS CONCERN      7/29/2025   Nutrition   Three or more servings of calcium each day? Yes   Diet: Low salt    Diabetic   How many servings of fruit and vegetables per day? (!) 2-3   How many sweetened beverages each day? 0-1       Multiple values from one day are sorted in reverse-chronological order         7/29/2025   Exercise   Days per week of moderate/strenous exercise 3 days   Average minutes spent exercising at this level 30 min         7/29/2025   Social Factors   Frequency of gathering with friends or relatives Twice a week   Worry food won't last until get money to buy more No   Food not last or not have enough money for food? No   Do you have housing? (Housing is defined as stable permanent housing and does not include staying outside in a car, in a tent, in an abandoned building, in an overnight shelter, or couch-surfing.) Yes   Are you worried about losing your housing? No   Lack of transportation? No   Unable to get utilities (heat,electricity)? No         7/29/2025   Fall Risk   Fallen 2 or more times in the past year? No   Trouble with walking or balance? No          7/29/2025   Dental   Dentist two times every year? (!) NO           Today's PHQ-2 Score:       1/7/2025     4:07 PM   PHQ-2 ( 1999 [employer])   Q1: Little interest or pleasure in doing things 0   Q2: Feeling down, depressed or hopeless 0   PHQ-2 Score 0         7/29/2025   Substance Use    Alcohol more than 3/day or more than 7/wk No   Do you use any other substances recreationally? No     Social History     Tobacco Use    Smoking status: Former     Types: Dip, chew, snus or snuff     Passive exposure: Never    Smokeless tobacco: Former     Types: Chew     Quit date: 12/24/2004   Vaping Use    Vaping status: Never Used   Substance Use Topics    Alcohol use: Yes     Comment: Alcoholic Drinks/day: 1 every 2 weeks    Drug use: No           7/29/2025   STI Screening   New sexual partner(s) since last STI/HIV test? No   Last PSA:   Prostate Specific Antigen Screen   Date Value Ref Range Status   11/29/2022 2.45 0.00 - 3.50 ng/mL Final   01/18/2021 2.4 0.0 - 3.5 ng/mL Final     ASCVD Risk   The ASCVD Risk score (Pia MATOS, et al., 2019) failed to calculate for the following reasons:    The valid total cholesterol range is 130 to 320 mg/dL           Reviewed and updated as needed this visit by Provider                    Past Medical History:   Diagnosis Date    Depression 12/24/2014    Essential hypertension     Infection due to 2019 novel coronavirus 01/11/2022    Multiple thyroid nodules 09/08/2016    Type 2 diabetes mellitus without complication (H) 11/07/2016     Past Surgical History:   Procedure Laterality Date    BIOPSY  9/18/16    CARPAL TUNNEL RELEASE RT/LT Right     COLONOSCOPY  6/15/18    COMBINED CYSTOSCOPY, RETROGRADES, URETEROSCOPY, LASER HOLMIUM LITHOTRIPSY URETER(S), INSERT STENT Bilateral 7/26/2023    Procedure: cystoscopy, bilateral ureteroscopy with holmium laser lithotripsy, bilateral ureteroscopy with stone basketing, bilateral retrograde pyelogram, bilateral ureteral stent placement;  Surgeon: Mark Orta MD;  Location:  OR    CV LEFT ATRIAL APPENDAGE CLOSURE N/A 5/22/2025    Procedure: Left Atrial Appendage Closure - WM;  Surgeon: Kenny Chaves MD;  [facility] CV    EP ABLATION PULMONARY VEIN ISOLATION N/A 9/5/2024    Procedure: Ablation  "Atrial Fibrillation;  Surgeon: Kenny Chaves MD;  Location: [facility] CV    HC REPAIR ROTATOR CUFF,ACUTE      Description: Rotator Cuff Repair Acute;  Recorded: 01/13/2010;  Comments: cortisone    PICC  11/30/2015         REMOVAL OF SPERM DUCT(S)      Description: Surgery Of Male Genitalia Vasectomy;  Recorded: 01/13/2010;    WISDOM TOOTH EXTRACTION       Labs reviewed in EPIC      Review of Systems  Constitutional, HEENT, cardiovascular, pulmonary, gi and gu systems are negative, except as otherwise noted.     Objective    Exam  /86   Pulse 76   Temp 97.5  F (36.4  C)   Resp 18   Ht 1.803 m (5' 11\")   Wt 131.1 kg (289 lb)   SpO2 96%   BMI 40.31 kg/m     Estimated body mass index is 40.31 kg/m  as calculated from the following:    Height as of this encounter: 1.803 m (5' 11\").    Weight as of this encounter: 131.1 kg (289 lb).    Physical Exam  GENERAL: alert and no distress  EYES: Eyes grossly normal to inspection, PERRL and conjunctivae and sclerae normal  NECK: no adenopathy, no asymmetry, masses, or scars  RESP: lungs clear to auscultation - no rales, rhonchi or wheezes  CV: regular rate and rhythm, normal S1 S2, no S3 or S4, no murmur, click or rub, no peripheral edema  ABDOMEN: soft, nontender, no hepatosplenomegaly, no masses and bowel sounds normal- round  MS: no gross musculoskeletal defects noted, no edema  SKIN: no suspicious lesions or rashes  NEURO: Normal strength and tone, mentation intact and speech normal  PSYCH: mentation appears normal, affect normal    Signed Electronically by: CECILY Lo CNP    "

## 2025-07-30 LAB
ALBUMIN SERPL BCG-MCNC: 4.4 G/DL (ref 3.5–5.2)
ALP SERPL-CCNC: 89 U/L (ref 40–150)
ALT SERPL W P-5'-P-CCNC: 32 U/L (ref 0–70)
ANION GAP SERPL CALCULATED.3IONS-SCNC: 12 MMOL/L (ref 7–15)
AST SERPL W P-5'-P-CCNC: 41 U/L (ref 0–45)
BILIRUB SERPL-MCNC: 1.1 MG/DL
BUN SERPL-MCNC: 11.7 MG/DL (ref 8–23)
CALCIUM SERPL-MCNC: 9.7 MG/DL (ref 8.8–10.4)
CHLORIDE SERPL-SCNC: 112 MMOL/L (ref 98–107)
CHOLEST SERPL-MCNC: 113 MG/DL
CREAT SERPL-MCNC: 1 MG/DL (ref 0.67–1.17)
CREAT UR-MCNC: 205 MG/DL
EGFRCR SERPLBLD CKD-EPI 2021: 86 ML/MIN/1.73M2
FASTING STATUS PATIENT QL REPORTED: ABNORMAL
FASTING STATUS PATIENT QL REPORTED: ABNORMAL
FERRITIN SERPL-MCNC: 125 NG/ML (ref 31–409)
GLUCOSE SERPL-MCNC: 91 MG/DL (ref 70–99)
HCO3 SERPL-SCNC: 25 MMOL/L (ref 22–29)
HDLC SERPL-MCNC: 31 MG/DL
IRON BINDING CAPACITY (ROCHE): 291 UG/DL (ref 240–430)
IRON SATN MFR SERPL: 28 % (ref 15–46)
IRON SERPL-MCNC: 81 UG/DL (ref 61–157)
LDLC SERPL CALC-MCNC: 52 MG/DL
MICROALBUMIN UR-MCNC: 164 MG/L
MICROALBUMIN/CREAT UR: 80 MG/G CR (ref 0–17)
NONHDLC SERPL-MCNC: 82 MG/DL
POTASSIUM SERPL-SCNC: 4.6 MMOL/L (ref 3.4–5.3)
PROT SERPL-MCNC: 7.2 G/DL (ref 6.4–8.3)
PSA SERPL DL<=0.01 NG/ML-MCNC: 2.98 NG/ML (ref 0–4.5)
SODIUM SERPL-SCNC: 149 MMOL/L (ref 135–145)
TRIGL SERPL-MCNC: 149 MG/DL

## 2025-08-05 ENCOUNTER — PATIENT OUTREACH (OUTPATIENT)
Dept: ONCOLOGY | Facility: CLINIC | Age: 60
End: 2025-08-05
Payer: COMMERCIAL

## 2025-08-28 ENCOUNTER — HOSPITAL ENCOUNTER (OUTPATIENT)
Dept: CT IMAGING | Facility: CLINIC | Age: 60
End: 2025-08-28
Attending: PHYSICIAN ASSISTANT
Payer: COMMERCIAL

## 2025-08-28 DIAGNOSIS — I48.0 PAROXYSMAL ATRIAL FIBRILLATION (H): ICD-10-CM

## 2025-08-28 LAB
BSA FOR ECHO PROCEDURE: 0 M2
CCTA ASCENDING AORTA: 4.3

## 2025-08-28 PROCEDURE — 250N000011 HC RX IP 250 OP 636: Performed by: PHYSICIAN ASSISTANT

## 2025-08-28 PROCEDURE — 75572 CT HRT W/3D IMAGE: CPT

## 2025-08-28 PROCEDURE — 999N000248 HC STATISTIC IV INSERT WITH US BY RN

## 2025-08-28 RX ORDER — IOPAMIDOL 755 MG/ML
120 INJECTION, SOLUTION INTRAVASCULAR ONCE
Status: COMPLETED | OUTPATIENT
Start: 2025-08-28 | End: 2025-08-28

## 2025-08-28 RX ADMIN — IOPAMIDOL 120 ML: 755 INJECTION, SOLUTION INTRAVENOUS at 12:45

## (undated) DEVICE — PACK CYSTOSCOPY SBA15CYFSI

## (undated) DEVICE — MANIFOLD KIT ANGIO AUTOMATED 014613

## (undated) DEVICE — GUIDEWIRE JTIP 3MM .035 180CM IQ35F180J3

## (undated) DEVICE — TUBE SET SMARKABLATE IRRIGATION

## (undated) DEVICE — TRANSDUCER TRAY ARTERIAL 42646-06

## (undated) DEVICE — INTRODUCER SHEATH VASC CATH 8.5FR CARTO GIDE STH MED D138502

## (undated) DEVICE — GUIDEWIRE URO STR STIFF .035"X150CM NITINOL 150NSS35

## (undated) DEVICE — SHEATH WITH DILATOR ACCESS SYSTEM FXD CRV DBL M635TU80020

## (undated) DEVICE — SHEATH URETERAL ACCESS NAVIGATOR HD 11/13FRX46CM M0062502230

## (undated) DEVICE — Device

## (undated) DEVICE — ENDO ADAPTER CHECK-FLO DISP 27550-CKU

## (undated) DEVICE — CUSTOM PACK EP

## (undated) DEVICE — 8F SOUNDSTAR ECO ULTRASOUND CATHETER

## (undated) DEVICE — CUSTOM PACK CORONARY SAN5BCRHEA

## (undated) DEVICE — SHEATH HEMO 11FR 30CM 406182

## (undated) DEVICE — CATH URETERAL FLEX TIP TIGERTAIL 06FRX70CM 139006

## (undated) DEVICE — KIT HAND CONTROL ACIST 014644 AR-P54

## (undated) DEVICE — SHEATH GUIDING VERSACROSS D1 CURVE L85 CM L180 CBL VXAK0103

## (undated) DEVICE — SYR ANGIOGRAPHY MULTIUSE KIT ACIST 014612

## (undated) DEVICE — SOL NACL 0.9% IRRIG 1000ML BOTTLE 2F7124

## (undated) DEVICE — ELECTRODE DEFIB CADENCE 22550R

## (undated) DEVICE — CATH DIAG IMPULSE 6FR PIG 155 SINGLE

## (undated) DEVICE — PROBE TEMP CIRCA S-CATH M ESPH 12-SNSR 3D MAP CS-21EP

## (undated) DEVICE — DECANTER BAG 2002S

## (undated) DEVICE — SHEATH PINNACLE 9/25/038 RSS905

## (undated) DEVICE — INTRODUCER CHECK FLO 18FRX30CM .038 RCFW-18.0P-38-30-RB

## (undated) DEVICE — CATH ABLATION 8FR 115CM D-F CURVE BI-DIR QDOT MICRO D139505

## (undated) DEVICE — CATH EP 7FR X 115CM DECANAV CA

## (undated) DEVICE — SOL NACL 0.9% IRRIG 3000ML BAG 2B7477

## (undated) DEVICE — INTRO MICRO MINI STICK 4FR STIFF NITINOL 45-753

## (undated) DEVICE — INTRO TERUMO 8FRX25CM W/MARKER RSB803

## (undated) DEVICE — PATCH CARTO 3 EXTERNAL REFERENCE 3D MAPPING CREFP6

## (undated) DEVICE — LASER FIBER HOLMIUM MOSES 200 D/F/L AC-10030100

## (undated) DEVICE — SYSTEM PANNUS RETENTION 4 PAD 2 STRAP CZ-PRS-04

## (undated) DEVICE — CATHETER OCTARAY STANDARD SPLINE CURVE F 2-2-2-2-2 D160904

## (undated) DEVICE — CATH TRANSSEPTAL VERSACROSS 8.5FR 180-J RF 63CM 45DEG

## (undated) DEVICE — PAD CHUX UNDERPAD 23X24" 7136

## (undated) RX ORDER — ONDANSETRON 2 MG/ML
INJECTION INTRAMUSCULAR; INTRAVENOUS
Status: DISPENSED
Start: 2023-07-26

## (undated) RX ORDER — ONDANSETRON 2 MG/ML
INJECTION INTRAMUSCULAR; INTRAVENOUS
Status: DISPENSED
Start: 2025-05-22

## (undated) RX ORDER — PROPOFOL 10 MG/ML
INJECTION, EMULSION INTRAVENOUS
Status: DISPENSED
Start: 2025-05-22

## (undated) RX ORDER — PROTAMINE SULFATE 10 MG/ML
INJECTION, SOLUTION INTRAVENOUS
Status: DISPENSED
Start: 2024-09-05

## (undated) RX ORDER — LIDOCAINE HYDROCHLORIDE 10 MG/ML
INJECTION, SOLUTION EPIDURAL; INFILTRATION; INTRACAUDAL; PERINEURAL
Status: DISPENSED
Start: 2025-05-22

## (undated) RX ORDER — PHENYLEPHRINE HYDROCHLORIDE 10 MG/ML
INJECTION INTRAVENOUS
Status: DISPENSED
Start: 2024-09-05

## (undated) RX ORDER — PROTAMINE SULFATE 10 MG/ML
INJECTION, SOLUTION INTRAVENOUS
Status: DISPENSED
Start: 2025-05-22

## (undated) RX ORDER — PROPOFOL 10 MG/ML
INJECTION, EMULSION INTRAVENOUS
Status: DISPENSED
Start: 2023-07-26

## (undated) RX ORDER — HEPARIN SODIUM 10000 [USP'U]/100ML
INJECTION, SOLUTION INTRAVENOUS
Status: DISPENSED
Start: 2024-09-05

## (undated) RX ORDER — PROPOFOL 10 MG/ML
INJECTION, EMULSION INTRAVENOUS
Status: DISPENSED
Start: 2024-09-05

## (undated) RX ORDER — HYDROMORPHONE HYDROCHLORIDE 1 MG/ML
INJECTION, SOLUTION INTRAMUSCULAR; INTRAVENOUS; SUBCUTANEOUS
Status: DISPENSED
Start: 2023-07-26

## (undated) RX ORDER — FENTANYL CITRATE-0.9 % NACL/PF 10 MCG/ML
PLASTIC BAG, INJECTION (ML) INTRAVENOUS
Status: DISPENSED
Start: 2025-05-22

## (undated) RX ORDER — EPHEDRINE SULFATE 50 MG/ML
INJECTION, SOLUTION INTRAMUSCULAR; INTRAVENOUS; SUBCUTANEOUS
Status: DISPENSED
Start: 2023-07-26

## (undated) RX ORDER — DEXAMETHASONE SODIUM PHOSPHATE 10 MG/ML
INJECTION, SOLUTION INTRAMUSCULAR; INTRAVENOUS
Status: DISPENSED
Start: 2025-05-22

## (undated) RX ORDER — DEXAMETHASONE SODIUM PHOSPHATE 4 MG/ML
INJECTION, SOLUTION INTRA-ARTICULAR; INTRALESIONAL; INTRAMUSCULAR; INTRAVENOUS; SOFT TISSUE
Status: DISPENSED
Start: 2023-07-26

## (undated) RX ORDER — ASPIRIN 81 MG/1
TABLET, CHEWABLE ORAL
Status: DISPENSED
Start: 2025-05-22

## (undated) RX ORDER — FENTANYL CITRATE 50 UG/ML
INJECTION, SOLUTION INTRAMUSCULAR; INTRAVENOUS
Status: DISPENSED
Start: 2024-09-05

## (undated) RX ORDER — FENTANYL CITRATE 50 UG/ML
INJECTION, SOLUTION INTRAMUSCULAR; INTRAVENOUS
Status: DISPENSED
Start: 2023-07-26

## (undated) RX ORDER — FENTANYL CITRATE 50 UG/ML
INJECTION, SOLUTION INTRAMUSCULAR; INTRAVENOUS
Status: DISPENSED
Start: 2025-05-22

## (undated) RX ORDER — CEFAZOLIN SODIUM/WATER 3 G/30 ML
SYRINGE (ML) INTRAVENOUS
Status: DISPENSED
Start: 2025-05-22